# Patient Record
Sex: MALE | Race: WHITE | NOT HISPANIC OR LATINO
[De-identification: names, ages, dates, MRNs, and addresses within clinical notes are randomized per-mention and may not be internally consistent; named-entity substitution may affect disease eponyms.]

---

## 2022-12-08 PROBLEM — Z00.00 ENCOUNTER FOR PREVENTIVE HEALTH EXAMINATION: Status: ACTIVE | Noted: 2022-12-08

## 2022-12-12 ENCOUNTER — APPOINTMENT (OUTPATIENT)
Dept: CT IMAGING | Facility: CLINIC | Age: 87
End: 2022-12-12

## 2022-12-12 PROCEDURE — 70450 CT HEAD/BRAIN W/O DYE: CPT | Mod: MH

## 2023-12-04 ENCOUNTER — EMERGENCY (EMERGENCY)
Facility: HOSPITAL | Age: 88
LOS: 1 days | Discharge: HOME | End: 2023-12-04
Attending: EMERGENCY MEDICINE | Admitting: EMERGENCY MEDICINE
Payer: MEDICARE

## 2023-12-04 VITALS
SYSTOLIC BLOOD PRESSURE: 127 MMHG | TEMPERATURE: 98 F | DIASTOLIC BLOOD PRESSURE: 75 MMHG | HEART RATE: 58 BPM | RESPIRATION RATE: 16 BRPM | OXYGEN SATURATION: 95 %

## 2023-12-04 VITALS
HEIGHT: 66 IN | HEART RATE: 62 BPM | WEIGHT: 147.93 LBS | DIASTOLIC BLOOD PRESSURE: 77 MMHG | TEMPERATURE: 98 F | SYSTOLIC BLOOD PRESSURE: 132 MMHG | OXYGEN SATURATION: 92 % | RESPIRATION RATE: 18 BRPM

## 2023-12-04 DIAGNOSIS — Z20.822 CONTACT WITH AND (SUSPECTED) EXPOSURE TO COVID-19: ICD-10-CM

## 2023-12-04 DIAGNOSIS — R79.89 OTHER SPECIFIED ABNORMAL FINDINGS OF BLOOD CHEMISTRY: ICD-10-CM

## 2023-12-04 DIAGNOSIS — E11.9 TYPE 2 DIABETES MELLITUS WITHOUT COMPLICATIONS: ICD-10-CM

## 2023-12-04 DIAGNOSIS — E86.0 DEHYDRATION: ICD-10-CM

## 2023-12-04 DIAGNOSIS — R53.1 WEAKNESS: ICD-10-CM

## 2023-12-04 DIAGNOSIS — N30.90 CYSTITIS, UNSPECIFIED WITHOUT HEMATURIA: ICD-10-CM

## 2023-12-04 LAB
ALBUMIN SERPL ELPH-MCNC: 3.6 G/DL — SIGNIFICANT CHANGE UP (ref 3.3–5)
ALBUMIN SERPL ELPH-MCNC: 3.6 G/DL — SIGNIFICANT CHANGE UP (ref 3.3–5)
ALP SERPL-CCNC: 56 U/L — SIGNIFICANT CHANGE UP (ref 40–120)
ALP SERPL-CCNC: 56 U/L — SIGNIFICANT CHANGE UP (ref 40–120)
ALT FLD-CCNC: 9 U/L — LOW (ref 10–45)
ALT FLD-CCNC: 9 U/L — LOW (ref 10–45)
ANION GAP SERPL CALC-SCNC: 13 MMOL/L — SIGNIFICANT CHANGE UP (ref 5–17)
ANION GAP SERPL CALC-SCNC: 13 MMOL/L — SIGNIFICANT CHANGE UP (ref 5–17)
APPEARANCE UR: ABNORMAL
APPEARANCE UR: ABNORMAL
AST SERPL-CCNC: 15 U/L — SIGNIFICANT CHANGE UP (ref 10–40)
AST SERPL-CCNC: 15 U/L — SIGNIFICANT CHANGE UP (ref 10–40)
BACTERIA # UR AUTO: ABNORMAL /HPF
BACTERIA # UR AUTO: ABNORMAL /HPF
BASOPHILS # BLD AUTO: 0.02 K/UL — SIGNIFICANT CHANGE UP (ref 0–0.2)
BASOPHILS # BLD AUTO: 0.02 K/UL — SIGNIFICANT CHANGE UP (ref 0–0.2)
BASOPHILS NFR BLD AUTO: 0.3 % — SIGNIFICANT CHANGE UP (ref 0–2)
BASOPHILS NFR BLD AUTO: 0.3 % — SIGNIFICANT CHANGE UP (ref 0–2)
BILIRUB SERPL-MCNC: 0.5 MG/DL — SIGNIFICANT CHANGE UP (ref 0.2–1.2)
BILIRUB SERPL-MCNC: 0.5 MG/DL — SIGNIFICANT CHANGE UP (ref 0.2–1.2)
BILIRUB UR-MCNC: NEGATIVE — SIGNIFICANT CHANGE UP
BILIRUB UR-MCNC: NEGATIVE — SIGNIFICANT CHANGE UP
BUN SERPL-MCNC: 35 MG/DL — HIGH (ref 7–23)
BUN SERPL-MCNC: 35 MG/DL — HIGH (ref 7–23)
CALCIUM SERPL-MCNC: 9.5 MG/DL — SIGNIFICANT CHANGE UP (ref 8.4–10.5)
CALCIUM SERPL-MCNC: 9.5 MG/DL — SIGNIFICANT CHANGE UP (ref 8.4–10.5)
CAST: 4 /LPF — SIGNIFICANT CHANGE UP (ref 0–4)
CAST: 4 /LPF — SIGNIFICANT CHANGE UP (ref 0–4)
CHLORIDE SERPL-SCNC: 100 MMOL/L — SIGNIFICANT CHANGE UP (ref 96–108)
CHLORIDE SERPL-SCNC: 100 MMOL/L — SIGNIFICANT CHANGE UP (ref 96–108)
CO2 SERPL-SCNC: 26 MMOL/L — SIGNIFICANT CHANGE UP (ref 22–31)
CO2 SERPL-SCNC: 26 MMOL/L — SIGNIFICANT CHANGE UP (ref 22–31)
COLOR SPEC: YELLOW — SIGNIFICANT CHANGE UP
COLOR SPEC: YELLOW — SIGNIFICANT CHANGE UP
CREAT SERPL-MCNC: 1.35 MG/DL — HIGH (ref 0.5–1.3)
CREAT SERPL-MCNC: 1.35 MG/DL — HIGH (ref 0.5–1.3)
DIFF PNL FLD: ABNORMAL
DIFF PNL FLD: ABNORMAL
EGFR: 46 ML/MIN/1.73M2 — LOW
EGFR: 46 ML/MIN/1.73M2 — LOW
EOSINOPHIL # BLD AUTO: 0.06 K/UL — SIGNIFICANT CHANGE UP (ref 0–0.5)
EOSINOPHIL # BLD AUTO: 0.06 K/UL — SIGNIFICANT CHANGE UP (ref 0–0.5)
EOSINOPHIL NFR BLD AUTO: 0.9 % — SIGNIFICANT CHANGE UP (ref 0–6)
EOSINOPHIL NFR BLD AUTO: 0.9 % — SIGNIFICANT CHANGE UP (ref 0–6)
FLUAV AG NPH QL: SIGNIFICANT CHANGE UP
FLUAV AG NPH QL: SIGNIFICANT CHANGE UP
FLUBV AG NPH QL: SIGNIFICANT CHANGE UP
FLUBV AG NPH QL: SIGNIFICANT CHANGE UP
GLUCOSE SERPL-MCNC: 188 MG/DL — HIGH (ref 70–99)
GLUCOSE SERPL-MCNC: 188 MG/DL — HIGH (ref 70–99)
GLUCOSE UR QL: >=1000 MG/DL
GLUCOSE UR QL: >=1000 MG/DL
HCT VFR BLD CALC: 38.4 % — LOW (ref 39–50)
HCT VFR BLD CALC: 38.4 % — LOW (ref 39–50)
HGB BLD-MCNC: 12.9 G/DL — LOW (ref 13–17)
HGB BLD-MCNC: 12.9 G/DL — LOW (ref 13–17)
IMM GRANULOCYTES NFR BLD AUTO: 0.3 % — SIGNIFICANT CHANGE UP (ref 0–0.9)
IMM GRANULOCYTES NFR BLD AUTO: 0.3 % — SIGNIFICANT CHANGE UP (ref 0–0.9)
KETONES UR-MCNC: NEGATIVE MG/DL — SIGNIFICANT CHANGE UP
KETONES UR-MCNC: NEGATIVE MG/DL — SIGNIFICANT CHANGE UP
LEUKOCYTE ESTERASE UR-ACNC: ABNORMAL
LEUKOCYTE ESTERASE UR-ACNC: ABNORMAL
LYMPHOCYTES # BLD AUTO: 1.11 K/UL — SIGNIFICANT CHANGE UP (ref 1–3.3)
LYMPHOCYTES # BLD AUTO: 1.11 K/UL — SIGNIFICANT CHANGE UP (ref 1–3.3)
LYMPHOCYTES # BLD AUTO: 16.3 % — SIGNIFICANT CHANGE UP (ref 13–44)
LYMPHOCYTES # BLD AUTO: 16.3 % — SIGNIFICANT CHANGE UP (ref 13–44)
MCHC RBC-ENTMCNC: 30.4 PG — SIGNIFICANT CHANGE UP (ref 27–34)
MCHC RBC-ENTMCNC: 30.4 PG — SIGNIFICANT CHANGE UP (ref 27–34)
MCHC RBC-ENTMCNC: 33.6 GM/DL — SIGNIFICANT CHANGE UP (ref 32–36)
MCHC RBC-ENTMCNC: 33.6 GM/DL — SIGNIFICANT CHANGE UP (ref 32–36)
MCV RBC AUTO: 90.6 FL — SIGNIFICANT CHANGE UP (ref 80–100)
MCV RBC AUTO: 90.6 FL — SIGNIFICANT CHANGE UP (ref 80–100)
MONOCYTES # BLD AUTO: 0.67 K/UL — SIGNIFICANT CHANGE UP (ref 0–0.9)
MONOCYTES # BLD AUTO: 0.67 K/UL — SIGNIFICANT CHANGE UP (ref 0–0.9)
MONOCYTES NFR BLD AUTO: 9.8 % — SIGNIFICANT CHANGE UP (ref 2–14)
MONOCYTES NFR BLD AUTO: 9.8 % — SIGNIFICANT CHANGE UP (ref 2–14)
NEUTROPHILS # BLD AUTO: 4.94 K/UL — SIGNIFICANT CHANGE UP (ref 1.8–7.4)
NEUTROPHILS # BLD AUTO: 4.94 K/UL — SIGNIFICANT CHANGE UP (ref 1.8–7.4)
NEUTROPHILS NFR BLD AUTO: 72.4 % — SIGNIFICANT CHANGE UP (ref 43–77)
NEUTROPHILS NFR BLD AUTO: 72.4 % — SIGNIFICANT CHANGE UP (ref 43–77)
NITRITE UR-MCNC: NEGATIVE — SIGNIFICANT CHANGE UP
NITRITE UR-MCNC: NEGATIVE — SIGNIFICANT CHANGE UP
NRBC # BLD: 0 /100 WBCS — SIGNIFICANT CHANGE UP (ref 0–0)
NRBC # BLD: 0 /100 WBCS — SIGNIFICANT CHANGE UP (ref 0–0)
PH UR: 7 — SIGNIFICANT CHANGE UP (ref 5–8)
PH UR: 7 — SIGNIFICANT CHANGE UP (ref 5–8)
PLATELET # BLD AUTO: 229 K/UL — SIGNIFICANT CHANGE UP (ref 150–400)
PLATELET # BLD AUTO: 229 K/UL — SIGNIFICANT CHANGE UP (ref 150–400)
POTASSIUM SERPL-MCNC: 4.8 MMOL/L — SIGNIFICANT CHANGE UP (ref 3.5–5.3)
POTASSIUM SERPL-MCNC: 4.8 MMOL/L — SIGNIFICANT CHANGE UP (ref 3.5–5.3)
POTASSIUM SERPL-SCNC: 4.8 MMOL/L — SIGNIFICANT CHANGE UP (ref 3.5–5.3)
POTASSIUM SERPL-SCNC: 4.8 MMOL/L — SIGNIFICANT CHANGE UP (ref 3.5–5.3)
PROT SERPL-MCNC: 8.1 G/DL — SIGNIFICANT CHANGE UP (ref 6–8.3)
PROT SERPL-MCNC: 8.1 G/DL — SIGNIFICANT CHANGE UP (ref 6–8.3)
PROT UR-MCNC: 30 MG/DL
PROT UR-MCNC: 30 MG/DL
RBC # BLD: 4.24 M/UL — SIGNIFICANT CHANGE UP (ref 4.2–5.8)
RBC # BLD: 4.24 M/UL — SIGNIFICANT CHANGE UP (ref 4.2–5.8)
RBC # FLD: 14.2 % — SIGNIFICANT CHANGE UP (ref 10.3–14.5)
RBC # FLD: 14.2 % — SIGNIFICANT CHANGE UP (ref 10.3–14.5)
RBC CASTS # UR COMP ASSIST: 1 /HPF — SIGNIFICANT CHANGE UP (ref 0–4)
RBC CASTS # UR COMP ASSIST: 1 /HPF — SIGNIFICANT CHANGE UP (ref 0–4)
RSV RNA NPH QL NAA+NON-PROBE: SIGNIFICANT CHANGE UP
RSV RNA NPH QL NAA+NON-PROBE: SIGNIFICANT CHANGE UP
SARS-COV-2 RNA SPEC QL NAA+PROBE: SIGNIFICANT CHANGE UP
SARS-COV-2 RNA SPEC QL NAA+PROBE: SIGNIFICANT CHANGE UP
SODIUM SERPL-SCNC: 139 MMOL/L — SIGNIFICANT CHANGE UP (ref 135–145)
SODIUM SERPL-SCNC: 139 MMOL/L — SIGNIFICANT CHANGE UP (ref 135–145)
SP GR SPEC: 1.02 — SIGNIFICANT CHANGE UP (ref 1–1.03)
SP GR SPEC: 1.02 — SIGNIFICANT CHANGE UP (ref 1–1.03)
SQUAMOUS # UR AUTO: 1 /HPF — SIGNIFICANT CHANGE UP (ref 0–5)
SQUAMOUS # UR AUTO: 1 /HPF — SIGNIFICANT CHANGE UP (ref 0–5)
UROBILINOGEN FLD QL: 0.2 MG/DL — SIGNIFICANT CHANGE UP (ref 0.2–1)
UROBILINOGEN FLD QL: 0.2 MG/DL — SIGNIFICANT CHANGE UP (ref 0.2–1)
WBC # BLD: 6.82 K/UL — SIGNIFICANT CHANGE UP (ref 3.8–10.5)
WBC # BLD: 6.82 K/UL — SIGNIFICANT CHANGE UP (ref 3.8–10.5)
WBC # FLD AUTO: 6.82 K/UL — SIGNIFICANT CHANGE UP (ref 3.8–10.5)
WBC # FLD AUTO: 6.82 K/UL — SIGNIFICANT CHANGE UP (ref 3.8–10.5)
WBC UR QL: 605 /HPF — HIGH (ref 0–5)
WBC UR QL: 605 /HPF — HIGH (ref 0–5)
YEAST-LIKE CELLS: PRESENT
YEAST-LIKE CELLS: PRESENT

## 2023-12-04 PROCEDURE — 85025 COMPLETE CBC W/AUTO DIFF WBC: CPT

## 2023-12-04 PROCEDURE — 87086 URINE CULTURE/COLONY COUNT: CPT

## 2023-12-04 PROCEDURE — 71045 X-RAY EXAM CHEST 1 VIEW: CPT | Mod: 26

## 2023-12-04 PROCEDURE — 81001 URINALYSIS AUTO W/SCOPE: CPT

## 2023-12-04 PROCEDURE — 36415 COLL VENOUS BLD VENIPUNCTURE: CPT

## 2023-12-04 PROCEDURE — 96374 THER/PROPH/DIAG INJ IV PUSH: CPT

## 2023-12-04 PROCEDURE — 99284 EMERGENCY DEPT VISIT MOD MDM: CPT | Mod: 25

## 2023-12-04 PROCEDURE — 99284 EMERGENCY DEPT VISIT MOD MDM: CPT

## 2023-12-04 PROCEDURE — 71045 X-RAY EXAM CHEST 1 VIEW: CPT

## 2023-12-04 PROCEDURE — 80053 COMPREHEN METABOLIC PANEL: CPT

## 2023-12-04 PROCEDURE — 87637 SARSCOV2&INF A&B&RSV AMP PRB: CPT

## 2023-12-04 RX ORDER — SODIUM CHLORIDE 9 MG/ML
1000 INJECTION INTRAMUSCULAR; INTRAVENOUS; SUBCUTANEOUS ONCE
Refills: 0 | Status: COMPLETED | OUTPATIENT
Start: 2023-12-04 | End: 2023-12-04

## 2023-12-04 RX ORDER — CEFTRIAXONE 500 MG/1
1000 INJECTION, POWDER, FOR SOLUTION INTRAMUSCULAR; INTRAVENOUS ONCE
Refills: 0 | Status: COMPLETED | OUTPATIENT
Start: 2023-12-04 | End: 2023-12-04

## 2023-12-04 RX ADMIN — SODIUM CHLORIDE 1000 MILLILITER(S): 9 INJECTION INTRAMUSCULAR; INTRAVENOUS; SUBCUTANEOUS at 13:33

## 2023-12-04 RX ADMIN — CEFTRIAXONE 100 MILLIGRAM(S): 500 INJECTION, POWDER, FOR SOLUTION INTRAMUSCULAR; INTRAVENOUS at 14:12

## 2023-12-04 NOTE — ED PROVIDER NOTE - PATIENT PORTAL LINK FT
You can access the FollowMyHealth Patient Portal offered by [facility] by registering at the following website: [email or web address]. By joining [employer]’s FollowMyHealth portal, you will also be able to view your health information using other applications (apps) compatible with our system. You can access the FollowMyHealth Patient Portal offered by [facility] by registering at the following website: [email or web address]. By joining [employer]’s FollowMyHealth portal, you will also be able to view your health information using other applications (apps) compatible with our system.

## 2023-12-04 NOTE — ED ADULT NURSE NOTE - NSFALLHARMRISKINTERV_ED_ALL_ED
Assistance OOB with selected safe patient handling equipment if applicable/Assistance with ambulation/Communicate risk of Fall with Harm to all staff, patient, and family/Monitor gait and stability/Provide patient with walking aids/Provide visual cue: red socks, yellow wristband, yellow gown, etc/Reinforce activity limits and safety measures with patient and family/Bed in lowest position, wheels locked, appropriate side rails in place/Call bell, personal items and telephone in reach/Instruct patient to call for assistance before getting out of bed/chair/stretcher/Non-slip footwear applied when patient is off stretcher/[place] to call system/Physically safe environment - no spills, clutter or unnecessary equipment/Purposeful Proactive Rounding/Room/bathroom lighting operational, light cord in reach Assistance OOB with selected safe patient handling equipment if applicable/Assistance with ambulation/Communicate risk of Fall with Harm to all staff, patient, and family/Monitor gait and stability/Provide patient with walking aids/Provide visual cue: red socks, yellow wristband, yellow gown, etc/Reinforce activity limits and safety measures with patient and family/Bed in lowest position, wheels locked, appropriate side rails in place/Call bell, personal items and telephone in reach/Instruct patient to call for assistance before getting out of bed/chair/stretcher/Non-slip footwear applied when patient is off stretcher/[place] to call system/Physically safe environment - no spills, clutter or unnecessary equipment/Purposeful Proactive Rounding/Room/bathroom lighting operational, light cord in reach

## 2023-12-04 NOTE — ED PROVIDER NOTE - IV ALTEPLASE EXCL ABS HIDDEN
[place] filter in place  2009  H/O sinus surgery  2/2017  H/O Spinal surgery  2009  S/P Appendectomy  1968  S/P Cholecystectomy  1968  S/P Hysterectomy  1987  S/P TKR (total knee replacement)  Left 2009  S/P TKR (Total Knee Replacement)  2015 show [place] filter in place  2009  H/O sinus surgery  2/2017  H/O Spinal surgery  2009  S/P Appendectomy  1968  S/P Cholecystectomy  1968  S/P Hysterectomy  1987  S/P TKR (total knee replacement)  Left 2009  S/P TKR (Total Knee Replacement)  Right 2015

## 2023-12-04 NOTE — ED ADULT NURSE NOTE - OBJECTIVE STATEMENT
Patient aaox4 c/o generalized weakness x 1 day, son states referred by MD to ED for evaluation of possible uti. Speaking in full sentences without difficulty. IV initiated, labs collected and sent. Safety measures initiated, comfort measures rendered.

## 2023-12-04 NOTE — ED PROVIDER NOTE - OBJECTIVE STATEMENT
89 M ho DM, co weakness- increased fatigue with loss of bladder control x 1 week- no back pain- no f/c  tolerating po normally no n/v no f/c  mild-mod severity  px lives alone! and takes care of himself  no sig exac/allev factors

## 2023-12-04 NOTE — ED PROVIDER NOTE - CLINICAL SUMMARY MEDICAL DECISION MAKING FREE TEXT BOX
89 m w increased fatigue- px and son denies fever- ua + - bun/cr elevated for px- will give IVF bolus and ceftriaxone IV 89 m w increased fatigue- px and son denies fever- ua + - bun/cr elevated for px- will give IVF bolus and ceftriaxone IV  px looks well feels well- d/w son and nephew- Dr Huggins- wants to go home- Dr DI loomis fu daily , give abx and check on px

## 2023-12-04 NOTE — ED PROVIDER NOTE - NSFOLLOWUPINSTRUCTIONS_ED_ALL_ED_FT
Urinary Tract Infection, Adult    A urinary tract infection (UTI) is an infection of any part of the urinary tract. The urinary tract includes:  The kidneys.  The ureters.  The bladder.  The urethra.  These organs make, store, and get rid of pee (urine) in the body.    What are the causes?  This infection is caused by germs (bacteria) in your genital area. These germs grow and cause swelling (inflammation) of your urinary tract.    What increases the risk?  The following factors may make you more likely to develop this condition:  Using a small, thin tube (catheter) to drain pee.  Not being able to control when you pee or poop (incontinence).  Being female. If you are female, these things can increase the risk:  Using these methods to prevent pregnancy:  A medicine that kills sperm (spermicide).  A device that blocks sperm (diaphragm).  Having low levels of a female hormone (estrogen).  Being pregnant.  You are more likely to develop this condition if:  You have genes that add to your risk.  You are sexually active.  You take antibiotic medicines.  You have trouble peeing because of:  A prostate that is bigger than normal, if you are male.  A blockage in the part of your body that drains pee from the bladder.  A kidney stone.  A nerve condition that affects your bladder.  Not getting enough to drink.  Not peeing often enough.  You have other conditions, such as:  Diabetes.  A weak disease-fighting system (immune system).  Sickle cell disease.  Gout.  Injury of the spine.  What are the signs or symptoms?  Symptoms of this condition include:  Needing to pee right away.  Peeing small amounts often.  Pain or burning when peeing.  Blood in the pee.  Pee that smells bad or not like normal.  Trouble peeing.  Pee that is cloudy.  Fluid coming from the vagina, if you are female.  Pain in the belly or lower back.  Other symptoms include:  Vomiting.  Not feeling hungry.  Feeling mixed up (confused). This may be the first symptom in older adults.  Being tired and grouchy (irritable).  A fever.  Watery poop (diarrhea).  How is this treated?  Taking antibiotic medicine.  Taking other medicines.  Drinking enough water.  In some cases, you may need to see a specialist.    Follow these instructions at home:    Medicines    Take over-the-counter and prescription medicines only as told by your doctor.  If you were prescribed an antibiotic medicine, take it as told by your doctor. Do not stop taking it even if you start to feel better.  General instructions    Make sure you:  Pee until your bladder is empty.  Do not hold pee for a long time.  Empty your bladder after sex.  Wipe from front to back after peeing or pooping if you are a female. Use each tissue one time when you wipe.  Drink enough fluid to keep your pee pale yellow.  Keep all follow-up visits.  Contact a doctor if:  You do not get better after 1–2 days.  Your symptoms go away and then come back.  Get help right away if:  You have very bad back pain.  You have very bad pain in your lower belly.  You have a fever.  You have chills.  You feeling like you will vomit or you vomit.  Summary  A urinary tract infection (UTI) is an infection of any part of the urinary tract.  This condition is caused by germs in your genital area.  There are many risk factors for a UTI.  Treatment includes antibiotic medicines.  Drink enough fluid to keep your pee pale yellow.  This information is not intended to replace advice given to you by your health care provider. Make sure you discuss any questions you have with your health care provider.    Document Revised: 07/30/2021 Document Reviewed: 07/30/2021

## 2023-12-06 LAB
CULTURE RESULTS: SIGNIFICANT CHANGE UP
CULTURE RESULTS: SIGNIFICANT CHANGE UP
SPECIMEN SOURCE: SIGNIFICANT CHANGE UP
SPECIMEN SOURCE: SIGNIFICANT CHANGE UP

## 2024-01-08 ENCOUNTER — INPATIENT (INPATIENT)
Facility: HOSPITAL | Age: 89
LOS: 7 days | Discharge: NURSING, REHAB OR LONG-TERM CARE | DRG: 637 | End: 2024-01-16
Attending: INTERNAL MEDICINE | Admitting: INTERNAL MEDICINE
Payer: MEDICARE

## 2024-01-08 VITALS
OXYGEN SATURATION: 96 % | WEIGHT: 145.06 LBS | HEIGHT: 66 IN | SYSTOLIC BLOOD PRESSURE: 124 MMHG | DIASTOLIC BLOOD PRESSURE: 74 MMHG | RESPIRATION RATE: 18 BRPM | HEART RATE: 71 BPM | TEMPERATURE: 97 F

## 2024-01-08 LAB
ANION GAP SERPL CALC-SCNC: 15 MMOL/L — SIGNIFICANT CHANGE UP (ref 5–17)
ANION GAP SERPL CALC-SCNC: 15 MMOL/L — SIGNIFICANT CHANGE UP (ref 5–17)
BASOPHILS # BLD AUTO: 0.01 K/UL — SIGNIFICANT CHANGE UP (ref 0–0.2)
BASOPHILS # BLD AUTO: 0.01 K/UL — SIGNIFICANT CHANGE UP (ref 0–0.2)
BASOPHILS NFR BLD AUTO: 0.1 % — SIGNIFICANT CHANGE UP (ref 0–2)
BASOPHILS NFR BLD AUTO: 0.1 % — SIGNIFICANT CHANGE UP (ref 0–2)
BUN SERPL-MCNC: 48 MG/DL — HIGH (ref 7–23)
BUN SERPL-MCNC: 48 MG/DL — HIGH (ref 7–23)
CALCIUM SERPL-MCNC: 9.4 MG/DL — SIGNIFICANT CHANGE UP (ref 8.4–10.5)
CALCIUM SERPL-MCNC: 9.4 MG/DL — SIGNIFICANT CHANGE UP (ref 8.4–10.5)
CHLORIDE SERPL-SCNC: 99 MMOL/L — SIGNIFICANT CHANGE UP (ref 96–108)
CHLORIDE SERPL-SCNC: 99 MMOL/L — SIGNIFICANT CHANGE UP (ref 96–108)
CO2 SERPL-SCNC: 22 MMOL/L — SIGNIFICANT CHANGE UP (ref 22–31)
CO2 SERPL-SCNC: 22 MMOL/L — SIGNIFICANT CHANGE UP (ref 22–31)
CREAT SERPL-MCNC: 2.94 MG/DL — HIGH (ref 0.5–1.3)
CREAT SERPL-MCNC: 2.94 MG/DL — HIGH (ref 0.5–1.3)
EGFR: 18 ML/MIN/1.73M2 — LOW
EGFR: 18 ML/MIN/1.73M2 — LOW
EOSINOPHIL # BLD AUTO: 0.02 K/UL — SIGNIFICANT CHANGE UP (ref 0–0.5)
EOSINOPHIL # BLD AUTO: 0.02 K/UL — SIGNIFICANT CHANGE UP (ref 0–0.5)
EOSINOPHIL NFR BLD AUTO: 0.3 % — SIGNIFICANT CHANGE UP (ref 0–6)
EOSINOPHIL NFR BLD AUTO: 0.3 % — SIGNIFICANT CHANGE UP (ref 0–6)
GLUCOSE SERPL-MCNC: 34 MG/DL — CRITICAL LOW (ref 70–99)
GLUCOSE SERPL-MCNC: 34 MG/DL — CRITICAL LOW (ref 70–99)
HCT VFR BLD CALC: 32.5 % — LOW (ref 39–50)
HCT VFR BLD CALC: 32.5 % — LOW (ref 39–50)
HGB BLD-MCNC: 10.8 G/DL — LOW (ref 13–17)
HGB BLD-MCNC: 10.8 G/DL — LOW (ref 13–17)
IMM GRANULOCYTES NFR BLD AUTO: 0.4 % — SIGNIFICANT CHANGE UP (ref 0–0.9)
IMM GRANULOCYTES NFR BLD AUTO: 0.4 % — SIGNIFICANT CHANGE UP (ref 0–0.9)
LYMPHOCYTES # BLD AUTO: 0.93 K/UL — LOW (ref 1–3.3)
LYMPHOCYTES # BLD AUTO: 0.93 K/UL — LOW (ref 1–3.3)
LYMPHOCYTES # BLD AUTO: 13.6 % — SIGNIFICANT CHANGE UP (ref 13–44)
LYMPHOCYTES # BLD AUTO: 13.6 % — SIGNIFICANT CHANGE UP (ref 13–44)
MCHC RBC-ENTMCNC: 30.2 PG — SIGNIFICANT CHANGE UP (ref 27–34)
MCHC RBC-ENTMCNC: 30.2 PG — SIGNIFICANT CHANGE UP (ref 27–34)
MCHC RBC-ENTMCNC: 33.2 GM/DL — SIGNIFICANT CHANGE UP (ref 32–36)
MCHC RBC-ENTMCNC: 33.2 GM/DL — SIGNIFICANT CHANGE UP (ref 32–36)
MCV RBC AUTO: 90.8 FL — SIGNIFICANT CHANGE UP (ref 80–100)
MCV RBC AUTO: 90.8 FL — SIGNIFICANT CHANGE UP (ref 80–100)
MONOCYTES # BLD AUTO: 0.6 K/UL — SIGNIFICANT CHANGE UP (ref 0–0.9)
MONOCYTES # BLD AUTO: 0.6 K/UL — SIGNIFICANT CHANGE UP (ref 0–0.9)
MONOCYTES NFR BLD AUTO: 8.7 % — SIGNIFICANT CHANGE UP (ref 2–14)
MONOCYTES NFR BLD AUTO: 8.7 % — SIGNIFICANT CHANGE UP (ref 2–14)
NEUTROPHILS # BLD AUTO: 5.27 K/UL — SIGNIFICANT CHANGE UP (ref 1.8–7.4)
NEUTROPHILS # BLD AUTO: 5.27 K/UL — SIGNIFICANT CHANGE UP (ref 1.8–7.4)
NEUTROPHILS NFR BLD AUTO: 76.9 % — SIGNIFICANT CHANGE UP (ref 43–77)
NEUTROPHILS NFR BLD AUTO: 76.9 % — SIGNIFICANT CHANGE UP (ref 43–77)
NRBC # BLD: 0 /100 WBCS — SIGNIFICANT CHANGE UP (ref 0–0)
NRBC # BLD: 0 /100 WBCS — SIGNIFICANT CHANGE UP (ref 0–0)
PLATELET # BLD AUTO: 210 K/UL — SIGNIFICANT CHANGE UP (ref 150–400)
PLATELET # BLD AUTO: 210 K/UL — SIGNIFICANT CHANGE UP (ref 150–400)
POTASSIUM SERPL-MCNC: 4 MMOL/L — SIGNIFICANT CHANGE UP (ref 3.5–5.3)
POTASSIUM SERPL-MCNC: 4 MMOL/L — SIGNIFICANT CHANGE UP (ref 3.5–5.3)
POTASSIUM SERPL-SCNC: 4 MMOL/L — SIGNIFICANT CHANGE UP (ref 3.5–5.3)
POTASSIUM SERPL-SCNC: 4 MMOL/L — SIGNIFICANT CHANGE UP (ref 3.5–5.3)
RBC # BLD: 3.58 M/UL — LOW (ref 4.2–5.8)
RBC # BLD: 3.58 M/UL — LOW (ref 4.2–5.8)
RBC # FLD: 14 % — SIGNIFICANT CHANGE UP (ref 10.3–14.5)
RBC # FLD: 14 % — SIGNIFICANT CHANGE UP (ref 10.3–14.5)
SODIUM SERPL-SCNC: 136 MMOL/L — SIGNIFICANT CHANGE UP (ref 135–145)
SODIUM SERPL-SCNC: 136 MMOL/L — SIGNIFICANT CHANGE UP (ref 135–145)
WBC # BLD: 6.86 K/UL — SIGNIFICANT CHANGE UP (ref 3.8–10.5)
WBC # BLD: 6.86 K/UL — SIGNIFICANT CHANGE UP (ref 3.8–10.5)
WBC # FLD AUTO: 6.86 K/UL — SIGNIFICANT CHANGE UP (ref 3.8–10.5)
WBC # FLD AUTO: 6.86 K/UL — SIGNIFICANT CHANGE UP (ref 3.8–10.5)

## 2024-01-08 PROCEDURE — 99222 1ST HOSP IP/OBS MODERATE 55: CPT | Mod: GC

## 2024-01-08 PROCEDURE — 99285 EMERGENCY DEPT VISIT HI MDM: CPT | Mod: FS

## 2024-01-08 RX ORDER — CHLORHEXIDINE GLUCONATE 213 G/1000ML
1 SOLUTION TOPICAL
Refills: 0 | Status: DISCONTINUED | OUTPATIENT
Start: 2024-01-08 | End: 2024-01-16

## 2024-01-08 RX ORDER — DEXTROSE 10 % IN WATER 10 %
1000 INTRAVENOUS SOLUTION INTRAVENOUS
Refills: 0 | Status: DISCONTINUED | OUTPATIENT
Start: 2024-01-08 | End: 2024-01-09

## 2024-01-08 RX ORDER — DEXTROSE 10 % IN WATER 10 %
1000 INTRAVENOUS SOLUTION INTRAVENOUS
Refills: 0 | Status: DISCONTINUED | OUTPATIENT
Start: 2024-01-08 | End: 2024-01-08

## 2024-01-08 RX ORDER — DEXTROSE 50 % IN WATER 50 %
25 SYRINGE (ML) INTRAVENOUS ONCE
Refills: 0 | Status: COMPLETED | OUTPATIENT
Start: 2024-01-08 | End: 2024-01-08

## 2024-01-08 RX ORDER — SODIUM CHLORIDE 9 MG/ML
1000 INJECTION INTRAMUSCULAR; INTRAVENOUS; SUBCUTANEOUS ONCE
Refills: 0 | Status: COMPLETED | OUTPATIENT
Start: 2024-01-08 | End: 2024-01-08

## 2024-01-08 RX ADMIN — Medication 25 MILLILITER(S): at 22:00

## 2024-01-08 NOTE — H&P ADULT - NSHPLABSRESULTS_GEN_ALL_CORE
LABS:                         10.8   6.86  )-----------( 210      ( 08 Jan 2024 21:53 )             32.5     01-08    136  |  99  |  48<H>  ----------------------------<  34<LL>  4.0   |  22  |  2.94<H>    Ca    9.4      08 Jan 2024 21:53        Urinalysis Basic - ( 08 Jan 2024 21:53 )    Color: x / Appearance: x / SG: x / pH: x  Gluc: 34 mg/dL / Ketone: x  / Bili: x / Urobili: x   Blood: x / Protein: x / Nitrite: x   Leuk Esterase: x / RBC: x / WBC x   Sq Epi: x / Non Sq Epi: x / Bacteria: x            RADIOLOGY, EKG & ADDITIONAL TESTS: Reviewed.

## 2024-01-08 NOTE — ED PROVIDER NOTE - OBJECTIVE STATEMENT
89 yr old male, history of DM,   previously on oral medications for DM, most recently farxiga. has had two UTIs in last few months. PMD thought possibly triggered from farxiga so stopped that and started him on levemir insulin. 10un in morning and 5un at night. has been taking for **    also currently on levaquin for uti. 89 yr old male, history of DM, presents to the Emergency Department w hypoglycemia. previously on oral medications for DM, most recently farxiga. has had two UTIs in last few months. PMD thought possibly triggered from farxiga so stopped that and started him on levemir insulin. 10un in morning and 5un at night. tonight around 5pm, got evening insulin, around 7pm son found pt to be confused, sweaty. gave pt honey and called ems. by the time ems arrived BG was ok and mental status had improved.   pt currently on levaquin for uti. no fever, cp, sob, abd pain, n/v/d, urinary symptoms.

## 2024-01-08 NOTE — ED ADULT NURSE REASSESSMENT NOTE - NS ED NURSE REASSESS COMMENT FT1
Pt is sleeping on stretcher comfortably. Denies any discomfort. No agitation noted. Family members are on bedside.

## 2024-01-08 NOTE — ED ADULT TRIAGE NOTE - CHIEF COMPLAINT QUOTE
Pt family reports pt had AMS, slurred speech earlier tonight, pt was given honey by mouth symptoms resolved. EMS reports bgl of 92 post honey. pt denies any pain, no numbness/tingling/weakness, no slurred speech. Pt recently started on insulin 3 days ago, received 10u in the morning and 5units tonight around 5pm. Pt family reports decreased appetite and recent dx of UTI.

## 2024-01-08 NOTE — H&P ADULT - ASSESSMENT
89 yr old male, history of DM, previously on oral medications for DM, most recently farxiga. has had two UTIs in last few months. PMD thought possibly triggered from farxiga so stopped that and started him on levemir insulin. 10un in morning and 5un at night. has been taking for **also currently on levaquin for uti.      Neuro        Pulm  RENA      CVS  RENA      GI  RENA        Renal  # JULIO CESAR on CKD  pt presents with  BUN 48, Cr 2.94 ( baseline 1.35 ), eGFR 18 (baseline 46), iso hypoglycemia 34 and > 1000 glucose in urine. UA suggestive of UTI.    - Obtain urine studies  - Treat UTI  - Manage hypoglycemia  - Monitor BMP  - Strict I & Os  - Avoid nephrotoxic agents            RENA          Endo  # Hypoglycemia  pt with a Hx of DM recently switched from Farxiga by PCP ( due to recurrent UTI ), to levemir insulin. 10 units in morning and 5 units at night. Found to have a blood glucose of 34, urine Glucose > 1000.         ID  # UTI  pt with a Hx of DM recently switched from Farxiga by PCP ( due to recurrent UTI ), to levemir insulin. 10 units in morning and 5 units at night. Currently presenting with UA findings of : positive Leukocyte Esterase, few bacteria, yeast like cells and Glucose > 1000.   - Obtain urine Cx  - Start CTX 1g  - Obtain BCx  - Strict I & Os       Heme/Onc  RENA      Prophylactic measures    Fluids:  Nutrition:  GI prophylaxis:  DVT prophylaxis:  Code:  Dispo: ICU        89 yr old male, history of DM,  on oral medications for DM, most recently farxiga. has had two UTIs in last few months. PCP  thought possibly triggered from farxiga so stopped that and started him on levemir insulin. 10un in morning and 5un at night. has also currently on levaquin for uti.      Neuro  RENA      Pulm  RENA      CVS  # Lower extremity edema  pt presented with +1 unilateral edema of the left lower extremity. His RLE has no edema. Cause unknown. No known Hx of DVT  - Consider Duplex US       GI  # Poor oral intake  per son , the patient has been eating poorly in the last few days, which may have contributed to his hypoglycemia  - Dietary consult        Renal  # JULIO CESAR on CKD  pt presents with  BUN 48, Cr 2.94 ( baseline 1.35 ), eGFR 18 (baseline 46), iso hypoglycemia 34 and > 1000 glucose in urine. UA suggestive of UTI.    - Obtain urine studies  - Treat UTI  - Manage hypoglycemia  - Monitor BMP  - Strict I & Os  - Avoid nephrotoxic agents            RENA          Endo  # Hypoglycemia  pt with a Hx of DM on ( Metformin 1000 BID, Glimeperide 2mg BID )   recently switched from Farxiga by PCP ( due to recurrent UTI ), to levemir insulin. 10 units in morning and 5 units at night. Found to have a blood glucose of 34, urine Glucose > 1000. S/P D50 50ml + 25ml boluses, D10 1000ml infusion at 30ml/h, 1 L NS bolus in the ED.    - CW D10 30ml/h  - close monitoring of blood sugar  - A1c in the AM  - ISS during inpatient stay  - Holding home meds        ID  # UTI  pt with a Hx of DM recently switched from Farxiga by PCP to levemir insulin. 10 units in morning and 5 units at night.  ( due to recurrent UTI ) the last UTI was diagnosed 5 days ago for which he was prescribed a 10 day course of Levofloxacin (5 days remaining),  Currently presenting with UA findings of : positive Leukocyte Esterase, few bacteria, yeast like cells and Glucose > 1000.     - Finish the Levofloxacin course ( 500mg BID, 5 days remaining )  - Obtain urine Cx  - Obtain BCx  - Strict I & Os       Heme/Onc  RENA      Prophylactic measures    Fluids:  Nutrition:  GI prophylaxis:  DVT prophylaxis:  Code:  Dispo: ICU        89 yr old male, history of DM,  on oral medications for DM, most recently farxiga. has had two UTIs in last few months. PCP  thought possibly triggered from farxiga so stopped that and started him on levemir insulin. 10un in morning and 5un at night. has also currently on levaquin for uti.      Neuro  RENA      Pulm  RENA      CVS  # Lower extremity edema  pt presented with +1 unilateral edema of the left lower extremity. His RLE has no edema. Cause unknown. No known Hx of DVT  - Consider Duplex US     # Pt on a home dose of Atorvastatin 10mg  - C/W home meds once pt passes dysphagia screen      GI  # Poor oral intake  per son , the patient has been eating poorly in the last few days, which may have contributed to his hypoglycemia  - Dietary consult  - NPO till pt pass dysphagia screen      Renal  # JULIO CESAR on CKD  pt presents with  BUN 48, Cr 2.94 ( baseline 1.35 ), eGFR 18 (baseline 46), iso hypoglycemia 34 and > 1000 glucose in urine. UA suggestive of UTI.    - ensure adequate hydration with IV fluids  - Obtain urine studies  - Treat UTI  - Manage hypoglycemia  - Monitor BMP  - Strict I & Os  - Avoid nephrotoxic agents            RENA          Endo  # Hypoglycemia  pt with a Hx of DM on ( Metformin 1000 BID, Glimeperide 2mg BID )   recently switched from Farxiga by PCP ( due to recurrent UTI ), to levemir insulin. 10 units in morning and 5 units at night. Found to have a blood glucose of 34, urine Glucose > 1000. S/P D50 50ml + 25ml boluses, D10 1000ml infusion at 30ml/h, 1 L NS bolus in the ED.    - CW D10 30ml/h  - close monitoring of blood sugar  - A1c in the AM  - ISS during inpatient stay  - Holding home meds        ID  # UTI  pt with a Hx of DM recently switched from Farxiga by PCP to levemir insulin. 10 units in morning and 5 units at night.  ( due to recurrent UTI ) the last UTI was diagnosed 5 days ago for which he was prescribed a 10 day course of Levofloxacin (5 days remaining),  Currently presenting with UA findings of : positive Leukocyte Esterase, few bacteria, yeast like cells and Glucose > 1000.     - Finish the Levofloxacin course ( 500mg BID, 5 days remaining )  - ensure adequate hydration with IV fluids  - Obtain urine Cx  - Obtain BCx  - Strict I & Os       Heme/Onc  RENA      Prophylactic measures    Fluids: D10 30cc/h  Nutrition: NPO till passing dysphagia screen  GI prophylaxis: none  DVT prophylaxis: Heparin 5000 q8  Code: full  Dispo: ICU        89 yr old male, history of DM,  on oral medications for DM, most recently farxiga. has had two UTIs in last few months. PCP  thought possibly triggered from farxiga so stopped that and started him on levemir insulin. 10un in morning and 5un at night. has also currently on levaquin for uti.      Neuro  RENA      Pulm  RENA      CVS  # Lower extremity edema  pt presented with +1 unilateral edema of the left lower extremity. His RLE has no edema. Cause unknown. No known Hx of DVT  - Consider Duplex US     # Pt on a home dose of Atorvastatin 10mg  - C/W home meds once pt passes dysphagia screen      GI  # Poor oral intake  per son , the patient has been eating poorly in the last few days, which may have contributed to his hypoglycemia  - Dietary consult  - NPO till pt pass dysphagia screen      Renal  # JULIO CESAR on CKD  pt presents with  BUN 48, Cr 2.94 ( baseline 1.35 ), eGFR 18 (baseline 46), iso hypoglycemia 34 and > 1000 glucose in urine. UA suggestive of UTI.    - ensure adequate hydration with IV fluids  - Obtain urine studies  - Treat UTI  - Manage hypoglycemia  - Monitor BMP  - Strict I & Os  - Avoid nephrotoxic agents            RENA          Endo  # Hypoglycemia  pt with a Hx of DM on ( Metformin 1000 BID, Glimeperide 2mg BID )   recently switched from Farxiga by PCP ( due to recurrent UTI ), to levemir insulin. 10 units in morning and 5 units at night. Found to have a blood glucose of 34, urine Glucose > 1000. S/P D50 50ml + 25ml boluses, D10 1000ml infusion at 30ml/h, 1 L NS bolus in the ED.    - CW D10 80 ml/h  - C/W LR at 100 ml/h  - close monitoring of blood sugar  - A1c in the AM  - ISS during inpatient stay  - Holding home meds        ID  # UTI  pt with a Hx of DM recently switched from Farxiga by PCP to levemir insulin. 10 units in morning and 5 units at night.  ( due to recurrent UTI ) the last UTI was diagnosed 5 days ago for which he was prescribed a 10 day course of Levofloxacin (5 days remaining),  Currently presenting with UA findings of : positive Leukocyte Esterase, few bacteria, yeast like cells and Glucose > 1000.     - Finish the Levofloxacin course ( 500mg BID, 5 days remaining )  - ensure adequate hydration with IV fluids  - Obtain urine Cx (sent)  - Obtain BCx  - Strict I & Os       Heme/Onc  RENA      Prophylactic measures    Fluids: D10 80cc/h, LR at 100cc/h   Nutrition: NPO till passing dysphagia screen  GI prophylaxis: none  DVT prophylaxis: Heparin 5000 q8  Code: full  Dispo: ICU        89 yr old male, history of DM,  on oral medications for DM, most recently farxiga. has had two UTIs in last few months. PCP  thought possibly triggered from farxiga so stopped that and started him on levemir insulin. 10un in morning and 5un at night. has also currently on levaquin for uti.      Neuro  RENA      Pulm  RENA      CVS  # Lower extremity edema  pt presented with +1 unilateral edema of the left lower extremity. His RLE has no edema. Cause unknown. No known Hx of DVT  - Consider Duplex US     # Pt on a home dose of Atorvastatin 10mg  - C/W home meds once pt passes dysphagia screen      GI  # Poor oral intake  per son , the patient has been eating poorly in the last few days, which may have contributed to his hypoglycemia  - Dietary consult  - NPO till pt pass dysphagia screen      Renal  # JULIO CESAR on CKD  pt presents with  BUN 48, Cr 2.94 ( baseline 1.35 ), eGFR 18 (baseline 46), iso hypoglycemia 34 and > 1000 glucose in urine. UA suggestive of UTI.    - ensure adequate hydration with IV fluids  - Obtain urine studies  - Treat UTI  - Manage hypoglycemia  - Monitor BMP  - Strict I & Os  - Avoid nephrotoxic agents            RENA          Endo  # Hypoglycemia  pt with a Hx of DM on ( Metformin 1000 BID, Glimeperide 2mg BID )   recently switched from Farxiga by PCP ( due to recurrent UTI ), to levemir insulin. 10 units in morning and 5 units at night. Found to have a blood glucose of 34, urine Glucose > 1000. S/P D50 50ml + 25ml boluses, D10 1000ml infusion at 30ml/h, 1 L NS bolus in the ED.    - CW D10 80 ml/h  - C/W LR at 100 ml/h  - close monitoring of blood sugar  - A1c  >>>> ( 9 )  - ISS during inpatient stay  - Holding home meds        ID  # UTI  pt with a Hx of DM recently switched from Farxiga by PCP to levemir insulin. 10 units in morning and 5 units at night.  ( due to recurrent UTI ) the last UTI was diagnosed 5 days ago for which he was prescribed a 10 day course of Levofloxacin (5 days remaining),  Currently presenting with UA findings of : positive Leukocyte Esterase, few bacteria, yeast like cells and Glucose > 1000.     - Finish the Levofloxacin course ( 500mg BID, 5 days remaining )  - ensure adequate hydration with IV fluids  - Obtain urine Cx (sent)  - Obtain BCx  - Strict I & Os       Heme/Onc  RENA      Prophylactic measures    Fluids: D10 80cc/h, LR at 100cc/h   Nutrition: NPO till passing dysphagia screen  GI prophylaxis: none  DVT prophylaxis: Heparin 5000 q8  Code: full  Dispo: ICU

## 2024-01-08 NOTE — ED PROVIDER NOTE - PHYSICAL EXAMINATION
Constitutional :  non-toxic, no acute distress. awake, alert, oriented to person, place, time/situation.  Head : head normocephalic, atraumatic  EENMT : eyes clear bilaterally, PERRL, EOMI. airway patent. moist mucous membranes. neck supple.  Cardiac : Normal rate, regular rhythm. No murmur appreciated, no LE edema.  Resp : Breath sounds clear and equal bilaterally. Respirations even and unlabored.   Gastro : abdomen soft, nontender, nondistended. no rebound or guarding. no CVAT.  MSK :  range of motion is not limited, no muscle or joint tenderness  Back : No evidence of trauma. No spinal or CVA tenderness.  Vasc : Extremities warm and well perfused. 2+ radial and DP pulses. cap refill <2 seconds  Neuro : Alert and oriented, CNII-XII grossly intact, no focal deficits, no motor or sensory deficits.  Skin : Skin normal color for race, warm, dry and intact. No evidence of rash.  Psych : Alert and oriented to person, place, time/situation. normal mood and affect. no apparent risk to self or others.

## 2024-01-08 NOTE — ED ADULT NURSE NOTE - OBJECTIVE STATEMENT
Pt complaints of AMS as per son. Son report AMS w/ slurred speech earlier tonight, was given honey by mouth and symptoms resolved. EMS report BGL was 92 post honey. Pt denies any discomfort, pain, injury, fall, numbness/tingling/weakness. No slurred speech noted. Report pt recently started on insulin for DM 3days ago and received 10unit in the morning and 5units tonight around 5pm. But having decreased appetite so didn't eat well for couple of days. Also recently dx UTI, taking meds for that.

## 2024-01-08 NOTE — ED ADULT TRIAGE NOTE - SPO2 (%)
Report directly to Emergency Department for any acute worsening of symptoms.   May alternate Tylenol and Motrin as directed for elevated temp and pain.   Recommend increased intake of fluids and rest.   May take Zyrtec or Claritin OTC as directed.   Recommend OTC children's cough medication as directed.   Follow up with PCP or return to clinic in three days if no improvement.     
96

## 2024-01-08 NOTE — ED ADULT NURSE NOTE - NSFALLHARMRISKINTERV_ED_ALL_ED
Assistance OOB with selected safe patient handling equipment if applicable/Assistance with ambulation/Communicate risk of Fall with Harm to all staff, patient, and family/Monitor gait and stability/Monitor for mental status changes and reorient to person, place, and time, as needed/Provide visual cue: red socks, yellow wristband, yellow gown, etc/Reinforce activity limits and safety measures with patient and family/Toileting schedule using arm’s reach rule for commode and bathroom/Use of alarms - bed, stretcher, chair and/or video monitoring/Bed in lowest position, wheels locked, appropriate side rails in place/Call bell, personal items and telephone in reach/Instruct patient to call for assistance before getting out of bed/chair/stretcher/Non-slip footwear applied when patient is off stretcher/[place] to call system/Physically safe environment - no spills, clutter or unnecessary equipment/Purposeful Proactive Rounding/Room/bathroom lighting operational, light cord in reach Assistance OOB with selected safe patient handling equipment if applicable/Assistance with ambulation/Communicate risk of Fall with Harm to all staff, patient, and family/Monitor gait and stability/Monitor for mental status changes and reorient to person, place, and time, as needed/Provide visual cue: red socks, yellow wristband, yellow gown, etc/Reinforce activity limits and safety measures with patient and family/Toileting schedule using arm’s reach rule for commode and bathroom/Use of alarms - bed, stretcher, chair and/or video monitoring/Bed in lowest position, wheels locked, appropriate side rails in place/Call bell, personal items and telephone in reach/Instruct patient to call for assistance before getting out of bed/chair/stretcher/Non-slip footwear applied when patient is off stretcher/[place] to call system/Physically safe environment - no spills, clutter or unnecessary equipment/Purposeful Proactive Rounding/Room/bathroom lighting operational, light cord in reach

## 2024-01-08 NOTE — H&P ADULT - NSHPPHYSICALEXAM_GEN_ALL_CORE
PHYSICAL EXAM:  GENERAL: NAD, lying in bed comfortably  HEAD:  Atraumatic, Normocephalic  EYES: EOMI, PERRLA, conjunctiva and sclera clear  ENT: Moist mucous membranes  NECK: Supple, No JVD  CHEST/LUNG: Clear to auscultation bilaterally; No rales, rhonchi, wheezing, or rubs. Unlabored respirations  HEART: Regular rate and rhythm; No murmurs, rubs, or gallops  ABDOMEN: Bowel sounds present; Soft, Nontender, Nondistended. No hepatomegally  EXTREMITIES:  2+ Peripheral Pulses, brisk capillary refill. No clubbing, cyanosis, or edema  NERVOUS SYSTEM:  Alert & Oriented X3, speech clear. No deficits   MSK: FROM all 4 extremities, full and equal strength  SKIN: No rashes or lesions PHYSICAL EXAM:  GENERAL: NAD, lying in bed comfortably  HEAD:  Atraumatic, Normocephalic  EYES: EOMI, PERRLA, conjunctiva and sclera clear  ENT: Moist mucous membranes  NECK: Supple, No JVD  CHEST/LUNG: Clear to auscultation bilaterally; No rales, rhonchi, wheezing, or rubs. Unlabored respirations  HEART: Regular rate and rhythm; No murmurs, rubs, or gallops  ABDOMEN: Bowel sounds present; Soft, Nontender, Nondistended. No hepatomegally  EXTREMITIES:  2+ Peripheral Pulses, brisk capillary refill. unilateral  +1 pitting edema of the left lower extremity half way to the knee, no swelling or tenderness  NERVOUS SYSTEM:  Alert & Oriented X3, speech clear. No deficits   MSK: FROM all 4 extremities, full and equal strength  SKIN: No rashes or lesions

## 2024-01-08 NOTE — ED PROVIDER NOTE - PROGRESS NOTE DETAILS
fingerstick 117 on arrival. -> 54 and s/p juice and 1amp improved to 117.   creatinine 2.94 from 1.35 on ed visit 12/4.   seen by icu given need for close fingerstick monitoring.  recs D10 drip @30cc and admit to ICU

## 2024-01-08 NOTE — H&P ADULT - HISTORY OF PRESENT ILLNESS
89 yr old male, history of DM, previously on oral medications for DM, most recently farxiga. has had two UTIs in last few months. PMD thought possibly triggered from farxiga so stopped that and started him on levemir insulin. 10un in morning and 5un at night. has been taking for **also currently on levaquin for uti.    Initial vital signs: T: 97.2 F, HR: 71 , BP: 124/74 , R: 18, SpO2: 96 % on RA  ED course:   Labs: significant for Glucose 34, BUN 48, Cr 2.94 ( baseline 1.35 ), eGFR 18 (baseline 46),   UA: large Leukocyte Esterase, Yeast like cells, > 1000 Glucose  Imaging:  CXR:   EKG:   Medications:  89 yr old male ( AAOx3 at baseline ), history of DM for the past 20 years, on oral medications for DM ( Metformin 1000 BID, Glimeperide 2mg BID ), was recently prescribed  farxiga ( unknown dose ) to be added to his oral medications for better glucose control. But due to repeated UTIs ( one was few weeks ago and one was 5 days ago for which he started Levofloxacin 500mg BID, 10 day course ). his PCP Dr. Lai Huggins ( [phone] ) thought possibly triggered from farxiga so stopped that and started him on levemir insulin. 10 units in morning and 5 units at night. The patient has not been eating well for the past few days. Around 5 pm today, the patient got his evening insulin and by 7pm he started feeling agitated, confused and sweaty. Son called EMS and they found him to have low blood glucose. Admitted for the management of hypoglycemia.    Initial vital signs: T: 97.2 F, HR: 71 , BP: 124/74 , R: 18, SpO2: 96 % on RA  ED course:   Labs: significant for Glucose 34, BUN 48, Cr 2.94 ( baseline 1.35 ), eGFR 18 (baseline 46),   UA: large Leukocyte Esterase, Yeast like cells, > 1000 Glucose  Imaging:  CXR:   EKG: NSR  Medications: D50 50ml + 25ml boluses, D10 1000ml infusion at 30ml/h, 1 L NS bolus         89 yr old male ( AAOx3 at baseline ), history of DM for the past 20 years, on oral medications for DM ( Metformin 1000 BID, Glimeperide 2mg BID ), was recently prescribed  farxiga ( unknown dose ) to be added to his oral medications for better glucose control. But due to repeated UTIs ( one was few weeks ago and one was 5 days ago for which he started Levofloxacin 500mg BID, 10 day course ). his PCP Dr. Lai Huggins ( [phone] ) thought possibly triggered from farxiga so stopped that and started him on levemir insulin. 10 units in morning and 5 units at night. The patient has not been eating well for the past few days. Around 5 pm today, the patient got his evening insulin and by 7pm he started feeling agitated, confused and sweaty. Son called EMS and they found him to have low blood glucose. Admitted for the management of hypoglycemia.    Initial vital signs: T: 97.2 F, HR: 71 , BP: 124/74 , R: 18, SpO2: 96 % on RA  ED course:   Labs: significant for Glucose 34, BUN 48, Cr 2.94 ( baseline 1.35 ), eGFR 18 (baseline 46),   UA: large Leukocyte Esterase, Yeast like cells, > 1000 Glucose  Imaging:  CXR:   EKG: NSR  Medications: D50 50ml + 25ml boluses, D10 1000ml infusion at 30ml/h, 1 L NS bolus

## 2024-01-08 NOTE — ED PROVIDER NOTE - CLINICAL SUMMARY MEDICAL DECISION MAKING FREE TEXT BOX
history of DM, recently started on levemir insulin (10un am and 5un pm), here after found to be confused, sweaty tonight after evening insulin. resolved after honey at home. currently on levaquin for uti  pt nontoxic appearing, stable vitals, exam unremarkable - neuro intact  suspect ams 2/2 hypoglycemia. now improved after honey pta.   long acting insulin will need close monitoring  otherwise assess for dehydration, electrolyte derangement  plan - labs  frequent bg monitoring and glucose prn  admit

## 2024-01-09 DIAGNOSIS — Z51.5 ENCOUNTER FOR PALLIATIVE CARE: ICD-10-CM

## 2024-01-09 DIAGNOSIS — Z71.89 OTHER SPECIFIED COUNSELING: ICD-10-CM

## 2024-01-09 DIAGNOSIS — K59.00 CONSTIPATION, UNSPECIFIED: ICD-10-CM

## 2024-01-09 DIAGNOSIS — R53.81 OTHER MALAISE: ICD-10-CM

## 2024-01-09 LAB
A1C WITH ESTIMATED AVERAGE GLUCOSE RESULT: 9 % — HIGH (ref 4–5.6)
A1C WITH ESTIMATED AVERAGE GLUCOSE RESULT: 9 % — HIGH (ref 4–5.6)
ALBUMIN SERPL ELPH-MCNC: 3.2 G/DL — LOW (ref 3.3–5)
ALBUMIN SERPL ELPH-MCNC: 3.2 G/DL — LOW (ref 3.3–5)
ALBUMIN SERPL ELPH-MCNC: 3.7 G/DL — SIGNIFICANT CHANGE UP (ref 3.3–5)
ALBUMIN SERPL ELPH-MCNC: 3.7 G/DL — SIGNIFICANT CHANGE UP (ref 3.3–5)
ALP SERPL-CCNC: 36 U/L — LOW (ref 40–120)
ALP SERPL-CCNC: 36 U/L — LOW (ref 40–120)
ALP SERPL-CCNC: 45 U/L — SIGNIFICANT CHANGE UP (ref 40–120)
ALP SERPL-CCNC: 45 U/L — SIGNIFICANT CHANGE UP (ref 40–120)
ALT FLD-CCNC: 11 U/L — SIGNIFICANT CHANGE UP (ref 10–45)
ALT FLD-CCNC: 11 U/L — SIGNIFICANT CHANGE UP (ref 10–45)
ALT FLD-CCNC: 8 U/L — LOW (ref 10–45)
ALT FLD-CCNC: 8 U/L — LOW (ref 10–45)
ANION GAP SERPL CALC-SCNC: 10 MMOL/L — SIGNIFICANT CHANGE UP (ref 5–17)
ANION GAP SERPL CALC-SCNC: 13 MMOL/L — SIGNIFICANT CHANGE UP (ref 5–17)
ANION GAP SERPL CALC-SCNC: 13 MMOL/L — SIGNIFICANT CHANGE UP (ref 5–17)
ANION GAP SERPL CALC-SCNC: 9 MMOL/L — SIGNIFICANT CHANGE UP (ref 5–17)
ANION GAP SERPL CALC-SCNC: 9 MMOL/L — SIGNIFICANT CHANGE UP (ref 5–17)
APPEARANCE UR: ABNORMAL
APPEARANCE UR: ABNORMAL
AST SERPL-CCNC: 19 U/L — SIGNIFICANT CHANGE UP (ref 10–40)
AST SERPL-CCNC: 19 U/L — SIGNIFICANT CHANGE UP (ref 10–40)
AST SERPL-CCNC: 28 U/L — SIGNIFICANT CHANGE UP (ref 10–40)
AST SERPL-CCNC: 28 U/L — SIGNIFICANT CHANGE UP (ref 10–40)
BACTERIA # UR AUTO: NEGATIVE /HPF — SIGNIFICANT CHANGE UP
BACTERIA # UR AUTO: NEGATIVE /HPF — SIGNIFICANT CHANGE UP
BASOPHILS # BLD AUTO: 0.02 K/UL — SIGNIFICANT CHANGE UP (ref 0–0.2)
BASOPHILS # BLD AUTO: 0.02 K/UL — SIGNIFICANT CHANGE UP (ref 0–0.2)
BASOPHILS NFR BLD AUTO: 0.3 % — SIGNIFICANT CHANGE UP (ref 0–2)
BASOPHILS NFR BLD AUTO: 0.3 % — SIGNIFICANT CHANGE UP (ref 0–2)
BILIRUB DIRECT SERPL-MCNC: 0.2 MG/DL — SIGNIFICANT CHANGE UP (ref 0–0.3)
BILIRUB DIRECT SERPL-MCNC: 0.2 MG/DL — SIGNIFICANT CHANGE UP (ref 0–0.3)
BILIRUB INDIRECT FLD-MCNC: 0 MG/DL — LOW (ref 0.2–1)
BILIRUB INDIRECT FLD-MCNC: 0 MG/DL — LOW (ref 0.2–1)
BILIRUB SERPL-MCNC: 0.2 MG/DL — SIGNIFICANT CHANGE UP (ref 0.2–1.2)
BILIRUB SERPL-MCNC: 0.2 MG/DL — SIGNIFICANT CHANGE UP (ref 0.2–1.2)
BILIRUB SERPL-MCNC: 0.4 MG/DL — SIGNIFICANT CHANGE UP (ref 0.2–1.2)
BILIRUB SERPL-MCNC: 0.4 MG/DL — SIGNIFICANT CHANGE UP (ref 0.2–1.2)
BILIRUB UR-MCNC: NEGATIVE — SIGNIFICANT CHANGE UP
BILIRUB UR-MCNC: NEGATIVE — SIGNIFICANT CHANGE UP
BLD GP AB SCN SERPL QL: NEGATIVE — SIGNIFICANT CHANGE UP
BLD GP AB SCN SERPL QL: NEGATIVE — SIGNIFICANT CHANGE UP
BUN SERPL-MCNC: 43 MG/DL — HIGH (ref 7–23)
BUN SERPL-MCNC: 43 MG/DL — HIGH (ref 7–23)
BUN SERPL-MCNC: 45 MG/DL — HIGH (ref 7–23)
BUN SERPL-MCNC: 45 MG/DL — HIGH (ref 7–23)
BUN SERPL-MCNC: 48 MG/DL — HIGH (ref 7–23)
BUN SERPL-MCNC: 48 MG/DL — HIGH (ref 7–23)
BUN SERPL-MCNC: 50 MG/DL — HIGH (ref 7–23)
BUN SERPL-MCNC: 50 MG/DL — HIGH (ref 7–23)
CALCIUM SERPL-MCNC: 8.6 MG/DL — SIGNIFICANT CHANGE UP (ref 8.4–10.5)
CALCIUM SERPL-MCNC: 8.6 MG/DL — SIGNIFICANT CHANGE UP (ref 8.4–10.5)
CALCIUM SERPL-MCNC: 8.8 MG/DL — SIGNIFICANT CHANGE UP (ref 8.4–10.5)
CALCIUM SERPL-MCNC: 8.8 MG/DL — SIGNIFICANT CHANGE UP (ref 8.4–10.5)
CALCIUM SERPL-MCNC: 9.1 MG/DL — SIGNIFICANT CHANGE UP (ref 8.4–10.5)
CAST: 0 /LPF — SIGNIFICANT CHANGE UP (ref 0–4)
CAST: 0 /LPF — SIGNIFICANT CHANGE UP (ref 0–4)
CHLORIDE SERPL-SCNC: 100 MMOL/L — SIGNIFICANT CHANGE UP (ref 96–108)
CHLORIDE SERPL-SCNC: 100 MMOL/L — SIGNIFICANT CHANGE UP (ref 96–108)
CHLORIDE SERPL-SCNC: 101 MMOL/L — SIGNIFICANT CHANGE UP (ref 96–108)
CHLORIDE SERPL-SCNC: 101 MMOL/L — SIGNIFICANT CHANGE UP (ref 96–108)
CHLORIDE SERPL-SCNC: 95 MMOL/L — LOW (ref 96–108)
CHLORIDE SERPL-SCNC: 95 MMOL/L — LOW (ref 96–108)
CHLORIDE SERPL-SCNC: 98 MMOL/L — SIGNIFICANT CHANGE UP (ref 96–108)
CHLORIDE SERPL-SCNC: 98 MMOL/L — SIGNIFICANT CHANGE UP (ref 96–108)
CO2 SERPL-SCNC: 21 MMOL/L — LOW (ref 22–31)
CO2 SERPL-SCNC: 21 MMOL/L — LOW (ref 22–31)
CO2 SERPL-SCNC: 22 MMOL/L — SIGNIFICANT CHANGE UP (ref 22–31)
CO2 SERPL-SCNC: 24 MMOL/L — SIGNIFICANT CHANGE UP (ref 22–31)
CO2 SERPL-SCNC: 24 MMOL/L — SIGNIFICANT CHANGE UP (ref 22–31)
COLOR SPEC: YELLOW — SIGNIFICANT CHANGE UP
COLOR SPEC: YELLOW — SIGNIFICANT CHANGE UP
CREAT ?TM UR-MCNC: 61 MG/DL — SIGNIFICANT CHANGE UP
CREAT ?TM UR-MCNC: 61 MG/DL — SIGNIFICANT CHANGE UP
CREAT SERPL-MCNC: 2.66 MG/DL — HIGH (ref 0.5–1.3)
CREAT SERPL-MCNC: 2.66 MG/DL — HIGH (ref 0.5–1.3)
CREAT SERPL-MCNC: 2.78 MG/DL — HIGH (ref 0.5–1.3)
CREAT SERPL-MCNC: 2.78 MG/DL — HIGH (ref 0.5–1.3)
CREAT SERPL-MCNC: 3.02 MG/DL — HIGH (ref 0.5–1.3)
CREAT SERPL-MCNC: 3.02 MG/DL — HIGH (ref 0.5–1.3)
CREAT SERPL-MCNC: 3.16 MG/DL — HIGH (ref 0.5–1.3)
CREAT SERPL-MCNC: 3.16 MG/DL — HIGH (ref 0.5–1.3)
DIFF PNL FLD: NEGATIVE — SIGNIFICANT CHANGE UP
DIFF PNL FLD: NEGATIVE — SIGNIFICANT CHANGE UP
EGFR: 17 ML/MIN/1.73M2 — LOW
EGFR: 17 ML/MIN/1.73M2 — LOW
EGFR: 18 ML/MIN/1.73M2 — LOW
EGFR: 18 ML/MIN/1.73M2 — LOW
EGFR: 19 ML/MIN/1.73M2 — LOW
EGFR: 19 ML/MIN/1.73M2 — LOW
EGFR: 20 ML/MIN/1.73M2 — LOW
EGFR: 20 ML/MIN/1.73M2 — LOW
EOSINOPHIL # BLD AUTO: 0.05 K/UL — SIGNIFICANT CHANGE UP (ref 0–0.5)
EOSINOPHIL # BLD AUTO: 0.05 K/UL — SIGNIFICANT CHANGE UP (ref 0–0.5)
EOSINOPHIL NFR BLD AUTO: 0.8 % — SIGNIFICANT CHANGE UP (ref 0–6)
EOSINOPHIL NFR BLD AUTO: 0.8 % — SIGNIFICANT CHANGE UP (ref 0–6)
ESTIMATED AVERAGE GLUCOSE: 212 MG/DL — HIGH (ref 68–114)
ESTIMATED AVERAGE GLUCOSE: 212 MG/DL — HIGH (ref 68–114)
GLUCOSE BLDC GLUCOMTR-MCNC: 106 MG/DL — HIGH (ref 70–99)
GLUCOSE BLDC GLUCOMTR-MCNC: 111 MG/DL — HIGH (ref 70–99)
GLUCOSE BLDC GLUCOMTR-MCNC: 111 MG/DL — HIGH (ref 70–99)
GLUCOSE BLDC GLUCOMTR-MCNC: 113 MG/DL — HIGH (ref 70–99)
GLUCOSE BLDC GLUCOMTR-MCNC: 113 MG/DL — HIGH (ref 70–99)
GLUCOSE BLDC GLUCOMTR-MCNC: 115 MG/DL — HIGH (ref 70–99)
GLUCOSE BLDC GLUCOMTR-MCNC: 115 MG/DL — HIGH (ref 70–99)
GLUCOSE BLDC GLUCOMTR-MCNC: 119 MG/DL — HIGH (ref 70–99)
GLUCOSE BLDC GLUCOMTR-MCNC: 119 MG/DL — HIGH (ref 70–99)
GLUCOSE BLDC GLUCOMTR-MCNC: 165 MG/DL — HIGH (ref 70–99)
GLUCOSE BLDC GLUCOMTR-MCNC: 165 MG/DL — HIGH (ref 70–99)
GLUCOSE BLDC GLUCOMTR-MCNC: 181 MG/DL — HIGH (ref 70–99)
GLUCOSE BLDC GLUCOMTR-MCNC: 181 MG/DL — HIGH (ref 70–99)
GLUCOSE BLDC GLUCOMTR-MCNC: 189 MG/DL — HIGH (ref 70–99)
GLUCOSE BLDC GLUCOMTR-MCNC: 189 MG/DL — HIGH (ref 70–99)
GLUCOSE BLDC GLUCOMTR-MCNC: 206 MG/DL — HIGH (ref 70–99)
GLUCOSE BLDC GLUCOMTR-MCNC: 206 MG/DL — HIGH (ref 70–99)
GLUCOSE BLDC GLUCOMTR-MCNC: 262 MG/DL — HIGH (ref 70–99)
GLUCOSE BLDC GLUCOMTR-MCNC: 262 MG/DL — HIGH (ref 70–99)
GLUCOSE BLDC GLUCOMTR-MCNC: 64 MG/DL — LOW (ref 70–99)
GLUCOSE BLDC GLUCOMTR-MCNC: 64 MG/DL — LOW (ref 70–99)
GLUCOSE BLDC GLUCOMTR-MCNC: 83 MG/DL — SIGNIFICANT CHANGE UP (ref 70–99)
GLUCOSE BLDC GLUCOMTR-MCNC: 83 MG/DL — SIGNIFICANT CHANGE UP (ref 70–99)
GLUCOSE BLDC GLUCOMTR-MCNC: 87 MG/DL — SIGNIFICANT CHANGE UP (ref 70–99)
GLUCOSE BLDC GLUCOMTR-MCNC: 87 MG/DL — SIGNIFICANT CHANGE UP (ref 70–99)
GLUCOSE SERPL-MCNC: 123 MG/DL — HIGH (ref 70–99)
GLUCOSE SERPL-MCNC: 123 MG/DL — HIGH (ref 70–99)
GLUCOSE SERPL-MCNC: 199 MG/DL — HIGH (ref 70–99)
GLUCOSE SERPL-MCNC: 199 MG/DL — HIGH (ref 70–99)
GLUCOSE SERPL-MCNC: 270 MG/DL — HIGH (ref 70–99)
GLUCOSE SERPL-MCNC: 270 MG/DL — HIGH (ref 70–99)
GLUCOSE SERPL-MCNC: 272 MG/DL — HIGH (ref 70–99)
GLUCOSE SERPL-MCNC: 272 MG/DL — HIGH (ref 70–99)
GLUCOSE UR QL: 250 MG/DL
GLUCOSE UR QL: 250 MG/DL
HCT VFR BLD CALC: 30.6 % — LOW (ref 39–50)
HCT VFR BLD CALC: 30.6 % — LOW (ref 39–50)
HGB BLD-MCNC: 10.3 G/DL — LOW (ref 13–17)
HGB BLD-MCNC: 10.3 G/DL — LOW (ref 13–17)
IMM GRANULOCYTES NFR BLD AUTO: 0.5 % — SIGNIFICANT CHANGE UP (ref 0–0.9)
IMM GRANULOCYTES NFR BLD AUTO: 0.5 % — SIGNIFICANT CHANGE UP (ref 0–0.9)
KETONES UR-MCNC: NEGATIVE MG/DL — SIGNIFICANT CHANGE UP
KETONES UR-MCNC: NEGATIVE MG/DL — SIGNIFICANT CHANGE UP
LEUKOCYTE ESTERASE UR-ACNC: ABNORMAL
LEUKOCYTE ESTERASE UR-ACNC: ABNORMAL
LYMPHOCYTES # BLD AUTO: 1.12 K/UL — SIGNIFICANT CHANGE UP (ref 1–3.3)
LYMPHOCYTES # BLD AUTO: 1.12 K/UL — SIGNIFICANT CHANGE UP (ref 1–3.3)
LYMPHOCYTES # BLD AUTO: 16.8 % — SIGNIFICANT CHANGE UP (ref 13–44)
LYMPHOCYTES # BLD AUTO: 16.8 % — SIGNIFICANT CHANGE UP (ref 13–44)
MAGNESIUM SERPL-MCNC: 1.8 MG/DL — SIGNIFICANT CHANGE UP (ref 1.6–2.6)
MCHC RBC-ENTMCNC: 30.5 PG — SIGNIFICANT CHANGE UP (ref 27–34)
MCHC RBC-ENTMCNC: 30.5 PG — SIGNIFICANT CHANGE UP (ref 27–34)
MCHC RBC-ENTMCNC: 33.7 GM/DL — SIGNIFICANT CHANGE UP (ref 32–36)
MCHC RBC-ENTMCNC: 33.7 GM/DL — SIGNIFICANT CHANGE UP (ref 32–36)
MCV RBC AUTO: 90.5 FL — SIGNIFICANT CHANGE UP (ref 80–100)
MCV RBC AUTO: 90.5 FL — SIGNIFICANT CHANGE UP (ref 80–100)
MONOCYTES # BLD AUTO: 0.77 K/UL — SIGNIFICANT CHANGE UP (ref 0–0.9)
MONOCYTES # BLD AUTO: 0.77 K/UL — SIGNIFICANT CHANGE UP (ref 0–0.9)
MONOCYTES NFR BLD AUTO: 11.6 % — SIGNIFICANT CHANGE UP (ref 2–14)
MONOCYTES NFR BLD AUTO: 11.6 % — SIGNIFICANT CHANGE UP (ref 2–14)
NEUTROPHILS # BLD AUTO: 4.67 K/UL — SIGNIFICANT CHANGE UP (ref 1.8–7.4)
NEUTROPHILS # BLD AUTO: 4.67 K/UL — SIGNIFICANT CHANGE UP (ref 1.8–7.4)
NEUTROPHILS NFR BLD AUTO: 70 % — SIGNIFICANT CHANGE UP (ref 43–77)
NEUTROPHILS NFR BLD AUTO: 70 % — SIGNIFICANT CHANGE UP (ref 43–77)
NITRITE UR-MCNC: NEGATIVE — SIGNIFICANT CHANGE UP
NITRITE UR-MCNC: NEGATIVE — SIGNIFICANT CHANGE UP
NRBC # BLD: 0 /100 WBCS — SIGNIFICANT CHANGE UP (ref 0–0)
NRBC # BLD: 0 /100 WBCS — SIGNIFICANT CHANGE UP (ref 0–0)
PH UR: 5.5 — SIGNIFICANT CHANGE UP (ref 5–8)
PH UR: 5.5 — SIGNIFICANT CHANGE UP (ref 5–8)
PHOSPHATE SERPL-MCNC: 4 MG/DL — SIGNIFICANT CHANGE UP (ref 2.5–4.5)
PHOSPHATE SERPL-MCNC: 4 MG/DL — SIGNIFICANT CHANGE UP (ref 2.5–4.5)
PHOSPHATE SERPL-MCNC: 4.3 MG/DL — SIGNIFICANT CHANGE UP (ref 2.5–4.5)
PHOSPHATE SERPL-MCNC: 4.3 MG/DL — SIGNIFICANT CHANGE UP (ref 2.5–4.5)
PLATELET # BLD AUTO: 194 K/UL — SIGNIFICANT CHANGE UP (ref 150–400)
PLATELET # BLD AUTO: 194 K/UL — SIGNIFICANT CHANGE UP (ref 150–400)
POTASSIUM SERPL-MCNC: 3.9 MMOL/L — SIGNIFICANT CHANGE UP (ref 3.5–5.3)
POTASSIUM SERPL-MCNC: 3.9 MMOL/L — SIGNIFICANT CHANGE UP (ref 3.5–5.3)
POTASSIUM SERPL-MCNC: 4.1 MMOL/L — SIGNIFICANT CHANGE UP (ref 3.5–5.3)
POTASSIUM SERPL-MCNC: 4.1 MMOL/L — SIGNIFICANT CHANGE UP (ref 3.5–5.3)
POTASSIUM SERPL-MCNC: 4.2 MMOL/L — SIGNIFICANT CHANGE UP (ref 3.5–5.3)
POTASSIUM SERPL-SCNC: 3.9 MMOL/L — SIGNIFICANT CHANGE UP (ref 3.5–5.3)
POTASSIUM SERPL-SCNC: 3.9 MMOL/L — SIGNIFICANT CHANGE UP (ref 3.5–5.3)
POTASSIUM SERPL-SCNC: 4.1 MMOL/L — SIGNIFICANT CHANGE UP (ref 3.5–5.3)
POTASSIUM SERPL-SCNC: 4.1 MMOL/L — SIGNIFICANT CHANGE UP (ref 3.5–5.3)
POTASSIUM SERPL-SCNC: 4.2 MMOL/L — SIGNIFICANT CHANGE UP (ref 3.5–5.3)
PROT SERPL-MCNC: 6.8 G/DL — SIGNIFICANT CHANGE UP (ref 6–8.3)
PROT SERPL-MCNC: 6.8 G/DL — SIGNIFICANT CHANGE UP (ref 6–8.3)
PROT SERPL-MCNC: 7.8 G/DL — SIGNIFICANT CHANGE UP (ref 6–8.3)
PROT SERPL-MCNC: 7.8 G/DL — SIGNIFICANT CHANGE UP (ref 6–8.3)
PROT UR-MCNC: SIGNIFICANT CHANGE UP MG/DL
PROT UR-MCNC: SIGNIFICANT CHANGE UP MG/DL
RBC # BLD: 3.38 M/UL — LOW (ref 4.2–5.8)
RBC # BLD: 3.38 M/UL — LOW (ref 4.2–5.8)
RBC # FLD: 14 % — SIGNIFICANT CHANGE UP (ref 10.3–14.5)
RBC # FLD: 14 % — SIGNIFICANT CHANGE UP (ref 10.3–14.5)
RBC CASTS # UR COMP ASSIST: 0 /HPF — SIGNIFICANT CHANGE UP (ref 0–4)
RBC CASTS # UR COMP ASSIST: 0 /HPF — SIGNIFICANT CHANGE UP (ref 0–4)
RH IG SCN BLD-IMP: NEGATIVE — SIGNIFICANT CHANGE UP
RH IG SCN BLD-IMP: NEGATIVE — SIGNIFICANT CHANGE UP
SODIUM SERPL-SCNC: 129 MMOL/L — LOW (ref 135–145)
SODIUM SERPL-SCNC: 129 MMOL/L — LOW (ref 135–145)
SODIUM SERPL-SCNC: 130 MMOL/L — LOW (ref 135–145)
SODIUM SERPL-SCNC: 130 MMOL/L — LOW (ref 135–145)
SODIUM SERPL-SCNC: 132 MMOL/L — LOW (ref 135–145)
SODIUM SERPL-SCNC: 132 MMOL/L — LOW (ref 135–145)
SODIUM SERPL-SCNC: 134 MMOL/L — LOW (ref 135–145)
SODIUM SERPL-SCNC: 134 MMOL/L — LOW (ref 135–145)
SODIUM UR-SCNC: 22 MMOL/L — SIGNIFICANT CHANGE UP
SODIUM UR-SCNC: 22 MMOL/L — SIGNIFICANT CHANGE UP
SP GR SPEC: 1.01 — SIGNIFICANT CHANGE UP (ref 1–1.03)
SP GR SPEC: 1.01 — SIGNIFICANT CHANGE UP (ref 1–1.03)
SQUAMOUS # UR AUTO: 0 /HPF — SIGNIFICANT CHANGE UP (ref 0–5)
SQUAMOUS # UR AUTO: 0 /HPF — SIGNIFICANT CHANGE UP (ref 0–5)
UROBILINOGEN FLD QL: 0.2 MG/DL — SIGNIFICANT CHANGE UP (ref 0.2–1)
UROBILINOGEN FLD QL: 0.2 MG/DL — SIGNIFICANT CHANGE UP (ref 0.2–1)
UUN UR-MCNC: 428 MG/DL — SIGNIFICANT CHANGE UP
UUN UR-MCNC: 428 MG/DL — SIGNIFICANT CHANGE UP
WBC # BLD: 6.66 K/UL — SIGNIFICANT CHANGE UP (ref 3.8–10.5)
WBC # BLD: 6.66 K/UL — SIGNIFICANT CHANGE UP (ref 3.8–10.5)
WBC # FLD AUTO: 6.66 K/UL — SIGNIFICANT CHANGE UP (ref 3.8–10.5)
WBC # FLD AUTO: 6.66 K/UL — SIGNIFICANT CHANGE UP (ref 3.8–10.5)
WBC UR QL: 173 /HPF — HIGH (ref 0–5)
WBC UR QL: 173 /HPF — HIGH (ref 0–5)

## 2024-01-09 PROCEDURE — 76775 US EXAM ABDO BACK WALL LIM: CPT | Mod: 26

## 2024-01-09 PROCEDURE — 99233 SBSQ HOSP IP/OBS HIGH 50: CPT

## 2024-01-09 RX ORDER — DEXTROSE 50 % IN WATER 50 %
50 SYRINGE (ML) INTRAVENOUS ONCE
Refills: 0 | Status: COMPLETED | OUTPATIENT
Start: 2024-01-09 | End: 2024-01-09

## 2024-01-09 RX ORDER — SODIUM CHLORIDE 9 MG/ML
2000 INJECTION, SOLUTION INTRAVENOUS
Refills: 0 | Status: DISCONTINUED | OUTPATIENT
Start: 2024-01-09 | End: 2024-01-09

## 2024-01-09 RX ORDER — CEFTRIAXONE 500 MG/1
1000 INJECTION, POWDER, FOR SOLUTION INTRAMUSCULAR; INTRAVENOUS EVERY 24 HOURS
Refills: 0 | Status: COMPLETED | OUTPATIENT
Start: 2024-01-09 | End: 2024-01-11

## 2024-01-09 RX ORDER — DEXTROSE 50 % IN WATER 50 %
25 SYRINGE (ML) INTRAVENOUS ONCE
Refills: 0 | Status: COMPLETED | OUTPATIENT
Start: 2024-01-09 | End: 2024-01-09

## 2024-01-09 RX ORDER — ALFUZOSIN HYDROCHLORIDE 10 MG/1
1 TABLET, EXTENDED RELEASE ORAL
Refills: 0 | DISCHARGE

## 2024-01-09 RX ORDER — DEXTROSE 10 % IN WATER 10 %
1000 INTRAVENOUS SOLUTION INTRAVENOUS
Refills: 0 | Status: DISCONTINUED | OUTPATIENT
Start: 2024-01-09 | End: 2024-01-09

## 2024-01-09 RX ORDER — HEPARIN SODIUM 5000 [USP'U]/ML
5000 INJECTION INTRAVENOUS; SUBCUTANEOUS EVERY 8 HOURS
Refills: 0 | Status: DISCONTINUED | OUTPATIENT
Start: 2024-01-09 | End: 2024-01-16

## 2024-01-09 RX ORDER — SODIUM CHLORIDE 9 MG/ML
1000 INJECTION, SOLUTION INTRAVENOUS
Refills: 0 | Status: DISCONTINUED | OUTPATIENT
Start: 2024-01-09 | End: 2024-01-09

## 2024-01-09 RX ORDER — SODIUM CHLORIDE 9 MG/ML
250 INJECTION, SOLUTION INTRAVENOUS
Refills: 0 | Status: DISCONTINUED | OUTPATIENT
Start: 2024-01-09 | End: 2024-01-10

## 2024-01-09 RX ORDER — HEPARIN SODIUM 5000 [USP'U]/ML
5000 INJECTION INTRAVENOUS; SUBCUTANEOUS EVERY 8 HOURS
Refills: 0 | Status: DISCONTINUED | OUTPATIENT
Start: 2024-01-09 | End: 2024-01-09

## 2024-01-09 RX ORDER — ATORVASTATIN CALCIUM 80 MG/1
1 TABLET, FILM COATED ORAL
Refills: 0 | DISCHARGE

## 2024-01-09 RX ORDER — ATORVASTATIN CALCIUM 80 MG/1
10 TABLET, FILM COATED ORAL AT BEDTIME
Refills: 0 | Status: DISCONTINUED | OUTPATIENT
Start: 2024-01-09 | End: 2024-01-16

## 2024-01-09 RX ORDER — TAMSULOSIN HYDROCHLORIDE 0.4 MG/1
0.8 CAPSULE ORAL AT BEDTIME
Refills: 0 | Status: DISCONTINUED | OUTPATIENT
Start: 2024-01-09 | End: 2024-01-16

## 2024-01-09 RX ADMIN — Medication 30 MILLILITER(S): at 00:17

## 2024-01-09 RX ADMIN — Medication 80 MILLILITER(S): at 05:38

## 2024-01-09 RX ADMIN — CHLORHEXIDINE GLUCONATE 1 APPLICATION(S): 213 SOLUTION TOPICAL at 05:14

## 2024-01-09 RX ADMIN — CEFTRIAXONE 100 MILLIGRAM(S): 500 INJECTION, POWDER, FOR SOLUTION INTRAMUSCULAR; INTRAVENOUS at 14:57

## 2024-01-09 RX ADMIN — Medication 50 MILLILITER(S): at 02:15

## 2024-01-09 RX ADMIN — SODIUM CHLORIDE 100 MILLILITER(S): 9 INJECTION, SOLUTION INTRAVENOUS at 03:06

## 2024-01-09 RX ADMIN — SODIUM CHLORIDE 500 MILLILITER(S): 9 INJECTION, SOLUTION INTRAVENOUS at 18:29

## 2024-01-09 RX ADMIN — SODIUM CHLORIDE 50 MILLILITER(S): 9 INJECTION, SOLUTION INTRAVENOUS at 23:01

## 2024-01-09 RX ADMIN — HEPARIN SODIUM 5000 UNIT(S): 5000 INJECTION INTRAVENOUS; SUBCUTANEOUS at 14:57

## 2024-01-09 RX ADMIN — HEPARIN SODIUM 5000 UNIT(S): 5000 INJECTION INTRAVENOUS; SUBCUTANEOUS at 21:31

## 2024-01-09 RX ADMIN — Medication 25 MILLILITER(S): at 05:16

## 2024-01-09 RX ADMIN — SODIUM CHLORIDE 1000 MILLILITER(S): 9 INJECTION INTRAMUSCULAR; INTRAVENOUS; SUBCUTANEOUS at 00:17

## 2024-01-09 RX ADMIN — Medication 50 MILLILITER(S): at 00:17

## 2024-01-09 RX ADMIN — Medication 50 MILLILITER(S): at 02:28

## 2024-01-09 RX ADMIN — ATORVASTATIN CALCIUM 10 MILLIGRAM(S): 80 TABLET, FILM COATED ORAL at 21:31

## 2024-01-09 NOTE — PROGRESS NOTE ADULT - ASSESSMENT
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  -UTI was on day 5 of levaquin for culture (see above)  -blood glucose now controlled - he lives alone and therefore managing his blood glucose has been a challenge as he has not been compliant with diet will adjust insulin out  patient to goal hgb a1c ~8  -follow up urine culture to see if sensitivity has changed  -cont lipitor  -DVT prophylaxis  -JULIO CESAR in the setting of dehydration/poor PO  -will follow

## 2024-01-09 NOTE — CONSULT NOTE ADULT - AGENT'S NAME
Son Alec Maravilla [phone], Daughter Rizwana [phone] Son Alec Maravilla [phone], Daughter Rizwana [phone]

## 2024-01-09 NOTE — CONSULT NOTE ADULT - SUBJECTIVE AND OBJECTIVE BOX
HPI:  89 yr old male ( AAOx3 at baseline ), history of DM for the past 20 years, on oral medications for DM ( Metformin 1000 BID, Glimeperide 2mg BID ), was recently prescribed  farxiga ( unknown dose ) to be added to his oral medications for better glucose control. But due to repeated UTIs ( one was few weeks ago and one was 5 days ago for which he started Levofloxacin 500mg BID, 10 day course ). his PCP Dr. Lai Huggins ( [phone] ) thought possibly triggered from farxiga so stopped that and started him on levemir insulin. 10 units in morning and 5 units at night. The patient has not been eating well for the past few days. Around 5 pm today, the patient got his evening insulin and by 7pm he started feeling agitated, confused and sweaty. Son called EMS and they found him to have low blood glucose. Admitted for the management of hypoglycemia.   (08 Jan 2024 23:54)    Hospital course, primary team, and consultant notes reviewed. Pt seen and examined in ICU, sitting up alert and oriented x3, reading the paper, overtly comfortable. Comprehensive ROS as noted below, collateral obtained from chart/team/family. Exploration of GOC documented as below.      PAST MEDICAL & SURGICAL HISTORY:      FAMILY HISTORY:   Reviewed; no history of     in mother or father    Opiate Naive (Y/N):  Yes  iStop reviewed (Y/N): Yes.   No Rx found on iStop review (Ref#:       [id number]    Items that are not checked are not present  PSYCHOSOCIAL ASSESSMENT:  - Significant other/partner: [  ]  Children: [x  ]  - Living Situation: Home [ x ] Long term care [  ]  Rehab[  ]  Other[  ]  - Support system: strong[ x ] adequate[  ] inadequate[  ]  - Scientology/Spiritual practice: deferred   - Role of organized Scientology important [  ] some [  ] unable to assess dt pt mentation [  ]  - Coping: well[ x ]  with difficulty[  ]  poor coping[  ]  unable to assess dt pt mentation [  ]      ADVANCE DIRECTIVES:    - MOLST[  ]     - Living Will [  ]     DECISION MAKER(s):  - Health Care Proxy(s) [ x ]  Surrogate(s)[  ] Guardian[  ]           Name(s)/Phone Number(s):   - Son Alec Maravilla [phone] HCP  - Daughter Rizwana [phone] HCP      BASELINE (I)ADLs (prior to admission):  - [place]:  Total[x  ] Moderate[  ] Dependent[  ]    Allergies    No Known Allergies    Intolerances        Medications:      MEDICATIONS  (STANDING):  chlorhexidine 2% Cloths 1 Application(s) Topical <User Schedule>  dextrose 10%. 1000 milliLiter(s) (80 mL/Hr) IV Continuous <Continuous>  heparin   Injectable 5000 Unit(s) SubCutaneous every 8 hours    MEDICATIONS  (PRN):      PRESENT SYMPTOMS:   [ ]Unable to obtain due to poor mentation   Source if other than patient:  [ ]Family   [ ]Team     Pain [  ]      PAIN AD Score:  0  [email or web address] (press ctrl +  left click to view)    Analgesic Use (PRNs) for past 24 hours:  - tylenol PRN       If [  ], pt denies symptom.   Dyspnea:         [  ]  Anxiety:           [  ]  Difficulty sleeping: [  ]  Fatigue:           [  ]  Nausea:           [  ]  Loss of appetite:     [  ]  Dysphagia: [  ]  Constipation:   [  ]        LBM   Grief Present   [  ] Yes   [  ] No   Other Symptoms:    All other review of systems negative [  ]    ECOG Performance:       Current Palliative Performance Scale/Karnofsky Score:    %  Preadmit Karnofsky:   %          PEx:  General:   alert  oriented x       lethargic, distressed, cachexia,  nonverbal,  unarousable, verbal  Behavioral: Anxiety  Delirium Agitation Cooperative  HEENT: atraumatic,  No temporal wasting,  No dry mouth,  ET tube/trach  RESP: Reg rhythm, No  tachypnea/labored breathing,  No audible excessive secretions,  CTAB, diminished bilat bases  CV: RRR, S1S2,  No  tachycardia  GI: soft non distended non tender  incontinent               PEG/NG/OG tube                 constipation  last BM:   : normal  incontinent  oliguria/anuria  wade  MUSK: weakness x4,  edema, ambulatory with assistance,   mostly/fully  BB/WC bound  SKIN: No abnormal skin lesions, Poor skin turgor, Pressure ulcer stage:   NEURO:  No deficits, cognitive impairment, encephalopathic dsyphagia dysarthria paresis  Oral intake ability: unable/only mouth care, minimal moderate full capability      T(C): 36.3 (01-09-24 @ 09:54), Max: 36.5 (01-09-24 @ 01:15)  HR: 60 (01-09-24 @ 11:00) (57 - 74)  BP: 116/64 (01-09-24 @ 11:00) (91/50 - 138/65)  RR: 20 (01-09-24 @ 11:00) (15 - 20)  SpO2: 97% (01-09-24 @ 11:00) (93% - 98%)  Wt(kg): --    Labs:    CBC:                        10.3   6.66  )-----------( 194      ( 09 Jan 2024 05:30 )             30.6     CMP:    01-09    134<L>  |  100  |  50<H>  ----------------------------<  123<H>  3.9   |  24  |  3.02<H>    Ca    8.6      09 Jan 2024 05:30  Phos  4.0     01-09  Mg     1.8     01-09    TPro  6.8  /  Alb  3.2<L>  /  TBili  0.4  /  DBili  x   /  AST  19  /  ALT  8<L>  /  AlkPhos  36<L>  01-09       Urinalysis Basic - ( 09 Jan 2024 05:30 )    Color: x / Appearance: x / SG: x / pH: x  Gluc: 123 mg/dL / Ketone: x  / Bili: x / Urobili: x   Blood: x / Protein: x / Nitrite: x   Leuk Esterase: x / RBC: x / WBC x   Sq Epi: x / Non Sq Epi: x / Bacteria: x        RADIOLOGY & ADDITIONAL STUDIES:  Imaging:  Reviewed      GO discussion:  HPI:  89 yr old male ( AAOx3 at baseline ), history of DM for the past 20 years, on oral medications for DM ( Metformin 1000 BID, Glimeperide 2mg BID ), was recently prescribed  farxiga ( unknown dose ) to be added to his oral medications for better glucose control. But due to repeated UTIs ( one was few weeks ago and one was 5 days ago for which he started Levofloxacin 500mg BID, 10 day course ). his PCP Dr. Lai Huggins ( [phone] ) thought possibly triggered from farxiga so stopped that and started him on levemir insulin. 10 units in morning and 5 units at night. The patient has not been eating well for the past few days. Around 5 pm today, the patient got his evening insulin and by 7pm he started feeling agitated, confused and sweaty. Son called EMS and they found him to have low blood glucose. Admitted for the management of hypoglycemia.   (08 Jan 2024 23:54)    Hospital course, primary team, and consultant notes reviewed. Pt seen and examined in ICU, sitting up alert and oriented x3, reading the paper, overtly comfortable. Comprehensive ROS as noted below, collateral obtained from chart/team/family. Exploration of GOC documented as below.      PAST MEDICAL & SURGICAL HISTORY:      FAMILY HISTORY:   Reviewed; no history of     in mother or father    Opiate Naive (Y/N):  Yes  iStop reviewed (Y/N): Yes.   No Rx found on iStop review (Ref#:       [id number]    Items that are not checked are not present  PSYCHOSOCIAL ASSESSMENT:  - Significant other/partner: [  ]  Children: [x  ]  - Living Situation: Home [ x ] Long term care [  ]  Rehab[  ]  Other[  ]  - Support system: strong[ x ] adequate[  ] inadequate[  ]  - Orthodoxy/Spiritual practice: deferred   - Role of organized Mosque important [  ] some [  ] unable to assess dt pt mentation [  ]  - Coping: well[ x ]  with difficulty[  ]  poor coping[  ]  unable to assess dt pt mentation [  ]      ADVANCE DIRECTIVES:    - MOLST[  ]     - Living Will [  ]     DECISION MAKER(s):  - Health Care Proxy(s) [ x ]  Surrogate(s)[  ] Guardian[  ]           Name(s)/Phone Number(s):   - Son Alec Maravilla [phone] HCP  - Daughter Rizwana [phone] HCP      BASELINE (I)ADLs (prior to admission):  - [place]:  Total[x  ] Moderate[  ] Dependent[  ]    Allergies    No Known Allergies    Intolerances        Medications:      MEDICATIONS  (STANDING):  chlorhexidine 2% Cloths 1 Application(s) Topical <User Schedule>  dextrose 10%. 1000 milliLiter(s) (80 mL/Hr) IV Continuous <Continuous>  heparin   Injectable 5000 Unit(s) SubCutaneous every 8 hours    MEDICATIONS  (PRN):      PRESENT SYMPTOMS:   [ ]Unable to obtain due to poor mentation   Source if other than patient:  [ ]Family   [ ]Team     Pain [  ]      PAIN AD Score:  0  [email or web address] (press ctrl +  left click to view)    Analgesic Use (PRNs) for past 24 hours:  - tylenol PRN       If [  ], pt denies symptom.   Dyspnea:         [  ]  Anxiety:           [  ]  Difficulty sleeping: [  ]  Fatigue:           [  ]  Nausea:           [  ]  Loss of appetite:     [  ]  Dysphagia: [  ]  Constipation:   [  ]        LBM   Grief Present   [  ] Yes   [  ] No   Other Symptoms:    All other review of systems negative [  ]    ECOG Performance:       Current Palliative Performance Scale/Karnofsky Score:    %  Preadmit Karnofsky:   %          PEx:  General:   alert  oriented x       lethargic, distressed, cachexia,  nonverbal,  unarousable, verbal  Behavioral: Anxiety  Delirium Agitation Cooperative  HEENT: atraumatic,  No temporal wasting,  No dry mouth,  ET tube/trach  RESP: Reg rhythm, No  tachypnea/labored breathing,  No audible excessive secretions,  CTAB, diminished bilat bases  CV: RRR, S1S2,  No  tachycardia  GI: soft non distended non tender  incontinent               PEG/NG/OG tube                 constipation  last BM:   : normal  incontinent  oliguria/anuria  wade  MUSK: weakness x4,  edema, ambulatory with assistance,   mostly/fully  BB/WC bound  SKIN: No abnormal skin lesions, Poor skin turgor, Pressure ulcer stage:   NEURO:  No deficits, cognitive impairment, encephalopathic dsyphagia dysarthria paresis  Oral intake ability: unable/only mouth care, minimal moderate full capability      T(C): 36.3 (01-09-24 @ 09:54), Max: 36.5 (01-09-24 @ 01:15)  HR: 60 (01-09-24 @ 11:00) (57 - 74)  BP: 116/64 (01-09-24 @ 11:00) (91/50 - 138/65)  RR: 20 (01-09-24 @ 11:00) (15 - 20)  SpO2: 97% (01-09-24 @ 11:00) (93% - 98%)  Wt(kg): --    Labs:    CBC:                        10.3   6.66  )-----------( 194      ( 09 Jan 2024 05:30 )             30.6     CMP:    01-09    134<L>  |  100  |  50<H>  ----------------------------<  123<H>  3.9   |  24  |  3.02<H>    Ca    8.6      09 Jan 2024 05:30  Phos  4.0     01-09  Mg     1.8     01-09    TPro  6.8  /  Alb  3.2<L>  /  TBili  0.4  /  DBili  x   /  AST  19  /  ALT  8<L>  /  AlkPhos  36<L>  01-09       Urinalysis Basic - ( 09 Jan 2024 05:30 )    Color: x / Appearance: x / SG: x / pH: x  Gluc: 123 mg/dL / Ketone: x  / Bili: x / Urobili: x   Blood: x / Protein: x / Nitrite: x   Leuk Esterase: x / RBC: x / WBC x   Sq Epi: x / Non Sq Epi: x / Bacteria: x        RADIOLOGY & ADDITIONAL STUDIES:  Imaging:  Reviewed      GO discussion:  HPI:  89 yr old male ( AAOx3 at baseline ), history of DM for the past 20 years, on oral medications for DM ( Metformin 1000 BID, Glimeperide 2mg BID ), was recently prescribed  farxiga ( unknown dose ) to be added to his oral medications for better glucose control. But due to repeated UTIs ( one was few weeks ago and one was 5 days ago for which he started Levofloxacin 500mg BID, 10 day course ). his PCP Dr. Lai Huggins ( [phone] ) thought possibly triggered from farxiga so stopped that and started him on levemir insulin. 10 units in morning and 5 units at night. The patient has not been eating well for the past few days. Around 5 pm today, the patient got his evening insulin and by 7pm he started feeling agitated, confused and sweaty. Son called EMS and they found him to have low blood glucose. Admitted for the management of hypoglycemia.   (08 Jan 2024 23:54)    Hospital course, primary team, and consultant notes reviewed. Pt seen and examined in ICU, sitting up alert and oriented x3, reading the paper, overtly comfortable. Comprehensive ROS as noted below, collateral obtained from chart/team/family. Exploration of GOC documented as below.      PAST MEDICAL & SURGICAL HISTORY:      FAMILY HISTORY:   Reviewed; no history of     in mother or father    Opiate Naive (Y/N):  Yes  iStop reviewed (Y/N): Yes.   No Rx found on iStop review (Ref#:       [id number]    Items that are not checked are not present  PSYCHOSOCIAL ASSESSMENT:  - Significant other/partner: [  ]  Children: [x  ]  - Living Situation: Home [ x ] Long term care [  ]  Rehab[  ]  Other[  ]  - Support system: strong[ x ] adequate[  ] inadequate[  ]  - Baptist/Spiritual practice: deferred   - Role of organized Anabaptism important [  ] some [  ] unable to assess dt pt mentation [  ]  - Coping: well[ x ]  with difficulty[  ]  poor coping[  ]  unable to assess dt pt mentation [  ]      ADVANCE DIRECTIVES:    - MOLST[  ]     - Living Will [  ]     DECISION MAKER(s):  - Health Care Proxy(s) [ x ]  Surrogate(s)[  ] Guardian[  ]           Name(s)/Phone Number(s):   - Son Alec Maravilla [phone] HCP  - Daughter Rizwana [phone] HCP      BASELINE (I)ADLs (prior to admission):  - [place]:  Total[x  ] Moderate[  ] Dependent[  ]    Allergies    No Known Allergies    Intolerances        Medications:      MEDICATIONS  (STANDING):  chlorhexidine 2% Cloths 1 Application(s) Topical <User Schedule>  dextrose 10%. 1000 milliLiter(s) (80 mL/Hr) IV Continuous <Continuous>  heparin   Injectable 5000 Unit(s) SubCutaneous every 8 hours    MEDICATIONS  (PRN):      PRESENT SYMPTOMS:   [ ]Unable to obtain due to poor mentation   Source if other than patient:  [ ]Family   [ ]Team     Pain [  ]      PAIN AD Score:  0  [email or web address] (press ctrl +  left click to view)    Analgesic Use (PRNs) for past 24 hours:  - tylenol PRN       If [  ], pt denies symptom.   Dyspnea:         [  ]  Anxiety:           [  ]  Difficulty sleeping: [  ]  Fatigue:           [  ]  Nausea:           [  ]  Loss of appetite:     [  ]  Dysphagia: [  ]  Constipation:   [  ]        Grief Present   [  ] Yes   [  ] No   Other Symptoms:    All other review of systems negative [  x]    ECOG Performance:     1/2  Current Palliative Performance Scale/Karnofsky Score:  40-50  %  Preadmit Karnofsky:  70-80 %          PEx:  General: elderly man sitting up in bed reading paper, NAD  alert  oriented x  3     verbal  Behavioral: Calm pleasant sense of humor   HEENT: atraumatic,  No temporal wasting,  No dry mouth  RESP: Reg rhythm, No  tachypnea/labored breathing,  No audible excessive secretions,  CTAB, diminished bilat bases  CV: RRR, S1S2,  No  tachycardia  GI: soft non distended non tender  MUSK: weakness x4,  No edema, ambulatory   SKIN: No abnormal skin lesions, Poor skin turgor  NEURO:  No deficits, cognitive impairment, encephalopathic dsyphagia dysarthria paresis  Oral intake ability:  full capability      T(C): 36.3 (01-09-24 @ 09:54), Max: 36.5 (01-09-24 @ 01:15)  HR: 60 (01-09-24 @ 11:00) (57 - 74)  BP: 116/64 (01-09-24 @ 11:00) (91/50 - 138/65)  RR: 20 (01-09-24 @ 11:00) (15 - 20)  SpO2: 97% (01-09-24 @ 11:00) (93% - 98%)  Wt(kg): --    Labs:    CBC:                        10.3   6.66  )-----------( 194      ( 09 Jan 2024 05:30 )             30.6     CMP:    01-09    134<L>  |  100  |  50<H>  ----------------------------<  123<H>  3.9   |  24  |  3.02<H>    Ca    8.6      09 Jan 2024 05:30  Phos  4.0     01-09  Mg     1.8     01-09    TPro  6.8  /  Alb  3.2<L>  /  TBili  0.4  /  DBili  x   /  AST  19  /  ALT  8<L>  /  AlkPhos  36<L>  01-09       Urinalysis Basic - ( 09 Jan 2024 05:30 )    Color: x / Appearance: x / SG: x / pH: x  Gluc: 123 mg/dL / Ketone: x  / Bili: x / Urobili: x   Blood: x / Protein: x / Nitrite: x   Leuk Esterase: x / RBC: x / WBC x   Sq Epi: x / Non Sq Epi: x / Bacteria: x        RADIOLOGY & ADDITIONAL STUDIES:  Imaging:  Reviewed      GO discussion:  HPI:  89 yr old male ( AAOx3 at baseline ), history of DM for the past 20 years, on oral medications for DM ( Metformin 1000 BID, Glimeperide 2mg BID ), was recently prescribed  farxiga ( unknown dose ) to be added to his oral medications for better glucose control. But due to repeated UTIs ( one was few weeks ago and one was 5 days ago for which he started Levofloxacin 500mg BID, 10 day course ). his PCP Dr. Lai Huggins ( [phone] ) thought possibly triggered from farxiga so stopped that and started him on levemir insulin. 10 units in morning and 5 units at night. The patient has not been eating well for the past few days. Around 5 pm today, the patient got his evening insulin and by 7pm he started feeling agitated, confused and sweaty. Son called EMS and they found him to have low blood glucose. Admitted for the management of hypoglycemia.   (08 Jan 2024 23:54)    Hospital course, primary team, and consultant notes reviewed. Pt seen and examined in ICU, sitting up alert and oriented x3, reading the paper, overtly comfortable. Comprehensive ROS as noted below, collateral obtained from chart/team/family. Exploration of GOC documented as below.      PAST MEDICAL & SURGICAL HISTORY:      FAMILY HISTORY:   Reviewed; no history of     in mother or father    Opiate Naive (Y/N):  Yes  iStop reviewed (Y/N): Yes.   No Rx found on iStop review (Ref#:       [id number]    Items that are not checked are not present  PSYCHOSOCIAL ASSESSMENT:  - Significant other/partner: [  ]  Children: [x  ]  - Living Situation: Home [ x ] Long term care [  ]  Rehab[  ]  Other[  ]  - Support system: strong[ x ] adequate[  ] inadequate[  ]  - Jehovah's witness/Spiritual practice: deferred   - Role of organized [employer] important [  ] some [  ] unable to assess dt pt mentation [  ]  - Coping: well[ x ]  with difficulty[  ]  poor coping[  ]  unable to assess dt pt mentation [  ]      ADVANCE DIRECTIVES:    - MOLST[  ]     - Living Will [  ]     DECISION MAKER(s):  - Health Care Proxy(s) [ x ]  Surrogate(s)[  ] Guardian[  ]           Name(s)/Phone Number(s):   - Son Alec Maravilla [phone] HCP  - Daughter Rizwana [phone] HCP      BASELINE (I)ADLs (prior to admission):  - [place]:  Total[x  ] Moderate[  ] Dependent[  ]    Allergies    No Known Allergies    Intolerances        Medications:      MEDICATIONS  (STANDING):  chlorhexidine 2% Cloths 1 Application(s) Topical <User Schedule>  dextrose 10%. 1000 milliLiter(s) (80 mL/Hr) IV Continuous <Continuous>  heparin   Injectable 5000 Unit(s) SubCutaneous every 8 hours    MEDICATIONS  (PRN):      PRESENT SYMPTOMS:   [ ]Unable to obtain due to poor mentation   Source if other than patient:  [ ]Family   [ ]Team     Pain [  ]      PAIN AD Score:  0  [email or web address] (press ctrl +  left click to view)    Analgesic Use (PRNs) for past 24 hours:  - tylenol PRN       If [  ], pt denies symptom.   Dyspnea:         [  ]  Anxiety:           [  ]  Difficulty sleeping: [  ]  Fatigue:           [  ]  Nausea:           [  ]  Loss of appetite:     [  ]  Dysphagia: [  ]  Constipation:   [  ]        Grief Present   [  ] Yes   [  ] No   Other Symptoms:    All other review of systems negative [  x]    ECOG Performance:     1/2  Current Palliative Performance Scale/Karnofsky Score:  40-50  %  Preadmit Karnofsky:  70-80 %          PEx:  General: elderly man sitting up in bed reading paper, NAD  alert  oriented x  3     verbal  Behavioral: Calm pleasant sense of humor   HEENT: atraumatic,  No temporal wasting,  No dry mouth  RESP: Reg rhythm, No  tachypnea/labored breathing,  No audible excessive secretions,  CTAB, diminished bilat bases  CV: RRR, S1S2,  No  tachycardia  GI: soft non distended non tender  MUSK: weakness x4,  No edema, ambulatory   SKIN: No abnormal skin lesions, Poor skin turgor  NEURO:  No deficits, cognitive impairment, encephalopathic dsyphagia dysarthria paresis  Oral intake ability:  full capability      T(C): 36.3 (01-09-24 @ 09:54), Max: 36.5 (01-09-24 @ 01:15)  HR: 60 (01-09-24 @ 11:00) (57 - 74)  BP: 116/64 (01-09-24 @ 11:00) (91/50 - 138/65)  RR: 20 (01-09-24 @ 11:00) (15 - 20)  SpO2: 97% (01-09-24 @ 11:00) (93% - 98%)  Wt(kg): --    Labs:    CBC:                        10.3   6.66  )-----------( 194      ( 09 Jan 2024 05:30 )             30.6     CMP:    01-09    134<L>  |  100  |  50<H>  ----------------------------<  123<H>  3.9   |  24  |  3.02<H>    Ca    8.6      09 Jan 2024 05:30  Phos  4.0     01-09  Mg     1.8     01-09    TPro  6.8  /  Alb  3.2<L>  /  TBili  0.4  /  DBili  x   /  AST  19  /  ALT  8<L>  /  AlkPhos  36<L>  01-09       Urinalysis Basic - ( 09 Jan 2024 05:30 )    Color: x / Appearance: x / SG: x / pH: x  Gluc: 123 mg/dL / Ketone: x  / Bili: x / Urobili: x   Blood: x / Protein: x / Nitrite: x   Leuk Esterase: x / RBC: x / WBC x   Sq Epi: x / Non Sq Epi: x / Bacteria: x        RADIOLOGY & ADDITIONAL STUDIES:  Imaging:  Reviewed      GO discussion:

## 2024-01-09 NOTE — CONSULT NOTE ADULT - ASSESSMENT
89M PMH DM2, HLD, BPH presents for metabolic encephalopathy 2/2 new insulin use. Admitted to MICU for q1 fingersticks. Palliative consulted for complex medical decision making / ACP in the setting of advanced age and critical illness.

## 2024-01-09 NOTE — CONSULT NOTE ADULT - ASSESSMENT
89M PMH DM2, BPH, HLD presents for AMS. ICU consulted for metabolic encephalopathy.    #Encephalopathy  Likely metabolic 2/2 hypoglycemia in the setting of new insulin use. EKG non-ischemic.   - admit to ICU for q1 fingersticks  - start D10 gtt goal -180    - CTH  - NPO for now until more awake  - hold all anti-diabetic medications  - f/u a1c    #JULIO CESAR  BUN 48/Cr 2.94 on admission, more elevated than previous. Unknown baseline.   Likely pre-renal as pt with poor PO intake over the last 2 days. Also has BPH for which he takes alfuzosin.   - will give LR @ 100cc/hr  - f/u UA with lytes  - bladder scan    Dispo: MICU    Pt seen and case d/w intensivist Dr. Carrizales. 89M PMH DM2, BPH, HLD presents for AMS. ICU consulted for metabolic encephalopathy.    #Encephalopathy  Likely metabolic 2/2 hypoglycemia in the setting of new insulin use with ongoing sulfonylurea use. EKG non-ischemic. He is lethargic but arousable and following commands.   - admit to ICU for q1 fingersticks  - start D10 gtt goal -180    - CTH  - NPO for now until more awake  - hold all anti-diabetic medications  - f/u a1c    #JULIO CESAR  BUN 48/Cr 2.94 on admission, more elevated than previous. Unknown baseline.   Likely pre-renal as pt with poor PO intake over the last 2 days. Also has BPH for which he takes alfuzosin.   - will give LR @ 100cc/hr  - f/u UA with lytes  - bladder scan    Dispo: MICU    Pt seen and case d/w intensivist Dr. Carrizales.

## 2024-01-09 NOTE — CONSULT NOTE ADULT - PROBLEM SELECTOR RECOMMENDATION 9
.  - DNR DNI MOLST completed 1/9  - [facility] son, Alec, and daughter, Rizwana  - Son Alec Maravilla [phone]   - Daughter Rizwana [phone] .  - DNR DNI MOLST completed 1/9  - [facility] son, Alec, and daughter, Rizwana  - Son Alec Maravilla [phone]   - Daughter Rizwana [phone] .  at baseline pt ambulates with walker, has good safety awareness, and is independent with ADLs, son does shopping.  admitted to ICU for q1 FS. pps 40%  - cw supportive care  - PT eval when appropriate  - Family interested in learning more about HHA resources

## 2024-01-09 NOTE — PROGRESS NOTE ADULT - SUBJECTIVE AND OBJECTIVE BOX
HPI: 89 yr old male ( AAOx3 at baseline ), history of DM for the past 20 years, on oral medications for DM ( Metformin 1000 BID, Glimeperide 2mg BID ), was recently prescribed  farxiga ( unknown dose ) to be added to his oral medications for better glucose control. But due to repeated UTIs ( one was few weeks ago and one was 5 days ago for which he started Levofloxacin 500mg BID, 10 day course ). his PCP Dr. Lai Huggins ( [phone] ) thought possibly triggered from farxiga so stopped that and started him on levemir insulin. 10 units in morning and 5 units at night. The patient has not been eating well for the past few days. Around 5 pm today, the patient got his evening insulin and by 7pm he started feeling agitated, confused and sweaty. Son called EMS and they found him to have low blood glucose. Admitted for the management of hypoglycemia.    Hgb a1c was 7 on GLP-1 but family felt it made him fatigued, he has been on SGLT-2 for close to a year but then recently developed recurrent UTI (see below) and his hgb a1c  has been runnning around 9 so insulin was started but in the setting of UTI his po intake decreased likely preciptitating the hypoglycemia.    PAST MEDICAL & SURGICAL HISTORY:  AODM  HLD    MEDICATIONS  (STANDING):  chlorhexidine 2% Cloths 1 Application(s) Topical <User Schedule>  dextrose 10%. 1000 milliLiter(s) (80 mL/Hr) IV Continuous <Continuous>  lactated ringers. 1000 milliLiter(s) (100 mL/Hr) IV Continuous <Continuous>    MEDICATIONS  (PRN):    ICU Vital Signs Last 24 Hrs  T(C): 36.3 (09 Jan 2024 05:46), Max: 36.5 (09 Jan 2024 01:15)  T(F): 97.4 (09 Jan 2024 05:46), Max: 97.7 (09 Jan 2024 01:15)  HR: 58 (09 Jan 2024 07:00) (57 - 74)  BP: 102/53 (09 Jan 2024 07:00) (91/50 - 138/65)  BP(mean): 73 (09 Jan 2024 07:00) (67 - 93)  ABP: --  ABP(mean): --  RR: 16 (09 Jan 2024 07:00) (16 - 18)  SpO2: 96% (09 Jan 2024 07:00) (95% - 98%)    O2 Parameters below as of 09 Jan 2024 07:00  Patient On (Oxygen Delivery Method): room air      NAD  No JVD  Chest clear  CV RRR s1s2  Abd soft   Ext trace edema                 10.3   6.66  )-----------( 194      ( 09 Jan 2024 05:30 )             30.6   01-09    134<L>  |  100  |  50<H>  ----------------------------<  123<H>  3.9   |  24  |  3.02<H>    Ca    8.6      09 Jan 2024 05:30  Phos  4.0     01-09  Mg     1.8     01-09    TPro  6.8  /  Alb  3.2<L>  /  TBili  0.4  /  DBili  x   /  AST  19  /  ALT  8<L>  /  AlkPhos  36<L>  01-09      (08 Jan 2024 23:54)    CULTURE/SENS,URINE   URINE CULTURE      CULTURE/SENS,URINE    REPORT STATUS: FINAL   SOURCE: NG              CULTURE: >= 100,000 CFU/ML STAPHYLOCOCCUS EPIDERMIDIS      STAEPI   ------------------   AMOX/CLAVULAN ACID SENSITIVE   CIPROFLOXACIN SENSITIVE <=0.5   CLINDAMYCIN SENSITIVE <=0.25   ERYTHROMYCIN SENSITIVE 0.5   GENTAMICIN SENSITIVE <=0.5   LEVOFLOXACIN SENSITIVE 0.25   LINEZOLID SENSITIVE 2   MOXIFLOXACIN SENSITIVE <=0.25   NITROFURANTOIN SENSITIVE <=16   OXACILLIN SENSITIVE <=0.25   PENICILLIN RESISTANT >=0.5   QUINUP/DALFOPRIST SENSITIVE <=0.25   RIFAMPICIN SENSITIVE <=0.5   TETRACYCLINE SENSITIVE <=1   TRIMETH/SULFAMETH SENSITIVE <=10 HPI: 89 yr old male ( AAOx3 at baseline ), history of DM for the past 20 years, on oral medications for DM ( Metformin 1000 BID, Glimeperide 2mg BID ), was recently prescribed  farxiga ( unknown dose ) to be added to his oral medications for better glucose control. But due to repeated UTIs ( one was few weeks ago and one was 5 days ago for which he started Levofloxacin 500mg BID, 10 day course ). his PCP Dr. Lai Huggins ( [phone] ) thought possibly triggered from farxiga so stopped that and started him on levemir insulin. 10 units in morning and 5 units at night. The patient has not been eating well for the past few days. Around 5 pm today, the patient got his evening insulin and by 7pm he started feeling agitated, confused and sweaty. Son called EMS and they found him to have low blood glucose. Admitted for the management of hypoglycemia.    Hgb a1c was 7 on GLP-1 but family felt it made him fatigued, he has been on SGLT-2 for close to a year but then recently developed recurrent UTI (see below) and his hgb a1c  has been runnning around 9 so insulin was started but in the setting of UTI his po intake decreased likely preciptitating the hypoglycemia.    PAST MEDICAL & SURGICAL HISTORY:  AODM  HLD    MEDICATIONS  (STANDING):  chlorhexidine 2% Cloths 1 Application(s) Topical <User Schedule>  dextrose 10%. 1000 milliLiter(s) (80 mL/Hr) IV Continuous <Continuous>  lactated ringers. 1000 milliLiter(s) (100 mL/Hr) IV Continuous <Continuous>    MEDICATIONS  (PRN):    ICU Vital Signs Last 24 Hrs  T(C): 36.3 (09 Jan 2024 05:46), Max: 36.5 (09 Jan 2024 01:15)  T(F): 97.4 (09 Jan 2024 05:46), Max: 97.7 (09 Jan 2024 01:15)  HR: 58 (09 Jan 2024 07:00) (57 - 74)  BP: 102/53 (09 Jan 2024 07:00) (91/50 - 138/65)  BP(mean): 73 (09 Jan 2024 07:00) (67 - 93)  ABP: --  ABP(mean): --  RR: 16 (09 Jan 2024 07:00) (16 - 18)  SpO2: 96% (09 Jan 2024 07:00) (95% - 98%)    O2 Parameters below as of 09 Jan 2024 07:00  Patient On (Oxygen Delivery Method): room air      NAD  No JVD  Chest clear  CV RRR s1s2  Abd soft   Ext trace edema                 10.3   6.66  )-----------( 194      ( 09 Jan 2024 05:30 )             30.6   01-09    134<L>  |  100  |  50<H>  ----------------------------<  123<H>  3.9   |  24  |  3.02<H>    Ca    8.6      09 Jan 2024 05:30  Phos  4.0     01-09  Mg     1.8     01-09    TPro  6.8  /  Alb  3.2<L>  /  TBili  0.4  /  DBili  x   /  AST  19  /  ALT  8<L>  /  AlkPhos  36<L>  01-09      (08 Jan 2024 23:54)    CULTURE/SENS,URINE   URINE CULTURE      CULTURE/SENS,URINE    REPORT STATUS: FINAL   SOURCE: NG              CULTURE: >= 100,000 CFU/ML STAPHYLOCOCCUS EPIDERMIDIS      STAEPI   ------------------   AMOX/CLAVULAN ACID SENSITIVE   CIPROFLOXACIN SENSITIVE <=0.5   CLINDAMYCIN SENSITIVE <=0.25   ERYTHROMYCIN SENSITIVE 0.5   GENTAMICIN SENSITIVE <=0.5   LEVOFLOXACIN SENSITIVE 0.25   LINEZOLID SENSITIVE 2   MOXIFLOXACIN SENSITIVE <=0.25   NITROFURANTOIN SENSITIVE <=16   OXACILLIN SENSITIVE <=0.25   PENICILLIN RESISTANT >=0.5   QUINUP/DALFOPRIST SENSITIVE <=0.25   RIFAMPICIN SENSITIVE <=0.5   TETRACYCLINE SENSITIVE <=1   TRIMETH/SULFAMETH SENSITIVE <=10

## 2024-01-09 NOTE — CONSULT NOTE ADULT - SUBJECTIVE AND OBJECTIVE BOX
[facility] [place] [facility] SERVICE CONSULTATION NOTE    Consult Reason:  hypoglycemia  CC: AMS    HPI:  89M PMH DM2, BPH, HLD presents for AMS. He has been on oral medications for his diabetes (metformin, glimeperide, farxiga). Majority of history obtained from son at the bedside. He developed 2 UTIs in the past month and his PMD (Dr. Huggins) discontinued farxiga and started him on levemir (10U in AM and 5u qhs). He was also given levaquin for a UTI on 1/3/23. His reports reports increased lethargy since insulin was prescribed along with poor PO intake. After receiving evening insulin (which the son has been administering), the patient became diaphoretic and confused. They placed honey in his mouth and called EMS. He was found have a BG of 92 in the field. The patient says he feels well and does not complain of chest pain, headache, abdominal pain, dysuria.      (2024 23:54)    VITAL SIGNS:  ICU Vital Signs Last 24 Hrs  T(C): 36.5 (2024 01:15), Max: 36.5 (2024 01:15)  T(F): 97.7 (2024 01:15), Max: 97.7 (2024 01:15)  HR: 57 (2024 04:00) (57 - 74)  BP: 91/50 (2024 04:00) (91/50 - 138/65)  BP(mean): 67 (2024 04:00) (67 - 93)  ABP: --  ABP(mean): --  RR: 16 (2024 04:00) (16 - 18)  SpO2: 98% (2024 04:00) (95% - 98%)    O2 Parameters below as of 2024 04:00  Patient On (Oxygen Delivery Method): room air          CAPILLARY BLOOD GLUCOSE      POCT Blood Glucose.: 106 mg/dL (2024 03:32)      PHYSICAL EXAM:  Constitutional: lethargic but arousable and follows commands, NAD  HEENT: NC/AT; PERRL; R eyelid difficult to open; dry mm  Neck: supple  Respiratory: CTA B/L, no W/R/R  Cardiovascular: +S1/S2, RRR  Gastrointestinal: abdomen soft, NT/ND  Extremities: WWP; no edema  Vascular: 2+ radial, DP/PT pulses B/L  Neurological: AAOx2; no FND    LABS:                        10.8   6.86  )-----------( 210      ( 2024 21:53 )             32.5         136  |  99  |  48<H>  ----------------------------<  34<LL>  4.0   |  22  |  2.94<H>    Ca    9.4      2024 21:53  Phos  4.3       Mg     1.8         TPro  7.8  /  Alb  3.7  /  TBili  0.2  /  DBili  0.2  /  AST  28  /  ALT  11  /  AlkPhos  45              Urinalysis Basic - ( 2024 00:51 )    Color: Yellow / Appearance: Cloudy / S.014 / pH: x  Gluc: x / Ketone: Negative mg/dL  / Bili: Negative / Urobili: 0.2 mg/dL   Blood: x / Protein: Trace mg/dL / Nitrite: Negative   Leuk Esterase: Large / RBC: 0 /HPF /  /HPF   Sq Epi: x / Non Sq Epi: 0 /HPF / Bacteria: Negative /HPF          EKG: Reviewed.    RADIOLOGY & ADDITIONAL TESTS: Reviewed. [facility] SERVICE CONSULTATION NOTE    Consult Reason:  hypoglycemia  CC: AMS    HPI:  89M PMH DM2, BPH, HLD presents for AMS. He has been on oral medications for his diabetes (metformin, glimeperide, farxiga). Majority of history obtained from son at the bedside. He developed 2 UTIs in the past month and his PMD (Dr. Huggins) discontinued farxiga and started him on levemir (10U in AM and 5u qhs). He was also given levaquin for a UTI on 1/3/23. His reports reports increased lethargy since insulin was prescribed along with poor PO intake. After receiving evening insulin (which the son has been administering), the patient became diaphoretic and confused. They placed honey in his mouth and called EMS. He was found have a BG of 92 in the field. The patient says he feels well and does not complain of chest pain, headache, abdominal pain, dysuria.      (2024 23:54)    VITAL SIGNS:  ICU Vital Signs Last 24 Hrs  T(C): 36.5 (2024 01:15), Max: 36.5 (2024 01:15)  T(F): 97.7 (2024 01:15), Max: 97.7 (2024 01:15)  HR: 57 (2024 04:00) (57 - 74)  BP: 91/50 (2024 04:00) (91/50 - 138/65)  BP(mean): 67 (2024 04:00) (67 - 93)  ABP: --  ABP(mean): --  RR: 16 (2024 04:00) (16 - 18)  SpO2: 98% (2024 04:00) (95% - 98%)    O2 Parameters below as of 2024 04:00  Patient On (Oxygen Delivery Method): room air          CAPILLARY BLOOD GLUCOSE      POCT Blood Glucose.: 106 mg/dL (2024 03:32)      PHYSICAL EXAM:  Constitutional: lethargic but arousable and follows commands, NAD  HEENT: NC/AT; PERRL; R eyelid difficult to open; dry mm  Neck: supple  Respiratory: CTA B/L, no W/R/R  Cardiovascular: +S1/S2, RRR  Gastrointestinal: abdomen soft, NT/ND  Extremities: WWP; no edema  Vascular: 2+ radial, DP/PT pulses B/L  Neurological: AAOx2; no FND    LABS:                        10.8   6.86  )-----------( 210      ( 2024 21:53 )             32.5         136  |  99  |  48<H>  ----------------------------<  34<LL>  4.0   |  22  |  2.94<H>    Ca    9.4      2024 21:53  Phos  4.3       Mg     1.8         TPro  7.8  /  Alb  3.7  /  TBili  0.2  /  DBili  0.2  /  AST  28  /  ALT  11  /  AlkPhos  45              Urinalysis Basic - ( 2024 00:51 )    Color: Yellow / Appearance: Cloudy / S.014 / pH: x  Gluc: x / Ketone: Negative mg/dL  / Bili: Negative / Urobili: 0.2 mg/dL   Blood: x / Protein: Trace mg/dL / Nitrite: Negative   Leuk Esterase: Large / RBC: 0 /HPF /  /HPF   Sq Epi: x / Non Sq Epi: 0 /HPF / Bacteria: Negative /HPF          EKG: Reviewed.    RADIOLOGY & ADDITIONAL TESTS: Reviewed.

## 2024-01-09 NOTE — CONSULT NOTE ADULT - PROBLEM SELECTOR RECOMMENDATION 4
.  Complex medical decision making / symptom management in the setting of advanced age.     Symptoms well managed  ACP and GOC as above  Hospice referral inappropriate given treatment preferences and lack of hospice dx     Coping:  well[x  ] with difficulty[  ] poor coping[  ] unable to assess[  ]   Support system: strong[  x] adequate[  ] inadequate[  ]    Active Psychosocial Referrals:  SW/CM[  x] PT/OT[ x ] Hospice[  ]  Ethics[  ]  S[facility] Patient/Family Support[  ] Chaplaincy[   ]  Massage Therapy[ x ] Music Therapy [x  ] Holistic RN[  ]    - Palliative medicine will sign off.  Please contact Palliative Medicine 24/7 at [phone] if the patient's symptoms and/or goals of care change, need to be readdressed, or if patient is readmitted in the future.

## 2024-01-09 NOTE — CONSULT NOTE ADULT - PROBLEM SELECTOR RECOMMENDATION 3
.  - DNR DNI [facility] completed 1/9  - HCP son, Alec, and daughter, Rizwana  - Son Alec Maravilla [phone]   - Daughter Rizwana [phone]      In addition to the E/M visit, an advance care planning meeting was performed. Start time:1015 ; End time: 1045; Total time: 30 min. A in-person meeting to discuss advance care planning was held today regarding:  Lewis Maravilla   Primary decision maker:  Patient is able to participate in decision making;  Alternate/surrogate: Rizwana and Alec Maravilla   . Discussed advance directives including, but not limited to, healthcare proxy and code status, as well as disease trajectory, patient's values/goals, and health care options that are available for end of life care. Decision regarding code status: DNR/DNI   Documentation completed today: [facility]  [facility] note .  - DNR DNI [facility] completed 1/9  - HCP son, Alec, and daughter, Rizwana  - Son Alec Maravilla [phone]   - Daughter Rizwana [phone]      In addition to the E/M visit, an advance care planning meeting was performed. Start time:1015 ; End time: 1045; Total time: 30 min. A in-person meeting to discuss advance care planning was held today regarding:  Lewis Maravilla   Primary decision maker:  Patient is able to participate in decision making;  Alternate/surrogate: Rizwana and Alec Maravilla   . Discussed advance directives including, but not limited to, healthcare proxy and code status, as well as disease trajectory, patient's values/goals, and health care options that are available for end of life care. Decision regarding code status: DNR/DNI   Documentation completed today: [facility]OL[facility]  [facility] note

## 2024-01-09 NOTE — PROGRESS NOTE ADULT - ASSESSMENT
89 yr old male, history of DM,  on oral medications for DM, most recently farxiga. has had two UTIs in last few months. PCP  thought possibly triggered from farxiga so stopped that and started him on levemir insulin. 10un in morning and 5un at night. has also currently on levaquin for uti.      Neuro  RENA      Pulm  RENA      CVS  # Lower extremity edema  pt presented with +1 unilateral edema of the left lower extremity. His RLE has no edema. Cause unknown. No known Hx of DVT  - Consider Duplex US     # Pt on a home dose of Atorvastatin 10mg  - C/W home meds once pt passes dysphagia screen      GI  # Poor oral intake  per son , the patient has been eating poorly in the last few days, which may have contributed to his hypoglycemia  - Dietary consult  - NPO till pt pass dysphagia screen      Renal  # JULIO CESAR on CKD  pt presents with  BUN 48, Cr 2.94 ( baseline 1.35 ), eGFR 18 (baseline 46), iso hypoglycemia 34 and > 1000 glucose in urine. UA suggestive of UTI.    - ensure adequate hydration with IV fluids  - Obtain urine studies  - Treat UTI  - Manage hypoglycemia  - Monitor BMP  - Strict I & Os  - Avoid nephrotoxic agents            RENA          Endo  # Hypoglycemia  pt with a Hx of DM on ( Metformin 1000 BID, Glimeperide 2mg BID )   recently switched from Farxiga by PCP ( due to recurrent UTI ), to levemir insulin. 10 units in morning and 5 units at night. Found to have a blood glucose of 34, urine Glucose > 1000. S/P D50 50ml + 25ml boluses, D10 1000ml infusion at 30ml/h, 1 L NS bolus in the ED.    - CW D10 30ml/h  - close monitoring of blood sugar  - A1c in the AM  - ISS during inpatient stay  - Holding home meds        ID  # UTI  pt with a Hx of DM recently switched from Farxiga by PCP to levemir insulin. 10 units in morning and 5 units at night.  ( due to recurrent UTI ) the last UTI was diagnosed 5 days ago for which he was prescribed a 10 day course of Levofloxacin (5 days remaining),  Currently presenting with UA findings of : positive Leukocyte Esterase, few bacteria, yeast like cells and Glucose > 1000.     - Finish the Levofloxacin course ( 500mg BID, 5 days remaining )  - ensure adequate hydration with IV fluids  - Obtain urine Cx  - Obtain BCx  - Strict I & Os       Heme/Onc  RENA      Prophylactic measures    Fluids: D10 30cc/h  Nutrition: NPO till passing dysphagia screen  GI prophylaxis: none  DVT prophylaxis: Heparin 5000 q8  Code: full  Dispo: ICU        89-year-old Male with PMH of DM2, HLD, BPH presented for metabolic encephalopathy 2/2 new insulin use. Admitted to MICU for q1 fingersticks.      Neuro  #AMS, resolved  Pt presented with AMS, likely iso UTI and hypoglycemia. Pt is back at baseline on 1/9.  - Continue to monitor     Pulm  RENA    CVS  #LLE edema  Pt presented with +1 unilateral edema of the left lower extremity. His RLE has no edema. No known Hx of DVT  - Bedside POCUS of LE on 1/10 to r/o DVT    #HLD  PT on home Atorvastatin 10mg PO qd.  - C/w home med     GI  Started soft bite sized diet after passing dysphagia screen on 1/9    Renal  #JULIO CESAR on CKD  On admission, Cr of 2.94 (baseline of 1.35 on 12/4/23); Cr continued to trend up to 3.16 on 1/9. Specific gravity of 1.023, concentrated. JULIO CESAR likely prerenal due to hypovolemia due to dehydration iso AMS (less likely, did not improve with 1L of LR) vs. obstructive etiology iso BPH, urinary retention and urethral stricture (more likely).   - Ensure adequate hydration with IV fluids  - Monitor BMP  - Strict I & Os  - Avoid nephrotoxic agents  - Dan placed by urology on 1/9       #BPH  #Urinary retention  Pt with history of BPH, on home tamsulosin 0.8mg PO qhs. Renal US with moderate bilateral hydro and mild increased renal parenchyma Found to be retaining on bladder scan on 1/9 with 800cc; unable to straight cath due to blood clot with resistance.   - Dan placed by urology on 1/9  - C/w tamsulosin 0.8mg PO qhs  - Monitor I&O's    Endo  #Hypoglycemia, resolved  Pt with DM, was on home Metformin 1000 BID, Glimepiride 2mg BID; recently switched from Farxiga by PCP (due to recurrent UTI), to Levemir insulin 10 units in morning and 5 units at night. Found to have a blood glucose of 34 on admission, urine Glucose > 1000, A1c 9. S/p D50 50ml + 25ml boluses, D10 1000ml infusion at 30ml/h, 1 L NS bolus in the ED.   - Discontinued D10 drip and started diet   - FSG q2h     ID  #UTI  Pt with BPH and DM recently switched from Farxiga by PCP to levemir insulin due to recurrent UTI. Had 2 episodes of UTI in the past month, last diagnosed on 1/4; ucx grew staph epi, cx was pansensitive, placed on 10-day course of Levaquin (completed 5 days outpatient prior to presenting to [facility]). UA (+) LE, WBC, bacteria. Ucx contaminated.   - Started CTX 1g IV qd x3 days (1/9-1/11)    Heme/Onc  RENA    Prophylactic measures  Fluids: s/p D10 drip   Nutrition: Soft bite sized diet  GI prophylaxis: none  DVT prophylaxis: Heparin 5000 q8h  Code: full  Dispo: MICU         89-year-old Male with PMH of DM2, HLD, BPH presented for metabolic encephalopathy 2/2 new insulin use. Admitted to MICU for q1 fingersticks.      Neuro  #AMS, resolved  Pt presented with AMS, likely iso UTI and hypoglycemia. Pt is back at baseline on 1/9.  - Continue to monitor     Pulm  RENA    CVS  #LLE edema  Pt presented with +1 unilateral edema of the left lower extremity. His RLE has no edema. No known Hx of DVT  - Bedside POCUS of LE on 1/10 to r/o DVT    #HLD  PT on home Atorvastatin 10mg PO qd.  - C/w home med     GI  Started soft bite sized diet after passing dysphagia screen on 1/9    Renal  #JULIO CESAR on CKD  On admission, Cr of 2.94 (baseline of 1.35 on 12/4/23); Cr continued to trend up to 3.16 on 1/9. Specific gravity of 1.023, concentrated. JULIO CESAR likely prerenal due to hypovolemia due to dehydration iso AMS (less likely, did not improve with 1L of LR) vs. obstructive etiology iso BPH, urinary retention and urethral stricture (more likely).   - Ensure adequate hydration with IV fluids  - Monitor BMP  - Strict I & Os  - Avoid nephrotoxic agents  - Dan placed by urology on 1/9       #BPH  #Urinary retention  Pt with history of BPH, on home tamsulosin 0.8mg PO qhs. Renal US with moderate bilateral hydro and mild increased renal parenchyma Found to be retaining on bladder scan on 1/9 with 800cc; unable to straight cath due to blood clot with resistance.   - Dan placed by urology on 1/9  - C/w tamsulosin 0.8mg PO qhs  - Monitor I&O's    Endo  #Hypoglycemia, resolved  Pt with DM, was on home Metformin 1000 BID, Glimepiride 2mg BID; recently switched from Farxiga by PCP (due to recurrent UTI), to Levemir insulin 10 units in morning and 5 units at night. Found to have a blood glucose of 34 on admission, urine Glucose > 1000, A1c 9. S/p D50 50ml + 25ml boluses, D10 1000ml infusion at 30ml/h, 1 L NS bolus in the ED.   - Discontinued D10 drip and started diet   - FSG q2h     ID  #UTI  Pt with BPH and DM recently switched from Farxiga by PCP to levemir insulin due to recurrent UTI. Had 2 episodes of UTI in the past month, last diagnosed on 1/4; ucx grew staph epi, cx was pansensitive, placed on 10-day course of Levaquin (completed 5 days outpatient prior to presenting to [facility]). UA (+) LE, WBC, bacteria. Ucx contaminated.   - Started CTX 1g IV qd x3 days (1/9-1/11)    Heme/Onc  RENA    Prophylactic measures  Fluids: s/p D10 drip   Nutrition: Soft bite sized diet  GI prophylaxis: none  DVT prophylaxis: Heparin 5000 q8h  Code: full  Dispo: MICU

## 2024-01-09 NOTE — DIETITIAN INITIAL EVALUATION ADULT - BODY MASS INDEX
at goal, continue current medications, continue current treatment plan, medication adherence emphasized
well controlled, continue current medications, continue current treatment plan, medication adherence emphasized, lifestyle modifications recommended
well controlled, continue current medications, continue current treatment plan, medication adherence emphasized, lifestyle modifications recommended
23.4

## 2024-01-09 NOTE — DIETITIAN INITIAL EVALUATION ADULT - PERTINENT LABORATORY DATA
01-09    134<L>  |  100  |  50<H>  ----------------------------<  123<H>  3.9   |  24  |  3.02<H>    Ca    8.6      09 Jan 2024 05:30  Phos  4.0     01-09  Mg     1.8     01-09    TPro  6.8  /  Alb  3.2<L>  /  TBili  0.4  /  DBili  x   /  AST  19  /  ALT  8<L>  /  AlkPhos  36<L>  01-09  POCT Blood Glucose.: 206 mg/dL (01-09-24 @ 13:19)  A1C with Estimated Average Glucose Result: 9.0 % (01-09-24 @ 05:30)

## 2024-01-09 NOTE — DIETITIAN INITIAL EVALUATION ADULT - OTHER CALCULATIONS
Estimated nutritional needs determined using [facility] Standards of Nutrition Care for elderly repletion, wound care using current body weight 65.8 kg (102%IBW).  Estimated nutritional needs determined using [facility] Standards of Nutrition Care for elderly repletion, wound care using current body weight 65.8 kg (102%IBW).

## 2024-01-09 NOTE — DIETITIAN INITIAL EVALUATION ADULT - OTHER INFO
89 yr old male, history of DM,  on oral medications for DM, most recently farxiga. has had two UTIs in last few months. PCP  thought possibly triggered from farxiga so stopped that and started him on levemir insulin. 10un in morning and 5un at night. has also currently on levaquin for uti.    Pt care discussed in interdisciplinary care team rounds. Rx and labs reviewed. Pt presents with no overt signs of nutritional wasting. Pt received resting in bed reading newspaper with family at bedside. Meal tray at bedside >75 consumed; family notes pt with very limited appetite or PO intake several days PTA. Good appetite today. Pt with usual body weight of 145 pounds; current body weight of 145 pounds reflects stable wt trend. Discussed current diet therapy and rationale; family agreeable and verbalized understanding. Discussed diet and DM management with insulin. Diet recall and frequently consumed foods gathered; food preferences updated and relayed to kitchen. Pt takes Glucerna at home; recommend Glucerna BID while inpatient to optimize nutrition and support wound healing. No complaints of nausea/vomiting/constipation/diarrhea or difficult chew/swallow. NKA to food. RDN will continue to reassess, intervene, and monitor as appropriate.     Pain: 0 per chart review   GI: Abdomen non-distended/non-tender, +BS x4, last bowel movement PTA   Skin: pressure injury unspecified mid-lower back, pressure injury unspecified R shoulder, no edema assessed.

## 2024-01-09 NOTE — DIETITIAN INITIAL EVALUATION ADULT - PERTINENT MEDS FT
MEDICATIONS  (STANDING):  atorvastatin 10 milliGRAM(s) Oral at bedtime  cefTRIAXone   IVPB 1000 milliGRAM(s) IV Intermittent every 24 hours  chlorhexidine 2% Cloths 1 Application(s) Topical <User Schedule>  dextrose 10%. 1000 milliLiter(s) (80 mL/Hr) IV Continuous <Continuous>  heparin   Injectable 5000 Unit(s) SubCutaneous every 8 hours  tamsulosin 0.8 milliGRAM(s) Oral at bedtime    MEDICATIONS  (PRN):

## 2024-01-09 NOTE — PATIENT PROFILE ADULT - FALL HARM RISK - HARM RISK INTERVENTIONS
Assistance with ambulation/Assistance OOB with selected safe patient handling equipment/Communicate Risk of Fall with Harm to all staff/Discuss with provider need for PT consult/Monitor gait and stability/Provide patient with walking aids - walker, cane, crutches/Reinforce activity limits and safety measures with patient and family/Tailored Fall Risk Interventions/Visual Cue: Yellow wristband and red socks/Bed in lowest position, wheels locked, appropriate side rails in place/Call bell, personal items and telephone in reach/Instruct patient to call for assistance before getting out of bed or chair/Non-slip footwear when patient is out of bed/[place] to call system/Physically safe environment - no spills, clutter or unnecessary equipment/Purposeful Proactive Rounding/Room/bathroom lighting operational, light cord in reach Assistance with ambulation/Assistance OOB with selected safe patient handling equipment/Communicate Risk of Fall with Harm to all staff/Discuss with provider need for PT consult/Monitor gait and stability/Provide patient with walking aids - walker, cane, crutches/Reinforce activity limits and safety measures with patient and family/Tailored Fall Risk Interventions/Visual Cue: Yellow wristband and red socks/Bed in lowest position, wheels locked, appropriate side rails in place/Call bell, personal items and telephone in reach/Instruct patient to call for assistance before getting out of bed or chair/Non-slip footwear when patient is out of bed/[place] to call system/Physically safe environment - no spills, clutter or unnecessary equipment/Purposeful Proactive Rounding/Room/bathroom lighting operational, light cord in reach

## 2024-01-09 NOTE — CONSULT NOTE ADULT - CONSULT REASON
Palliative consulted for complex medical decision making / ACP in the setting of advanced illness.
encephalopathy

## 2024-01-09 NOTE — CONSULT NOTE ADULT - CONVERSATION DETAILS
Met with patient and his adult children at bedside. Introduced the role of palliative medicine for ACP, GOC, and support.  Reviewed hospital course after he presented with AMS 2/2 new insulin regimen for his DM2. Pt feeling well today and is pending step down.     Prior to admission, pt had been living alone, independent with ADLs, and ambulatory with walker; his son does shopping for patient. Pt has had two UTIs in recent weeks and following treatment has returned to his functional baseline.  He lives with an "excellent" quality of life and remains active and OOB as much as he is able. Goals are for patient to maintain functional independence for as long as possible, to avoid falls, and explore hiring HHA or  when patient returns home.     Patient's HCP are his adult children, Alec and Rizwana. We discussed risks/benefits/potential complications for cpr/intubation in light of advanced age. Alec understands that this has been discussed and addressed in the past. He affirms that pt/family "do not want heroic measures." They would like reversible conditions to be treated, but in the event of cardiac arrest/respiratory failure, they would elect to trial NIV, but otherwise wish to allow natural death. Verbal consent obtained to complete Molst reflecting these wishes.

## 2024-01-09 NOTE — DIETITIAN INITIAL EVALUATION ADULT - ADD RECOMMEND
-Continue current diet   -Add Glucerna BID  -Encourage good PO intake  -Honor food preferences as able  -Weekly wts  -Monitor chemistry, GI function, and skin integrity

## 2024-01-09 NOTE — PROGRESS NOTE ADULT - SUBJECTIVE AND OBJECTIVE BOX
OVERNIGHT EVENTS:    SUBJECTIVE/INTERVAL HPI: Patient was seen and examined at bedside, resting comfortably.     VITAL SIGNS:  T(F): 97.4 (01-09-24 @ 05:46)  HR: 59 (01-09-24 @ 06:00)  BP: 116/60 (01-09-24 @ 06:00)  RR: 17 (01-09-24 @ 06:00)  SpO2: 96% (01-09-24 @ 06:00)  Wt(kg): --      01-08-24 @ 07:01  -  01-09-24 @ 06:49  --------------------------------------------------------  IN: 770 mL / OUT: 50 mL / NET: 720 mL        PHYSICAL EXAM:    Constitutional: lethargic but arousable and follows commands, NAD  HEENT: NC/AT; PERRL; R eyelid difficult to open; dry mm  Neck: supple  Respiratory: CTA B/L, no W/R/R  Cardiovascular: +S1/S2, RRR  Gastrointestinal: abdomen soft, NT/ND  Extremities: WWP; no edema  Vascular: 2+ radial, DP/PT pulses B/L  Neurological: AAOx2; no FND      MEDICATIONS  (STANDING):  chlorhexidine 2% Cloths 1 Application(s) Topical <User Schedule>  dextrose 10%. 1000 milliLiter(s) (80 mL/Hr) IV Continuous <Continuous>  lactated ringers. 1000 milliLiter(s) (100 mL/Hr) IV Continuous <Continuous>    MEDICATIONS  (PRN):      Allergies    No Known Allergies    Intolerances        LABS:                        10.3   6.66  )-----------( 194      ( 09 Jan 2024 05:30 )             30.6     01-09    134<L>  |  100  |  50<H>  ----------------------------<  123<H>  3.9   |  24  |  3.02<H>    Ca    8.6      09 Jan 2024 05:30  Phos  4.0     01-09  Mg     1.8     01-09    TPro  6.8  /  Alb  3.2<L>  /  TBili  0.4  /  DBili  x   /  AST  19  /  ALT  8<L>  /  AlkPhos  36<L>  01-09      Urinalysis Basic - ( 09 Jan 2024 05:30 )    Color: x / Appearance: x / SG: x / pH: x  Gluc: 123 mg/dL / Ketone: x  / Bili: x / Urobili: x   Blood: x / Protein: x / Nitrite: x   Leuk Esterase: x / RBC: x / WBC x   Sq Epi: x / Non Sq Epi: x / Bacteria: x        RADIOLOGY & ADDITIONAL TESTS:  Reviewed OVERNIGHT EVENTS: ROBIN    SUBJECTIVE/INTERVAL HPI: Patient was seen and examined at bedside. Reports fatigue but denies any chest pain, SOB, headache, dizziness, abdominal pain, n/v/d.     VITAL SIGNS:  T(F): 97.4 (01-09-24 @ 05:46)  HR: 59 (01-09-24 @ 06:00)  BP: 116/60 (01-09-24 @ 06:00)  RR: 17 (01-09-24 @ 06:00)  SpO2: 96% (01-09-24 @ 06:00)  Wt(kg): --      01-08-24 @ 07:01  -  01-09-24 @ 06:49  --------------------------------------------------------  IN: 770 mL / OUT: 50 mL / NET: 720 mL        PHYSICAL EXAM:    Constitutional: NAD, resting comfortably in bed  HEENT: PERRL; dry MM  Respiratory: CTA B/L, no wheezing  Cardiovascular: +S1/S2, RRR, no murmurs  Gastrointestinal: abdomen soft, NT/ND  Extremities: WWP; edema to LLE   Vascular: 2+ radial pulses B/L  Neurological: AAOx3; follows commands; answers questions       MEDICATIONS  (STANDING):  chlorhexidine 2% Cloths 1 Application(s) Topical <User Schedule>  dextrose 10%. 1000 milliLiter(s) (80 mL/Hr) IV Continuous <Continuous>  lactated ringers. 1000 milliLiter(s) (100 mL/Hr) IV Continuous <Continuous>    MEDICATIONS  (PRN):      Allergies    No Known Allergies    Intolerances        LABS:                        10.3   6.66  )-----------( 194      ( 09 Jan 2024 05:30 )             30.6     01-09    134<L>  |  100  |  50<H>  ----------------------------<  123<H>  3.9   |  24  |  3.02<H>    Ca    8.6      09 Jan 2024 05:30  Phos  4.0     01-09  Mg     1.8     01-09    TPro  6.8  /  Alb  3.2<L>  /  TBili  0.4  /  DBili  x   /  AST  19  /  ALT  8<L>  /  AlkPhos  36<L>  01-09      Urinalysis Basic - ( 09 Jan 2024 05:30 )    Color: x / Appearance: x / SG: x / pH: x  Gluc: 123 mg/dL / Ketone: x  / Bili: x / Urobili: x   Blood: x / Protein: x / Nitrite: x   Leuk Esterase: x / RBC: x / WBC x   Sq Epi: x / Non Sq Epi: x / Bacteria: x        RADIOLOGY & ADDITIONAL TESTS:  Reviewed

## 2024-01-09 NOTE — CONSULT NOTE ADULT - TIME BILLING
Please see the above for the details, multiple visits to his room for adjustment of D10 and frequent evaluations.

## 2024-01-10 DIAGNOSIS — N17.9 ACUTE KIDNEY FAILURE, UNSPECIFIED: ICD-10-CM

## 2024-01-10 DIAGNOSIS — G93.41 METABOLIC ENCEPHALOPATHY: ICD-10-CM

## 2024-01-10 DIAGNOSIS — N39.0 URINARY TRACT INFECTION, SITE NOT SPECIFIED: ICD-10-CM

## 2024-01-10 DIAGNOSIS — R33.9 RETENTION OF URINE, UNSPECIFIED: ICD-10-CM

## 2024-01-10 DIAGNOSIS — Z29.9 ENCOUNTER FOR PROPHYLACTIC MEASURES, UNSPECIFIED: ICD-10-CM

## 2024-01-10 DIAGNOSIS — N40.0 BENIGN PROSTATIC HYPERPLASIA WITHOUT LOWER URINARY TRACT SYMPTOMS: ICD-10-CM

## 2024-01-10 DIAGNOSIS — E16.2 HYPOGLYCEMIA, UNSPECIFIED: ICD-10-CM

## 2024-01-10 DIAGNOSIS — E78.5 HYPERLIPIDEMIA, UNSPECIFIED: ICD-10-CM

## 2024-01-10 LAB
ALBUMIN SERPL ELPH-MCNC: 3.1 G/DL — LOW (ref 3.3–5)
ALBUMIN SERPL ELPH-MCNC: 3.1 G/DL — LOW (ref 3.3–5)
ALP SERPL-CCNC: 41 U/L — SIGNIFICANT CHANGE UP (ref 40–120)
ALP SERPL-CCNC: 41 U/L — SIGNIFICANT CHANGE UP (ref 40–120)
ALT FLD-CCNC: 8 U/L — LOW (ref 10–45)
ALT FLD-CCNC: 8 U/L — LOW (ref 10–45)
ANION GAP SERPL CALC-SCNC: 10 MMOL/L — SIGNIFICANT CHANGE UP (ref 5–17)
ANION GAP SERPL CALC-SCNC: 10 MMOL/L — SIGNIFICANT CHANGE UP (ref 5–17)
AST SERPL-CCNC: 16 U/L — SIGNIFICANT CHANGE UP (ref 10–40)
AST SERPL-CCNC: 16 U/L — SIGNIFICANT CHANGE UP (ref 10–40)
BASOPHILS # BLD AUTO: 0.02 K/UL — SIGNIFICANT CHANGE UP (ref 0–0.2)
BASOPHILS # BLD AUTO: 0.02 K/UL — SIGNIFICANT CHANGE UP (ref 0–0.2)
BASOPHILS NFR BLD AUTO: 0.3 % — SIGNIFICANT CHANGE UP (ref 0–2)
BASOPHILS NFR BLD AUTO: 0.3 % — SIGNIFICANT CHANGE UP (ref 0–2)
BILIRUB SERPL-MCNC: 0.4 MG/DL — SIGNIFICANT CHANGE UP (ref 0.2–1.2)
BILIRUB SERPL-MCNC: 0.4 MG/DL — SIGNIFICANT CHANGE UP (ref 0.2–1.2)
BUN SERPL-MCNC: 39 MG/DL — HIGH (ref 7–23)
BUN SERPL-MCNC: 39 MG/DL — HIGH (ref 7–23)
CALCIUM SERPL-MCNC: 9.1 MG/DL — SIGNIFICANT CHANGE UP (ref 8.4–10.5)
CALCIUM SERPL-MCNC: 9.1 MG/DL — SIGNIFICANT CHANGE UP (ref 8.4–10.5)
CHLORIDE SERPL-SCNC: 104 MMOL/L — SIGNIFICANT CHANGE UP (ref 96–108)
CHLORIDE SERPL-SCNC: 104 MMOL/L — SIGNIFICANT CHANGE UP (ref 96–108)
CO2 SERPL-SCNC: 22 MMOL/L — SIGNIFICANT CHANGE UP (ref 22–31)
CO2 SERPL-SCNC: 22 MMOL/L — SIGNIFICANT CHANGE UP (ref 22–31)
CREAT SERPL-MCNC: 2.48 MG/DL — HIGH (ref 0.5–1.3)
CREAT SERPL-MCNC: 2.48 MG/DL — HIGH (ref 0.5–1.3)
CULTURE RESULTS: SIGNIFICANT CHANGE UP
CULTURE RESULTS: SIGNIFICANT CHANGE UP
EGFR: 22 ML/MIN/1.73M2 — LOW
EGFR: 22 ML/MIN/1.73M2 — LOW
EOSINOPHIL # BLD AUTO: 0.06 K/UL — SIGNIFICANT CHANGE UP (ref 0–0.5)
EOSINOPHIL # BLD AUTO: 0.06 K/UL — SIGNIFICANT CHANGE UP (ref 0–0.5)
EOSINOPHIL NFR BLD AUTO: 0.8 % — SIGNIFICANT CHANGE UP (ref 0–6)
EOSINOPHIL NFR BLD AUTO: 0.8 % — SIGNIFICANT CHANGE UP (ref 0–6)
GLUCOSE BLDC GLUCOMTR-MCNC: 150 MG/DL — HIGH (ref 70–99)
GLUCOSE BLDC GLUCOMTR-MCNC: 150 MG/DL — HIGH (ref 70–99)
GLUCOSE BLDC GLUCOMTR-MCNC: 196 MG/DL — HIGH (ref 70–99)
GLUCOSE BLDC GLUCOMTR-MCNC: 196 MG/DL — HIGH (ref 70–99)
GLUCOSE BLDC GLUCOMTR-MCNC: 239 MG/DL — HIGH (ref 70–99)
GLUCOSE BLDC GLUCOMTR-MCNC: 239 MG/DL — HIGH (ref 70–99)
GLUCOSE BLDC GLUCOMTR-MCNC: 252 MG/DL — HIGH (ref 70–99)
GLUCOSE BLDC GLUCOMTR-MCNC: 252 MG/DL — HIGH (ref 70–99)
GLUCOSE BLDC GLUCOMTR-MCNC: 263 MG/DL — HIGH (ref 70–99)
GLUCOSE BLDC GLUCOMTR-MCNC: 263 MG/DL — HIGH (ref 70–99)
GLUCOSE BLDC GLUCOMTR-MCNC: 265 MG/DL — HIGH (ref 70–99)
GLUCOSE BLDC GLUCOMTR-MCNC: 265 MG/DL — HIGH (ref 70–99)
GLUCOSE BLDC GLUCOMTR-MCNC: 52 MG/DL — CRITICAL LOW (ref 70–99)
GLUCOSE BLDC GLUCOMTR-MCNC: 52 MG/DL — CRITICAL LOW (ref 70–99)
GLUCOSE SERPL-MCNC: 198 MG/DL — HIGH (ref 70–99)
GLUCOSE SERPL-MCNC: 198 MG/DL — HIGH (ref 70–99)
HCT VFR BLD CALC: 32.7 % — LOW (ref 39–50)
HCT VFR BLD CALC: 32.7 % — LOW (ref 39–50)
HGB BLD-MCNC: 11.1 G/DL — LOW (ref 13–17)
HGB BLD-MCNC: 11.1 G/DL — LOW (ref 13–17)
IMM GRANULOCYTES NFR BLD AUTO: 0.4 % — SIGNIFICANT CHANGE UP (ref 0–0.9)
IMM GRANULOCYTES NFR BLD AUTO: 0.4 % — SIGNIFICANT CHANGE UP (ref 0–0.9)
LYMPHOCYTES # BLD AUTO: 1.08 K/UL — SIGNIFICANT CHANGE UP (ref 1–3.3)
LYMPHOCYTES # BLD AUTO: 1.08 K/UL — SIGNIFICANT CHANGE UP (ref 1–3.3)
LYMPHOCYTES # BLD AUTO: 14.5 % — SIGNIFICANT CHANGE UP (ref 13–44)
LYMPHOCYTES # BLD AUTO: 14.5 % — SIGNIFICANT CHANGE UP (ref 13–44)
MAGNESIUM SERPL-MCNC: 1.8 MG/DL — SIGNIFICANT CHANGE UP (ref 1.6–2.6)
MAGNESIUM SERPL-MCNC: 1.8 MG/DL — SIGNIFICANT CHANGE UP (ref 1.6–2.6)
MCHC RBC-ENTMCNC: 29.8 PG — SIGNIFICANT CHANGE UP (ref 27–34)
MCHC RBC-ENTMCNC: 29.8 PG — SIGNIFICANT CHANGE UP (ref 27–34)
MCHC RBC-ENTMCNC: 33.9 GM/DL — SIGNIFICANT CHANGE UP (ref 32–36)
MCHC RBC-ENTMCNC: 33.9 GM/DL — SIGNIFICANT CHANGE UP (ref 32–36)
MCV RBC AUTO: 87.9 FL — SIGNIFICANT CHANGE UP (ref 80–100)
MCV RBC AUTO: 87.9 FL — SIGNIFICANT CHANGE UP (ref 80–100)
MONOCYTES # BLD AUTO: 0.65 K/UL — SIGNIFICANT CHANGE UP (ref 0–0.9)
MONOCYTES # BLD AUTO: 0.65 K/UL — SIGNIFICANT CHANGE UP (ref 0–0.9)
MONOCYTES NFR BLD AUTO: 8.7 % — SIGNIFICANT CHANGE UP (ref 2–14)
MONOCYTES NFR BLD AUTO: 8.7 % — SIGNIFICANT CHANGE UP (ref 2–14)
NEUTROPHILS # BLD AUTO: 5.61 K/UL — SIGNIFICANT CHANGE UP (ref 1.8–7.4)
NEUTROPHILS # BLD AUTO: 5.61 K/UL — SIGNIFICANT CHANGE UP (ref 1.8–7.4)
NEUTROPHILS NFR BLD AUTO: 75.3 % — SIGNIFICANT CHANGE UP (ref 43–77)
NEUTROPHILS NFR BLD AUTO: 75.3 % — SIGNIFICANT CHANGE UP (ref 43–77)
NRBC # BLD: 0 /100 WBCS — SIGNIFICANT CHANGE UP (ref 0–0)
NRBC # BLD: 0 /100 WBCS — SIGNIFICANT CHANGE UP (ref 0–0)
PHOSPHATE SERPL-MCNC: 3.8 MG/DL — SIGNIFICANT CHANGE UP (ref 2.5–4.5)
PHOSPHATE SERPL-MCNC: 3.8 MG/DL — SIGNIFICANT CHANGE UP (ref 2.5–4.5)
PLATELET # BLD AUTO: 210 K/UL — SIGNIFICANT CHANGE UP (ref 150–400)
PLATELET # BLD AUTO: 210 K/UL — SIGNIFICANT CHANGE UP (ref 150–400)
POTASSIUM SERPL-MCNC: 3.7 MMOL/L — SIGNIFICANT CHANGE UP (ref 3.5–5.3)
POTASSIUM SERPL-MCNC: 3.7 MMOL/L — SIGNIFICANT CHANGE UP (ref 3.5–5.3)
POTASSIUM SERPL-SCNC: 3.7 MMOL/L — SIGNIFICANT CHANGE UP (ref 3.5–5.3)
POTASSIUM SERPL-SCNC: 3.7 MMOL/L — SIGNIFICANT CHANGE UP (ref 3.5–5.3)
PROT SERPL-MCNC: 6.7 G/DL — SIGNIFICANT CHANGE UP (ref 6–8.3)
PROT SERPL-MCNC: 6.7 G/DL — SIGNIFICANT CHANGE UP (ref 6–8.3)
RBC # BLD: 3.72 M/UL — LOW (ref 4.2–5.8)
RBC # BLD: 3.72 M/UL — LOW (ref 4.2–5.8)
RBC # FLD: 13.8 % — SIGNIFICANT CHANGE UP (ref 10.3–14.5)
RBC # FLD: 13.8 % — SIGNIFICANT CHANGE UP (ref 10.3–14.5)
SODIUM SERPL-SCNC: 136 MMOL/L — SIGNIFICANT CHANGE UP (ref 135–145)
SODIUM SERPL-SCNC: 136 MMOL/L — SIGNIFICANT CHANGE UP (ref 135–145)
SPECIMEN SOURCE: SIGNIFICANT CHANGE UP
SPECIMEN SOURCE: SIGNIFICANT CHANGE UP
WBC # BLD: 7.45 K/UL — SIGNIFICANT CHANGE UP (ref 3.8–10.5)
WBC # BLD: 7.45 K/UL — SIGNIFICANT CHANGE UP (ref 3.8–10.5)
WBC # FLD AUTO: 7.45 K/UL — SIGNIFICANT CHANGE UP (ref 3.8–10.5)
WBC # FLD AUTO: 7.45 K/UL — SIGNIFICANT CHANGE UP (ref 3.8–10.5)

## 2024-01-10 PROCEDURE — 99233 SBSQ HOSP IP/OBS HIGH 50: CPT

## 2024-01-10 RX ORDER — SODIUM CHLORIDE 9 MG/ML
1000 INJECTION, SOLUTION INTRAVENOUS
Refills: 0 | Status: DISCONTINUED | OUTPATIENT
Start: 2024-01-10 | End: 2024-01-16

## 2024-01-10 RX ORDER — INSULIN LISPRO 100/ML
VIAL (ML) SUBCUTANEOUS AT BEDTIME
Refills: 0 | Status: DISCONTINUED | OUTPATIENT
Start: 2024-01-10 | End: 2024-01-16

## 2024-01-10 RX ORDER — GLUCAGON INJECTION, SOLUTION 0.5 MG/.1ML
1 INJECTION, SOLUTION SUBCUTANEOUS ONCE
Refills: 0 | Status: DISCONTINUED | OUTPATIENT
Start: 2024-01-10 | End: 2024-01-16

## 2024-01-10 RX ORDER — SODIUM CHLORIDE 9 MG/ML
250 INJECTION, SOLUTION INTRAVENOUS
Refills: 0 | Status: DISCONTINUED | OUTPATIENT
Start: 2024-01-10 | End: 2024-01-10

## 2024-01-10 RX ORDER — DEXTROSE 50 % IN WATER 50 %
25 SYRINGE (ML) INTRAVENOUS ONCE
Refills: 0 | Status: DISCONTINUED | OUTPATIENT
Start: 2024-01-10 | End: 2024-01-16

## 2024-01-10 RX ORDER — POTASSIUM CHLORIDE 20 MEQ
40 PACKET (EA) ORAL ONCE
Refills: 0 | Status: COMPLETED | OUTPATIENT
Start: 2024-01-10 | End: 2024-01-10

## 2024-01-10 RX ORDER — ACETAMINOPHEN 500 MG
650 TABLET ORAL EVERY 6 HOURS
Refills: 0 | Status: DISCONTINUED | OUTPATIENT
Start: 2024-01-10 | End: 2024-01-16

## 2024-01-10 RX ORDER — INSULIN LISPRO 100/ML
3 VIAL (ML) SUBCUTANEOUS ONCE
Refills: 0 | Status: COMPLETED | OUTPATIENT
Start: 2024-01-10 | End: 2024-01-10

## 2024-01-10 RX ORDER — DEXTROSE 50 % IN WATER 50 %
50 SYRINGE (ML) INTRAVENOUS ONCE
Refills: 0 | Status: COMPLETED | OUTPATIENT
Start: 2024-01-10 | End: 2024-01-10

## 2024-01-10 RX ORDER — SODIUM CHLORIDE 9 MG/ML
1000 INJECTION, SOLUTION INTRAVENOUS
Refills: 0 | Status: DISCONTINUED | OUTPATIENT
Start: 2024-01-10 | End: 2024-01-11

## 2024-01-10 RX ORDER — DEXTROSE 50 % IN WATER 50 %
12.5 SYRINGE (ML) INTRAVENOUS ONCE
Refills: 0 | Status: DISCONTINUED | OUTPATIENT
Start: 2024-01-10 | End: 2024-01-16

## 2024-01-10 RX ORDER — POTASSIUM CHLORIDE 20 MEQ
40 PACKET (EA) ORAL ONCE
Refills: 0 | Status: DISCONTINUED | OUTPATIENT
Start: 2024-01-10 | End: 2024-01-10

## 2024-01-10 RX ORDER — LIDOCAINE HCL 20 MG/ML
5 VIAL (ML) INJECTION ONCE
Refills: 0 | Status: COMPLETED | OUTPATIENT
Start: 2024-01-10 | End: 2024-01-10

## 2024-01-10 RX ORDER — MAGNESIUM SULFATE 500 MG/ML
2 VIAL (ML) INJECTION ONCE
Refills: 0 | Status: COMPLETED | OUTPATIENT
Start: 2024-01-10 | End: 2024-01-10

## 2024-01-10 RX ORDER — INSULIN LISPRO 100/ML
VIAL (ML) SUBCUTANEOUS
Refills: 0 | Status: DISCONTINUED | OUTPATIENT
Start: 2024-01-10 | End: 2024-01-16

## 2024-01-10 RX ORDER — DEXTROSE 50 % IN WATER 50 %
15 SYRINGE (ML) INTRAVENOUS ONCE
Refills: 0 | Status: DISCONTINUED | OUTPATIENT
Start: 2024-01-10 | End: 2024-01-16

## 2024-01-10 RX ADMIN — Medication 40 MILLIEQUIVALENT(S): at 08:07

## 2024-01-10 RX ADMIN — SODIUM CHLORIDE 125 MILLILITER(S): 9 INJECTION, SOLUTION INTRAVENOUS at 01:00

## 2024-01-10 RX ADMIN — HEPARIN SODIUM 5000 UNIT(S): 5000 INJECTION INTRAVENOUS; SUBCUTANEOUS at 05:10

## 2024-01-10 RX ADMIN — TAMSULOSIN HYDROCHLORIDE 0.8 MILLIGRAM(S): 0.4 CAPSULE ORAL at 03:19

## 2024-01-10 RX ADMIN — SODIUM CHLORIDE 100 MILLILITER(S): 9 INJECTION, SOLUTION INTRAVENOUS at 05:30

## 2024-01-10 RX ADMIN — Medication 25 GRAM(S): at 06:32

## 2024-01-10 RX ADMIN — SODIUM CHLORIDE 100 MILLILITER(S): 9 INJECTION, SOLUTION INTRAVENOUS at 09:39

## 2024-01-10 RX ADMIN — CEFTRIAXONE 100 MILLIGRAM(S): 500 INJECTION, POWDER, FOR SOLUTION INTRAMUSCULAR; INTRAVENOUS at 13:54

## 2024-01-10 RX ADMIN — Medication 3: at 15:49

## 2024-01-10 RX ADMIN — ATORVASTATIN CALCIUM 10 MILLIGRAM(S): 80 TABLET, FILM COATED ORAL at 22:14

## 2024-01-10 RX ADMIN — Medication 3 UNIT(S): at 16:51

## 2024-01-10 RX ADMIN — TAMSULOSIN HYDROCHLORIDE 0.8 MILLIGRAM(S): 0.4 CAPSULE ORAL at 22:14

## 2024-01-10 RX ADMIN — SODIUM CHLORIDE 75 MILLILITER(S): 9 INJECTION, SOLUTION INTRAVENOUS at 16:32

## 2024-01-10 RX ADMIN — SODIUM CHLORIDE 125 MILLILITER(S): 9 INJECTION, SOLUTION INTRAVENOUS at 05:10

## 2024-01-10 RX ADMIN — HEPARIN SODIUM 5000 UNIT(S): 5000 INJECTION INTRAVENOUS; SUBCUTANEOUS at 13:54

## 2024-01-10 RX ADMIN — HEPARIN SODIUM 5000 UNIT(S): 5000 INJECTION INTRAVENOUS; SUBCUTANEOUS at 22:15

## 2024-01-10 RX ADMIN — Medication 50 MILLILITER(S): at 22:22

## 2024-01-10 NOTE — PROGRESS NOTE ADULT - PROBLEM SELECTOR PLAN 8
Fluids: s/p D10 drip, NS while still autodiuresis  Nutrition: Soft bite sized diet  GI prophylaxis: none  DVT prophylaxis: Heparin 5000 q8h  Code: full  Dispo: MICU

## 2024-01-10 NOTE — PROGRESS NOTE ADULT - ASSESSMENT
Urinary retention - BPH now with wade  JULIO CESAR - now tredning down post wade  UTI - on abx though it appears the out pateint treatment with levofolxacin resolved the infection but the BPH and obstruiction precipitated the JULIO CESAR  Hypoglycemia in the setting of poor po with UTI - off insulin now euglycmic  -will need adjustment to outpateint meds but they need to be manageable in thiis 89 yo man whose son is the primary care giver and can only see him most days once  -DVT prophyalxis

## 2024-01-10 NOTE — PROGRESS NOTE ADULT - ASSESSMENT
89-year-old Male with PMH of DM2, HLD, BPH presented for metabolic encephalopathy 2/2 new insulin use. Admitted to MICU for q1 fingersticks. Pt found to have obstructive uropathy 2/2 BPH/retention, now resolving.

## 2024-01-10 NOTE — ADVANCED PRACTICE NURSE CONSULT - RECOMMEDATIONS
Continue pressure injury prevention strategies.    Please consult if new wound care concerns arise.

## 2024-01-10 NOTE — PROGRESS NOTE ADULT - PROBLEM SELECTOR PROBLEM 3
0730  Pt arrived for elective induction.Admission and POC.    0900  Dr. Hunter at bedside to SVE and AROM    1200 SVE byRN and pitocin started with verbal consent from pt.       Urinary retention

## 2024-01-10 NOTE — PROGRESS NOTE ADULT - ASSESSMENT
89-year-old Male with PMH of DM2, HLD, BPH presented for metabolic encephalopathy 2/2 new insulin use. Admitted to MICU for q1 fingersticks.      Neuro  #AMS, resolved  Pt presented with AMS, likely iso UTI and hypoglycemia. Pt is back at baseline on 1/9.  - Continue to monitor     Pulm  RENA    CVS  #LLE edema  Pt presented with +1 unilateral edema of the left lower extremity. His RLE has no edema. No known Hx of DVT  - Bedside POCUS of LE on 1/10 to r/o DVT    #HLD  PT on home Atorvastatin 10mg PO qd.  - C/w home med     GI  Started soft bite sized diet after passing dysphagia screen on 1/9    Renal  #JULIO CESAR on CKD  On admission, Cr of 2.94 (baseline of 1.35 on 12/4/23); Cr continued to trend up to 3.16 on 1/9. Specific gravity of 1.023, concentrated. JULIO CESAR likely prerenal due to hypovolemia due to dehydration iso AMS (less likely, did not improve with 1L of LR) vs. obstructive etiology iso BPH, urinary retention and urethral stricture (more likely).   - Ensure adequate hydration with IV fluids  - Monitor BMP  - Strict I & Os  - Avoid nephrotoxic agents  - Dan placed by urology on 1/9       #BPH  #Urinary retention  Pt with history of BPH, on home tamsulosin 0.8mg PO qhs. Renal US with moderate bilateral hydro and mild increased renal parenchyma Found to be retaining on bladder scan on 1/9 with 800cc; unable to straight cath due to blood clot with resistance.   - Dan placed by urology on 1/9  - C/w tamsulosin 0.8mg PO qhs  - Monitor I&O's    Endo  #Hypoglycemia, resolved  Pt with DM, was on home Metformin 1000 BID, Glimepiride 2mg BID; recently switched from Farxiga by PCP (due to recurrent UTI), to Levemir insulin 10 units in morning and 5 units at night. Found to have a blood glucose of 34 on admission, urine Glucose > 1000, A1c 9. S/p D50 50ml + 25ml boluses, D10 1000ml infusion at 30ml/h, 1 L NS bolus in the ED.   - Discontinued D10 drip and started diet   - FSG q2h     ID  #UTI  Pt with BPH and DM recently switched from Farxiga by PCP to levemir insulin due to recurrent UTI. Had 2 episodes of UTI in the past month, last diagnosed on 1/4; ucx grew staph epi, cx was pansensitive, placed on 10-day course of Levaquin (completed 5 days outpatient prior to presenting to [facility]). UA (+) LE, WBC, bacteria. Ucx contaminated.   - Started CTX 1g IV qd x3 days (1/9-1/11)    Heme/Onc  RENA    Prophylactic measures  Fluids: s/p D10 drip   Nutrition: Soft bite sized diet  GI prophylaxis: none  DVT prophylaxis: Heparin 5000 q8h  Code: full  Dispo: MICU         89-year-old Male with PMH of DM2, HLD, BPH presented for metabolic encephalopathy 2/2 new insulin use. Admitted to MICU for q1 fingersticks.      Neuro  #AMS, resolved  Pt presented with AMS, likely iso UTI and hypoglycemia. Pt is back at baseline on 1/9.  - Continue to monitor     Pulm  RENA    CVS  #LLE edema  Pt presented with +1 unilateral edema of the left lower extremity. His RLE has no edema. No known Hx of DVT  - Bedside POCUS of LE on 1/10 to r/o DVT    #HLD  PT on home Atorvastatin 10mg PO qd.  - C/w home med     GI  Started soft bite sized diet after passing dysphagia screen on 1/9    Renal  #JULIO CESAR on CKD  On admission, Cr of 2.94 (baseline of 1.35 on 12/4/23); Cr continued to trend up to 3.16 on 1/9. Specific gravity of 1.023, concentrated. JULIO CESAR likely prerenal due to hypovolemia due to dehydration iso AMS (less likely, did not improve with 1L of LR) vs. obstructive etiology iso BPH, urinary retention and urethral stricture (more likely).   - Ensure adequate hydration with IV fluids  - Monitor BMP  - Strict I & Os  - Avoid nephrotoxic agents  - Dan placed by urology on 1/9       #BPH  #Urinary retention  Pt with history of BPH, on home tamsulosin 0.8mg PO qhs. Renal US with moderate bilateral hydro and mild increased renal parenchyma Found to be retaining on bladder scan on 1/9 with 800cc; unable to straight cath due to blood clot with resistance.   - Dan placed by urology on 1/9  - C/w tamsulosin 0.8mg PO qhs  - Monitor I&O's    Endo  #Hypoglycemia, resolved  Pt with DM, was on home Metformin 1000 BID, Glimepiride 2mg BID; recently switched from Farxiga by PCP (due to recurrent UTI), to Levemir insulin 10 units in morning and 5 units at night. Found to have a blood glucose of 34 on admission, urine Glucose > 1000, A1c 9. S/p D50 50ml + 25ml boluses, D10 1000ml infusion at 30ml/h, 1 L NS bolus in the ED.   - Discontinued D10 drip and started diet   - FSG q2h     ID  #UTI  Pt with BPH and DM recently switched from Farxiga by PCP to levemir insulin due to recurrent UTI. Had 2 episodes of UTI in the past month, last diagnosed on 1/4; ucx grew staph epi, cx was pansensitive, placed on 10-day course of Levaquin (completed 5 days outpatient prior to presenting to [facility]). UA (+) LE, WBC, bacteria. Ucx contaminated.   - Started CTX 1g IV qd x3 days (1/9-1/11)    Heme/Onc  RENA    Prophylactic measures  Fluids: s/p D10 drip   Nutrition: Soft bite sized diet  GI prophylaxis: none  DVT prophylaxis: Heparin 5000 q8h  Code: full  Dispo: MICU         89-year-old Male with PMH of DM2, HLD, BPH presented for metabolic encephalopathy 2/2 new insulin use. Admitted to MICU for q1 fingersticks. Pt found to have obstructive uropathy 2/2 BPH/retention, now resolving.      Neuro  #Metabolic encephalopathy, resolved  Pt presented with AMS, likely iso UTI and hypoglycemia. Pt is back at baseline on 1/9.    Pulm  RENA    CVS  #HLD  PT on home Atorvastatin 10mg PO qd  - C/w home med     GI  Started soft bite sized diet after passing dysphagia screen on 1/9    Renal  #obstructive JULIO CESAR on CKD iso BPH  #b/l hydronephrosis  On admission, Cr of 2.94 (baseline of 1.35 on 12/4/23); Cr continued to trend up to 3.16 on 1/9. Specific gravity of 1.023, concentrated  - Ensure adequate hydration with IV fluids  - Monitor BMP  - Strict I & Os  - Avoid nephrotoxic agents  - Dan placed by urology on 1/9       #BPH  #Urinary retention  Pt with history of BPH, on home tamsulosin 0.8mg PO qhs. Renal US with moderate bilateral hydro and mild increased renal parenchyma Found to be retaining on bladder scan on 1/9 with 800cc; unable to straight cath due to blood clot with resistance.   - Dan placed by urology on 1/9  - C/w tamsulosin 0.8mg PO qhs  - Monitor I&O's    Endo  #Hypoglycemia, resolved  Pt with DM, was on home Metformin 1000 BID, Glimepiride 2mg BID; recently switched from Farxiga by PCP (due to recurrent UTI), to Levemir insulin 10 units in morning and 5 units at night. Found to have a blood glucose of 34 on admission, urine Glucose > 1000, A1c 9. S/p D50 50ml + 25ml boluses, D10 1000ml infusion at 30ml/h, 1 L NS bolus in the ED. Discontinued D10 drip and started diet   - FSG q2h   - start on Maame     ID  #UTI  Pt with BPH and DM recently switched from Farxiga by PCP to levemir insulin due to recurrent UTI. Had 2 episodes of UTI in the past month, last diagnosed on 1/4; ucx grew staph epi, cx was pansensitive, placed on 10-day course of Levaquin (completed 5 days outpatient prior to presenting to [facility]). UA (+) LE, WBC, bacteria. Ucx contaminated.   - Started CTX 1g IV qd x3 days (1/9-1/11)    Heme/Onc  RENA    Prophylactic measures  Fluids: s/p D10 drip, NS while still autodiuresis  Nutrition: Soft bite sized diet  GI prophylaxis: none  DVT prophylaxis: Heparin 5000 q8h  Code: full  Dispo: MICU         89-year-old Male with PMH of DM2, HLD, BPH presented for metabolic encephalopathy 2/2 new insulin use. Admitted to MICU for q1 fingersticks. Pt found to have obstructive uropathy 2/2 BPH/retention, now resolving.      Neuro  #Metabolic encephalopathy, resolved  Pt presented with AMS, likely iso UTI and hypoglycemia. Pt is back at baseline on 1/9.    Pulm  RENA    CVS  #HLD  PT on home Atorvastatin 10mg PO qd  - C/w home med     GI  Started soft bite sized diet after passing dysphagia screen on 1/9    Renal  #obstructive JULIO CESAR on CKD iso BPH  #b/l hydronephrosis  On admission, Cr of 2.94 (baseline of 1.35 on 12/4/23); Cr continued to trend up to 3.16 on 1/9. Specific gravity of 1.023, concentrated  - Ensure adequate hydration with IV fluids  - Monitor BMP  - Strict I & Os  - Avoid nephrotoxic agents  - Dan placed by urology on 1/9       #BPH  #Urinary retention  Pt with history of BPH, on home tamsulosin 0.8mg PO qhs. Renal US with moderate bilateral hydro and mild increased renal parenchyma Found to be retaining on bladder scan on 1/9 with 800cc; unable to straight cath due to blood clot with resistance.   - Dan placed by urology on 1/9  - C/w tamsulosin 0.8mg PO qhs  - Monitor I&O's    Endo  #Hypoglycemia, resolved  Pt with DM, was on home Metformin 1000 BID, Glimepiride 2mg BID; recently switched from Farxiga by PCP (due to recurrent UTI), to Levemir insulin 10 units in morning and 5 units at night. Found to have a blood glucose of 34 on admission, urine Glucose > 1000, A1c 9. S/p D50 50ml + 25ml boluses, D10 1000ml infusion at 30ml/h, 1 L NS bolus in the ED. Discontinued D10 drip and started diet   - FSG q2h   - start on Maame     ID  #UTI  Pt with BPH and DM recently switched from Farxiga by PCP to levemir insulin due to recurrent UTI. Had 2 episodes of UTI in the past month, last diagnosed on 1/4; ucx grew staph epi, cx was pansensitive, placed on 10-day course of Levaquin (completed 5 days outpatient prior to presenting to [facility]). UA (+) LE, WBC, bacteria. Ucx contaminated.   - Started CTX 1g IV qd x3 days (1/9-1/11)    Heme/Onc  RENA    Prophylactic measures  Fluids: s/p D10 drip, NS while still autodiuresis  Nutrition: Soft bite sized diet  GI prophylaxis: none  DVT prophylaxis: Heparin 5000 q8h  Code: full  Dispo: MICU

## 2024-01-10 NOTE — PROGRESS NOTE ADULT - SUBJECTIVE AND OBJECTIVE BOX
*******TRANSFER ACCEPTED FROM MICU TO [facility]********  89 yr old male w/ PMH of DM for the past 20 years, on oral medications for DM ( Metformin 1000 BID, Glimeperide 2mg BID ), HLD, admitted for metabolic encephalopathy 2/2 symptomatic hypoglycemia after pt was recently started on levemir insulin. Of note, pt had frequent UTIs on farxiga and was started on insulin. Home dose 10 units in morning and 5 units at night. S/p D50 50ml + 25ml boluses and D10 drip. hypoglycemia corrected and started on Maame. On retroperitoneal US, found to have b/l moderate hydronephrosis. Urology consulted and placed wade. Pt now in post-obstructive diuresis and receiving fluids. CTX for three days (1/9-1/11). Pt back to baseline mental status and medically stable to be stepped down.    SUBJECTIVE / INTERVAL HPI: Patient seen and examined at bedside. He feels well and is in a cheerful mood. Denies SOB, CP. ROS negative.     VITAL SIGNS:  Vital Signs Last 24 Hrs  T(C): 36.3 (10 Braxton 2024 13:50), Max: 36.6 (09 Jan 2024 17:43)  T(F): 97.3 (10 Braxton 2024 13:50), Max: 97.9 (09 Jan 2024 17:43)  HR: 79 (10 Braxton 2024 15:00) (62 - 91)  BP: 148/77 (10 Braxton 2024 15:00) (106/64 - 154/63)  BP(mean): 107 (10 Braxton 2024 15:00) (68 - 107)  RR: 18 (10 Braxton 2024 15:00) (10 - 36)  SpO2: 94% (10 Braxton 2024 15:00) (92% - 98%)    Parameters below as of 10 Braxton 2024 15:00  Patient On (Oxygen Delivery Method): room air        PHYSICAL EXAM:    General: NAD, sitting up in bed comfortably  Head: NC/AT; MMM; PERRL; EOMI;  Neck: Supple; no JVD  Respiratory: CTAB; no wheezes/rales/rhonchi  Cardiovascular: Regular rhythm/rate; S1/S2+, no murmurs, rubs gallops   Gastrointestinal: Soft; NTND; bowel sounds normal and present  Extremities: WWP; no edema/cyanosis  Neurological: A&Ox3, conversing and answering questions appropriately    MEDICATIONS:  MEDICATIONS  (STANDING):  atorvastatin 10 milliGRAM(s) Oral at bedtime  cefTRIAXone   IVPB 1000 milliGRAM(s) IV Intermittent every 24 hours  chlorhexidine 2% Cloths 1 Application(s) Topical <User Schedule>  dextrose 5%. 1000 milliLiter(s) (50 mL/Hr) IV Continuous <Continuous>  dextrose 5%. 1000 milliLiter(s) (100 mL/Hr) IV Continuous <Continuous>  dextrose 50% Injectable 25 Gram(s) IV Push once  dextrose 50% Injectable 12.5 Gram(s) IV Push once  dextrose 50% Injectable 25 Gram(s) IV Push once  glucagon  Injectable 1 milliGRAM(s) IntraMuscular once  heparin   Injectable 5000 Unit(s) SubCutaneous every 8 hours  insulin lispro (ADMELOG) corrective regimen sliding scale   SubCutaneous at bedtime  insulin lispro (ADMELOG) corrective regimen sliding scale   SubCutaneous three times a day before meals  sodium chloride 0.45%. 250 milliLiter(s) (100 mL/Hr) IV Continuous <Continuous>  tamsulosin 0.8 milliGRAM(s) Oral at bedtime    MEDICATIONS  (PRN):  dextrose Oral Gel 15 Gram(s) Oral once PRN Blood Glucose LESS THAN 70 milliGRAM(s)/deciliter      ALLERGIES:  Allergies    No Known Allergies    Intolerances        LABS:                        11.1   7.45  )-----------( 210      ( 10 Braxton 2024 04:53 )             32.7     01-10    136  |  104  |  39<H>  ----------------------------<  198<H>  3.7   |  22  |  2.48<H>    Ca    9.1      10 Braxton 2024 04:53  Phos  3.8     01-10  Mg     1.8     01-10    TPro  6.7  /  Alb  3.1<L>  /  TBili  0.4  /  DBili  x   /  AST  16  /  ALT  8<L>  /  AlkPhos  41  01-10      Urinalysis Basic - ( 10 Braxton 2024 04:53 )    Color: x / Appearance: x / SG: x / pH: x  Gluc: 198 mg/dL / Ketone: x  / Bili: x / Urobili: x   Blood: x / Protein: x / Nitrite: x   Leuk Esterase: x / RBC: x / WBC x   Sq Epi: x / Non Sq Epi: x / Bacteria: x      CAPILLARY BLOOD GLUCOSE      POCT Blood Glucose.: 239 mg/dL (10 Braxton 2024 11:10)      RADIOLOGY & ADDITIONAL TESTS: Reviewed.   *******TRANSFER ACCEPTED FROM MICU TO [facility] [place]********  89 yr old male w/ PMH of DM for the past 20 years, on oral medications for DM ( Metformin 1000 BID, Glimeperide 2mg BID ), HLD, admitted for metabolic encephalopathy 2/2 symptomatic hypoglycemia after pt was recently started on levemir insulin. Of note, pt had frequent UTIs on farxiga and was started on insulin. Home dose 10 units in morning and 5 units at night. S/p D50 50ml + 25ml boluses and D10 drip. hypoglycemia corrected and started on Maame. On retroperitoneal US, found to have b/l moderate hydronephrosis. Urology consulted and placed wade. Pt now in post-obstructive diuresis and receiving fluids. CTX for three days (1/9-1/11). Pt back to baseline mental status and medically stable to be stepped down.    SUBJECTIVE / INTERVAL HPI: Patient seen and examined at bedside. He feels well and is in a cheerful mood. Denies SOB, CP. ROS negative.     VITAL SIGNS:  Vital Signs Last 24 Hrs  T(C): 36.3 (10 Braxton 2024 13:50), Max: 36.6 (09 Jan 2024 17:43)  T(F): 97.3 (10 Braxton 2024 13:50), Max: 97.9 (09 Jan 2024 17:43)  HR: 79 (10 Braxton 2024 15:00) (62 - 91)  BP: 148/77 (10 Braxton 2024 15:00) (106/64 - 154/63)  BP(mean): 107 (10 Braxton 2024 15:00) (68 - 107)  RR: 18 (10 Braxton 2024 15:00) (10 - 36)  SpO2: 94% (10 Braxton 2024 15:00) (92% - 98%)    Parameters below as of 10 Braxton 2024 15:00  Patient On (Oxygen Delivery Method): room air        PHYSICAL EXAM:    General: NAD, sitting up in bed comfortably  Head: NC/AT; MMM; PERRL; EOMI;  Neck: Supple; no JVD  Respiratory: CTAB; no wheezes/rales/rhonchi  Cardiovascular: Regular rhythm/rate; S1/S2+, no murmurs, rubs gallops   Gastrointestinal: Soft; NTND; bowel sounds normal and present  Extremities: WWP; no edema/cyanosis  Neurological: A&Ox3, conversing and answering questions appropriately    MEDICATIONS:  MEDICATIONS  (STANDING):  atorvastatin 10 milliGRAM(s) Oral at bedtime  cefTRIAXone   IVPB 1000 milliGRAM(s) IV Intermittent every 24 hours  chlorhexidine 2% Cloths 1 Application(s) Topical <User Schedule>  dextrose 5%. 1000 milliLiter(s) (50 mL/Hr) IV Continuous <Continuous>  dextrose 5%. 1000 milliLiter(s) (100 mL/Hr) IV Continuous <Continuous>  dextrose 50% Injectable 25 Gram(s) IV Push once  dextrose 50% Injectable 12.5 Gram(s) IV Push once  dextrose 50% Injectable 25 Gram(s) IV Push once  glucagon  Injectable 1 milliGRAM(s) IntraMuscular once  heparin   Injectable 5000 Unit(s) SubCutaneous every 8 hours  insulin lispro (ADMELOG) corrective regimen sliding scale   SubCutaneous at bedtime  insulin lispro (ADMELOG) corrective regimen sliding scale   SubCutaneous three times a day before meals  sodium chloride 0.45%. 250 milliLiter(s) (100 mL/Hr) IV Continuous <Continuous>  tamsulosin 0.8 milliGRAM(s) Oral at bedtime    MEDICATIONS  (PRN):  dextrose Oral Gel 15 Gram(s) Oral once PRN Blood Glucose LESS THAN 70 milliGRAM(s)/deciliter      ALLERGIES:  Allergies    No Known Allergies    Intolerances        LABS:                        11.1   7.45  )-----------( 210      ( 10 Braxton 2024 04:53 )             32.7     01-10    136  |  104  |  39<H>  ----------------------------<  198<H>  3.7   |  22  |  2.48<H>    Ca    9.1      10 Braxton 2024 04:53  Phos  3.8     01-10  Mg     1.8     01-10    TPro  6.7  /  Alb  3.1<L>  /  TBili  0.4  /  DBili  x   /  AST  16  /  ALT  8<L>  /  AlkPhos  41  01-10      Urinalysis Basic - ( 10 Braxton 2024 04:53 )    Color: x / Appearance: x / SG: x / pH: x  Gluc: 198 mg/dL / Ketone: x  / Bili: x / Urobili: x   Blood: x / Protein: x / Nitrite: x   Leuk Esterase: x / RBC: x / WBC x   Sq Epi: x / Non Sq Epi: x / Bacteria: x      CAPILLARY BLOOD GLUCOSE      POCT Blood Glucose.: 239 mg/dL (10 Braxton 2024 11:10)      RADIOLOGY & ADDITIONAL TESTS: Reviewed.

## 2024-01-10 NOTE — PROGRESS NOTE ADULT - PROBLEM SELECTOR PLAN 4
Pt with BPH and DM recently switched from Farxiga by PCP to levemir insulin due to recurrent UTI. Had 2 episodes of UTI in the past month, last diagnosed on 1/4; ucx grew staph epi, cx was pansensitive, placed on 10-day course of Levaquin (completed 5 days outpatient prior to presenting to [facility]). UA (+) LE, WBC, bacteria. Ucx contaminated.   - Started CTX 1g IV qd x3 days (1/9-1/11) Pt with BPH and DM recently switched from Farxiga by PCP to levemir insulin due to recurrent UTI. Had 2 episodes of UTI in the past month, last diagnosed on 1/4; ucx grew staph epi, cx was pansensitive, placed on 10-day course of Levaquin (completed 5 days outpatient prior to presenting to [facility]). UA (+) LE, WBC, bacteria. Ucx contaminated.   - Started CTX 1g IV qd x3 days (1/9-1/11)

## 2024-01-10 NOTE — PROGRESS NOTE ADULT - ATTENDING COMMENTS
off D10, cr improving, good UOP  stable for transfer to [facility] off D10, cr improving, good UOP  stable for transfer to [facility] off D10, cr improving, good UOP  stable for transfer to [facility]  decision making high complexity

## 2024-01-10 NOTE — PROGRESS NOTE ADULT - SUBJECTIVE AND OBJECTIVE BOX
O/N Events:    Subjective/ROS: Patient seen and examined at bedside.     Denies Fever/Chills, HA, CP, SOB, n/v, changes in bowel/urinary habits.  12pt ROS otherwise negative.    VITALS  Vital Signs Last 24 Hrs  T(C): 36.6 (10 Braxton 2024 01:03), Max: 36.6 (09 Jan 2024 17:43)  T(F): 97.8 (10 Braxton 2024 01:03), Max: 97.9 (09 Jan 2024 17:43)  HR: 75 (10 Braxton 2024 05:00) (57 - 91)  BP: 131/60 (10 Braxton 2024 05:00) (102/53 - 154/63)  BP(mean): 87 (10 Braxton 2024 05:00) (68 - 105)  RR: 10 (10 Braxton 2024 05:00) (10 - 22)  SpO2: 92% (10 Braxton 2024 05:00) (92% - 98%)    Parameters below as of 10 Braxton 2024 06:00  Patient On (Oxygen Delivery Method): room air        CAPILLARY BLOOD GLUCOSE      POCT Blood Glucose.: 196 mg/dL (10 Braxton 2024 05:40)  POCT Blood Glucose.: 263 mg/dL (10 Braxton 2024 00:53)  POCT Blood Glucose.: 262 mg/dL (09 Jan 2024 20:56)  POCT Blood Glucose.: 181 mg/dL (09 Jan 2024 16:30)  POCT Blood Glucose.: 206 mg/dL (09 Jan 2024 13:19)  POCT Blood Glucose.: 119 mg/dL (09 Jan 2024 10:45)  POCT Blood Glucose.: 115 mg/dL (09 Jan 2024 08:47)  POCT Blood Glucose.: 106 mg/dL (09 Jan 2024 07:32)  POCT Blood Glucose.: 113 mg/dL (09 Jan 2024 06:32)      PHYSICAL EXAM  General: NAD  Head: NC/AT; MMM; PERRL; EOMI;  Neck: Supple; no JVD  Respiratory: CTAB; no wheezes/rales/rhonchi  Cardiovascular: Regular rhythm/rate; S1/S2+, no murmurs, rubs gallops   Gastrointestinal: Soft; NTND; bowel sounds normal and present  Extremities: WWP; no edema/cyanosis  Neurological: A&Ox3, CNII-XII grossly intact; no obvious focal deficits    MEDICATIONS  (STANDING):  atorvastatin 10 milliGRAM(s) Oral at bedtime  cefTRIAXone   IVPB 1000 milliGRAM(s) IV Intermittent every 24 hours  chlorhexidine 2% Cloths 1 Application(s) Topical <User Schedule>  heparin   Injectable 5000 Unit(s) SubCutaneous every 8 hours  sodium chloride 0.45%. 250 milliLiter(s) (100 mL/Hr) IV Continuous <Continuous>  tamsulosin 0.8 milliGRAM(s) Oral at bedtime    MEDICATIONS  (PRN):      No Known Allergies      LABS                        11.1   7.45  )-----------( 210      ( 10 Braxton 2024 04:53 )             32.7     01-10    136  |  104  |  39<H>  ----------------------------<  198<H>  3.7   |  22  |  2.48<H>    Ca    9.1      10 Braxton 2024 04:53  Phos  3.8     01-10  Mg     1.8     01-10    TPro  6.7  /  Alb  3.1<L>  /  TBili  0.4  /  DBili  x   /  AST  16  /  ALT  8<L>  /  AlkPhos  41  01-10      Urinalysis Basic - ( 10 Braxton 2024 04:53 )    Color: x / Appearance: x / SG: x / pH: x  Gluc: 198 mg/dL / Ketone: x  / Bili: x / Urobili: x   Blood: x / Protein: x / Nitrite: x   Leuk Esterase: x / RBC: x / WBC x   Sq Epi: x / Non Sq Epi: x / Bacteria: x              IMAGING/EKG/ETC   *********************************Transfer from ICU to [facility]*******************************  89 yr old male w/ PMH of DM for the past 20 years, on oral medications for DM ( Metformin 1000 BID, Glimeperide 2mg BID ), HLD, admitted for metabolic encephalopathy 2/2 symptomatic hypoglycemia after pt was recently started on levemir insulin. Of note, pt had frequent UTIs on farxiga and was started on insulin. Home dose 10 units in morning and 5 units at night. On retroperitoneal US, found to have b/l moderate hydronephrosis. Pt now in post-obstructive diuresis and receiving fluids. Pt back to baseline mental status and medically stable to be stepped down.      Subjective/ROS: Patient seen and examined at bedside. Pt slept poorly but otherwise denies any pain, SOB, CP, dysuria.    VITALS  Vital Signs Last 24 Hrs  T(C): 36.6 (10 Braxton 2024 01:03), Max: 36.6 (09 Jan 2024 17:43)  T(F): 97.8 (10 Braxton 2024 01:03), Max: 97.9 (09 Jan 2024 17:43)  HR: 75 (10 Braxton 2024 05:00) (57 - 91)  BP: 131/60 (10 Braxton 2024 05:00) (102/53 - 154/63)  BP(mean): 87 (10 Braxton 2024 05:00) (68 - 105)  RR: 10 (10 Braxton 2024 05:00) (10 - 22)  SpO2: 92% (10 Braxton 2024 05:00) (92% - 98%)    Parameters below as of 10 Braxton 2024 06:00  Patient On (Oxygen Delivery Method): room air        CAPILLARY BLOOD GLUCOSE      POCT Blood Glucose.: 196 mg/dL (10 Braxton 2024 05:40)  POCT Blood Glucose.: 263 mg/dL (10 Braxton 2024 00:53)  POCT Blood Glucose.: 262 mg/dL (09 Jan 2024 20:56)  POCT Blood Glucose.: 181 mg/dL (09 Jan 2024 16:30)  POCT Blood Glucose.: 206 mg/dL (09 Jan 2024 13:19)  POCT Blood Glucose.: 119 mg/dL (09 Jan 2024 10:45)  POCT Blood Glucose.: 115 mg/dL (09 Jan 2024 08:47)  POCT Blood Glucose.: 106 mg/dL (09 Jan 2024 07:32)  POCT Blood Glucose.: 113 mg/dL (09 Jan 2024 06:32)      PHYSICAL EXAM  General: NAD, sitting up in bed comfortably  Head: NC/AT; MMM; PERRL; EOMI;  Neck: Supple; no JVD  Respiratory: CTAB; no wheezes/rales/rhonchi  Cardiovascular: Regular rhythm/rate; S1/S2+, no murmurs, rubs gallops   Gastrointestinal: Soft; NTND; bowel sounds normal and present  Extremities: WWP; no edema/cyanosis  Neurological: A&Ox3, conversing and answering questions appropriately    MEDICATIONS  (STANDING):  atorvastatin 10 milliGRAM(s) Oral at bedtime  cefTRIAXone   IVPB 1000 milliGRAM(s) IV Intermittent every 24 hours  chlorhexidine 2% Cloths 1 Application(s) Topical <User Schedule>  heparin   Injectable 5000 Unit(s) SubCutaneous every 8 hours  sodium chloride 0.45%. 250 milliLiter(s) (100 mL/Hr) IV Continuous <Continuous>  tamsulosin 0.8 milliGRAM(s) Oral at bedtime    MEDICATIONS  (PRN):      No Known Allergies      LABS                        11.1   7.45  )-----------( 210      ( 10 Braxton 2024 04:53 )             32.7     01-10    136  |  104  |  39<H>  ----------------------------<  198<H>  3.7   |  22  |  2.48<H>    Ca    9.1      10 Braxton 2024 04:53  Phos  3.8     01-10  Mg     1.8     01-10    TPro  6.7  /  Alb  3.1<L>  /  TBili  0.4  /  DBili  x   /  AST  16  /  ALT  8<L>  /  AlkPhos  41  01-10      Urinalysis Basic - ( 10 Braxton 2024 04:53 )    Color: x / Appearance: x / SG: x / pH: x  Gluc: 198 mg/dL / Ketone: x  / Bili: x / Urobili: x   Blood: x / Protein: x / Nitrite: x   Leuk Esterase: x / RBC: x / WBC x   Sq Epi: x / Non Sq Epi: x / Bacteria: x              IMAGING/EKG/ETC   *********************************Transfer from ICU to [facility]*******************************  89 yr old male w/ PMH of DM for the past 20 years, on oral medications for DM ( Metformin 1000 BID, Glimeperide 2mg BID ), HLD, admitted for metabolic encephalopathy 2/2 symptomatic hypoglycemia after pt was recently started on levemir insulin. Of note, pt had frequent UTIs on farxiga and was started on insulin. Home dose 10 units in morning and 5 units at night. On retroperitoneal US, found to have b/l moderate hydronephrosis. Pt now in post-obstructive diuresis and receiving fluids. Pt back to baseline mental status and medically stable to be stepped down.      Subjective/ROS: Patient seen and examined at bedside. Pt slept poorly but otherwise denies any pain, SOB, CP, dysuria.    VITALS  Vital Signs Last 24 Hrs  T(C): 36.6 (10 Braxton 2024 01:03), Max: 36.6 (09 Jan 2024 17:43)  T(F): 97.8 (10 Braxton 2024 01:03), Max: 97.9 (09 Jan 2024 17:43)  HR: 75 (10 Braxton 2024 05:00) (57 - 91)  BP: 131/60 (10 Braxton 2024 05:00) (102/53 - 154/63)  BP(mean): 87 (10 Braxton 2024 05:00) (68 - 105)  RR: 10 (10 Braxton 2024 05:00) (10 - 22)  SpO2: 92% (10 Braxton 2024 05:00) (92% - 98%)    Parameters below as of 10 Braxton 2024 06:00  Patient On (Oxygen Delivery Method): room air        CAPILLARY BLOOD GLUCOSE      POCT Blood Glucose.: 196 mg/dL (10 Braxton 2024 05:40)  POCT Blood Glucose.: 263 mg/dL (10 Braxton 2024 00:53)  POCT Blood Glucose.: 262 mg/dL (09 Jan 2024 20:56)  POCT Blood Glucose.: 181 mg/dL (09 Jan 2024 16:30)  POCT Blood Glucose.: 206 mg/dL (09 Jan 2024 13:19)  POCT Blood Glucose.: 119 mg/dL (09 Jan 2024 10:45)  POCT Blood Glucose.: 115 mg/dL (09 Jan 2024 08:47)  POCT Blood Glucose.: 106 mg/dL (09 Jan 2024 07:32)  POCT Blood Glucose.: 113 mg/dL (09 Jan 2024 06:32)      PHYSICAL EXAM  General: NAD, sitting up in bed comfortably  Head: NC/AT; MMM; PERRL; EOMI;  Neck: Supple; no JVD  Respiratory: CTAB; no wheezes/rales/rhonchi  Cardiovascular: Regular rhythm/rate; S1/S2+, no murmurs, rubs gallops   Gastrointestinal: Soft; NTND; bowel sounds normal and present  Extremities: WWP; no edema/cyanosis  Neurological: A&Ox3, conversing and answering questions appropriately    MEDICATIONS  (STANDING):  atorvastatin 10 milliGRAM(s) Oral at bedtime  cefTRIAXone   IVPB 1000 milliGRAM(s) IV Intermittent every 24 hours  chlorhexidine 2% Cloths 1 Application(s) Topical <User Schedule>  heparin   Injectable 5000 Unit(s) SubCutaneous every 8 hours  sodium chloride 0.45%. 250 milliLiter(s) (100 mL/Hr) IV Continuous <Continuous>  tamsulosin 0.8 milliGRAM(s) Oral at bedtime    MEDICATIONS  (PRN):      No Known Allergies      LABS                        11.1   7.45  )-----------( 210      ( 10 Braxton 2024 04:53 )             32.7     01-10    136  |  104  |  39<H>  ----------------------------<  198<H>  3.7   |  22  |  2.48<H>    Ca    9.1      10 Braxton 2024 04:53  Phos  3.8     01-10  Mg     1.8     01-10    TPro  6.7  /  Alb  3.1<L>  /  TBili  0.4  /  DBili  x   /  AST  16  /  ALT  8<L>  /  AlkPhos  41  01-10      Urinalysis Basic - ( 10 Braxton 2024 04:53 )    Color: x / Appearance: x / SG: x / pH: x  Gluc: 198 mg/dL / Ketone: x  / Bili: x / Urobili: x   Blood: x / Protein: x / Nitrite: x   Leuk Esterase: x / RBC: x / WBC x   Sq Epi: x / Non Sq Epi: x / Bacteria: x              IMAGING/EKG/ETC   *********************************Transfer from ICU to [facility]*******************************  89 yr old male w/ PMH of DM for the past 20 years, on oral medications for DM ( Metformin 1000 BID, Glimeperide 2mg BID ), HLD, admitted for metabolic encephalopathy 2/2 symptomatic hypoglycemia after pt was recently started on levemir insulin. Of note, pt had frequent UTIs on farxiga and was started on insulin. Home dose 10 units in morning and 5 units at night. S/p D50 50ml + 25ml boluses and D10 drip. hypoglycemia corrected and started on Maame. On retroperitoneal US, found to have b/l moderate hydronephrosis. Urology consulted and placed wade. Pt now in post-obstructive diuresis and receiving fluids. CTX for three days (1/9-1/11). Pt back to baseline mental status and medically stable to be stepped down.      Subjective/ROS: Patient seen and examined at bedside. Pt slept poorly but otherwise denies any pain, SOB, CP, dysuria.    VITALS  Vital Signs Last 24 Hrs  T(C): 36.6 (10 Braxton 2024 01:03), Max: 36.6 (09 Jan 2024 17:43)  T(F): 97.8 (10 Braxton 2024 01:03), Max: 97.9 (09 Jan 2024 17:43)  HR: 75 (10 Braxton 2024 05:00) (57 - 91)  BP: 131/60 (10 Braxton 2024 05:00) (102/53 - 154/63)  BP(mean): 87 (10 Braxton 2024 05:00) (68 - 105)  RR: 10 (10 Braxton 2024 05:00) (10 - 22)  SpO2: 92% (10 Braxton 2024 05:00) (92% - 98%)    Parameters below as of 10 Braxton 2024 06:00  Patient On (Oxygen Delivery Method): room air        CAPILLARY BLOOD GLUCOSE      POCT Blood Glucose.: 196 mg/dL (10 Braxton 2024 05:40)  POCT Blood Glucose.: 263 mg/dL (10 Braxton 2024 00:53)  POCT Blood Glucose.: 262 mg/dL (09 Jan 2024 20:56)  POCT Blood Glucose.: 181 mg/dL (09 Jan 2024 16:30)  POCT Blood Glucose.: 206 mg/dL (09 Jan 2024 13:19)  POCT Blood Glucose.: 119 mg/dL (09 Jan 2024 10:45)  POCT Blood Glucose.: 115 mg/dL (09 Jan 2024 08:47)  POCT Blood Glucose.: 106 mg/dL (09 Jan 2024 07:32)  POCT Blood Glucose.: 113 mg/dL (09 Jan 2024 06:32)      PHYSICAL EXAM  General: NAD, sitting up in bed comfortably  Head: NC/AT; MMM; PERRL; EOMI;  Neck: Supple; no JVD  Respiratory: CTAB; no wheezes/rales/rhonchi  Cardiovascular: Regular rhythm/rate; S1/S2+, no murmurs, rubs gallops   Gastrointestinal: Soft; NTND; bowel sounds normal and present  Extremities: WWP; no edema/cyanosis  Neurological: A&Ox3, conversing and answering questions appropriately    MEDICATIONS  (STANDING):  atorvastatin 10 milliGRAM(s) Oral at bedtime  cefTRIAXone   IVPB 1000 milliGRAM(s) IV Intermittent every 24 hours  chlorhexidine 2% Cloths 1 Application(s) Topical <User Schedule>  heparin   Injectable 5000 Unit(s) SubCutaneous every 8 hours  sodium chloride 0.45%. 250 milliLiter(s) (100 mL/Hr) IV Continuous <Continuous>  tamsulosin 0.8 milliGRAM(s) Oral at bedtime    MEDICATIONS  (PRN):      No Known Allergies      LABS                        11.1   7.45  )-----------( 210      ( 10 Braxton 2024 04:53 )             32.7     01-10    136  |  104  |  39<H>  ----------------------------<  198<H>  3.7   |  22  |  2.48<H>    Ca    9.1      10 Braxton 2024 04:53  Phos  3.8     01-10  Mg     1.8     01-10    TPro  6.7  /  Alb  3.1<L>  /  TBili  0.4  /  DBili  x   /  AST  16  /  ALT  8<L>  /  AlkPhos  41  01-10      Urinalysis Basic - ( 10 Braxton 2024 04:53 )    Color: x / Appearance: x / SG: x / pH: x  Gluc: 198 mg/dL / Ketone: x  / Bili: x / Urobili: x   Blood: x / Protein: x / Nitrite: x   Leuk Esterase: x / RBC: x / WBC x   Sq Epi: x / Non Sq Epi: x / Bacteria: x              IMAGING/EKG/ETC   *********************************Transfer from ICU to [facility]*******************************  89 yr old male w/ PMH of DM for the past 20 years, on oral medications for DM ( Metformin 1000 BID, Glimeperide 2mg BID ), HLD, admitted for metabolic encephalopathy 2/2 symptomatic hypoglycemia after pt was recently started on levemir insulin. Of note, pt had frequent UTIs on farxiga and was started on insulin. Home dose 10 units in morning and 5 units at night. S/p D50 50ml + 25ml boluses and D10 drip. hypoglycemia corrected and started on Maame. On retroperitoneal US, found to have b/l moderate hydronephrosis. Urology consulted and placed wade. Pt now in post-obstructive diuresis and receiving fluids. CTX for three days (1/9-1/11). Pt back to baseline mental status and medically stable to be stepped down.      Subjective/ROS: Patient seen and examined at bedside. Pt slept poorly but otherwise denies any pain, SOB, CP, dysuria.    VITALS  Vital Signs Last 24 Hrs  T(C): 36.6 (10 Braxton 2024 01:03), Max: 36.6 (09 Jan 2024 17:43)  T(F): 97.8 (10 Braxton 2024 01:03), Max: 97.9 (09 Jan 2024 17:43)  HR: 75 (10 Braxton 2024 05:00) (57 - 91)  BP: 131/60 (10 Braxton 2024 05:00) (102/53 - 154/63)  BP(mean): 87 (10 Braxton 2024 05:00) (68 - 105)  RR: 10 (10 Braxton 2024 05:00) (10 - 22)  SpO2: 92% (10 Braxton 2024 05:00) (92% - 98%)    Parameters below as of 10 Braxton 2024 06:00  Patient On (Oxygen Delivery Method): room air        CAPILLARY BLOOD GLUCOSE      POCT Blood Glucose.: 196 mg/dL (10 Braxton 2024 05:40)  POCT Blood Glucose.: 263 mg/dL (10 Braxton 2024 00:53)  POCT Blood Glucose.: 262 mg/dL (09 Jan 2024 20:56)  POCT Blood Glucose.: 181 mg/dL (09 Jan 2024 16:30)  POCT Blood Glucose.: 206 mg/dL (09 Jan 2024 13:19)  POCT Blood Glucose.: 119 mg/dL (09 Jan 2024 10:45)  POCT Blood Glucose.: 115 mg/dL (09 Jan 2024 08:47)  POCT Blood Glucose.: 106 mg/dL (09 Jan 2024 07:32)  POCT Blood Glucose.: 113 mg/dL (09 Jan 2024 06:32)      PHYSICAL EXAM  General: NAD, sitting up in bed comfortably  Head: NC/AT; MMM; PERRL; EOMI;  Neck: Supple; no JVD  Respiratory: CTAB; no wheezes/rales/rhonchi  Cardiovascular: Regular rhythm/rate; S1/S2+, no murmurs, rubs gallops   Gastrointestinal: Soft; NTND; bowel sounds normal and present  Extremities: WWP; no edema/cyanosis  Neurological: A&Ox3, conversing and answering questions appropriately    MEDICATIONS  (STANDING):  atorvastatin 10 milliGRAM(s) Oral at bedtime  cefTRIAXone   IVPB 1000 milliGRAM(s) IV Intermittent every 24 hours  chlorhexidine 2% Cloths 1 Application(s) Topical <User Schedule>  heparin   Injectable 5000 Unit(s) SubCutaneous every 8 hours  sodium chloride 0.45%. 250 milliLiter(s) (100 mL/Hr) IV Continuous <Continuous>  tamsulosin 0.8 milliGRAM(s) Oral at bedtime    MEDICATIONS  (PRN):      No Known Allergies      LABS                        11.1   7.45  )-----------( 210      ( 10 Braxton 2024 04:53 )             32.7     01-10    136  |  104  |  39<H>  ----------------------------<  198<H>  3.7   |  22  |  2.48<H>    Ca    9.1      10 Braxton 2024 04:53  Phos  3.8     01-10  Mg     1.8     01-10    TPro  6.7  /  Alb  3.1<L>  /  TBili  0.4  /  DBili  x   /  AST  16  /  ALT  8<L>  /  AlkPhos  41  01-10      Urinalysis Basic - ( 10 Braxton 2024 04:53 )    Color: x / Appearance: x / SG: x / pH: x  Gluc: 198 mg/dL / Ketone: x  / Bili: x / Urobili: x   Blood: x / Protein: x / Nitrite: x   Leuk Esterase: x / RBC: x / WBC x   Sq Epi: x / Non Sq Epi: x / Bacteria: x              IMAGING/EKG/ETC

## 2024-01-10 NOTE — PROGRESS NOTE ADULT - PROBLEM SELECTOR PLAN 1
Pt with DM, was on home Metformin 1000 BID, Glimepiride 2mg BID; recently switched from Farxiga by PCP (due to recurrent UTI), to Levemir insulin 10 units in morning and 5 units at night. Found to have a blood glucose of 34 on admission, urine Glucose > 1000, A1c 9. S/p D50 50ml + 25ml boluses, D10 1000ml infusion at 30ml/h, 1 L NS bolus in the ED. Discontinued D10 drip and started diet   - FSG q2h   - start on Maame

## 2024-01-10 NOTE — PROGRESS NOTE ADULT - SUBJECTIVE AND OBJECTIVE BOX
Urinary retention  JULIO CESAR  UTI  Hypoglycemia in the setting of poor po with UTI    MEDICATIONS  (STANDING):  atorvastatin 10 milliGRAM(s) Oral at bedtime  cefTRIAXone   IVPB 1000 milliGRAM(s) IV Intermittent every 24 hours  chlorhexidine 2% Cloths 1 Application(s) Topical <User Schedule>  dextrose 5%. 1000 milliLiter(s) (100 mL/Hr) IV Continuous <Continuous>  dextrose 5%. 1000 milliLiter(s) (50 mL/Hr) IV Continuous <Continuous>  dextrose 50% Injectable 25 Gram(s) IV Push once  dextrose 50% Injectable 25 Gram(s) IV Push once  dextrose 50% Injectable 12.5 Gram(s) IV Push once  glucagon  Injectable 1 milliGRAM(s) IntraMuscular once  heparin   Injectable 5000 Unit(s) SubCutaneous every 8 hours  insulin lispro (ADMELOG) corrective regimen sliding scale   SubCutaneous at bedtime  insulin lispro (ADMELOG) corrective regimen sliding scale   SubCutaneous three times a day before meals  lidocaine 2% Jelly 5 milliLiter(s) IntraUrethral once  sodium chloride 0.45%. 1000 milliLiter(s) (75 mL/Hr) IV Continuous <Continuous>  tamsulosin 0.8 milliGRAM(s) Oral at bedtime    MEDICATIONS  (PRN):  acetaminophen     Tablet .. 650 milliGRAM(s) Oral every 6 hours PRN Temp greater or equal to 38C (100.4F), Mild Pain (1 - 3)  dextrose Oral Gel 15 Gram(s) Oral once PRN Blood Glucose LESS THAN 70 milliGRAM(s)/deciliter    Vital Signs Last 24 Hrs  T(C): 36.2 (10 Braxton 2024 16:10), Max: 36.6 (10 Braxton 2024 01:03)  T(F): 97.1 (10 Braxton 2024 16:10), Max: 97.8 (10 Braxton 2024 01:03)  HR: 82 (10 Braxton 2024 16:10) (62 - 91)  BP: 130/75 (10 Braxton 2024 16:10) (116/62 - 154/63)  BP(mean): 90 (10 Braxton 2024 15:47) (81 - 107)  RR: 18 (10 Braxton 2024 16:10) (10 - 36)  SpO2: 95% (10 Braxton 2024 16:10) (92% - 95%)    Parameters below as of 10 Braxton 2024 16:10  Patient On (Oxygen Delivery Method): room air    NAD  No JVD  Chest clear  CV RRR s1s2  Abd soft  Ext no edema                          11.1   7.45  )-----------( 210      ( 10 Braxton 2024 04:53 )             32.7   01-10    136  |  104  |  39<H>  ----------------------------<  198<H>  3.7   |  22  |  2.48<H>    Ca    9.1      10 Braxton 2024 04:53  Phos  3.8     01-10  Mg     1.8     01-10    TPro  6.7  /  Alb  3.1<L>  /  TBili  0.4  /  DBili  x   /  AST  16  /  ALT  8<L>  /  AlkPhos  41  01-10    Urinalysis with Rflx Culture (collected 09 Jan 2024 00:51)    Culture - Urine (collected 09 Jan 2024 00:51)  Source: Clean Catch None  Final Report (10 Braxton 2024 11:15):    Normal Urogenital francisco j present

## 2024-01-10 NOTE — PROGRESS NOTE ADULT - PROBLEM SELECTOR PLAN 3
Pt with history of BPH, on home tamsulosin 0.8mg PO qhs. Renal US with moderate bilateral hydro and mild increased renal parenchyma Found to be retaining on bladder scan on 1/9 with 800cc; unable to straight cath due to blood clot with resistance.   - Dan placed by urology on 1/9  - C/w tamsulosin 0.8mg PO qhs  - Monitor I&O's

## 2024-01-10 NOTE — PROGRESS NOTE ADULT - PROBLEM SELECTOR PLAN 2
#obstructive JULIO CESAR on CKD iso BPH  #b/l hydronephrosis  On admission, Cr of 2.94 (baseline of 1.35 on 12/4/23); Cr continued to trend up to 3.16 on 1/9. Specific gravity of 1.023, concentrated  - Ensure adequate hydration with IV fluids  - Monitor BMP  - Strict I & Os  - Avoid nephrotoxic agents  - Dan placed by urology on 1/9

## 2024-01-11 ENCOUNTER — TRANSCRIPTION ENCOUNTER (OUTPATIENT)
Age: 89
End: 2024-01-11

## 2024-01-11 LAB
ALBUMIN SERPL ELPH-MCNC: 2.9 G/DL — LOW (ref 3.3–5)
ALBUMIN SERPL ELPH-MCNC: 2.9 G/DL — LOW (ref 3.3–5)
ALP SERPL-CCNC: 40 U/L — SIGNIFICANT CHANGE UP (ref 40–120)
ALP SERPL-CCNC: 40 U/L — SIGNIFICANT CHANGE UP (ref 40–120)
ALT FLD-CCNC: 7 U/L — LOW (ref 10–45)
ALT FLD-CCNC: 7 U/L — LOW (ref 10–45)
ANION GAP SERPL CALC-SCNC: 10 MMOL/L — SIGNIFICANT CHANGE UP (ref 5–17)
ANION GAP SERPL CALC-SCNC: 10 MMOL/L — SIGNIFICANT CHANGE UP (ref 5–17)
AST SERPL-CCNC: 15 U/L — SIGNIFICANT CHANGE UP (ref 10–40)
AST SERPL-CCNC: 15 U/L — SIGNIFICANT CHANGE UP (ref 10–40)
BASOPHILS # BLD AUTO: 0.03 K/UL — SIGNIFICANT CHANGE UP (ref 0–0.2)
BASOPHILS # BLD AUTO: 0.03 K/UL — SIGNIFICANT CHANGE UP (ref 0–0.2)
BASOPHILS NFR BLD AUTO: 0.4 % — SIGNIFICANT CHANGE UP (ref 0–2)
BASOPHILS NFR BLD AUTO: 0.4 % — SIGNIFICANT CHANGE UP (ref 0–2)
BILIRUB SERPL-MCNC: 0.3 MG/DL — SIGNIFICANT CHANGE UP (ref 0.2–1.2)
BILIRUB SERPL-MCNC: 0.3 MG/DL — SIGNIFICANT CHANGE UP (ref 0.2–1.2)
BUN SERPL-MCNC: 26 MG/DL — HIGH (ref 7–23)
BUN SERPL-MCNC: 26 MG/DL — HIGH (ref 7–23)
CALCIUM SERPL-MCNC: 8.5 MG/DL — SIGNIFICANT CHANGE UP (ref 8.4–10.5)
CALCIUM SERPL-MCNC: 8.5 MG/DL — SIGNIFICANT CHANGE UP (ref 8.4–10.5)
CHLORIDE SERPL-SCNC: 104 MMOL/L — SIGNIFICANT CHANGE UP (ref 96–108)
CHLORIDE SERPL-SCNC: 104 MMOL/L — SIGNIFICANT CHANGE UP (ref 96–108)
CO2 SERPL-SCNC: 25 MMOL/L — SIGNIFICANT CHANGE UP (ref 22–31)
CO2 SERPL-SCNC: 25 MMOL/L — SIGNIFICANT CHANGE UP (ref 22–31)
CREAT SERPL-MCNC: 1.78 MG/DL — HIGH (ref 0.5–1.3)
CREAT SERPL-MCNC: 1.78 MG/DL — HIGH (ref 0.5–1.3)
EGFR: 33 ML/MIN/1.73M2 — LOW
EGFR: 33 ML/MIN/1.73M2 — LOW
EOSINOPHIL # BLD AUTO: 0.11 K/UL — SIGNIFICANT CHANGE UP (ref 0–0.5)
EOSINOPHIL # BLD AUTO: 0.11 K/UL — SIGNIFICANT CHANGE UP (ref 0–0.5)
EOSINOPHIL NFR BLD AUTO: 1.4 % — SIGNIFICANT CHANGE UP (ref 0–6)
EOSINOPHIL NFR BLD AUTO: 1.4 % — SIGNIFICANT CHANGE UP (ref 0–6)
GLUCOSE BLDC GLUCOMTR-MCNC: 138 MG/DL — HIGH (ref 70–99)
GLUCOSE BLDC GLUCOMTR-MCNC: 138 MG/DL — HIGH (ref 70–99)
GLUCOSE BLDC GLUCOMTR-MCNC: 160 MG/DL — HIGH (ref 70–99)
GLUCOSE BLDC GLUCOMTR-MCNC: 160 MG/DL — HIGH (ref 70–99)
GLUCOSE BLDC GLUCOMTR-MCNC: 215 MG/DL — HIGH (ref 70–99)
GLUCOSE BLDC GLUCOMTR-MCNC: 215 MG/DL — HIGH (ref 70–99)
GLUCOSE BLDC GLUCOMTR-MCNC: 323 MG/DL — HIGH (ref 70–99)
GLUCOSE BLDC GLUCOMTR-MCNC: 323 MG/DL — HIGH (ref 70–99)
GLUCOSE SERPL-MCNC: 138 MG/DL — HIGH (ref 70–99)
GLUCOSE SERPL-MCNC: 138 MG/DL — HIGH (ref 70–99)
HCT VFR BLD CALC: 30.4 % — LOW (ref 39–50)
HCT VFR BLD CALC: 30.4 % — LOW (ref 39–50)
HGB BLD-MCNC: 10.3 G/DL — LOW (ref 13–17)
HGB BLD-MCNC: 10.3 G/DL — LOW (ref 13–17)
IMM GRANULOCYTES NFR BLD AUTO: 0.5 % — SIGNIFICANT CHANGE UP (ref 0–0.9)
IMM GRANULOCYTES NFR BLD AUTO: 0.5 % — SIGNIFICANT CHANGE UP (ref 0–0.9)
LYMPHOCYTES # BLD AUTO: 1.31 K/UL — SIGNIFICANT CHANGE UP (ref 1–3.3)
LYMPHOCYTES # BLD AUTO: 1.31 K/UL — SIGNIFICANT CHANGE UP (ref 1–3.3)
LYMPHOCYTES # BLD AUTO: 17.2 % — SIGNIFICANT CHANGE UP (ref 13–44)
LYMPHOCYTES # BLD AUTO: 17.2 % — SIGNIFICANT CHANGE UP (ref 13–44)
MAGNESIUM SERPL-MCNC: 1.6 MG/DL — SIGNIFICANT CHANGE UP (ref 1.6–2.6)
MAGNESIUM SERPL-MCNC: 1.6 MG/DL — SIGNIFICANT CHANGE UP (ref 1.6–2.6)
MCHC RBC-ENTMCNC: 30.3 PG — SIGNIFICANT CHANGE UP (ref 27–34)
MCHC RBC-ENTMCNC: 30.3 PG — SIGNIFICANT CHANGE UP (ref 27–34)
MCHC RBC-ENTMCNC: 33.9 GM/DL — SIGNIFICANT CHANGE UP (ref 32–36)
MCHC RBC-ENTMCNC: 33.9 GM/DL — SIGNIFICANT CHANGE UP (ref 32–36)
MCV RBC AUTO: 89.4 FL — SIGNIFICANT CHANGE UP (ref 80–100)
MCV RBC AUTO: 89.4 FL — SIGNIFICANT CHANGE UP (ref 80–100)
MONOCYTES # BLD AUTO: 0.66 K/UL — SIGNIFICANT CHANGE UP (ref 0–0.9)
MONOCYTES # BLD AUTO: 0.66 K/UL — SIGNIFICANT CHANGE UP (ref 0–0.9)
MONOCYTES NFR BLD AUTO: 8.7 % — SIGNIFICANT CHANGE UP (ref 2–14)
MONOCYTES NFR BLD AUTO: 8.7 % — SIGNIFICANT CHANGE UP (ref 2–14)
NEUTROPHILS # BLD AUTO: 5.48 K/UL — SIGNIFICANT CHANGE UP (ref 1.8–7.4)
NEUTROPHILS # BLD AUTO: 5.48 K/UL — SIGNIFICANT CHANGE UP (ref 1.8–7.4)
NEUTROPHILS NFR BLD AUTO: 71.8 % — SIGNIFICANT CHANGE UP (ref 43–77)
NEUTROPHILS NFR BLD AUTO: 71.8 % — SIGNIFICANT CHANGE UP (ref 43–77)
NRBC # BLD: 0 /100 WBCS — SIGNIFICANT CHANGE UP (ref 0–0)
NRBC # BLD: 0 /100 WBCS — SIGNIFICANT CHANGE UP (ref 0–0)
PHOSPHATE SERPL-MCNC: 2.9 MG/DL — SIGNIFICANT CHANGE UP (ref 2.5–4.5)
PHOSPHATE SERPL-MCNC: 2.9 MG/DL — SIGNIFICANT CHANGE UP (ref 2.5–4.5)
PLATELET # BLD AUTO: 197 K/UL — SIGNIFICANT CHANGE UP (ref 150–400)
PLATELET # BLD AUTO: 197 K/UL — SIGNIFICANT CHANGE UP (ref 150–400)
POTASSIUM SERPL-MCNC: 3.8 MMOL/L — SIGNIFICANT CHANGE UP (ref 3.5–5.3)
POTASSIUM SERPL-MCNC: 3.8 MMOL/L — SIGNIFICANT CHANGE UP (ref 3.5–5.3)
POTASSIUM SERPL-SCNC: 3.8 MMOL/L — SIGNIFICANT CHANGE UP (ref 3.5–5.3)
POTASSIUM SERPL-SCNC: 3.8 MMOL/L — SIGNIFICANT CHANGE UP (ref 3.5–5.3)
PROT SERPL-MCNC: 6.5 G/DL — SIGNIFICANT CHANGE UP (ref 6–8.3)
PROT SERPL-MCNC: 6.5 G/DL — SIGNIFICANT CHANGE UP (ref 6–8.3)
RBC # BLD: 3.4 M/UL — LOW (ref 4.2–5.8)
RBC # BLD: 3.4 M/UL — LOW (ref 4.2–5.8)
RBC # FLD: 13.9 % — SIGNIFICANT CHANGE UP (ref 10.3–14.5)
RBC # FLD: 13.9 % — SIGNIFICANT CHANGE UP (ref 10.3–14.5)
SODIUM SERPL-SCNC: 139 MMOL/L — SIGNIFICANT CHANGE UP (ref 135–145)
SODIUM SERPL-SCNC: 139 MMOL/L — SIGNIFICANT CHANGE UP (ref 135–145)
WBC # BLD: 7.63 K/UL — SIGNIFICANT CHANGE UP (ref 3.8–10.5)
WBC # BLD: 7.63 K/UL — SIGNIFICANT CHANGE UP (ref 3.8–10.5)
WBC # FLD AUTO: 7.63 K/UL — SIGNIFICANT CHANGE UP (ref 3.8–10.5)
WBC # FLD AUTO: 7.63 K/UL — SIGNIFICANT CHANGE UP (ref 3.8–10.5)

## 2024-01-11 RX ORDER — SODIUM CHLORIDE 9 MG/ML
1000 INJECTION, SOLUTION INTRAVENOUS
Refills: 0 | Status: DISCONTINUED | OUTPATIENT
Start: 2024-01-11 | End: 2024-01-11

## 2024-01-11 RX ADMIN — ATORVASTATIN CALCIUM 10 MILLIGRAM(S): 80 TABLET, FILM COATED ORAL at 21:56

## 2024-01-11 RX ADMIN — CHLORHEXIDINE GLUCONATE 1 APPLICATION(S): 213 SOLUTION TOPICAL at 07:51

## 2024-01-11 RX ADMIN — Medication 2: at 22:02

## 2024-01-11 RX ADMIN — HEPARIN SODIUM 5000 UNIT(S): 5000 INJECTION INTRAVENOUS; SUBCUTANEOUS at 21:56

## 2024-01-11 RX ADMIN — HEPARIN SODIUM 5000 UNIT(S): 5000 INJECTION INTRAVENOUS; SUBCUTANEOUS at 13:27

## 2024-01-11 RX ADMIN — CEFTRIAXONE 100 MILLIGRAM(S): 500 INJECTION, POWDER, FOR SOLUTION INTRAMUSCULAR; INTRAVENOUS at 13:27

## 2024-01-11 RX ADMIN — TAMSULOSIN HYDROCHLORIDE 0.8 MILLIGRAM(S): 0.4 CAPSULE ORAL at 21:56

## 2024-01-11 RX ADMIN — Medication 1: at 17:56

## 2024-01-11 NOTE — DISCHARGE NOTE PROVIDER - NSDCMRMEDTOKEN_GEN_ALL_CORE_FT
alfuzosin 10 mg oral tablet, extended release: 1 tab(s) orally once a day  atorvastatin 10 mg oral tablet: 1 tab(s) orally once a day  glimepiride 2 mg oral tablet: 1 tab(s) orally 2 times a day  Levaquin 500 mg oral tablet: 1 tab(s) orally 2 times a day  Levemir 100 units/mL subcutaneous solution: subcutaneous 2 times a day 10 units in AM and 5 units in pm  metFORMIN 1000 mg oral tablet: 1 tab(s) orally 2 times a day   alfuzosin 10 mg oral tablet, extended release: 1 tab(s) orally once a day  atorvastatin 10 mg oral tablet: 1 tab(s) orally once a day  metFORMIN 1000 mg oral tablet: 1 tab(s) orally 2 times a day  tamsulosin 0.4 mg oral capsule: 2 cap(s) orally once a day (at bedtime)   alfuzosin 10 mg oral tablet, extended release: 1 tab(s) orally once a day  atorvastatin 10 mg oral tablet: 1 tab(s) orally once a day  finasteride 5 mg oral tablet: 1 tab(s) orally once a day  heparin: 1 subcutaneous once a day  Januvia 100 mg oral tablet: 1 tab(s) orally once a day  metFORMIN 1000 mg oral tablet: 1 tab(s) orally 2 times a day  tadalafil 5 mg oral tablet: 1 tab(s) orally once a day as needed for difficulty urinating  tamsulosin 0.4 mg oral capsule: 2 cap(s) orally once a day (at bedtime)

## 2024-01-11 NOTE — PROGRESS NOTE ADULT - PROBLEM SELECTOR PLAN 4
Pt with BPH and DM recently switched from Farxiga by PCP to levemir insulin due to recurrent UTI. Had 2 episodes of UTI in the past month, last diagnosed on 1/4; ucx grew staph epi, cx was pansensitive, placed on 10-day course of Levaquin (completed 5 days outpatient prior to presenting to [facility]). UA (+) LE, WBC, bacteria. Ucx contaminated.   - Started CTX 1g IV qd x3 days (1/9-1/11) Pt with BPH and DM recently switched from Farxiga by PCP to levemir insulin due to recurrent UTI. Had 2 episodes of UTI in the past month, last diagnosed on 1/4; ucx grew staph epi, cx was pansensitive, placed on 10-day course of Levaquin (completed 5 days outpatient prior to presenting to [facility]). UA (+) LE, WBC, bacteria. Ucx contaminated.   - Started CTX 1g IV qd x3 days (1/9-1/11) Pt with BPH and DM recently switched from Farxiga by PCP to levemir insulin due to recurrent UTI. Had 2 episodes of UTI in the past month, last diagnosed on 1/4; ucx grew staph epi, cx was pansensitive, placed on 10-day course of Levaquin (completed 5 days outpatient prior to presenting to [facility]). UA (+) LE, WBC, bacteria. Ucx contaminated.   - Started CTX 1g IV qd x3 days (1/9-1/11)  RESOLVED Pt with BPH and DM recently switched from Farxiga by PCP to levemir insulin due to recurrent UTI. Had 2 episodes of UTI in the past month, last diagnosed on 1/4; ucx grew staph epi, cx was pansensitive, placed on 10-day course of Levaquin (completed 5 days outpatient prior to presenting to [facility]). UA (+) LE, WBC, bacteria. Ucx contaminated.   - Started CTX 1g IV qd x3 days (1/9-1/11)  RESOLVED

## 2024-01-11 NOTE — PROGRESS NOTE ADULT - SUBJECTIVE AND OBJECTIVE BOX
Overnight episode of hypoglycemia post insulin dosing  Slept well  Appettite improved  Significant diuresis (1.5 liters) post obstructive - continues to require IV fluid replacement    MEDICATIONS  (STANDING):  atorvastatin 10 milliGRAM(s) Oral at bedtime  cefTRIAXone   IVPB 1000 milliGRAM(s) IV Intermittent every 24 hours  chlorhexidine 2% Cloths 1 Application(s) Topical <User Schedule>  dextrose 5%. 1000 milliLiter(s) (100 mL/Hr) IV Continuous <Continuous>  dextrose 5%. 1000 milliLiter(s) (50 mL/Hr) IV Continuous <Continuous>  dextrose 50% Injectable 25 Gram(s) IV Push once  dextrose 50% Injectable 25 Gram(s) IV Push once  dextrose 50% Injectable 12.5 Gram(s) IV Push once  glucagon  Injectable 1 milliGRAM(s) IntraMuscular once  heparin   Injectable 5000 Unit(s) SubCutaneous every 8 hours  insulin lispro (ADMELOG) corrective regimen sliding scale   SubCutaneous at bedtime  insulin lispro (ADMELOG) corrective regimen sliding scale   SubCutaneous three times a day before meals  sodium chloride 0.45%. 1000 milliLiter(s) (75 mL/Hr) IV Continuous <Continuous>  tamsulosin 0.8 milliGRAM(s) Oral at bedtime    MEDICATIONS  (PRN):  acetaminophen     Tablet .. 650 milliGRAM(s) Oral every 6 hours PRN Temp greater or equal to 38C (100.4F), Mild Pain (1 - 3)  dextrose Oral Gel 15 Gram(s) Oral once PRN Blood Glucose LESS THAN 70 milliGRAM(s)/deciliter      ICU Vital Signs Last 24 Hrs  T(C): 37.7 (11 Jan 2024 05:44), Max: 37.7 (11 Jan 2024 05:44)  T(F): 99.8 (11 Jan 2024 05:44), Max: 99.8 (11 Jan 2024 05:44)  HR: 85 (11 Jan 2024 05:44) (62 - 85)  BP: 134/72 (11 Jan 2024 05:44) (103/57 - 151/71)  BP(mean): 90 (10 Braxton 2024 15:47) (85 - 107)  ABP: --  ABP(mean): --  RR: 18 (11 Jan 2024 05:44) (11 - 36)  SpO2: 93% (11 Jan 2024 05:44) (93% - 95%)    O2 Parameters below as of 11 Jan 2024 05:44  Patient On (Oxygen Delivery Method): room air    Sleeping but easily arousable  No JVD  Chest clear  CV RRR s1s2   Abd soft  Ext no edema                          11.1   7.45  )-----------( 210      ( 10 Braxton 2024 04:53 )             32.7   01-10    136  |  104  |  39<H>  ----------------------------<  198<H>  3.7   |  22  |  2.48<H>    Ca    9.1      10 Braxton 2024 04:53  Phos  3.8     01-10  Mg     1.8     01-10    TPro  6.7  /  Alb  3.1<L>  /  TBili  0.4  /  DBili  x   /  AST  16  /  ALT  8<L>  /  AlkPhos  41  01-10      Urinalysis with Rflx Culture (collected 09 Jan 2024 00:51)    Culture - Urine (collected 09 Jan 2024 00:51)  Source: Clean Catch None  Final Report (10 Braxton 2024 11:15):    Normal Urogenital francisco j present    CAPILLARY BLOOD GLUCOSE      POCT Blood Glucose.: 138 mg/dL (11 Jan 2024 07:01)  POCT Blood Glucose.: 150 mg/dL (10 Braxton 2024 23:14)  POCT Blood Glucose.: 52 mg/dL (10 Braxton 2024 21:55)  POCT Blood Glucose.: 252 mg/dL (10 Braxton 2024 16:42)  POCT Blood Glucose.: 265 mg/dL (10 Braxton 2024 15:41)  POCT Blood Glucose.: 239 mg/dL (10 Braxton 2024 11:10)

## 2024-01-11 NOTE — DISCHARGE NOTE PROVIDER - CARE PROVIDERS DIRECT ADDRESSES
[email or web address]

## 2024-01-11 NOTE — PROGRESS NOTE ADULT - ASSESSMENT
Hemodynamically stable with persistent hypoglycemic response to insulin  -post obstriuctive diuresis with wade still requires IV fluid replacement  Blood pressure controlled  Monitor renal function  Will reveiw out patient oral diabetic regimen  Verify alpha blocker (either tamsulosin or alfulosin) in consideration of voiding trial prior to discharge  DVT prophyalxis  D/W crystal

## 2024-01-11 NOTE — DISCHARGE NOTE PROVIDER - NSDCCPCAREPLAN_GEN_ALL_CORE_FT
PRINCIPAL DISCHARGE DIAGNOSIS  Diagnosis: Hypoglycemia  Assessment and Plan of Treatment: You came to the hospita for feeling unwell, confused and sweaty after recently starting insulin for your diabetes. In the ED your blood glucose was checked and was found to be dangerously low. You were given fluids rich with glucose to help bring up your blood glucose and you were admitted to the ICU for closer monitoring, as hypoglycemia is very dangerous and can be fatal. In the ICU you continued to received fluids with glucose. You sugars became stable and you were determined stable to moved from the ICU to the regular medical floor. At this point, you have been deemed medically optimzied for discharge and will be going home with a change to your medical regimen. ***COMPLETE WITH DISCHARGE MED PLAN****      SECONDARY DISCHARGE DIAGNOSES  Diagnosis: Postobstructive diuresis  Assessment and Plan of Treatment:      PRINCIPAL DISCHARGE DIAGNOSIS  Diagnosis: Hypoglycemia  Assessment and Plan of Treatment: You came to the hospital for feeling unwell, confused and sweaty after recently starting insulin for your diabetes. In the ED your blood glucose was checked and was found to be dangerously low. You were given fluids rich with glucose to help bring up your blood glucose and you were admitted to the ICU for closer monitoring, as hypoglycemia is very dangerous and can be fatal. In the ICU you continued to received fluids with glucose. You sugars became stable and you were determined stable to moved from the ICU to the regular medical floor. At this point, you have been deemed medically optimzied for discharge and will be going home with a change to your medical regimen. ***COMPLETE WITH DISCHARGE MED PLAN****      SECONDARY DISCHARGE DIAGNOSES  Diagnosis: Postobstructive diuresis  Assessment and Plan of Treatment: You came to the hospital after feeling unwell with recent urinary tract infections (UTIs), which is why your diabetes medication was changed, as Farxiga puts you at risk for developing UTIs. You also have a history of urinary retention. We completed an ultrasound of your kidneys which showed that they were full of fluid, called hydronephrosis. This is likely secondary to an obstruction blocking the flow of urine from the kidneys out of the body. In older men, a common cause of this is BPH or benign prostatic hyperplasia. We placed a wade catheter which relieved the obstruction, however your body started producing excessive urine. This is a common consequence of relieving a urinary obstruction and we call this post-obstructive diuresis. We treated you by ensuring that the fluid lost to your urine was replaced with IV fluid. Moving forward, it is very important that you follow up with your primary care provider as soon as possible after discharge. Please seek immediate medical attention if you notice increased difficulty with urination or excessive urination.  ****COMPLETE WITH DISCHARGE PLAN****     PRINCIPAL DISCHARGE DIAGNOSIS  Diagnosis: Hypoglycemia  Assessment and Plan of Treatment: You came to the hospital for feeling unwell, confused and sweaty after recently starting insulin for your diabetes. In the ED your blood glucose was checked and was found to be dangerously low. You were given fluids rich with glucose to help bring up your blood glucose and you were admitted to the ICU for closer monitoring, as hypoglycemia is very dangerous and can be fatal. In the ICU you continued to received fluids with glucose. You sugars became stable and you were determined stable to moved from the ICU to the regular medical floor. At this point, you have been deemed medically optimzied for discharge and will be going home with a change to your medical regimen.   Moving forward, you will continue taking Metformin 1000mg once per day and will start taking a medication called Januvia 100mg once a day.   Please seek immediate medical attention if you experience worsening fatigue, exhaustion, dizziness,      SECONDARY DISCHARGE DIAGNOSES  Diagnosis: Postobstructive diuresis  Assessment and Plan of Treatment: You came to the hospital after feeling unwell with recent urinary tract infections (UTIs), which is why your diabetes medication was changed, as Farxiga puts you at risk for developing UTIs. You also have a history of urinary retention. We completed an ultrasound of your kidneys which showed that they were full of fluid, called hydronephrosis. This is likely secondary to an obstruction blocking the flow of urine from the kidneys out of the body. In older men, a common cause of this is BPH or benign prostatic hyperplasia. We placed a wade catheter which relieved the obstruction, however your body started producing excessive urine. This is a common consequence of relieving a urinary obstruction and we call this post-obstructive diuresis. We treated you by ensuring that the fluid lost to your urine was replaced with IV fluid. Moving forward, it is very important that you follow up with your primary care provider as soon as possible after discharge. Please seek immediate medical attention if you notice increased difficulty with urination or excessive urination.  ****COMPLETE WITH DISCHARGE PLAN****     PRINCIPAL DISCHARGE DIAGNOSIS  Diagnosis: Hypoglycemia  Assessment and Plan of Treatment: You came to the hospital for feeling unwell, confused and sweaty after recently starting insulin for your diabetes. In the ED your blood glucose was checked and was found to be dangerously low. You were given fluids rich with glucose to help bring up your blood glucose and you were admitted to the ICU for closer monitoring, as hypoglycemia is very dangerous and can be fatal. In the ICU you continued to received fluids with glucose. You sugars became stable and you were determined stable to moved from the ICU to the regular medical floor. At this point, you have been deemed medically optimzied for discharge and will be going home with a change to your medical regimen.   Moving forward, you will continue taking Metformin 1000mg once per day and will start taking a medication called Januvia 100mg once a day.   Please seek immediate medical attention if you experience worsening fatigue/exhaustion, dizziness, nausea/vomiting, or increased thirst or urination      SECONDARY DISCHARGE DIAGNOSES  Diagnosis: Postobstructive diuresis  Assessment and Plan of Treatment: You came to the hospital after feeling unwell with recent urinary tract infections (UTIs), which is why your diabetes medication was changed, as Farxiga puts you at risk for developing UTIs. You also have a history of urinary retention. We completed an ultrasound of your kidneys which showed that they were full of fluid, called hydronephrosis. This is likely secondary to an obstruction blocking the flow of urine from the kidneys out of the body. In older men, a common cause of this is BPH or benign prostatic hyperplasia. We placed a wade catheter which relieved the obstruction, however your body started producing excessive urine. This is a common consequence of relieving a urinary obstruction and we call this post-obstructive diuresis. We treated you by ensuring that the fluid lost to your urine was replaced with IV fluid. At this time you are now stable without the need for IV fluids. You will be discharged with a Wade catheter in place which you will have managed for you at the rehabiliation facility. it is very important that you follow up with your primary care provider as soon as possible after discharge. Please seek immediate medical attention if you notice increased difficulty with urination or excessive urination.

## 2024-01-11 NOTE — PROGRESS NOTE ADULT - PROBLEM SELECTOR PLAN 3
Pt with history of BPH, on home tamsulosin 0.8mg PO qhs. Renal US with moderate bilateral hydro and mild increased renal parenchyma Found to be retaining on bladder scan on 1/9 with 800cc; unable to straight cath due to blood clot with resistance.   - Dan placed by urology on 1/9  - C/w tamsulosin 0.8mg PO qhs  - Monitor I&O's Pt with history of BPH, on home tamsulosin 0.8mg PO qhs. Renal US with moderate bilateral hydro and mild increased renal parenchyma Found to be retaining on bladder scan on 1/9 with 800cc; unable to straight cath due to blood clot with resistance.   - Dan placed by urology on 1/9  - C/w tamsulosin 0.8mg PO qhs  - Monitor I&O's  - TOV 1/12 #Post-obstructive diuresis   Pt with history of BPH, on home tamsulosin 0.8mg PO qhs. Renal US with moderate bilateral hydro and mild increased renal parenchyma Found to be retaining on bladder scan on 1/9 with 800cc; unable to straight cath due to blood clot with resistance.   Pt urinating high volume following obstruction, consistent with post-obstructive diuresis  - Dan placed by urology on 1/9  - C/w tamsulosin 0.8mg PO qhs  - Monitor I&O's  - C/w IV fluid replacement   - TOV 1/12

## 2024-01-11 NOTE — DISCHARGE NOTE PROVIDER - CARE PROVIDER_API CALL
Lai Huggins  Cardiovascular Disease  [address] [place] [id number]  Phone: [phone]  Fax: [phone]  Established Patient  Follow Up Time: 1 week   Lai Huggins  Cardiovascular Disease  [address] [place] [id number]  Phone: [phone]  Fax: [phone]  Established Patient  Follow Up Time: 1 week   Lai Huggins  Cardiovascular Disease  [address] [place] [id number]  Phone: [phone]  Fax: [phone]  Established Patient  Follow Up Time: 1 week   Lai Huggins  Cardiovascular Disease  [address] [place] [id number]  Phone: [phone]  Fax: [phone]  Established Patient  Follow Up Time: 1 week

## 2024-01-11 NOTE — PROGRESS NOTE ADULT - SUBJECTIVE AND OBJECTIVE BOX
OVERNIGHT EVENTS:    SUBJECTIVE / INTERVAL HPI: Patient seen and examined at bedside.     VITAL SIGNS:  Vital Signs Last 24 Hrs  T(C): 37.7 (11 Jan 2024 05:44), Max: 37.7 (11 Jan 2024 05:44)  T(F): 99.8 (11 Jan 2024 05:44), Max: 99.8 (11 Jan 2024 05:44)  HR: 85 (11 Jan 2024 05:44) (62 - 85)  BP: 134/72 (11 Jan 2024 05:44) (103/57 - 151/71)  BP(mean): 90 (10 Braxton 2024 15:47) (84 - 107)  RR: 18 (11 Jan 2024 05:44) (11 - 36)  SpO2: 93% (11 Jan 2024 05:44) (92% - 95%)    Parameters below as of 11 Jan 2024 05:44  Patient On (Oxygen Delivery Method): room air        PHYSICAL EXAM:    General: NAD  HEENT: NC/AT; PERRL, anicteric sclera; MMM  Neck: supple w/o palpable nodularity  Cardiovascular: +S1/S2; RRR  Respiratory: CTA B/L; no W/R/R  Gastrointestinal: soft, NT/ND; +BSx4  Extremities: WWP; no edema, clubbing or cyanosis  Vascular: 2+ radial, DP/PT pulses B/L  Neurological: AAOx3; no focal deficits    MEDICATIONS:  MEDICATIONS  (STANDING):  atorvastatin 10 milliGRAM(s) Oral at bedtime  cefTRIAXone   IVPB 1000 milliGRAM(s) IV Intermittent every 24 hours  chlorhexidine 2% Cloths 1 Application(s) Topical <User Schedule>  dextrose 5%. 1000 milliLiter(s) (100 mL/Hr) IV Continuous <Continuous>  dextrose 5%. 1000 milliLiter(s) (50 mL/Hr) IV Continuous <Continuous>  dextrose 50% Injectable 25 Gram(s) IV Push once  dextrose 50% Injectable 25 Gram(s) IV Push once  dextrose 50% Injectable 12.5 Gram(s) IV Push once  glucagon  Injectable 1 milliGRAM(s) IntraMuscular once  heparin   Injectable 5000 Unit(s) SubCutaneous every 8 hours  insulin lispro (ADMELOG) corrective regimen sliding scale   SubCutaneous at bedtime  insulin lispro (ADMELOG) corrective regimen sliding scale   SubCutaneous three times a day before meals  sodium chloride 0.45%. 1000 milliLiter(s) (75 mL/Hr) IV Continuous <Continuous>  tamsulosin 0.8 milliGRAM(s) Oral at bedtime    MEDICATIONS  (PRN):  acetaminophen     Tablet .. 650 milliGRAM(s) Oral every 6 hours PRN Temp greater or equal to 38C (100.4F), Mild Pain (1 - 3)  dextrose Oral Gel 15 Gram(s) Oral once PRN Blood Glucose LESS THAN 70 milliGRAM(s)/deciliter      ALLERGIES:  Allergies    No Known Allergies    Intolerances        LABS:                        11.1   7.45  )-----------( 210      ( 10 Braxton 2024 04:53 )             32.7     01-10    136  |  104  |  39<H>  ----------------------------<  198<H>  3.7   |  22  |  2.48<H>    Ca    9.1      10 Braxton 2024 04:53  Phos  3.8     01-10  Mg     1.8     01-10    TPro  6.7  /  Alb  3.1<L>  /  TBili  0.4  /  DBili  x   /  AST  16  /  ALT  8<L>  /  AlkPhos  41  01-10      Urinalysis Basic - ( 10 Braxton 2024 04:53 )    Color: x / Appearance: x / SG: x / pH: x  Gluc: 198 mg/dL / Ketone: x  / Bili: x / Urobili: x   Blood: x / Protein: x / Nitrite: x   Leuk Esterase: x / RBC: x / WBC x   Sq Epi: x / Non Sq Epi: x / Bacteria: x      CAPILLARY BLOOD GLUCOSE      POCT Blood Glucose.: 138 mg/dL (11 Jan 2024 07:01)      RADIOLOGY & ADDITIONAL TESTS: Reviewed.   OVERNIGHT EVENTS: FSG 50, given 1 amp    SUBJECTIVE / INTERVAL HPI: Patient seen and examined at bedside found sleeping but woke to my whispered conversation with son. He feels well and has no acute complaints. The son is concerned by his father's hypoglycemia. Explained to him that it was likely 2/2 insulin he received yesterday.     VITAL SIGNS:  Vital Signs Last 24 Hrs  T(C): 37.7 (11 Jan 2024 05:44), Max: 37.7 (11 Jan 2024 05:44)  T(F): 99.8 (11 Jan 2024 05:44), Max: 99.8 (11 Jan 2024 05:44)  HR: 85 (11 Jan 2024 05:44) (62 - 85)  BP: 134/72 (11 Jan 2024 05:44) (103/57 - 151/71)  BP(mean): 90 (10 Braxton 2024 15:47) (84 - 107)  RR: 18 (11 Jan 2024 05:44) (11 - 36)  SpO2: 93% (11 Jan 2024 05:44) (92% - 95%)    Parameters below as of 11 Jan 2024 05:44  Patient On (Oxygen Delivery Method): room air        PHYSICAL EXAM:    General: NAD, laying in bed comfortably  Head: NC/AT; MMM; PERRL; EOMI;  Neck: Supple; no JVD  Respiratory: CTAB; no wheezes/rales/rhonchi  Cardiovascular: Regular rhythm/rate; S1/S2+, no murmurs, rubs gallops   Gastrointestinal: Soft; NTND; bowel sounds normal and present  Extremities: WWP; no edema/cyanosis  Neurological: A&Ox3, conversing and answering questions appropriately    MEDICATIONS:  MEDICATIONS  (STANDING):  atorvastatin 10 milliGRAM(s) Oral at bedtime  cefTRIAXone   IVPB 1000 milliGRAM(s) IV Intermittent every 24 hours  chlorhexidine 2% Cloths 1 Application(s) Topical <User Schedule>  dextrose 5%. 1000 milliLiter(s) (100 mL/Hr) IV Continuous <Continuous>  dextrose 5%. 1000 milliLiter(s) (50 mL/Hr) IV Continuous <Continuous>  dextrose 50% Injectable 25 Gram(s) IV Push once  dextrose 50% Injectable 25 Gram(s) IV Push once  dextrose 50% Injectable 12.5 Gram(s) IV Push once  glucagon  Injectable 1 milliGRAM(s) IntraMuscular once  heparin   Injectable 5000 Unit(s) SubCutaneous every 8 hours  insulin lispro (ADMELOG) corrective regimen sliding scale   SubCutaneous at bedtime  insulin lispro (ADMELOG) corrective regimen sliding scale   SubCutaneous three times a day before meals  sodium chloride 0.45%. 1000 milliLiter(s) (75 mL/Hr) IV Continuous <Continuous>  tamsulosin 0.8 milliGRAM(s) Oral at bedtime    MEDICATIONS  (PRN):  acetaminophen     Tablet .. 650 milliGRAM(s) Oral every 6 hours PRN Temp greater or equal to 38C (100.4F), Mild Pain (1 - 3)  dextrose Oral Gel 15 Gram(s) Oral once PRN Blood Glucose LESS THAN 70 milliGRAM(s)/deciliter      ALLERGIES:  Allergies    No Known Allergies    Intolerances        LABS:                        11.1   7.45  )-----------( 210      ( 10 Braxton 2024 04:53 )             32.7     01-10    136  |  104  |  39<H>  ----------------------------<  198<H>  3.7   |  22  |  2.48<H>    Ca    9.1      10 Braxton 2024 04:53  Phos  3.8     01-10  Mg     1.8     01-10    TPro  6.7  /  Alb  3.1<L>  /  TBili  0.4  /  DBili  x   /  AST  16  /  ALT  8<L>  /  AlkPhos  41  01-10      Urinalysis Basic - ( 10 Braxton 2024 04:53 )    Color: x / Appearance: x / SG: x / pH: x  Gluc: 198 mg/dL / Ketone: x  / Bili: x / Urobili: x   Blood: x / Protein: x / Nitrite: x   Leuk Esterase: x / RBC: x / WBC x   Sq Epi: x / Non Sq Epi: x / Bacteria: x      CAPILLARY BLOOD GLUCOSE      POCT Blood Glucose.: 138 mg/dL (11 Jan 2024 07:01)      RADIOLOGY & ADDITIONAL TESTS: Reviewed.

## 2024-01-11 NOTE — DISCHARGE NOTE PROVIDER - HOSPITAL COURSE
#Discharge: do not delete    SARATH VILLASEÑOR is a 89 yr old male w/ PMH of DM for the past 20 years, on oral medications for DM ( Metformin 1000 BID, Glimeperide 2mg BID ), HLD, admitted for metabolic encephalopathy 2/2 symptomatic hypoglycemia after pt was recently started on levemir insulin.    Problem List/Main Diagnoses (system-based):   # Hypoglycemia   Pt with DM, was on home Metformin 1000 BID, Glimepiride 2mg BID; recently switched from Farxiga by PCP (due to recurrent UTI), to Levemir insulin 10 units in morning and 5 units at night. Found to have a blood glucose of 34 on admission, urine Glucose > 1000, A1c 9. S/p D50 50ml + 25ml boluses, D10 1000ml infusion at 30ml/h, 1 L NS bolus in the ED. Discontinued D10 drip and started diet.   - DC'd insulin  - C/w ______    # Obstructive JULIO CESAR on CKD  On admission, Cr of 2.94 (baseline of 1.35 on 12/4/23); Cr continued to trend up to 3.16 on 1/9. Specific gravity of 1.023, concentrated. Cr downtrended throughout stay.       #    Inpatient treatment course:     Patient was discharged to: (home/[facility]/acute rehab/hospice, etc. and w/ home health/home PT/RN/home O2)    New medications:   Changes to old medications:  Medications that were stopped:    Items to follow up as outpatient:    Physical exam at the time of discharge:       LABS & STUDIES:   #Discharge: do not delete    SARATH VILLASEÑOR is a 89 yr old male w/ PMH of DM for the past 20 years, on oral medications for DM ( Metformin 1000 BID, Glimeperide 2mg BID ), HLD, admitted for metabolic encephalopathy 2/2 symptomatic hypoglycemia after pt was recently started on levemir insulin. Pt had recently been started on insulin after lizzie frequent UTIs on farxiga, which precipitated his hypoglycemia. Was admitted to MICU and given D50 50ml + 25ml boluses and D10 gtt. His hypoglycemia corrected and started on low dose ISS. Found to have b/l moderate hydronephrosis on RP US. Urology consulted and placed wade. Pt then had post-obstructive diuresis, urinating high volumed, requiring IV fluid replacement. Stepped down to [facility] for further management.     Problem List/Main Diagnoses (system-based):   # Hypoglycemia   Pt with DM, was on home Metformin 1000 BID, Glimepiride 2mg BID; recently switched from Farxiga by PCP (due to recurrent UTI), to Levemir insulin 10 units in morning and 5 units at night. Found to have a blood glucose of 34 on admission, urine Glucose > 1000, A1c 9. S/p D50 50ml + 25ml boluses, D10 1000ml infusion at 30ml/h, 1 L NS bolus in the ED. Discontinued D10 drip and started diet.   - DC'd insulin  - C/w ______    # Obstructive JULIO CESAR on CKD  #Post-obstructive diuresis  On admission, Cr of 2.94 (baseline of 1.35 on 12/4/23); Cr continued to trend up to 3.16 on 1/9. Specific gravity of 1.023, concentrated. Cr downtrended throughout stay. Retroperitoneal US found to have b/l moderate hydronephrosis. Urology consulted and placed wade -> post-obstructive diuresis requiring aggressive IV volume replacement. Decided to keep wade until UOP returns to baseline. Unclear as to why continues to have diuresis. Attempt to hold fluids 1/12 ...     # UTI  Pt with 2 episodes of UTIs in the past month, last on 1/4. UCX growing staph epi, started on 10-dayu course levaquin (completed 5 days outpatient prior to current presentation). Completed CTX 1g IV x3 days. RESOLVED    Patient was discharged to: Home    New medications:   Changes to old medications:  Medications that were stopped:    Items to follow up as outpatient:    Physical exam at the time of discharge:       LABS & STUDIES:   #Discharge: do not delete    SARATH VILLASEÑOR is a 89 yr old male w/ PMH of DM for the past 20 years, on oral medications for DM ( Metformin 1000 BID, Glimeperide 2mg BID ), HLD, admitted for metabolic encephalopathy 2/2 symptomatic hypoglycemia after pt was recently started on levemir insulin. Pt had recently been started on insulin after lizzie frequent UTIs on farxiga, which precipitated his hypoglycemia. Was admitted to MICU and given D50 50ml + 25ml boluses and D10 gtt. His hypoglycemia corrected and started on low dose ISS. Found to have b/l moderate hydronephrosis on RP US. Urology consulted and placed wade. Pt then had post-obstructive diuresis, urinating high volumed, requiring IV fluid replacement. Stepped down to [facility] for further management.     Problem List/Main Diagnoses (system-based):   # Hypoglycemia   Pt with DM, was on home Metformin 1000 BID, Glimepiride 2mg BID; recently switched from Farxiga by PCP (due to recurrent UTI), to Levemir insulin 10 units in morning and 5 units at night. Found to have a blood glucose of 34 on admission, urine Glucose > 1000, A1c 9. S/p D50 50ml + 25ml boluses, D10 1000ml infusion at 30ml/h, 1 L NS bolus in the ED. Discontinued D10 drip and started diet.   - DC'd insulin  - C/w metformin 1000mg QD  - Start januvia 100mg QD    # Obstructive JULIO CESAR on CKD  #Post-obstructive diuresis  On admission, Cr of 2.94 (baseline of 1.35 on 12/4/23); Cr continued to trend up to 3.16 on 1/9. Specific gravity of 1.023, concentrated. Cr downtrended throughout stay. Retroperitoneal US found to have b/l moderate hydronephrosis. Urology consulted and placed wade -> post-obstructive diuresis requiring aggressive IV volume replacement. Decided to keep wade until UOP returns to baseline. Unclear as to why continues to have diuresis. Failed TOV. Wade placed.  - Discharge with wade to [facility]    # UTI  Pt with 2 episodes of UTIs in the past month, last on 1/4. UCX growing staph epi, started on 10-dayu course levaquin (completed 5 days outpatient prior to current presentation). Completed CTX 1g IV x3 days. RESOLVED    Patient was discharged to: [facility]    New medications: Januvia 100mg QD, finasteride 5mg QD, Tadalifil PRN  Changes to old medications: None  Medications that were stopped: Insulin     Items to follow up as outpatient: DM, Wade cath    Physical exam at the time of discharge:   General: NAD, laying in bed comfortably  Head: NC/AT; MMM; PERRL; EOMI;  Neck: Supple; no JVD  Respiratory: CTAB; no wheezes/rales/rhonchi  Cardiovascular: Regular rhythm/rate; S1/S2+, no murmurs, rubs gallops   Gastrointestinal: Soft; NTND; bowel sounds normal and present  Extremities: WWP; no edema/cyanosis  Neurological: A&Ox3, conversing and answering questions appropriately      LABS & STUDIES:  LABS:                         11.2   9.09  )-----------( 233      ( 14 Jan 2024 05:30 )             33.8     01-14    137  |  102  |  19  ----------------------------<  182<H>  3.8   |  23  |  1.27    Ca    8.8      14 Jan 2024 05:30  Phos  2.2     01-14  Mg     2.0     01-14    TPro  7.4  /  Alb  3.2<L>  /  TBili  0.4  /  DBili  x   /  AST  16  /  ALT  12  /  AlkPhos  49  01-14      Urinalysis Basic - ( 14 Jan 2024 05:30 )    Color: x / Appearance: x / SG: x / pH: x  Gluc: 182 mg/dL / Ketone: x  / Bili: x / Urobili: x   Blood: x / Protein: x / Nitrite: x   Leuk Esterase: x / RBC: x / WBC x   Sq Epi: x / Non Sq Epi: x / Bacteria: x                RADIOLOGY, EKG & ADDITIONAL TESTS: #Discharge: do not delete    SARATH VILLASEÑOR is a 89 yr old male w/ PMH of DM for the past 20 years, on oral medications for DM ( Metformin 1000 BID, Glimeperide 2mg BID ), HLD, admitted for metabolic encephalopathy 2/2 symptomatic hypoglycemia after pt was recently started on levemir insulin. Pt had recently been started on insulin after lizzie frequent UTIs on farxiga, which precipitated his hypoglycemia. Was admitted to MICU and given D50 50ml + 25ml boluses and D10 gtt. His hypoglycemia corrected and started on low dose ISS. Found to have b/l moderate hydronephrosis on RP US. Urology consulted and placed wade. Pt then had post-obstructive diuresis, urinating high volumed, requiring IV fluid replacement. Stepped down to [facility] for further management.     Problem List/Main Diagnoses (system-based):   # Hypoglycemia   Pt with DM, was on home Metformin 1000 BID, Glimepiride 2mg BID; recently switched from Farxiga by PCP (due to recurrent UTI), to Levemir insulin 10 units in morning and 5 units at night. Found to have a blood glucose of 34 on admission, urine Glucose > 1000, A1c 9. S/p D50 50ml + 25ml boluses, D10 1000ml infusion at 30ml/h, 1 L NS bolus in the ED. Discontinued D10 drip and started diet.   - DC'd insulin  - C/w metformin 1000mg QD  - Start januvia 100mg QD    # Obstructive JULIO CESAR on CKD  #Post-obstructive diuresis  On admission, Cr of 2.94 (baseline of 1.35 on 12/4/23); Cr continued to trend up to 3.16 on 1/9. Specific gravity of 1.023, concentrated. Cr downtrended throughout stay. Retroperitoneal US found to have b/l moderate hydronephrosis. Urology consulted and placed wade -> post-obstructive diuresis requiring aggressive IV volume replacement. Decided to keep wade until UOP returns to baseline. Unclear as to why continues to have diuresis. Failed TOV. Wade placed.  - Discharge with wade to [facility]    # UTI  Pt with 2 episodes of UTIs in the past month, last on 1/4. UCX growing staph epi, started on 10-dayu course levaquin (completed 5 days outpatient prior to current presentation). Completed CTX 1g IV x3 days. RESOLVED    Patient was discharged to: [facility]    New medications: Januvia 100mg QD, finasteride 5mg QD, Tadalifil PRN  Changes to old medications: None  Medications that were stopped: Insulin     Items to follow up as outpatient: DM, Wade cath    Physical exam at the time of discharge:   General: NAD, laying in bed comfortably  Head: NC/AT; MMM; PERRL; EOMI;  Neck: Supple; no JVD  Respiratory: CTAB; no wheezes/rales/rhonchi  Cardiovascular: Regular rhythm/rate; S1/S2+, no murmurs, rubs gallops   Gastrointestinal: Soft; NTND; bowel sounds normal and present  Extremities: WWP; no edema/cyanosis  Neurological: A&Ox3, conversing and answering questions appropriately      LABS & STUDIES:  LABS:                         11.2   9.09  )-----------( 233      ( 14 Jan 2024 05:30 )             33.8     01-14    137  |  102  |  19  ----------------------------<  182<H>  3.8   |  23  |  1.27    Ca    8.8      14 Jan 2024 05:30  Phos  2.2     01-14  Mg     2.0     01-14    TPro  7.4  /  Alb  3.2<L>  /  TBili  0.4  /  DBili  x   /  AST  16  /  ALT  12  /  AlkPhos  49  01-14      Urinalysis Basic - ( 14 Jan 2024 05:30 )    Color: x / Appearance: x / SG: x / pH: x  Gluc: 182 mg/dL / Ketone: x  / Bili: x / Urobili: x   Blood: x / Protein: x / Nitrite: x   Leuk Esterase: x / RBC: x / WBC x   Sq Epi: x / Non Sq Epi: x / Bacteria: x                RADIOLOGY, EKG & ADDITIONAL TESTS: #Discharge: do not delete    SARATH VILLASEÑOR is a 89 yr old male w/ PMH of DM for the past 20 years, on oral medications for DM ( Metformin 1000 BID, Glimeperide 2mg BID ), HLD, admitted for metabolic encephalopathy 2/2 symptomatic hypoglycemia after pt was recently started on levemir insulin. Pt had recently been started on insulin after lizzie frequent UTIs on farxiga, which precipitated his hypoglycemia. Was admitted to MICU and given D50 50ml + 25ml boluses and D10 gtt. His hypoglycemia corrected and started on low dose ISS. Found to have b/l moderate hydronephrosis on RP US. Urology consulted and placed wade. Pt then had post-obstructive diuresis, urinating high volumed, requiring IV fluid replacement. Stepped down to [facility] for further management.     Problem List/Main Diagnoses (system-based):   # Hypoglycemia   Pt with DM, was on home Metformin 1000 BID, Glimepiride 2mg BID; recently switched from Farxiga by PCP (due to recurrent UTI), to Levemir insulin 10 units in morning and 5 units at night. Found to have a blood glucose of 34 on admission, urine Glucose > 1000, A1c 9. S/p D50 50ml + 25ml boluses, D10 1000ml infusion at 30ml/h, 1 L NS bolus in the ED. Discontinued D10 drip and started diet.   - DC'd insulin  - C/w metformin 1000mg QD  - Start januvia 100mg QD    # Obstructive JULIO CESAR on CKD  #Post-obstructive diuresis  On admission, Cr of 2.94 (baseline of 1.35 on 12/4/23); Cr continued to trend up to 3.16 on 1/9. Specific gravity of 1.023, concentrated. Cr downtrended throughout stay. Retroperitoneal US found to have b/l moderate hydronephrosis. Urology consulted and placed wade -> post-obstructive diuresis requiring aggressive IV volume replacement. Decided to keep wade until UOP returns to baseline. Unclear as to why continues to have diuresis. Failed TOV. Wade placed.  - Discharge with wade to [facility]    # UTI  Pt with 2 episodes of UTIs in the past month, last on 1/4. UCX growing staph epi, started on 10-dayu course levaquin (completed 5 days outpatient prior to current presentation). Completed CTX 1g IV x3 days. RESOLVED    Patient was discharged to: [facility]    New medications: Januvia 100mg QD, finasteride 5mg QD, Tadalifil PRN  Changes to old medications: None  Medications that were stopped: Insulin     Items to follow up as outpatient: DM, Wade cath    Physical exam at the time of discharge:   General: NAD, laying in bed comfortably  Head: NC/AT; MMM; PERRL; EOMI;  Neck: Supple; no JVD  Respiratory: CTAB; no wheezes/rales/rhonchi  Cardiovascular: Regular rhythm/rate; S1/S2+, no murmurs, rubs gallops   Gastrointestinal: Soft; NTND; bowel sounds normal and present  Extremities: WWP; no edema/cyanosis  Neurological: A&Ox3, conversing and answering questions appropriately      LABS & STUDIES:  LABS:                         11.2   9.09  )-----------( 233      ( 14 Jan 2024 05:30 )             33.8     01-14    137  |  102  |  19  ----------------------------<  182<H>  3.8   |  23  |  1.27    Ca    8.8      14 Jan 2024 05:30  Phos  2.2     01-14  Mg     2.0     01-14    TPro  7.4  /  Alb  3.2<L>  /  TBili  0.4  /  DBili  x   /  AST  16  /  ALT  12  /  AlkPhos  49  01-14      Urinalysis Basic - ( 14 Jan 2024 05:30 )    Color: x / Appearance: x / SG: x / pH: x  Gluc: 182 mg/dL / Ketone: x  / Bili: x / Urobili: x   Blood: x / Protein: x / Nitrite: x   Leuk Esterase: x / RBC: x / WBC x   Sq Epi: x / Non Sq Epi: x / Bacteria: x                RADIOLOGY, EKG & ADDITIONAL TESTS: #Discharge: do not delete    SARATH VILLASEÑOR is a 89 yr old male w/ PMH of DM for the past 20 years, on oral medications for DM ( Metformin 1000 BID, Glimeperide 2mg BID ), HLD, admitted for metabolic encephalopathy 2/2 symptomatic hypoglycemia after pt was recently started on levemir insulin. Pt had recently been started on insulin after lizzie frequent UTIs on farxiga, which precipitated his hypoglycemia. Was admitted to MICU and given D50 50ml + 25ml boluses and D10 gtt. His hypoglycemia corrected and started on low dose ISS. Found to have b/l moderate hydronephrosis on RP US. Urology consulted and placed wade. Pt then had post-obstructive diuresis, urinating high volumed, requiring IV fluid replacement. Stepped down to [facility] for further management.     Problem List/Main Diagnoses (system-based):   # Hypoglycemia   Pt with DM, was on home Metformin 1000 BID, Glimepiride 2mg BID; recently switched from Farxiga by PCP (due to recurrent UTI), to Levemir insulin 10 units in morning and 5 units at night. Found to have a blood glucose of 34 on admission, urine Glucose > 1000, A1c 9. S/p D50 50ml + 25ml boluses, D10 1000ml infusion at 30ml/h, 1 L NS bolus in the ED. Discontinued D10 drip and started diet.   - DC'd insulin  - C/w metformin 1000mg QD  - Start januvia 100mg QD    # Obstructive JULIO CESAR on CKD  #Post-obstructive diuresis  On admission, Cr of 2.94 (baseline of 1.35 on 12/4/23); Cr continued to trend up to 3.16 on 1/9. Specific gravity of 1.023, concentrated. Cr downtrended throughout stay. Retroperitoneal US found to have b/l moderate hydronephrosis. Urology consulted and placed wade -> post-obstructive diuresis requiring aggressive IV volume replacement. Decided to keep wade until UOP returns to baseline. Unclear as to why continues to have diuresis. Failed TOV. Wade placed.  - Discharge with wade to [facility]    # UTI  Pt with 2 episodes of UTIs in the past month, last on 1/4. UCX growing staph epi, started on 10-dayu course levaquin (completed 5 days outpatient prior to current presentation). Completed CTX 1g IV x3 days. RESOLVED    Patient was discharged to: [facility]    New medications: Januvia 100mg QD, finasteride 5mg QD, Tadalifil PRN  Changes to old medications: None  Medications that were stopped: Insulin     Items to follow up as outpatient: DM, Wade cath    Physical exam at the time of discharge:   General: NAD, laying in bed comfortably  Head: NC/AT; MMM; PERRL; EOMI;  Neck: Supple; no JVD  Respiratory: CTAB; no wheezes/rales/rhonchi  Cardiovascular: Regular rhythm/rate; S1/S2+, no murmurs, rubs gallops   Gastrointestinal: Soft; NTND; bowel sounds normal and present  Extremities: WWP; no edema/cyanosis  Neurological: A&Ox3, conversing and answering questions appropriately      LABS & STUDIES:  LABS:                         11.2   9.09  )-----------( 233      ( 14 Jan 2024 05:30 )             33.8     01-14    137  |  102  |  19  ----------------------------<  182<H>  3.8   |  23  |  1.27    Ca    8.8      14 Jan 2024 05:30  Phos  2.2     01-14  Mg     2.0     01-14    TPro  7.4  /  Alb  3.2<L>  /  TBili  0.4  /  DBili  x   /  AST  16  /  ALT  12  /  AlkPhos  49  01-14      Urinalysis Basic - ( 14 Jan 2024 05:30 )    Color: x / Appearance: x / SG: x / pH: x  Gluc: 182 mg/dL / Ketone: x  / Bili: x / Urobili: x   Blood: x / Protein: x / Nitrite: x   Leuk Esterase: x / RBC: x / WBC x   Sq Epi: x / Non Sq Epi: x / Bacteria: x                RADIOLOGY, EKG & ADDITIONAL TESTS: #Discharge: do not delete    SARATH VILLASEÑOR is a 89 yr old male w/ PMH of DM for the past 20 years, on oral medications for DM ( Metformin 1000 BID, Glimeperide 2mg BID ), HLD, admitted for metabolic encephalopathy 2/2 symptomatic hypoglycemia after pt was recently started on levemir insulin. Pt had recently been started on insulin after lizzie frequent UTIs on farxiga, which precipitated his hypoglycemia. Was admitted to MICU and given D50 50ml + 25ml boluses and D10 gtt. His hypoglycemia corrected and started on low dose ISS. Found to have b/l moderate hydronephrosis on RP US. Urology consulted and placed wade. Pt then had post-obstructive diuresis, urinating high volumed, requiring IV fluid replacement. Stepped down to [facility] for further management.     Problem List/Main Diagnoses (system-based):   # Hypoglycemia   Pt with DM, was on home Metformin 1000 BID, Glimepiride 2mg BID; recently switched from Farxiga by PCP (due to recurrent UTI), to Levemir insulin 10 units in morning and 5 units at night. Found to have a blood glucose of 34 on admission, urine Glucose > 1000, A1c 9. S/p D50 50ml + 25ml boluses, D10 1000ml infusion at 30ml/h, 1 L NS bolus in the ED. Discontinued D10 drip and started diet.   - DC'd insulin  - C/w metformin 1000mg QD  - Start januvia 100mg QD    # Obstructive JULIO CESAR on CKD  #Post-obstructive diuresis  On admission, Cr of 2.94 (baseline of 1.35 on 12/4/23); Cr continued to trend up to 3.16 on 1/9. Specific gravity of 1.023, concentrated. Cr downtrended throughout stay. Retroperitoneal US found to have b/l moderate hydronephrosis. Urology consulted and placed wade -> post-obstructive diuresis requiring aggressive IV volume replacement. Decided to keep wade until UOP returns to baseline. Unclear as to why continues to have diuresis. Failed TOV. Wade placed.  - Discharge with wade to [facility]    # UTI  Pt with 2 episodes of UTIs in the past month, last on 1/4. UCX growing staph epi, started on 10-dayu course levaquin (completed 5 days outpatient prior to current presentation). Completed CTX 1g IV x3 days. RESOLVED    Patient was discharged to: [facility]    New medications: Januvia 100mg QD, finasteride 5mg QD, Tadalifil PRN  Changes to old medications: None  Medications that were stopped: Insulin     Items to follow up as outpatient: DM, Wade cath    Physical exam at the time of discharge:   General: NAD, laying in bed comfortably  Head: NC/AT; MMM; PERRL; EOMI;  Neck: Supple; no JVD  Respiratory: CTAB; no wheezes/rales/rhonchi  Cardiovascular: Regular rhythm/rate; S1/S2+, no murmurs, rubs gallops   Gastrointestinal: Soft; NTND; bowel sounds normal and present  Extremities: WWP; no edema/cyanosis  Neurological: A&Ox3, conversing and answering questions appropriately      LABS & STUDIES:  .  LABS:                         10.3   8.01  )-----------( 242      ( 16 Jan 2024 08:46 )             31.0     01-16    138  |  103  |  17  ----------------------------<  164<H>  4.2   |  26  |  1.24    Ca    8.8      16 Jan 2024 08:46  Phos  2.3     01-16  Mg     1.6     01-16        Urinalysis Basic - ( 16 Jan 2024 08:46 )    Color: x / Appearance: x / SG: x / pH: x  Gluc: 164 mg/dL / Ketone: x  / Bili: x / Urobili: x   Blood: x / Protein: x / Nitrite: x   Leuk Esterase: x / RBC: x / WBC x   Sq Epi: x / Non Sq Epi: x / Bacteria: x                RADIOLOGY, EKG & ADDITIONAL TESTS: Reviewed. #Discharge: do not delete    SARATH VILLASEÑOR is a 89 yr old male w/ PMH of DM for the past 20 years, on oral medications for DM ( Metformin 1000 BID, Glimeperide 2mg BID ), HLD, admitted for metabolic encephalopathy 2/2 symptomatic hypoglycemia after pt was recently started on levemir insulin. Pt had recently been started on insulin after lizzie frequent UTIs on farxiga, which precipitated his hypoglycemia. Was admitted to MICU and given D50 50ml + 25ml boluses and D10 gtt. His hypoglycemia corrected and started on low dose ISS. Found to have b/l moderate hydronephrosis on RP US. Urology consulted and placed wade. Pt then had post-obstructive diuresis, urinating high volumed, requiring IV fluid replacement. Stepped down to [facility] for further management.     Problem List/Main Diagnoses (system-based):   # Hypoglycemia   Pt with DM, was on home Metformin 1000 BID, Glimepiride 2mg BID; recently switched from Farxiga by PCP (due to recurrent UTI), to Levemir insulin 10 units in morning and 5 units at night. Found to have a blood glucose of 34 on admission, urine Glucose > 1000, A1c 9. S/p D50 50ml + 25ml boluses, D10 1000ml infusion at 30ml/h, 1 L NS bolus in the ED. Discontinued D10 drip and started diet.   - DC'd insulin  - C/w metformin 1000mg QD  - Start januvia 100mg QD    # Obstructive JULIO CESAR on CKD  #Post-obstructive diuresis  On admission, Cr of 2.94 (baseline of 1.35 on 12/4/23); Cr continued to trend up to 3.16 on 1/9. Specific gravity of 1.023, concentrated. Cr downtrended throughout stay. Retroperitoneal US found to have b/l moderate hydronephrosis. Urology consulted and placed wade -> post-obstructive diuresis requiring aggressive IV volume replacement. Decided to keep wade until UOP returns to baseline. Unclear as to why continues to have diuresis. Failed TOV. Wade placed.  - Discharge with wade to [facility]    # UTI  Pt with 2 episodes of UTIs in the past month, last on 1/4. UCX growing staph epi, started on 10-dayu course levaquin (completed 5 days outpatient prior to current presentation). Completed CTX 1g IV x3 days. RESOLVED    Patient was discharged to: [facility]    New medications: Januvia 100mg QD, finasteride 5mg QD, Tadalifil PRN  Changes to old medications: None  Medications that were stopped: Insulin     Items to follow up as outpatient: DM, Wade cath    Physical exam at the time of discharge:   General: NAD, laying in bed comfortably  Head: NC/AT; MMM; PERRL; EOMI;  Neck: Supple; no JVD  Respiratory: CTAB; no wheezes/rales/rhonchi  Cardiovascular: Regular rhythm/rate; S1/S2+, no murmurs, rubs gallops   Gastrointestinal: Soft; NTND; bowel sounds normal and present  Extremities: WWP; no edema/cyanosis  Neurological: A&Ox3, conversing and answering questions appropriately      LABS & STUDIES:  .  LABS:                         10.3   8.01  )-----------( 242      ( 16 Jan 2024 08:46 )             31.0     01-16    138  |  103  |  17  ----------------------------<  164<H>  4.2   |  26  |  1.24    Ca    8.8      16 Jan 2024 08:46  Phos  2.3     01-16  Mg     1.6     01-16        Urinalysis Basic - ( 16 Jan 2024 08:46 )    Color: x / Appearance: x / SG: x / pH: x  Gluc: 164 mg/dL / Ketone: x  / Bili: x / Urobili: x   Blood: x / Protein: x / Nitrite: x   Leuk Esterase: x / RBC: x / WBC x   Sq Epi: x / Non Sq Epi: x / Bacteria: x                RADIOLOGY, EKG & ADDITIONAL TESTS: Reviewed. #Discharge: do not delete    SARATH VILLASEÑOR is a 89 yr old male w/ PMH of DM for the past 20 years, on oral medications for DM ( Metformin 1000 BID, Glimeperide 2mg BID ), HLD, admitted for metabolic encephalopathy 2/2 symptomatic hypoglycemia after pt was recently started on levemir insulin. Pt had recently been started on insulin after lizzie frequent UTIs on farxiga, which precipitated his hypoglycemia. Was admitted to MICU and given D50 50ml + 25ml boluses and D10 gtt. His hypoglycemia corrected and started on low dose ISS. Found to have b/l moderate hydronephrosis on RP US. Urology consulted and placed wdae. Pt then had post-obstructive diuresis, urinating high volumed, requiring IV fluid replacement. Stepped down to [facility] for further management.     Problem List/Main Diagnoses (system-based):   # Hypoglycemia   Pt with DM, was on home Metformin 1000 BID, Glimepiride 2mg BID; recently switched from Farxiga by PCP (due to recurrent UTI), to Levemir insulin 10 units in morning and 5 units at night. Found to have a blood glucose of 34 on admission, urine Glucose > 1000, A1c 9. S/p D50 50ml + 25ml boluses, D10 1000ml infusion at 30ml/h, 1 L NS bolus in the ED. Discontinued D10 drip and started diet.   - DC'd insulin  - C/w metformin 1000mg QD  - Start januvia 100mg QD    # Obstructive JULIO CESAR on CKD  #Post-obstructive diuresis  On admission, Cr of 2.94 (baseline of 1.35 on 12/4/23); Cr continued to trend up to 3.16 on 1/9. Specific gravity of 1.023, concentrated. Cr downtrended throughout stay. Retroperitoneal US found to have b/l moderate hydronephrosis. Urology consulted and placed wade -> post-obstructive diuresis requiring aggressive IV volume replacement. Decided to keep wade until UOP returns to baseline. Unclear as to why continues to have diuresis. Failed TOV. Wade placed.  - Discharge with wade to [facility]    # UTI  Pt with 2 episodes of UTIs in the past month, last on 1/4. UCX growing staph epi, started on 10-dayu course levaquin (completed 5 days outpatient prior to current presentation). Completed CTX 1g IV x3 days. RESOLVED    Patient was discharged to: [facility]    New medications: Januvia 100mg QD, finasteride 5mg QD, Tadalifil PRN  Changes to old medications: None  Medications that were stopped: Insulin     Items to follow up as outpatient: DM, Wade cath    Physical exam at the time of discharge:   General: NAD, laying in bed comfortably  Head: NC/AT; MMM; PERRL; EOMI;  Neck: Supple; no JVD  Respiratory: CTAB; no wheezes/rales/rhonchi  Cardiovascular: Regular rhythm/rate; S1/S2+, no murmurs, rubs gallops   Gastrointestinal: Soft; NTND; bowel sounds normal and present  Extremities: WWP; no edema/cyanosis  Neurological: A&Ox3, conversing and answering questions appropriately      LABS & STUDIES:  .  LABS:                         10.3   8.01  )-----------( 242      ( 16 Jan 2024 08:46 )             31.0     01-16    138  |  103  |  17  ----------------------------<  164<H>  4.2   |  26  |  1.24    Ca    8.8      16 Jan 2024 08:46  Phos  2.3     01-16  Mg     1.6     01-16        Urinalysis Basic - ( 16 Jan 2024 08:46 )    Color: x / Appearance: x / SG: x / pH: x  Gluc: 164 mg/dL / Ketone: x  / Bili: x / Urobili: x   Blood: x / Protein: x / Nitrite: x   Leuk Esterase: x / RBC: x / WBC x   Sq Epi: x / Non Sq Epi: x / Bacteria: x                RADIOLOGY, EKG & ADDITIONAL TESTS: Reviewed. #Discharge: do not delete    SARATH VILLASEÑOR is a 89 yr old male w/ PMH of DM for the past 20 years, on oral medications for DM ( Metformin 1000 BID, Glimeperide 2mg BID ), HLD, admitted for metabolic encephalopathy 2/2 symptomatic hypoglycemia after pt was recently started on levemir insulin. Pt had recently been started on insulin after lizzie frequent UTIs on farxiga, which precipitated his hypoglycemia. Was admitted to MICU and given D50 50ml + 25ml boluses and D10 gtt. His hypoglycemia corrected and started on low dose ISS. Found to have b/l moderate hydronephrosis on RP US. Urology consulted and placed wade. Pt then had post-obstructive diuresis, urinating high volumed, requiring IV fluid replacement. Stepped down to [facility] for further management.     Problem List/Main Diagnoses (system-based):   # Hypoglycemia   Pt with DM, was on home Metformin 1000 BID, Glimepiride 2mg BID; recently switched from Farxiga by PCP (due to recurrent UTI), to Levemir insulin 10 units in morning and 5 units at night. Found to have a blood glucose of 34 on admission, urine Glucose > 1000, A1c 9. S/p D50 50ml + 25ml boluses, D10 1000ml infusion at 30ml/h, 1 L NS bolus in the ED. Discontinued D10 drip and started diet.   - DC'd insulin  - C/w metformin 1000mg QD  - Start januvia 100mg QD    # Obstructive JULIO CESAR on CKD  #Post-obstructive diuresis  On admission, Cr of 2.94 (baseline of 1.35 on 12/4/23); Cr continued to trend up to 3.16 on 1/9. Specific gravity of 1.023, concentrated. Cr downtrended throughout stay. Retroperitoneal US found to have b/l moderate hydronephrosis. Urology consulted and placed wade -> post-obstructive diuresis requiring aggressive IV volume replacement. Decided to keep wade until UOP returns to baseline. Unclear as to why continues to have diuresis. Failed TOV. Wade placed.  - Discharge with wade to [facility]    # UTI  Pt with 2 episodes of UTIs in the past month, last on 1/4. UCX growing staph epi, started on 10-dayu course levaquin (completed 5 days outpatient prior to current presentation). Completed CTX 1g IV x3 days. RESOLVED    Patient was discharged to: [facility]    New medications: Januvia 100mg QD, finasteride 5mg QD, Tadalifil PRN  Changes to old medications: None  Medications that were stopped: Insulin     Items to follow up as outpatient: DM, Wade cath    Physical exam at the time of discharge:   General: NAD, laying in bed comfortably  Head: NC/AT; MMM; PERRL; EOMI;  Neck: Supple; no JVD  Respiratory: CTAB; no wheezes/rales/rhonchi  Cardiovascular: Regular rhythm/rate; S1/S2+, no murmurs, rubs gallops   Gastrointestinal: Soft; NTND; bowel sounds normal and present  Extremities: WWP; no edema/cyanosis  Neurological: A&Ox3, conversing and answering questions appropriately      LABS & STUDIES:  .  LABS:                         10.3   8.01  )-----------( 242      ( 16 Jan 2024 08:46 )             31.0     01-16    138  |  103  |  17  ----------------------------<  164<H>  4.2   |  26  |  1.24    Ca    8.8      16 Jan 2024 08:46  Phos  2.3     01-16  Mg     1.6     01-16        Urinalysis Basic - ( 16 Jan 2024 08:46 )    Color: x / Appearance: x / SG: x / pH: x  Gluc: 164 mg/dL / Ketone: x  / Bili: x / Urobili: x   Blood: x / Protein: x / Nitrite: x   Leuk Esterase: x / RBC: x / WBC x   Sq Epi: x / Non Sq Epi: x / Bacteria: x                RADIOLOGY, EKG & ADDITIONAL TESTS: Reviewed.

## 2024-01-11 NOTE — PROVIDER CONTACT NOTE (HYPOGLYCEMIA EVENT) - NS PROVIDER CONTACT BACKGROUND-HYPO
Age: 89y    Gender: Male    POCT Blood Glucose:  150 mg/dL (01-10-24 @ 23:14)  52 mg/dL (01-10-24 @ 21:55)  252 mg/dL (01-10-24 @ 16:42)  265 mg/dL (01-10-24 @ 15:41)  239 mg/dL (01-10-24 @ 11:10)  196 mg/dL (01-10-24 @ 05:40)      eMAR:atorvastatin   10 milliGRAM(s) Oral (01-10-24 @ 22:14)    dextrose 50% Injectable   50 milliLiter(s) IV Push (01-10-24 @ 22:22)    insulin lispro (ADMELOG) corrective regimen sliding scale   3 Unit(s) SubCutaneous (01-10-24 @ 15:49)    insulin lispro Injectable (ADMELOG)   3 Unit(s) SubCutaneous (01-10-24 @ 16:51)

## 2024-01-11 NOTE — DISCHARGE NOTE PROVIDER - NSDCCPTREATMENT_GEN_ALL_CORE_FT
PRINCIPAL PROCEDURE  Procedure: US retroperitoneum limited  Findings and Treatment: IMPRESSION:  Moderate bilateral hydronephrosis.  Normal size kidneys.  Mildly increased renal parenchymal images

## 2024-01-12 LAB
ANION GAP SERPL CALC-SCNC: 8 MMOL/L — SIGNIFICANT CHANGE UP (ref 5–17)
ANION GAP SERPL CALC-SCNC: 8 MMOL/L — SIGNIFICANT CHANGE UP (ref 5–17)
BASOPHILS # BLD AUTO: 0.04 K/UL — SIGNIFICANT CHANGE UP (ref 0–0.2)
BASOPHILS # BLD AUTO: 0.04 K/UL — SIGNIFICANT CHANGE UP (ref 0–0.2)
BASOPHILS NFR BLD AUTO: 0.5 % — SIGNIFICANT CHANGE UP (ref 0–2)
BASOPHILS NFR BLD AUTO: 0.5 % — SIGNIFICANT CHANGE UP (ref 0–2)
BUN SERPL-MCNC: 22 MG/DL — SIGNIFICANT CHANGE UP (ref 7–23)
BUN SERPL-MCNC: 22 MG/DL — SIGNIFICANT CHANGE UP (ref 7–23)
CALCIUM SERPL-MCNC: 8.6 MG/DL — SIGNIFICANT CHANGE UP (ref 8.4–10.5)
CALCIUM SERPL-MCNC: 8.6 MG/DL — SIGNIFICANT CHANGE UP (ref 8.4–10.5)
CHLORIDE SERPL-SCNC: 105 MMOL/L — SIGNIFICANT CHANGE UP (ref 96–108)
CHLORIDE SERPL-SCNC: 105 MMOL/L — SIGNIFICANT CHANGE UP (ref 96–108)
CO2 SERPL-SCNC: 24 MMOL/L — SIGNIFICANT CHANGE UP (ref 22–31)
CO2 SERPL-SCNC: 24 MMOL/L — SIGNIFICANT CHANGE UP (ref 22–31)
CREAT SERPL-MCNC: 1.53 MG/DL — HIGH (ref 0.5–1.3)
CREAT SERPL-MCNC: 1.53 MG/DL — HIGH (ref 0.5–1.3)
EGFR: 40 ML/MIN/1.73M2 — LOW
EGFR: 40 ML/MIN/1.73M2 — LOW
EOSINOPHIL # BLD AUTO: 0.25 K/UL — SIGNIFICANT CHANGE UP (ref 0–0.5)
EOSINOPHIL # BLD AUTO: 0.25 K/UL — SIGNIFICANT CHANGE UP (ref 0–0.5)
EOSINOPHIL NFR BLD AUTO: 3.1 % — SIGNIFICANT CHANGE UP (ref 0–6)
EOSINOPHIL NFR BLD AUTO: 3.1 % — SIGNIFICANT CHANGE UP (ref 0–6)
GLUCOSE BLDC GLUCOMTR-MCNC: 154 MG/DL — HIGH (ref 70–99)
GLUCOSE BLDC GLUCOMTR-MCNC: 154 MG/DL — HIGH (ref 70–99)
GLUCOSE BLDC GLUCOMTR-MCNC: 195 MG/DL — HIGH (ref 70–99)
GLUCOSE BLDC GLUCOMTR-MCNC: 195 MG/DL — HIGH (ref 70–99)
GLUCOSE BLDC GLUCOMTR-MCNC: 198 MG/DL — HIGH (ref 70–99)
GLUCOSE BLDC GLUCOMTR-MCNC: 198 MG/DL — HIGH (ref 70–99)
GLUCOSE BLDC GLUCOMTR-MCNC: 213 MG/DL — HIGH (ref 70–99)
GLUCOSE BLDC GLUCOMTR-MCNC: 213 MG/DL — HIGH (ref 70–99)
GLUCOSE BLDC GLUCOMTR-MCNC: 238 MG/DL — HIGH (ref 70–99)
GLUCOSE BLDC GLUCOMTR-MCNC: 238 MG/DL — HIGH (ref 70–99)
GLUCOSE BLDC GLUCOMTR-MCNC: 247 MG/DL — HIGH (ref 70–99)
GLUCOSE BLDC GLUCOMTR-MCNC: 247 MG/DL — HIGH (ref 70–99)
GLUCOSE SERPL-MCNC: 161 MG/DL — HIGH (ref 70–99)
GLUCOSE SERPL-MCNC: 161 MG/DL — HIGH (ref 70–99)
HCT VFR BLD CALC: 31.3 % — LOW (ref 39–50)
HCT VFR BLD CALC: 31.3 % — LOW (ref 39–50)
HGB BLD-MCNC: 10.1 G/DL — LOW (ref 13–17)
HGB BLD-MCNC: 10.1 G/DL — LOW (ref 13–17)
IMM GRANULOCYTES NFR BLD AUTO: 0.5 % — SIGNIFICANT CHANGE UP (ref 0–0.9)
IMM GRANULOCYTES NFR BLD AUTO: 0.5 % — SIGNIFICANT CHANGE UP (ref 0–0.9)
LYMPHOCYTES # BLD AUTO: 1.6 K/UL — SIGNIFICANT CHANGE UP (ref 1–3.3)
LYMPHOCYTES # BLD AUTO: 1.6 K/UL — SIGNIFICANT CHANGE UP (ref 1–3.3)
LYMPHOCYTES # BLD AUTO: 19.8 % — SIGNIFICANT CHANGE UP (ref 13–44)
LYMPHOCYTES # BLD AUTO: 19.8 % — SIGNIFICANT CHANGE UP (ref 13–44)
MAGNESIUM SERPL-MCNC: 1.5 MG/DL — LOW (ref 1.6–2.6)
MAGNESIUM SERPL-MCNC: 1.5 MG/DL — LOW (ref 1.6–2.6)
MCHC RBC-ENTMCNC: 30.1 PG — SIGNIFICANT CHANGE UP (ref 27–34)
MCHC RBC-ENTMCNC: 30.1 PG — SIGNIFICANT CHANGE UP (ref 27–34)
MCHC RBC-ENTMCNC: 32.3 GM/DL — SIGNIFICANT CHANGE UP (ref 32–36)
MCHC RBC-ENTMCNC: 32.3 GM/DL — SIGNIFICANT CHANGE UP (ref 32–36)
MCV RBC AUTO: 93.4 FL — SIGNIFICANT CHANGE UP (ref 80–100)
MCV RBC AUTO: 93.4 FL — SIGNIFICANT CHANGE UP (ref 80–100)
MONOCYTES # BLD AUTO: 0.69 K/UL — SIGNIFICANT CHANGE UP (ref 0–0.9)
MONOCYTES # BLD AUTO: 0.69 K/UL — SIGNIFICANT CHANGE UP (ref 0–0.9)
MONOCYTES NFR BLD AUTO: 8.5 % — SIGNIFICANT CHANGE UP (ref 2–14)
MONOCYTES NFR BLD AUTO: 8.5 % — SIGNIFICANT CHANGE UP (ref 2–14)
NEUTROPHILS # BLD AUTO: 5.47 K/UL — SIGNIFICANT CHANGE UP (ref 1.8–7.4)
NEUTROPHILS # BLD AUTO: 5.47 K/UL — SIGNIFICANT CHANGE UP (ref 1.8–7.4)
NEUTROPHILS NFR BLD AUTO: 67.6 % — SIGNIFICANT CHANGE UP (ref 43–77)
NEUTROPHILS NFR BLD AUTO: 67.6 % — SIGNIFICANT CHANGE UP (ref 43–77)
NRBC # BLD: 0 /100 WBCS — SIGNIFICANT CHANGE UP (ref 0–0)
NRBC # BLD: 0 /100 WBCS — SIGNIFICANT CHANGE UP (ref 0–0)
PHOSPHATE SERPL-MCNC: 2.7 MG/DL — SIGNIFICANT CHANGE UP (ref 2.5–4.5)
PHOSPHATE SERPL-MCNC: 2.7 MG/DL — SIGNIFICANT CHANGE UP (ref 2.5–4.5)
PLATELET # BLD AUTO: 205 K/UL — SIGNIFICANT CHANGE UP (ref 150–400)
PLATELET # BLD AUTO: 205 K/UL — SIGNIFICANT CHANGE UP (ref 150–400)
POTASSIUM SERPL-MCNC: 3.8 MMOL/L — SIGNIFICANT CHANGE UP (ref 3.5–5.3)
POTASSIUM SERPL-MCNC: 3.8 MMOL/L — SIGNIFICANT CHANGE UP (ref 3.5–5.3)
POTASSIUM SERPL-SCNC: 3.8 MMOL/L — SIGNIFICANT CHANGE UP (ref 3.5–5.3)
POTASSIUM SERPL-SCNC: 3.8 MMOL/L — SIGNIFICANT CHANGE UP (ref 3.5–5.3)
RBC # BLD: 3.35 M/UL — LOW (ref 4.2–5.8)
RBC # BLD: 3.35 M/UL — LOW (ref 4.2–5.8)
RBC # FLD: 14.3 % — SIGNIFICANT CHANGE UP (ref 10.3–14.5)
RBC # FLD: 14.3 % — SIGNIFICANT CHANGE UP (ref 10.3–14.5)
SODIUM SERPL-SCNC: 137 MMOL/L — SIGNIFICANT CHANGE UP (ref 135–145)
SODIUM SERPL-SCNC: 137 MMOL/L — SIGNIFICANT CHANGE UP (ref 135–145)
WBC # BLD: 8.09 K/UL — SIGNIFICANT CHANGE UP (ref 3.8–10.5)
WBC # BLD: 8.09 K/UL — SIGNIFICANT CHANGE UP (ref 3.8–10.5)
WBC # FLD AUTO: 8.09 K/UL — SIGNIFICANT CHANGE UP (ref 3.8–10.5)
WBC # FLD AUTO: 8.09 K/UL — SIGNIFICANT CHANGE UP (ref 3.8–10.5)

## 2024-01-12 RX ORDER — MAGNESIUM SULFATE 500 MG/ML
2 VIAL (ML) INJECTION ONCE
Refills: 0 | Status: COMPLETED | OUTPATIENT
Start: 2024-01-12 | End: 2024-01-12

## 2024-01-12 RX ORDER — SODIUM CHLORIDE 9 MG/ML
1000 INJECTION, SOLUTION INTRAVENOUS
Refills: 0 | Status: DISCONTINUED | OUTPATIENT
Start: 2024-01-12 | End: 2024-01-12

## 2024-01-12 RX ADMIN — Medication 25 GRAM(S): at 18:08

## 2024-01-12 RX ADMIN — SODIUM CHLORIDE 80 MILLILITER(S): 9 INJECTION, SOLUTION INTRAVENOUS at 03:32

## 2024-01-12 RX ADMIN — Medication 2: at 18:08

## 2024-01-12 RX ADMIN — CHLORHEXIDINE GLUCONATE 1 APPLICATION(S): 213 SOLUTION TOPICAL at 05:12

## 2024-01-12 RX ADMIN — Medication 1: at 13:19

## 2024-01-12 RX ADMIN — TAMSULOSIN HYDROCHLORIDE 0.8 MILLIGRAM(S): 0.4 CAPSULE ORAL at 21:44

## 2024-01-12 RX ADMIN — HEPARIN SODIUM 5000 UNIT(S): 5000 INJECTION INTRAVENOUS; SUBCUTANEOUS at 05:12

## 2024-01-12 RX ADMIN — HEPARIN SODIUM 5000 UNIT(S): 5000 INJECTION INTRAVENOUS; SUBCUTANEOUS at 13:49

## 2024-01-12 RX ADMIN — HEPARIN SODIUM 5000 UNIT(S): 5000 INJECTION INTRAVENOUS; SUBCUTANEOUS at 21:44

## 2024-01-12 RX ADMIN — ATORVASTATIN CALCIUM 10 MILLIGRAM(S): 80 TABLET, FILM COATED ORAL at 21:44

## 2024-01-12 NOTE — PROGRESS NOTE ADULT - PROBLEM SELECTOR PLAN 4
Pt with BPH and DM recently switched from Farxiga by PCP to levemir insulin due to recurrent UTI. Had 2 episodes of UTI in the past month, last diagnosed on 1/4; ucx grew staph epi, cx was pansensitive, placed on 10-day course of Levaquin (completed 5 days outpatient prior to presenting to [facility]). UA (+) LE, WBC, bacteria. Ucx contaminated.   - Started CTX 1g IV qd x3 days (1/9-1/11)  RESOLVED Pt with BPH and DM recently switched from Farxiga by PCP to levemir insulin due to recurrent UTI. Had 2 episodes of UTI in the past month, last diagnosed on 1/4; ucx grew staph epi, cx was pansensitive, placed on 10-day course of Levaquin (completed 5 days outpatient prior to presenting to [facility]). UA (+) LE, WBC, bacteria. Ucx contaminated.   - Started CTX 1g IV qd x3 days (1/9-1/11)  RESOLVED

## 2024-01-12 NOTE — PROGRESS NOTE ADULT - SUBJECTIVE AND OBJECTIVE BOX
Hemodynamically stable today  FSS up to 300 last pm treated with insulin with good response  Excellent appetite this am  Signifcant ongoing post obstructive diuresis with 4 liters out over the past 24 hours    MEDICATIONS  (STANDING):  atorvastatin 10 milliGRAM(s) Oral at bedtime  chlorhexidine 2% Cloths 1 Application(s) Topical <User Schedule>  dextrose 5%. 1000 milliLiter(s) (100 mL/Hr) IV Continuous <Continuous>  dextrose 5%. 1000 milliLiter(s) (50 mL/Hr) IV Continuous <Continuous>  dextrose 50% Injectable 25 Gram(s) IV Push once  dextrose 50% Injectable 25 Gram(s) IV Push once  dextrose 50% Injectable 12.5 Gram(s) IV Push once  glucagon  Injectable 1 milliGRAM(s) IntraMuscular once  heparin   Injectable 5000 Unit(s) SubCutaneous every 8 hours  insulin lispro (ADMELOG) corrective regimen sliding scale   SubCutaneous at bedtime  insulin lispro (ADMELOG) corrective regimen sliding scale   SubCutaneous three times a day before meals  sodium chloride 0.45%. 1000 milliLiter(s) (80 mL/Hr) IV Continuous <Continuous>  tamsulosin 0.8 milliGRAM(s) Oral at bedtime    MEDICATIONS  (PRN):  acetaminophen     Tablet .. 650 milliGRAM(s) Oral every 6 hours PRN Temp greater or equal to 38C (100.4F), Mild Pain (1 - 3)  dextrose Oral Gel 15 Gram(s) Oral once PRN Blood Glucose LESS THAN 70 milliGRAM(s)/deciliter    ICU Vital Signs Last 24 Hrs  T(C): 36.7 (12 Jan 2024 11:31), Max: 37.4 (12 Jan 2024 05:33)  T(F): 98 (12 Jan 2024 11:31), Max: 99.4 (12 Jan 2024 05:33)  HR: 69 (12 Jan 2024 11:31) (69 - 90)  BP: 120/69 (12 Jan 2024 11:31) (104/60 - 133/86)  BP(mean): --  ABP: --  ABP(mean): --  RR: 20 (12 Jan 2024 11:31) (18 - 20)  SpO2: 94% (12 Jan 2024 11:31) (93% - 95%)    O2 Parameters below as of 12 Jan 2024 11:31  Patient On (Oxygen Delivery Method): room air        NAD  No JVD  Chest clear  CV RRR s1s2 no murmur  Abd soft   Ext no edema                            10.1   8.09  )-----------( 205      ( 12 Jan 2024 07:24 )             31.3   01-12    137  |  105  |  22  ----------------------------<  161<H>  3.8   |  24  |  1.53<H>    Ca    8.6      12 Jan 2024 07:24  Phos  2.7     01-12  Mg     1.5     01-12    TPro  6.5  /  Alb  2.9<L>  /  TBili  0.3  /  DBili  x   /  AST  15  /  ALT  7<L>  /  AlkPhos  40  01-11

## 2024-01-12 NOTE — PROGRESS NOTE ADULT - ASSESSMENT
Hemodynamically stable  AODM controlled on SSRI  Post obstructive diuretisis with persistent high IV volume requirements - concerning that he could not keep up with the electrolyte and fluid balance at this time  Continue wade until urine output returns to baseline  DVT prophyalxis  Encouarge ambulation  OOB to chair  PT evaluation  D/W housetaff adn family

## 2024-01-12 NOTE — PHYSICAL THERAPY INITIAL EVALUATION ADULT - MODALITIES TREATMENT COMMENTS
Pt. currently presenting with significantly limited endurance and mobility compared to recent reported baseline and therefore not safe for d/c home.

## 2024-01-12 NOTE — PROGRESS NOTE ADULT - PROBLEM SELECTOR PLAN 3
#Post-obstructive diuresis   Pt with history of BPH, on home tamsulosin 0.8mg PO qhs. Renal US with moderate bilateral hydro and mild increased renal parenchyma Found to be retaining on bladder scan on 1/9 with 800cc; unable to straight cath due to blood clot with resistance.   Pt urinating high volume following obstruction, consistent with post-obstructive diuresis  - Dan placed by urology on 1/9  - C/w tamsulosin 0.8mg PO qhs  - Monitor I&O's  - C/w IV fluid replacement   - TOV 1/12

## 2024-01-12 NOTE — CHART NOTE - NSCHARTNOTEFT_GEN_A_CORE
Admitting Diagnosis:   Patient is a 89y old  Male who presents with a chief complaint of Hypoglycemia (2024 12:09)    Current Nutrition Order:  Diet, Soft and Bite Sized:   Consistent Carbohydrate {Evening Snack}  Glucerna Shake x2/day     PO Intake: Good (%) [ x ]  Fair (50-75%) [   ] Poor (<25%) [   ]    GI Issues:   Pt denies nausea/vomiting/diarrhea  Endorses constipation, no BM since admission    Pt denies abdominal discomfort/distension    Pain:  No pain reported     Skin Integrity:  No pressure injuries or edema documented, Tate score 20    Labs:       137  |  105  |  22  ----------------------------<  161<H>  3.8   |  24  |  1.53<H>    Ca    8.6      2024 07:24  Phos  2.7       Mg     1.5         TPro  6.5  /  Alb  2.9<L>  /  TBili  0.3  /  DBili  x   /  AST  15  /  ALT  7<L>  /  AlkPhos  40      CAPILLARY BLOOD GLUCOSE  POCT Blood Glucose.: 198 mg/dL (2024 12:20)  POCT Blood Glucose.: 154 mg/dL (2024 09:18)  POCT Blood Glucose.: 195 mg/dL (2024 00:30)  POCT Blood Glucose.: 323 mg/dL (2024 21:57)  POCT Blood Glucose.: 160 mg/dL (2024 17:20)    Medications:  MEDICATIONS  (STANDING):  atorvastatin 10 milliGRAM(s) Oral at bedtime  chlorhexidine 2% Cloths 1 Application(s) Topical <User Schedule>  dextrose 5%. 1000 milliLiter(s) (100 mL/Hr) IV Continuous <Continuous>  dextrose 5%. 1000 milliLiter(s) (50 mL/Hr) IV Continuous <Continuous>  dextrose 50% Injectable 12.5 Gram(s) IV Push once  dextrose 50% Injectable 25 Gram(s) IV Push once  dextrose 50% Injectable 25 Gram(s) IV Push once  glucagon  Injectable 1 milliGRAM(s) IntraMuscular once  heparin   Injectable 5000 Unit(s) SubCutaneous every 8 hours  insulin lispro (ADMELOG) corrective regimen sliding scale   SubCutaneous at bedtime  insulin lispro (ADMELOG) corrective regimen sliding scale   SubCutaneous three times a day before meals  sodium chloride 0.45%. 1000 milliLiter(s) (80 mL/Hr) IV Continuous <Continuous>  tamsulosin 0.8 milliGRAM(s) Oral at bedtime    MEDICATIONS  (PRN):  acetaminophen     Tablet .. 650 milliGRAM(s) Oral every 6 hours PRN Temp greater or equal to 38C (100.4F), Mild Pain (1 - 3)  dextrose Oral Gel 15 Gram(s) Oral once PRN Blood Glucose LESS THAN 70 milliGRAM(s)/deciliter    Anthropometrics:  Height: 5'6"  Weight: 145lb/65.8kg  BMI: 23.4  IBW: 142lb/64.5kg    102% IBW    Weight Change:   No new weights obtained since admission. Recommend nursing to trend weights weekly. RD to continue monitoring weights as able.     Estimated energy needs:   Calories: 25-30kcal/k-1974kcal/d  Protein: 1.0-1.2g/k-79g/d  Fluid: 25-30mL/k-1974mL/d  Estimated needs based on dosing wt as within % IBW. Needs adjusted for advanced age and clinical status.     Subjective:   89-year-old Male with PMH of DM2, HLD, BPH presented for metabolic encephalopathy 2/2 new insulin use. Admitted to MICU for q1 fingersticks. Pt found to have obstructive uropathy 2/2 BPH/retention, now resolving.    Pt seen on 7WO for follow-up. Labs and medication orders reviewed. Pt resting in bed with son at bedside. Pt and son report pt with good appetite and intake, RD observed pt completed breakfast this AM 12. Pt denies difficulty chewing/swallowing, pt and son request regular texture diet so pt may order preferred foods - communicated to team. Son reports pt with no BM since admission, no bowel regimen ordered - communicated with RN, RN to request regimen from MD. Discussed nutrition therapy to promote blood glucose control. See nutrition recommendations. RD to remain available.     Previous Nutrition Diagnosis:  Increased nutrient needs related to pressure injuries present as evidenced by increased metabolic demands for wound healing.    Active [   ]  Resolved [ x ]  *No pressure injuries per Wound Care 1/10.    If resolved, new PES:   Altered nutrition related lab values related to diabetes as evidenced by elevated POC blood glucose/HgbA1c 9.0%    Goal:  Pt diet to align with nutrition recommendations while consistently meeting >75% nutrient needs.     Recommendations:  1. Continue Consistent Carbohydrate diet with Glucerna Shake (220kcal, 10g protein) x2/day.   >>Recommend advance to regular texture as pt with no difficulty chewing/swallowing and per pt/family preference.   >>Encourage & monitor PO intake. Elliott dietary preferences as able.   2. Monitor GI tolerance, weight trends, labs, & skin integrity.  3. Defer bowel and pain regimens to team.   4. RD to remain available for diet education/intervention prn.     Education:   Provided written and verbal education on nutrition therapy for DM per son request. Discussed sources of carbohydrates, choosing high fiber options, pairing with proteins, and portion sizing. Provided outpatient nutrition program contact information. Pt and son aware RD remains available for additional questions/concerns.     Risk Level: High [   ] Moderate [ x ] Low [   ] Admitting Diagnosis:   Patient is a 89y old  Male who presents with a chief complaint of Hypoglycemia (2024 12:09)    Current Nutrition Order:  Diet, Soft and Bite Sized:   Consistent Carbohydrate {Evening Snack}  Glucerna Shake x2/day     PO Intake: Good (%) [ x ]  Fair (50-75%) [   ] Poor (<25%) [   ]    GI Issues:   Pt denies nausea/vomiting/diarrhea  Endorses constipation, no BM since admission    Pt denies abdominal discomfort/distension    Pain:  No pain reported     Skin Integrity:  No pressure injuries or edema documented, Tate score 20    Labs:       137  |  105  |  22  ----------------------------<  161<H>  3.8   |  24  |  1.53<H>    Ca    8.6      2024 07:24  Phos  2.7       Mg     1.5         TPro  6.5  /  Alb  2.9<L>  /  TBili  0.3  /  DBili  x   /  AST  15  /  ALT  7<L>  /  AlkPhos  40      CAPILLARY BLOOD GLUCOSE  POCT Blood Glucose.: 198 mg/dL (2024 12:20)  POCT Blood Glucose.: 154 mg/dL (2024 09:18)  POCT Blood Glucose.: 195 mg/dL (2024 00:30)  POCT Blood Glucose.: 323 mg/dL (2024 21:57)  POCT Blood Glucose.: 160 mg/dL (2024 17:20)    Medications:  MEDICATIONS  (STANDING):  atorvastatin 10 milliGRAM(s) Oral at bedtime  chlorhexidine 2% Cloths 1 Application(s) Topical <User Schedule>  dextrose 5%. 1000 milliLiter(s) (100 mL/Hr) IV Continuous <Continuous>  dextrose 5%. 1000 milliLiter(s) (50 mL/Hr) IV Continuous <Continuous>  dextrose 50% Injectable 12.5 Gram(s) IV Push once  dextrose 50% Injectable 25 Gram(s) IV Push once  dextrose 50% Injectable 25 Gram(s) IV Push once  glucagon  Injectable 1 milliGRAM(s) IntraMuscular once  heparin   Injectable 5000 Unit(s) SubCutaneous every 8 hours  insulin lispro (ADMELOG) corrective regimen sliding scale   SubCutaneous at bedtime  insulin lispro (ADMELOG) corrective regimen sliding scale   SubCutaneous three times a day before meals  sodium chloride 0.45%. 1000 milliLiter(s) (80 mL/Hr) IV Continuous <Continuous>  tamsulosin 0.8 milliGRAM(s) Oral at bedtime    MEDICATIONS  (PRN):  acetaminophen     Tablet .. 650 milliGRAM(s) Oral every 6 hours PRN Temp greater or equal to 38C (100.4F), Mild Pain (1 - 3)  dextrose Oral Gel 15 Gram(s) Oral once PRN Blood Glucose LESS THAN 70 milliGRAM(s)/deciliter    Anthropometrics:  Height: 5'6"  Weight: 145lb/65.8kg  BMI: 23.4  IBW: 142lb/64.5kg    102% IBW    Weight Change:   No new weights obtained since admission. Recommend nursing to trend weights weekly. RD to continue monitoring weights as able.     Estimated energy needs:   Calories: 25-30kcal/k-1974kcal/d  Protein: 1.0-1.2g/k-79g/d  Fluid: 25-30mL/k-1974mL/d  Estimated needs based on dosing wt as within % IBW. Needs adjusted for advanced age and clinical status.     Subjective:   89-year-old Male with PMH of DM2, HLD, BPH presented for metabolic encephalopathy 2/2 new insulin use. Admitted to MICU for q1 fingersticks. Pt found to have obstructive uropathy 2/2 BPH/retention, now resolving.    Pt seen on 7WO for follow-up. Labs and medication orders reviewed. Pt resting in bed with son at bedside. Pt and son report pt with good appetite and intake, RD observed pt completed breakfast this AM 12. Pt denies difficulty chewing/swallowing, pt and son request regular texture diet so pt may order preferred foods - communicated to team. Son reports pt with no BM since admission, no bowel regimen ordered - communicated with RN, RN to request regimen from MD. Discussed nutrition therapy to promote blood glucose control. See nutrition recommendations. RD to remain available.     Previous Nutrition Diagnosis:  Increased nutrient needs related to pressure injuries present as evidenced by increased metabolic demands for wound healing.    Active [   ]  Resolved [ x ]  *No pressure injuries per Wound Care 1/10.    If resolved, new PES:   Altered nutrition related lab values related to diabetes as evidenced by elevated POC blood glucose/HgbA1c 9.0%    Goal:  Pt diet to align with nutrition recommendations while consistently meeting >75% nutrient needs.     Recommendations:  1. Continue Consistent Carbohydrate diet with Glucerna Shake (220kcal, 10g protein) x2/day.   >>Recommend advance to regular texture as pt with no difficulty chewing/swallowing and per pt/family preference.   >>Encourage & monitor PO intake. [place] dietary preferences as able.   2. Monitor GI tolerance, weight trends, labs, & skin integrity.  3. Defer bowel and pain regimens to team.   4. RD to remain available for diet education/intervention prn.     Education:   Provided written and verbal education on nutrition therapy for DM per son request. Discussed sources of carbohydrates, choosing high fiber options, pairing with proteins, and portion sizing. Provided outpatient nutrition program contact information. Pt and son aware RD remains available for additional questions/concerns.     Risk Level: High [   ] Moderate [ x ] Low [   ]

## 2024-01-12 NOTE — PHYSICAL THERAPY INITIAL EVALUATION ADULT - PERTINENT HX OF CURRENT PROBLEM, REHAB EVAL
Pt. is a 89 y.o male initially presenting with hypoglycemia with BG=34mmol/dl; admitted to MICU for management of hypoglycemia with course c/b JULIO CESAR on CKD and obstructive uropathy., now resolving.

## 2024-01-12 NOTE — PROGRESS NOTE ADULT - SUBJECTIVE AND OBJECTIVE BOX
OVERNIGHT EVENTS:    SUBJECTIVE / INTERVAL HPI: Patient seen and examined at bedside.     VITAL SIGNS:  Vital Signs Last 24 Hrs  T(C): 37.4 (12 Jan 2024 05:33), Max: 37.4 (12 Jan 2024 05:33)  T(F): 99.4 (12 Jan 2024 05:33), Max: 99.4 (12 Jan 2024 05:33)  HR: 80 (12 Jan 2024 05:33) (77 - 90)  BP: 132/75 (12 Jan 2024 05:33) (104/60 - 133/86)  BP(mean): --  RR: 18 (12 Jan 2024 05:33) (18 - 20)  SpO2: 93% (12 Jan 2024 05:33) (93% - 95%)    Parameters below as of 11 Jan 2024 20:36  Patient On (Oxygen Delivery Method): room air        PHYSICAL EXAM:    General: NAD  HEENT: NC/AT; PERRL, anicteric sclera; MMM  Neck: supple w/o palpable nodularity  Cardiovascular: +S1/S2; RRR  Respiratory: CTA B/L; no W/R/R  Gastrointestinal: soft, NT/ND; +BSx4  Extremities: WWP; no edema, clubbing or cyanosis  Vascular: 2+ radial, DP/PT pulses B/L  Neurological: AAOx3; no focal deficits    MEDICATIONS:  MEDICATIONS  (STANDING):  atorvastatin 10 milliGRAM(s) Oral at bedtime  chlorhexidine 2% Cloths 1 Application(s) Topical <User Schedule>  dextrose 5%. 1000 milliLiter(s) (100 mL/Hr) IV Continuous <Continuous>  dextrose 5%. 1000 milliLiter(s) (50 mL/Hr) IV Continuous <Continuous>  dextrose 50% Injectable 12.5 Gram(s) IV Push once  dextrose 50% Injectable 25 Gram(s) IV Push once  dextrose 50% Injectable 25 Gram(s) IV Push once  glucagon  Injectable 1 milliGRAM(s) IntraMuscular once  heparin   Injectable 5000 Unit(s) SubCutaneous every 8 hours  insulin lispro (ADMELOG) corrective regimen sliding scale   SubCutaneous at bedtime  insulin lispro (ADMELOG) corrective regimen sliding scale   SubCutaneous three times a day before meals  sodium chloride 0.45%. 1000 milliLiter(s) (80 mL/Hr) IV Continuous <Continuous>  tamsulosin 0.8 milliGRAM(s) Oral at bedtime    MEDICATIONS  (PRN):  acetaminophen     Tablet .. 650 milliGRAM(s) Oral every 6 hours PRN Temp greater or equal to 38C (100.4F), Mild Pain (1 - 3)  dextrose Oral Gel 15 Gram(s) Oral once PRN Blood Glucose LESS THAN 70 milliGRAM(s)/deciliter      ALLERGIES:  Allergies    No Known Allergies    Intolerances        LABS:                        10.3   7.63  )-----------( 197      ( 11 Jan 2024 09:01 )             30.4     01-11    139  |  104  |  26<H>  ----------------------------<  138<H>  3.8   |  25  |  1.78<H>    Ca    8.5      11 Jan 2024 09:01  Phos  2.9     01-11  Mg     1.6     01-11    TPro  6.5  /  Alb  2.9<L>  /  TBili  0.3  /  DBili  x   /  AST  15  /  ALT  7<L>  /  AlkPhos  40  01-11      Urinalysis Basic - ( 11 Jan 2024 09:01 )    Color: x / Appearance: x / SG: x / pH: x  Gluc: 138 mg/dL / Ketone: x  / Bili: x / Urobili: x   Blood: x / Protein: x / Nitrite: x   Leuk Esterase: x / RBC: x / WBC x   Sq Epi: x / Non Sq Epi: x / Bacteria: x      CAPILLARY BLOOD GLUCOSE      POCT Blood Glucose.: 195 mg/dL (12 Jan 2024 00:30)      RADIOLOGY & ADDITIONAL TESTS: Reviewed.   OVERNIGHT EVENTS: 2am UOP 2L over 12 hours. Started 1L 1/2 NS @80 cc/hr    SUBJECTIVE / INTERVAL HPI: Patient seen and examined at bedside. Patient found sitting in chair reading newspaper. He says he feels well and has no acute complaints. ROS negative.     VITAL SIGNS:  Vital Signs Last 24 Hrs  T(C): 37.4 (12 Jan 2024 05:33), Max: 37.4 (12 Jan 2024 05:33)  T(F): 99.4 (12 Jan 2024 05:33), Max: 99.4 (12 Jan 2024 05:33)  HR: 80 (12 Jan 2024 05:33) (77 - 90)  BP: 132/75 (12 Jan 2024 05:33) (104/60 - 133/86)  BP(mean): --  RR: 18 (12 Jan 2024 05:33) (18 - 20)  SpO2: 93% (12 Jan 2024 05:33) (93% - 95%)    Parameters below as of 11 Jan 2024 20:36  Patient On (Oxygen Delivery Method): room air        PHYSICAL EXAM:    General: NAD, laying in bed comfortably  Head: NC/AT; MMM; PERRL; EOMI;  Neck: Supple; no JVD  Respiratory: CTAB; no wheezes/rales/rhonchi  Cardiovascular: Regular rhythm/rate; S1/S2+, no murmurs, rubs gallops   Gastrointestinal: Soft; NTND; bowel sounds normal and present  Extremities: WWP; no edema/cyanosis  Neurological: A&Ox3, conversing and answering questions appropriately    MEDICATIONS:  MEDICATIONS  (STANDING):  atorvastatin 10 milliGRAM(s) Oral at bedtime  chlorhexidine 2% Cloths 1 Application(s) Topical <User Schedule>  dextrose 5%. 1000 milliLiter(s) (100 mL/Hr) IV Continuous <Continuous>  dextrose 5%. 1000 milliLiter(s) (50 mL/Hr) IV Continuous <Continuous>  dextrose 50% Injectable 12.5 Gram(s) IV Push once  dextrose 50% Injectable 25 Gram(s) IV Push once  dextrose 50% Injectable 25 Gram(s) IV Push once  glucagon  Injectable 1 milliGRAM(s) IntraMuscular once  heparin   Injectable 5000 Unit(s) SubCutaneous every 8 hours  insulin lispro (ADMELOG) corrective regimen sliding scale   SubCutaneous at bedtime  insulin lispro (ADMELOG) corrective regimen sliding scale   SubCutaneous three times a day before meals  sodium chloride 0.45%. 1000 milliLiter(s) (80 mL/Hr) IV Continuous <Continuous>  tamsulosin 0.8 milliGRAM(s) Oral at bedtime    MEDICATIONS  (PRN):  acetaminophen     Tablet .. 650 milliGRAM(s) Oral every 6 hours PRN Temp greater or equal to 38C (100.4F), Mild Pain (1 - 3)  dextrose Oral Gel 15 Gram(s) Oral once PRN Blood Glucose LESS THAN 70 milliGRAM(s)/deciliter      ALLERGIES:  Allergies    No Known Allergies    Intolerances        LABS:                        10.3   7.63  )-----------( 197      ( 11 Jan 2024 09:01 )             30.4     01-11    139  |  104  |  26<H>  ----------------------------<  138<H>  3.8   |  25  |  1.78<H>    Ca    8.5      11 Jan 2024 09:01  Phos  2.9     01-11  Mg     1.6     01-11    TPro  6.5  /  Alb  2.9<L>  /  TBili  0.3  /  DBili  x   /  AST  15  /  ALT  7<L>  /  AlkPhos  40  01-11      Urinalysis Basic - ( 11 Jan 2024 09:01 )    Color: x / Appearance: x / SG: x / pH: x  Gluc: 138 mg/dL / Ketone: x  / Bili: x / Urobili: x   Blood: x / Protein: x / Nitrite: x   Leuk Esterase: x / RBC: x / WBC x   Sq Epi: x / Non Sq Epi: x / Bacteria: x      CAPILLARY BLOOD GLUCOSE      POCT Blood Glucose.: 195 mg/dL (12 Jan 2024 00:30)      RADIOLOGY & ADDITIONAL TESTS: Reviewed.

## 2024-01-12 NOTE — PROGRESS NOTE ADULT - PROBLEM SELECTOR PLAN 2
#obstructive JULIO CESAR on CKD iso BPH  #b/l hydronephrosis  On admission, Cr of 2.94 (baseline of 1.35 on 12/4/23); Cr continued to trend up to 3.16 on 1/9. Specific gravity of 1.023, concentrated  - Ensure adequate hydration with IV fluids  - Monitor BMP  - Strict I & Os  - Avoid nephrotoxic agents  - Dan placed by urology on 1/9 #obstructive JULIO CESAR on CKD iso BPH  #b/l hydronephrosis  On admission, Cr of 2.94 (baseline of 1.35 on 12/4/23); Cr continued to trend up to 3.16 on 1/9. Specific gravity of 1.023, concentrated  - Ensure adequate hydration with IV fluids  - Monitor BMP  - Strict I & Os  - Avoid nephrotoxic agents  - Dan placed by urology on 1/9  - Holding fluids iso continued diuresis

## 2024-01-12 NOTE — PROGRESS NOTE ADULT - PROBLEM SELECTOR PLAN 8
Fluids: s/p D10 drip, NS while still autodiuresis  Nutrition: Soft bite sized diet  GI prophylaxis: none  DVT prophylaxis: Heparin 5000 q8h  Code: full  Dispo: MICU Fluids: s/p D10 drip, NS while still autodiuresis  Nutrition: Soft bite sized diet  GI prophylaxis: none  DVT prophylaxis: Heparin 5000 q8h  Code: full  Dispo: RMF

## 2024-01-12 NOTE — PHYSICAL THERAPY INITIAL EVALUATION ADULT - PREDICTED DURATION OF THERAPY (DAYS/WKS), PT EVAL
Pt. would benefit from continued PT f/u to improve endurance and functional mobility; prevent further deconditioning.

## 2024-01-13 LAB
ANION GAP SERPL CALC-SCNC: 9 MMOL/L — SIGNIFICANT CHANGE UP (ref 5–17)
ANION GAP SERPL CALC-SCNC: 9 MMOL/L — SIGNIFICANT CHANGE UP (ref 5–17)
BASOPHILS # BLD AUTO: 0.02 K/UL — SIGNIFICANT CHANGE UP (ref 0–0.2)
BASOPHILS # BLD AUTO: 0.02 K/UL — SIGNIFICANT CHANGE UP (ref 0–0.2)
BASOPHILS NFR BLD AUTO: 0.3 % — SIGNIFICANT CHANGE UP (ref 0–2)
BASOPHILS NFR BLD AUTO: 0.3 % — SIGNIFICANT CHANGE UP (ref 0–2)
BUN SERPL-MCNC: 20 MG/DL — SIGNIFICANT CHANGE UP (ref 7–23)
BUN SERPL-MCNC: 20 MG/DL — SIGNIFICANT CHANGE UP (ref 7–23)
CALCIUM SERPL-MCNC: 8.7 MG/DL — SIGNIFICANT CHANGE UP (ref 8.4–10.5)
CALCIUM SERPL-MCNC: 8.7 MG/DL — SIGNIFICANT CHANGE UP (ref 8.4–10.5)
CHLORIDE SERPL-SCNC: 100 MMOL/L — SIGNIFICANT CHANGE UP (ref 96–108)
CHLORIDE SERPL-SCNC: 100 MMOL/L — SIGNIFICANT CHANGE UP (ref 96–108)
CO2 SERPL-SCNC: 25 MMOL/L — SIGNIFICANT CHANGE UP (ref 22–31)
CO2 SERPL-SCNC: 25 MMOL/L — SIGNIFICANT CHANGE UP (ref 22–31)
CREAT SERPL-MCNC: 1.35 MG/DL — HIGH (ref 0.5–1.3)
CREAT SERPL-MCNC: 1.35 MG/DL — HIGH (ref 0.5–1.3)
EGFR: 46 ML/MIN/1.73M2 — LOW
EGFR: 46 ML/MIN/1.73M2 — LOW
EOSINOPHIL # BLD AUTO: 0.21 K/UL — SIGNIFICANT CHANGE UP (ref 0–0.5)
EOSINOPHIL # BLD AUTO: 0.21 K/UL — SIGNIFICANT CHANGE UP (ref 0–0.5)
EOSINOPHIL NFR BLD AUTO: 3 % — SIGNIFICANT CHANGE UP (ref 0–6)
EOSINOPHIL NFR BLD AUTO: 3 % — SIGNIFICANT CHANGE UP (ref 0–6)
GLUCOSE BLDC GLUCOMTR-MCNC: 161 MG/DL — HIGH (ref 70–99)
GLUCOSE BLDC GLUCOMTR-MCNC: 161 MG/DL — HIGH (ref 70–99)
GLUCOSE BLDC GLUCOMTR-MCNC: 208 MG/DL — HIGH (ref 70–99)
GLUCOSE BLDC GLUCOMTR-MCNC: 208 MG/DL — HIGH (ref 70–99)
GLUCOSE BLDC GLUCOMTR-MCNC: 247 MG/DL — HIGH (ref 70–99)
GLUCOSE BLDC GLUCOMTR-MCNC: 247 MG/DL — HIGH (ref 70–99)
GLUCOSE BLDC GLUCOMTR-MCNC: 266 MG/DL — HIGH (ref 70–99)
GLUCOSE BLDC GLUCOMTR-MCNC: 266 MG/DL — HIGH (ref 70–99)
GLUCOSE SERPL-MCNC: 268 MG/DL — HIGH (ref 70–99)
GLUCOSE SERPL-MCNC: 268 MG/DL — HIGH (ref 70–99)
HCT VFR BLD CALC: 31.7 % — LOW (ref 39–50)
HCT VFR BLD CALC: 31.7 % — LOW (ref 39–50)
HGB BLD-MCNC: 10.6 G/DL — LOW (ref 13–17)
HGB BLD-MCNC: 10.6 G/DL — LOW (ref 13–17)
IMM GRANULOCYTES NFR BLD AUTO: 0.4 % — SIGNIFICANT CHANGE UP (ref 0–0.9)
IMM GRANULOCYTES NFR BLD AUTO: 0.4 % — SIGNIFICANT CHANGE UP (ref 0–0.9)
LYMPHOCYTES # BLD AUTO: 1.17 K/UL — SIGNIFICANT CHANGE UP (ref 1–3.3)
LYMPHOCYTES # BLD AUTO: 1.17 K/UL — SIGNIFICANT CHANGE UP (ref 1–3.3)
LYMPHOCYTES # BLD AUTO: 16.6 % — SIGNIFICANT CHANGE UP (ref 13–44)
LYMPHOCYTES # BLD AUTO: 16.6 % — SIGNIFICANT CHANGE UP (ref 13–44)
MAGNESIUM SERPL-MCNC: 1.9 MG/DL — SIGNIFICANT CHANGE UP (ref 1.6–2.6)
MAGNESIUM SERPL-MCNC: 1.9 MG/DL — SIGNIFICANT CHANGE UP (ref 1.6–2.6)
MCHC RBC-ENTMCNC: 30.2 PG — SIGNIFICANT CHANGE UP (ref 27–34)
MCHC RBC-ENTMCNC: 30.2 PG — SIGNIFICANT CHANGE UP (ref 27–34)
MCHC RBC-ENTMCNC: 33.4 GM/DL — SIGNIFICANT CHANGE UP (ref 32–36)
MCHC RBC-ENTMCNC: 33.4 GM/DL — SIGNIFICANT CHANGE UP (ref 32–36)
MCV RBC AUTO: 90.3 FL — SIGNIFICANT CHANGE UP (ref 80–100)
MCV RBC AUTO: 90.3 FL — SIGNIFICANT CHANGE UP (ref 80–100)
MONOCYTES # BLD AUTO: 0.66 K/UL — SIGNIFICANT CHANGE UP (ref 0–0.9)
MONOCYTES # BLD AUTO: 0.66 K/UL — SIGNIFICANT CHANGE UP (ref 0–0.9)
MONOCYTES NFR BLD AUTO: 9.3 % — SIGNIFICANT CHANGE UP (ref 2–14)
MONOCYTES NFR BLD AUTO: 9.3 % — SIGNIFICANT CHANGE UP (ref 2–14)
NEUTROPHILS # BLD AUTO: 4.97 K/UL — SIGNIFICANT CHANGE UP (ref 1.8–7.4)
NEUTROPHILS # BLD AUTO: 4.97 K/UL — SIGNIFICANT CHANGE UP (ref 1.8–7.4)
NEUTROPHILS NFR BLD AUTO: 70.4 % — SIGNIFICANT CHANGE UP (ref 43–77)
NEUTROPHILS NFR BLD AUTO: 70.4 % — SIGNIFICANT CHANGE UP (ref 43–77)
NRBC # BLD: 0 /100 WBCS — SIGNIFICANT CHANGE UP (ref 0–0)
NRBC # BLD: 0 /100 WBCS — SIGNIFICANT CHANGE UP (ref 0–0)
PHOSPHATE SERPL-MCNC: 2.1 MG/DL — LOW (ref 2.5–4.5)
PHOSPHATE SERPL-MCNC: 2.1 MG/DL — LOW (ref 2.5–4.5)
PLATELET # BLD AUTO: 214 K/UL — SIGNIFICANT CHANGE UP (ref 150–400)
PLATELET # BLD AUTO: 214 K/UL — SIGNIFICANT CHANGE UP (ref 150–400)
POTASSIUM SERPL-MCNC: 4.2 MMOL/L — SIGNIFICANT CHANGE UP (ref 3.5–5.3)
POTASSIUM SERPL-MCNC: 4.2 MMOL/L — SIGNIFICANT CHANGE UP (ref 3.5–5.3)
POTASSIUM SERPL-SCNC: 4.2 MMOL/L — SIGNIFICANT CHANGE UP (ref 3.5–5.3)
POTASSIUM SERPL-SCNC: 4.2 MMOL/L — SIGNIFICANT CHANGE UP (ref 3.5–5.3)
RBC # BLD: 3.51 M/UL — LOW (ref 4.2–5.8)
RBC # BLD: 3.51 M/UL — LOW (ref 4.2–5.8)
RBC # FLD: 14 % — SIGNIFICANT CHANGE UP (ref 10.3–14.5)
RBC # FLD: 14 % — SIGNIFICANT CHANGE UP (ref 10.3–14.5)
SODIUM SERPL-SCNC: 134 MMOL/L — LOW (ref 135–145)
SODIUM SERPL-SCNC: 134 MMOL/L — LOW (ref 135–145)
WBC # BLD: 7.06 K/UL — SIGNIFICANT CHANGE UP (ref 3.8–10.5)
WBC # BLD: 7.06 K/UL — SIGNIFICANT CHANGE UP (ref 3.8–10.5)
WBC # FLD AUTO: 7.06 K/UL — SIGNIFICANT CHANGE UP (ref 3.8–10.5)
WBC # FLD AUTO: 7.06 K/UL — SIGNIFICANT CHANGE UP (ref 3.8–10.5)

## 2024-01-13 RX ADMIN — Medication 2: at 18:23

## 2024-01-13 RX ADMIN — HEPARIN SODIUM 5000 UNIT(S): 5000 INJECTION INTRAVENOUS; SUBCUTANEOUS at 07:13

## 2024-01-13 RX ADMIN — CHLORHEXIDINE GLUCONATE 1 APPLICATION(S): 213 SOLUTION TOPICAL at 07:10

## 2024-01-13 RX ADMIN — HEPARIN SODIUM 5000 UNIT(S): 5000 INJECTION INTRAVENOUS; SUBCUTANEOUS at 15:36

## 2024-01-13 RX ADMIN — HEPARIN SODIUM 5000 UNIT(S): 5000 INJECTION INTRAVENOUS; SUBCUTANEOUS at 22:12

## 2024-01-13 RX ADMIN — ATORVASTATIN CALCIUM 10 MILLIGRAM(S): 80 TABLET, FILM COATED ORAL at 22:12

## 2024-01-13 RX ADMIN — TAMSULOSIN HYDROCHLORIDE 0.8 MILLIGRAM(S): 0.4 CAPSULE ORAL at 22:12

## 2024-01-13 RX ADMIN — Medication 1: at 08:53

## 2024-01-13 RX ADMIN — Medication 3: at 13:00

## 2024-01-13 NOTE — PROGRESS NOTE ADULT - SUBJECTIVE AND OBJECTIVE BOX
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 1000 milliLiter(s) (100 mL/Hr) IV Continuous <Continuous>  dextrose 5%. 1000 milliLiter(s) (50 mL/Hr) IV Continuous <Continuous>  dextrose 50% Injectable 25 Gram(s) IV Push once  dextrose 50% Injectable 12.5 Gram(s) IV Push once  dextrose 50% Injectable 25 Gram(s) IV Push once  glucagon  Injectable 1 milliGRAM(s) IntraMuscular once  heparin   Injectable 5000 Unit(s) SubCutaneous every 8 hours  insulin lispro (ADMELOG) corrective regimen sliding scale   SubCutaneous at bedtime  insulin lispro (ADMELOG) corrective regimen sliding scale   SubCutaneous three times a day before meals  tamsulosin 0.8 milliGRAM(s) Oral at bedtime    MEDICATIONS  (PRN):  acetaminophen     Tablet .. 650 milliGRAM(s) Oral every 6 hours PRN Temp greater or equal to 38C (100.4F), Mild Pain (1 - 3)  dextrose Oral Gel 15 Gram(s) Oral once PRN Blood Glucose LESS THAN 70 milliGRAM(s)/deciliter    ICU Vital Signs Last 24 Hrs  T(C): 36.7 (13 Jan 2024 07:21), Max: 37.6 (12 Jan 2024 16:49)  T(F): 98.1 (13 Jan 2024 07:21), Max: 99.7 (12 Jan 2024 16:49)  HR: 77 (13 Jan 2024 07:21) (77 - 82)  BP: 129/70 (13 Jan 2024 07:21) (110/67 - 137/71)  BP(mean): --  ABP: --  ABP(mean): --  RR: 16 (13 Jan 2024 07:21) (16 - 20)  SpO2: 92% (13 Jan 2024 07:21) (92% - 99%)    O2 Parameters below as of 13 Jan 2024 07:21  Patient On (Oxygen Delivery Method): room air        Seated in chair eating  No JVD  Chest clear  CV RRR s1s2 no murmur  Abd soft   Ext no edema                          10.6   7.06  )-----------( 214      ( 13 Jan 2024 12:00 )             31.7   01-13    134<L>  |  100  |  20  ----------------------------<  268<H>  4.2   |  25  |  1.35<H>    Ca    8.7      13 Jan 2024 12:00  Phos  2.1     01-13  Mg     1.9     01-13

## 2024-01-13 NOTE — PROGRESS NOTE ADULT - PROBLEM SELECTOR PLAN 5
Pt presented with AMS, likely iso UTI and hypoglycemia. Pt is back at baseline on 1/9.  RESOLVED RESOLVED. Pt with DM, was on home Metformin 1000 BID, Glimepiride 2mg BID; recently switched from Farxiga by PCP (due to recurrent UTI), to Levemir insulin 10 units in morning and 5 units at night. Found to have a blood glucose of 34 on admission, urine Glucose > 1000, A1c 9. S/p D50 50ml + 25ml boluses, D10 1000ml infusion at 30ml/h, 1 L NS bolus in the ED. Discontinued D10 drip and started diet   - FSG q2h   - start on Maame

## 2024-01-13 NOTE — PROGRESS NOTE ADULT - PROBLEM SELECTOR PLAN 2
#obstructive JULIO CESAR on CKD iso BPH  #b/l hydronephrosis  On admission, Cr of 2.94 (baseline of 1.35 on 12/4/23); Cr continued to trend up to 3.16 on 1/9. Specific gravity of 1.023, concentrated  - Ensure adequate hydration with IV fluids  - Monitor BMP  - Strict I & Os  - Avoid nephrotoxic agents  - Dan placed by urology on 1/9  - Holding fluids iso continued diuresis #Post-obstructive diuresis   Pt with history of BPH, on home tamsulosin 0.8mg PO qhs. Renal US with moderate bilateral hydro and mild increased renal parenchyma Found to be retaining on bladder scan on 1/9 with 800cc; unable to straight cath due to blood clot with resistance.   Pt urinating high volume following obstruction, consistent with post-obstructive diuresis  - Dan placed by urology on 1/9, TOV 1/13 while ambulate as tolerated  - C/w tamsulosin 0.8mg PO qhs  - Monitor I&O's  - will defer further IVF as pt is tolerating PO>

## 2024-01-13 NOTE — PROGRESS NOTE ADULT - PROBLEM SELECTOR PLAN 8
Fluids: s/p D10 drip, NS while still autodiuresis  Nutrition: Soft bite sized diet  GI prophylaxis: none  DVT prophylaxis: Heparin 5000 q8h  Code: full  Dispo: RMF

## 2024-01-13 NOTE — PROGRESS NOTE ADULT - PROBLEM SELECTOR PLAN 3
#Post-obstructive diuresis   Pt with history of BPH, on home tamsulosin 0.8mg PO qhs. Renal US with moderate bilateral hydro and mild increased renal parenchyma Found to be retaining on bladder scan on 1/9 with 800cc; unable to straight cath due to blood clot with resistance.   Pt urinating high volume following obstruction, consistent with post-obstructive diuresis  - Dan placed by urology on 1/9  - C/w tamsulosin 0.8mg PO qhs  - Monitor I&O's  - C/w IV fluid replacement   - TOV 1/12 Pt has hx of BPH  - C/w tamsulosin 0.8mg PO qhs  - Dan placed by urology on 1/9

## 2024-01-13 NOTE — PROGRESS NOTE ADULT - PROBLEM SELECTOR PLAN 6
PT on home Atorvastatin 10mg PO qd  - C/w home med RESOLVED. Pt presented with AMS, likely iso UTI and hypoglycemia. Pt is back at baseline on 1/9.

## 2024-01-13 NOTE — PROGRESS NOTE ADULT - PROBLEM SELECTOR PLAN 1
Pt with DM, was on home Metformin 1000 BID, Glimepiride 2mg BID; recently switched from Farxiga by PCP (due to recurrent UTI), to Levemir insulin 10 units in morning and 5 units at night. Found to have a blood glucose of 34 on admission, urine Glucose > 1000, A1c 9. S/p D50 50ml + 25ml boluses, D10 1000ml infusion at 30ml/h, 1 L NS bolus in the ED. Discontinued D10 drip and started diet   - FSG q2h   - start on Maame #obstructive JULIO CESAR on CKD iso BPH  #b/l hydronephrosis  IMPROVING.   On admission, Cr of 2.94 (baseline of 1.35 on 12/4/23); Cr continued to trend up to 3.16 on 1/9. Specific gravity of 1.023, concentrated  - Ensure adequate hydration with IV fluids  - Monitor BMP  - Strict I & Os  - Avoid nephrotoxic agents  - Dan placed by urology on 1/9  - Holding fluids iso continued diuresis

## 2024-01-13 NOTE — PROGRESS NOTE ADULT - PROBLEM SELECTOR PLAN 4
Pt with BPH and DM recently switched from Farxiga by PCP to levemir insulin due to recurrent UTI. Had 2 episodes of UTI in the past month, last diagnosed on 1/4; ucx grew staph epi, cx was pansensitive, placed on 10-day course of Levaquin (completed 5 days outpatient prior to presenting to [facility]). UA (+) LE, WBC, bacteria. Ucx contaminated.   - Started CTX 1g IV qd x3 days (1/9-1/11)  RESOLVED Pt with BPH and DM recently switched from Farxiga by PCP to levemir insulin due to recurrent UTI. Had 2 episodes of UTI in the past month, last diagnosed on 1/4; ucx grew staph epi, cx was pansensitive, placed on 10-day course of Levaquin (completed 5 days outpatient prior to presenting to [facility]). UA (+) LE, WBC, bacteria. Ucx contaminated.   - Started CTX 1g IV qd x3 days (1/9-1/11)  RESOLVED PT on home Atorvastatin 10mg PO qd  - C/w home med

## 2024-01-13 NOTE — PROGRESS NOTE ADULT - SUBJECTIVE AND OBJECTIVE BOX
**INCOMPLETE NOTE*** OVERNIGHT EVENTS: NAEO      SUBJECTIVE  Pt was seen and examined at bedside with family. Pt appears to be in good spirit and reports subjective symptomatic improvement.     Family also reports improving functional status as pt was able to make small steps bedside overnight.     Patient denies any fevers/ chills, n/v, headache, acute SOB, chest pain, abdominal pain, genitourinary sx.       Vital Signs Last 24 Hrs  T(C): 36.7 (13 Jan 2024 07:21), Max: 37.6 (12 Jan 2024 16:49)  T(F): 98.1 (13 Jan 2024 07:21), Max: 99.7 (12 Jan 2024 16:49)  HR: 77 (13 Jan 2024 07:21) (77 - 82)  BP: 129/70 (13 Jan 2024 07:21) (110/67 - 137/71)  BP(mean): --  RR: 16 (13 Jan 2024 07:21) (16 - 20)  SpO2: 92% (13 Jan 2024 07:21) (92% - 99%)    Parameters below as of 13 Jan 2024 07:21  Patient On (Oxygen Delivery Method): room air          PHYSICAL EXAM   General: NAD, laying in bed comfortably  Head: NC/AT; MMM; PERRL; EOMI;  Neck: Supple; no JVD  Respiratory: CTAB; no wheezes/rales/rhonchi  Cardiovascular: Regular rhythm/rate; S1/S2+, no murmurs, rubs gallops   Gastrointestinal: Soft; NTND; bowel sounds normal and present  Extremities: WWP; no edema/cyanosis  Neurological: A&Ox3, conversing and answering questions appropriately      .  LABS:                         10.6   7.06  )-----------( 214      ( 13 Jan 2024 12:00 )             31.7     01-13    134<L>  |  100  |  20  ----------------------------<  268<H>  4.2   |  25  |  1.35<H>    Ca    8.7      13 Jan 2024 12:00  Phos  2.1     01-13  Mg     1.9     01-13        Urinalysis Basic - ( 13 Jan 2024 12:00 )    Color: x / Appearance: x / SG: x / pH: x  Gluc: 268 mg/dL / Ketone: x  / Bili: x / Urobili: x   Blood: x / Protein: x / Nitrite: x   Leuk Esterase: x / RBC: x / WBC x   Sq Epi: x / Non Sq Epi: x / Bacteria: x                RADIOLOGY, EKG & ADDITIONAL TESTS: Reviewed.

## 2024-01-13 NOTE — PROGRESS NOTE ADULT - ASSESSMENT
89 yo man with AODM  -BPH with obstruction and JULIO CESAR resolving with wade  -post obstructive diuresis now resolved  -off IVF   -will try to remove wade and monitor urine output on tamsulosin 0.8mg daily  -FSS Sliding scale insulin - will resume out pateint metformin and DPP4 therapy  -blood pressure controlled  -would be an excellent candidate for BRIAN  -cont atorvastatin  -DVT prophylaxis  -Case d/w resident and family

## 2024-01-13 NOTE — PROGRESS NOTE ADULT - PROBLEM SELECTOR PLAN 7
Pt has hx of BPH  - C/w tamsulosin 0.8mg PO qhs  - Dan placed by urology on 1/9 RESOLVED. Pt with BPH and DM recently switched from Farxiga by PCP to levemir insulin due to recurrent UTI. Had 2 episodes of UTI in the past month, last diagnosed on 1/4; ucx grew staph epi, cx was pansensitive, placed on 10-day course of Levaquin (completed 5 days outpatient prior to presenting to [facility]). UA (+) LE, WBC, bacteria. Ucx contaminated.   - Started CTX 1g IV qd x3 days (1/9-1/11) RESOLVED. Pt with BPH and DM recently switched from Farxiga by PCP to levemir insulin due to recurrent UTI. Had 2 episodes of UTI in the past month, last diagnosed on 1/4; ucx grew staph epi, cx was pansensitive, placed on 10-day course of Levaquin (completed 5 days outpatient prior to presenting to [facility]). UA (+) LE, WBC, bacteria. Ucx contaminated.   - Started CTX 1g IV qd x3 days (1/9-1/11)

## 2024-01-14 LAB
ALBUMIN SERPL ELPH-MCNC: 3.2 G/DL — LOW (ref 3.3–5)
ALBUMIN SERPL ELPH-MCNC: 3.2 G/DL — LOW (ref 3.3–5)
ALP SERPL-CCNC: 49 U/L — SIGNIFICANT CHANGE UP (ref 40–120)
ALP SERPL-CCNC: 49 U/L — SIGNIFICANT CHANGE UP (ref 40–120)
ALT FLD-CCNC: 12 U/L — SIGNIFICANT CHANGE UP (ref 10–45)
ALT FLD-CCNC: 12 U/L — SIGNIFICANT CHANGE UP (ref 10–45)
ANION GAP SERPL CALC-SCNC: 12 MMOL/L — SIGNIFICANT CHANGE UP (ref 5–17)
ANION GAP SERPL CALC-SCNC: 12 MMOL/L — SIGNIFICANT CHANGE UP (ref 5–17)
AST SERPL-CCNC: 16 U/L — SIGNIFICANT CHANGE UP (ref 10–40)
AST SERPL-CCNC: 16 U/L — SIGNIFICANT CHANGE UP (ref 10–40)
BASOPHILS # BLD AUTO: 0.02 K/UL — SIGNIFICANT CHANGE UP (ref 0–0.2)
BASOPHILS # BLD AUTO: 0.02 K/UL — SIGNIFICANT CHANGE UP (ref 0–0.2)
BASOPHILS NFR BLD AUTO: 0.2 % — SIGNIFICANT CHANGE UP (ref 0–2)
BASOPHILS NFR BLD AUTO: 0.2 % — SIGNIFICANT CHANGE UP (ref 0–2)
BILIRUB SERPL-MCNC: 0.4 MG/DL — SIGNIFICANT CHANGE UP (ref 0.2–1.2)
BILIRUB SERPL-MCNC: 0.4 MG/DL — SIGNIFICANT CHANGE UP (ref 0.2–1.2)
BUN SERPL-MCNC: 19 MG/DL — SIGNIFICANT CHANGE UP (ref 7–23)
BUN SERPL-MCNC: 19 MG/DL — SIGNIFICANT CHANGE UP (ref 7–23)
CALCIUM SERPL-MCNC: 8.8 MG/DL — SIGNIFICANT CHANGE UP (ref 8.4–10.5)
CALCIUM SERPL-MCNC: 8.8 MG/DL — SIGNIFICANT CHANGE UP (ref 8.4–10.5)
CHLORIDE SERPL-SCNC: 102 MMOL/L — SIGNIFICANT CHANGE UP (ref 96–108)
CHLORIDE SERPL-SCNC: 102 MMOL/L — SIGNIFICANT CHANGE UP (ref 96–108)
CO2 SERPL-SCNC: 23 MMOL/L — SIGNIFICANT CHANGE UP (ref 22–31)
CO2 SERPL-SCNC: 23 MMOL/L — SIGNIFICANT CHANGE UP (ref 22–31)
CREAT SERPL-MCNC: 1.27 MG/DL — SIGNIFICANT CHANGE UP (ref 0.5–1.3)
CREAT SERPL-MCNC: 1.27 MG/DL — SIGNIFICANT CHANGE UP (ref 0.5–1.3)
EGFR: 50 ML/MIN/1.73M2 — LOW
EGFR: 50 ML/MIN/1.73M2 — LOW
EOSINOPHIL # BLD AUTO: 0.16 K/UL — SIGNIFICANT CHANGE UP (ref 0–0.5)
EOSINOPHIL # BLD AUTO: 0.16 K/UL — SIGNIFICANT CHANGE UP (ref 0–0.5)
EOSINOPHIL NFR BLD AUTO: 1.8 % — SIGNIFICANT CHANGE UP (ref 0–6)
EOSINOPHIL NFR BLD AUTO: 1.8 % — SIGNIFICANT CHANGE UP (ref 0–6)
GLUCOSE BLDC GLUCOMTR-MCNC: 130 MG/DL — HIGH (ref 70–99)
GLUCOSE BLDC GLUCOMTR-MCNC: 130 MG/DL — HIGH (ref 70–99)
GLUCOSE BLDC GLUCOMTR-MCNC: 192 MG/DL — HIGH (ref 70–99)
GLUCOSE BLDC GLUCOMTR-MCNC: 192 MG/DL — HIGH (ref 70–99)
GLUCOSE BLDC GLUCOMTR-MCNC: 239 MG/DL — HIGH (ref 70–99)
GLUCOSE BLDC GLUCOMTR-MCNC: 239 MG/DL — HIGH (ref 70–99)
GLUCOSE BLDC GLUCOMTR-MCNC: 287 MG/DL — HIGH (ref 70–99)
GLUCOSE BLDC GLUCOMTR-MCNC: 287 MG/DL — HIGH (ref 70–99)
GLUCOSE SERPL-MCNC: 182 MG/DL — HIGH (ref 70–99)
GLUCOSE SERPL-MCNC: 182 MG/DL — HIGH (ref 70–99)
HCT VFR BLD CALC: 33.8 % — LOW (ref 39–50)
HCT VFR BLD CALC: 33.8 % — LOW (ref 39–50)
HGB BLD-MCNC: 11.2 G/DL — LOW (ref 13–17)
HGB BLD-MCNC: 11.2 G/DL — LOW (ref 13–17)
IMM GRANULOCYTES NFR BLD AUTO: 0.4 % — SIGNIFICANT CHANGE UP (ref 0–0.9)
IMM GRANULOCYTES NFR BLD AUTO: 0.4 % — SIGNIFICANT CHANGE UP (ref 0–0.9)
LYMPHOCYTES # BLD AUTO: 1.52 K/UL — SIGNIFICANT CHANGE UP (ref 1–3.3)
LYMPHOCYTES # BLD AUTO: 1.52 K/UL — SIGNIFICANT CHANGE UP (ref 1–3.3)
LYMPHOCYTES # BLD AUTO: 16.7 % — SIGNIFICANT CHANGE UP (ref 13–44)
LYMPHOCYTES # BLD AUTO: 16.7 % — SIGNIFICANT CHANGE UP (ref 13–44)
MAGNESIUM SERPL-MCNC: 2 MG/DL — SIGNIFICANT CHANGE UP (ref 1.6–2.6)
MAGNESIUM SERPL-MCNC: 2 MG/DL — SIGNIFICANT CHANGE UP (ref 1.6–2.6)
MCHC RBC-ENTMCNC: 30.2 PG — SIGNIFICANT CHANGE UP (ref 27–34)
MCHC RBC-ENTMCNC: 30.2 PG — SIGNIFICANT CHANGE UP (ref 27–34)
MCHC RBC-ENTMCNC: 33.1 GM/DL — SIGNIFICANT CHANGE UP (ref 32–36)
MCHC RBC-ENTMCNC: 33.1 GM/DL — SIGNIFICANT CHANGE UP (ref 32–36)
MCV RBC AUTO: 91.1 FL — SIGNIFICANT CHANGE UP (ref 80–100)
MCV RBC AUTO: 91.1 FL — SIGNIFICANT CHANGE UP (ref 80–100)
MONOCYTES # BLD AUTO: 0.69 K/UL — SIGNIFICANT CHANGE UP (ref 0–0.9)
MONOCYTES # BLD AUTO: 0.69 K/UL — SIGNIFICANT CHANGE UP (ref 0–0.9)
MONOCYTES NFR BLD AUTO: 7.6 % — SIGNIFICANT CHANGE UP (ref 2–14)
MONOCYTES NFR BLD AUTO: 7.6 % — SIGNIFICANT CHANGE UP (ref 2–14)
NEUTROPHILS # BLD AUTO: 6.66 K/UL — SIGNIFICANT CHANGE UP (ref 1.8–7.4)
NEUTROPHILS # BLD AUTO: 6.66 K/UL — SIGNIFICANT CHANGE UP (ref 1.8–7.4)
NEUTROPHILS NFR BLD AUTO: 73.3 % — SIGNIFICANT CHANGE UP (ref 43–77)
NEUTROPHILS NFR BLD AUTO: 73.3 % — SIGNIFICANT CHANGE UP (ref 43–77)
NRBC # BLD: 0 /100 WBCS — SIGNIFICANT CHANGE UP (ref 0–0)
NRBC # BLD: 0 /100 WBCS — SIGNIFICANT CHANGE UP (ref 0–0)
PHOSPHATE SERPL-MCNC: 2.2 MG/DL — LOW (ref 2.5–4.5)
PHOSPHATE SERPL-MCNC: 2.2 MG/DL — LOW (ref 2.5–4.5)
PLATELET # BLD AUTO: 233 K/UL — SIGNIFICANT CHANGE UP (ref 150–400)
PLATELET # BLD AUTO: 233 K/UL — SIGNIFICANT CHANGE UP (ref 150–400)
POTASSIUM SERPL-MCNC: 3.8 MMOL/L — SIGNIFICANT CHANGE UP (ref 3.5–5.3)
POTASSIUM SERPL-MCNC: 3.8 MMOL/L — SIGNIFICANT CHANGE UP (ref 3.5–5.3)
POTASSIUM SERPL-SCNC: 3.8 MMOL/L — SIGNIFICANT CHANGE UP (ref 3.5–5.3)
POTASSIUM SERPL-SCNC: 3.8 MMOL/L — SIGNIFICANT CHANGE UP (ref 3.5–5.3)
PROT SERPL-MCNC: 7.4 G/DL — SIGNIFICANT CHANGE UP (ref 6–8.3)
PROT SERPL-MCNC: 7.4 G/DL — SIGNIFICANT CHANGE UP (ref 6–8.3)
RBC # BLD: 3.71 M/UL — LOW (ref 4.2–5.8)
RBC # BLD: 3.71 M/UL — LOW (ref 4.2–5.8)
RBC # FLD: 14.4 % — SIGNIFICANT CHANGE UP (ref 10.3–14.5)
RBC # FLD: 14.4 % — SIGNIFICANT CHANGE UP (ref 10.3–14.5)
SODIUM SERPL-SCNC: 137 MMOL/L — SIGNIFICANT CHANGE UP (ref 135–145)
SODIUM SERPL-SCNC: 137 MMOL/L — SIGNIFICANT CHANGE UP (ref 135–145)
WBC # BLD: 9.09 K/UL — SIGNIFICANT CHANGE UP (ref 3.8–10.5)
WBC # BLD: 9.09 K/UL — SIGNIFICANT CHANGE UP (ref 3.8–10.5)
WBC # FLD AUTO: 9.09 K/UL — SIGNIFICANT CHANGE UP (ref 3.8–10.5)
WBC # FLD AUTO: 9.09 K/UL — SIGNIFICANT CHANGE UP (ref 3.8–10.5)

## 2024-01-14 PROCEDURE — 99232 SBSQ HOSP IP/OBS MODERATE 35: CPT | Mod: GC

## 2024-01-14 RX ORDER — SODIUM CHLORIDE 9 MG/ML
1000 INJECTION INTRAMUSCULAR; INTRAVENOUS; SUBCUTANEOUS ONCE
Refills: 0 | Status: COMPLETED | OUTPATIENT
Start: 2024-01-14 | End: 2024-01-14

## 2024-01-14 RX ORDER — SODIUM,POTASSIUM PHOSPHATES 278-250MG
1 POWDER IN PACKET (EA) ORAL ONCE
Refills: 0 | Status: COMPLETED | OUTPATIENT
Start: 2024-01-14 | End: 2024-01-14

## 2024-01-14 RX ORDER — POTASSIUM PHOSPHATE, MONOBASIC POTASSIUM PHOSPHATE, DIBASIC 236; 224 MG/ML; MG/ML
30 INJECTION, SOLUTION INTRAVENOUS ONCE
Refills: 0 | Status: DISCONTINUED | OUTPATIENT
Start: 2024-01-14 | End: 2024-01-14

## 2024-01-14 RX ADMIN — HEPARIN SODIUM 5000 UNIT(S): 5000 INJECTION INTRAVENOUS; SUBCUTANEOUS at 14:55

## 2024-01-14 RX ADMIN — ATORVASTATIN CALCIUM 10 MILLIGRAM(S): 80 TABLET, FILM COATED ORAL at 22:21

## 2024-01-14 RX ADMIN — Medication 1: at 09:34

## 2024-01-14 RX ADMIN — SODIUM CHLORIDE 1000 MILLILITER(S): 9 INJECTION INTRAMUSCULAR; INTRAVENOUS; SUBCUTANEOUS at 12:42

## 2024-01-14 RX ADMIN — Medication 1 PACKET(S): at 12:03

## 2024-01-14 RX ADMIN — HEPARIN SODIUM 5000 UNIT(S): 5000 INJECTION INTRAVENOUS; SUBCUTANEOUS at 22:22

## 2024-01-14 RX ADMIN — Medication 3: at 12:41

## 2024-01-14 RX ADMIN — CHLORHEXIDINE GLUCONATE 1 APPLICATION(S): 213 SOLUTION TOPICAL at 05:13

## 2024-01-14 RX ADMIN — HEPARIN SODIUM 5000 UNIT(S): 5000 INJECTION INTRAVENOUS; SUBCUTANEOUS at 05:13

## 2024-01-14 RX ADMIN — TAMSULOSIN HYDROCHLORIDE 0.8 MILLIGRAM(S): 0.4 CAPSULE ORAL at 22:21

## 2024-01-14 NOTE — PROGRESS NOTE ADULT - SUBJECTIVE AND OBJECTIVE BOX
89 yo diabetic acmittied with hypoglycemia in setting of UTI with BPH and obstruction resolved with wade  -he has completed abx  -failled trial of voiding wade catheter replaced  -FSS controlled on sliding scale insulin  -will need BRIAN as he cannot manage wade at home   -d/w family and patient and team

## 2024-01-14 NOTE — PROGRESS NOTE ADULT - ASSESSMENT
89-year-old Male with PMH of DM2, HLD, BPH presented for metabolic encephalopathy 2/2 new insulin use. Admitted to MICU for q1 fingersticks. Pt found to have obstructive uropathy 2/2 BPH/retention, was previously treated with 14 fr catheter for bladder scan of 700 on 1/9. The patient had TOV today and failed with bladder scan showing 700-800cc in the bladder with resulting bladder distention, suprapubic pain and tendnerness to palpation.     - 14 fr catheter placed with return of clear yellow urine sans hematuria (____ cc return) , stat locked to right leg with wade bag placed below level of the bladder  - monitor urine output and color, specifically urine outputs of greater than 200cc / hour would raise suspicion for post obstructive diuresis  - replete outputs as per primary team and per patients hydration status  - Q8h or q12h BMP as long as urine output is above 200c/ per hour. May decrease frequency of BMP monitoring once urine output decreases or if electrolyte values are within normal ranges. Replete electrolytes as needed  - maintain wade catheter for to promote maximal decompression of urinary system in the setting of obstructive uropathy and multiple failed TOVs  - do not attempt trial of void prior to optimization which includes: bladder rest and recovery with wade catheter decompression (minimum 3 days), ambulation at baseline for patient, regular bowel function, and discontinuation of any anticholinergic or bladder relaxing medications  - Patient may follow up for active TOV in the urology clinic within 1-2 weeks of discharge.  Call [phone] to schedule your appointment.  The office is located at [address] [place] [address], [place] [address].  89-year-old Male with PMH of DM2, HLD, BPH presented for metabolic encephalopathy 2/2 new insulin use. Admitted to MICU for q1 fingersticks. Pt found to have obstructive uropathy 2/2 BPH/retention, was previously treated with 14 fr catheter for bladder scan of 700 on 1/9. The patient had TOV today and failed with bladder scan showing 700-800cc in the bladder with resulting bladder distention, suprapubic pain and tendnerness to palpation.     - 14 fr catheter placed with return of clear yellow urine sans hematuria (____ cc return) , stat locked to right leg with wade bag placed below level of the bladder  - monitor urine output and color, specifically urine outputs of greater than 200cc / hour would raise suspicion for post obstructive diuresis  - replete outputs as per primary team and per patients hydration status  - Q8h or q12h BMP as long as urine output is above 200c/ per hour. May decrease frequency of BMP monitoring once urine output decreases or if electrolyte values are within normal ranges. Replete electrolytes as needed  - maintain wade catheter for to promote maximal decompression of urinary system in the setting of obstructive uropathy and multiple failed TOVs  - do not attempt trial of void prior to optimization which includes: bladder rest and recovery with wade catheter decompression (minimum 3 days), ambulation at baseline for patient, regular bowel function, and discontinuation of any anticholinergic or bladder relaxing medications  - Patient may follow up for active TOV in the urology clinic within 1-2 weeks of discharge.  Call [phone] to schedule your appointment.  The office is located at [address] [place] [address], [place] [address].  89-year-old Male with PMH of DM2, HLD, BPH presented for metabolic encephalopathy 2/2 new insulin use. Admitted to MICU for q1 fingersticks. Pt found to have obstructive uropathy 2/2 BPH/retention, was previously treated with 14 fr catheter for bladder scan of 700 on 1/9. The patient had TOV today and failed with bladder scan showing 700-800cc in the bladder with resulting bladder distention, suprapubic pain and tendnerness to palpation.     - 14 fr catheter placed with return of clear yellow urine sans hematuria (____ cc return) , stat locked to right leg with wade bag placed below level of the bladder  - monitor urine output and color, specifically urine outputs of greater than 200cc / hour would raise suspicion for post obstructive diuresis  - replete outputs as per primary team and per patients hydration status  - Q8h or q12h BMP as long as urine output is above 200c/ per hour. May decrease frequency of BMP monitoring once urine output decreases or if electrolyte values are within normal ranges. Replete electrolytes as needed  - maintain wade catheter for to promote maximal decompression of urinary system in the setting of obstructive uropathy and multiple failed TOVs  - do not attempt trial of void prior to optimization which includes: bladder rest and recovery with wade catheter decompression (minimum 3 days), ambulation at baseline for patient, regular bowel function, and discontinuation of any anticholinergic or bladder relaxing medications  - Patient may follow up for active TOV in the urology clinic within 1-2 weeks of discharge.  Call [phone] to schedule your appointment.  The office is located at [address] [place] [address], [place] [address].  89-year-old Male with PMH of DM2, HLD, BPH presented for metabolic encephalopathy 2/2 new insulin use. Admitted to MICU for q1 fingersticks. Pt found to have obstructive uropathy 2/2 BPH/retention, was previously treated with 14 fr catheter for bladder scan of 700 on 1/9. The patient had TOV today and failed with bladder scan showing 700-800cc in the bladder with resulting bladder distention, suprapubic pain and tendnerness to palpation.     - 14 fr catheter placed with return of clear yellow urine sans hematuria (____ cc return) , stat locked to right leg with wade bag placed below level of the bladder  - monitor urine output and color, specifically urine outputs of greater than 200cc / hour would raise suspicion for post obstructive diuresis  - replete outputs as per primary team and per patients hydration status  - Q8h or q12h BMP as long as urine output is above 200c/ per hour. May decrease frequency of BMP monitoring once urine output decreases or if electrolyte values are within normal ranges. Replete electrolytes as needed  - maintain wade catheter for to promote maximal decompression of urinary system in the setting of obstructive uropathy and multiple failed TOVs  - do not attempt trial of void prior to optimization which includes: bladder rest and recovery with wade catheter decompression (minimum 3 days), ambulation at baseline for patient, regular bowel function, and discontinuation of any anticholinergic or bladder relaxing medications  - Patient may follow up for active TOV in the urology clinic within 1-2 weeks of discharge.  Call [phone] to schedule your appointment.  The office is located at [address] [place] [address], [place] [address].  89-year-old Male with PMH of DM2, HLD, BPH presented for metabolic encephalopathy 2/2 new insulin use. Admitted to MICU for q1 fingersticks. Pt found to have obstructive uropathy 2/2 BPH/retention, was previously treated with 14 fr catheter for bladder scan of 700 on 1/9. The patient had TOV today and failed with bladder scan showing 700-800cc in the bladder with resulting bladder distention, suprapubic pain and tendnerness to palpation.     - 14 fr catheter placed with return of clear yellow urine sans hematuria (1000 cc return) , stat locked to right leg with wade bag placed below level of the bladder  - monitor urine output and color, specifically urine outputs of greater than 200cc / hour would raise suspicion for post obstructive diuresis  - replete outputs as per primary team and per patients hydration status  - Q8h or q12h BMP as long as urine output is above 200c/ per hour. May decrease frequency of BMP monitoring once urine output decreases or if electrolyte values are within normal ranges. Replete electrolytes as needed  - maintain wade catheter for to promote maximal decompression of urinary system in the setting of obstructive uropathy and multiple failed TOVs  - do not attempt trial of void prior to optimization which includes: bladder rest and recovery with wade catheter decompression (minimum 3 days), ambulation at baseline for patient, regular bowel function, and discontinuation of any anticholinergic or bladder relaxing medications  - Patient may follow up for active TOV in the urology clinic within 1-2 weeks of discharge.  Call [phone] to schedule your appointment.  The office is located at [address] [place] [address], [address] [place] [address].  89-year-old Male with PMH of DM2, HLD, BPH presented for metabolic encephalopathy 2/2 new insulin use. Admitted to MICU for q1 fingersticks. Pt found to have obstructive uropathy 2/2 BPH/retention, was previously treated with 14 fr catheter for bladder scan of 700 on 1/9. The patient had TOV today and failed with bladder scan showing 700-800cc in the bladder with resulting bladder distention, suprapubic pain and tendnerness to palpation.     - 14 fr catheter placed with return of clear yellow urine sans hematuria (1000 cc return) , stat locked to right leg with wade bag placed below level of the bladder  - monitor urine output and color, specifically urine outputs of greater than 200cc / hour would raise suspicion for post obstructive diuresis  - replete outputs as per primary team and per patients hydration status  - Q8h or q12h BMP as long as urine output is above 200c/ per hour. May decrease frequency of BMP monitoring once urine output decreases or if electrolyte values are within normal ranges. Replete electrolytes as needed  - maintain wade catheter for to promote maximal decompression of urinary system in the setting of obstructive uropathy and multiple failed TOVs  - do not attempt trial of void prior to optimization which includes: bladder rest and recovery with wade catheter decompression (minimum 3 days), ambulation at baseline for patient, regular bowel function, and discontinuation of any anticholinergic or bladder relaxing medications  - Patient may follow up for active TOV in the urology clinic within 1-2 weeks of discharge.  Call [phone] to schedule your appointment.  The office is located at [address] [place] [address], [address] [place] [address].  89-year-old Male with PMH of DM2, HLD, BPH presented for metabolic encephalopathy 2/2 new insulin use. Admitted to MICU for q1 fingersticks. Pt found to have obstructive uropathy 2/2 BPH/retention, was previously treated with 14 fr catheter for bladder scan of 700 on 1/9. The patient had TOV today and failed with bladder scan showing 700-800cc in the bladder with resulting bladder distention, suprapubic pain and tendnerness to palpation.     - 14 fr catheter placed with return of clear yellow urine sans hematuria (1000 cc return) , stat locked to right leg with wade bag placed below level of the bladder  - monitor urine output and color, specifically urine outputs of greater than 200cc / hour would raise suspicion for post obstructive diuresis  - replete outputs as per primary team and per patients hydration status  - Q8h or q12h BMP as long as urine output is above 200c/ per hour. May decrease frequency of BMP monitoring once urine output decreases or if electrolyte values are within normal ranges. Replete electrolytes as needed  - maintain wade catheter for to promote maximal decompression of urinary system in the setting of obstructive uropathy and multiple failed TOVs  - do not attempt trial of void prior to optimization which includes: bladder rest and recovery with wade catheter decompression (minimum 3 days), ambulation at baseline for patient, regular bowel function, and discontinuation of any anticholinergic or bladder relaxing medications  - Patient may follow up for active TOV in the urology clinic within 1-2 weeks of discharge.  Call [phone] to schedule your appointment.  The office is located at [address] [place] [address], [address] [place] [address].  89-year-old Male with PMH of DM2, HLD, BPH presented for metabolic encephalopathy 2/2 new insulin use. Admitted to MICU for q1 fingersticks. Pt found to have obstructive uropathy 2/2 BPH/retention, was previously treated with 14 fr catheter for bladder scan of 700 on 1/9. The patient had TOV today and failed with bladder scan showing 700-800cc in the bladder with resulting bladder distention, suprapubic pain and tendnerness to palpation.     - 14 fr catheter placed with return of clear yellow urine sans hematuria (1000 cc return) , stat locked to right leg with wade bag placed below level of the bladder  - monitor urine output and color, specifically urine outputs of greater than 200cc / hour would raise suspicion for post obstructive diuresis  - replete outputs as per primary team and per patients hydration status  - Q8h or q12h BMP as long as urine output is above 200c/ per hour. May decrease frequency of BMP monitoring once urine output decreases or if electrolyte values are within normal ranges. Replete electrolytes as needed  - maintain wade catheter for to promote maximal decompression of urinary system in the setting of obstructive uropathy and multiple failed TOVs  - do not attempt trial of void prior to optimization which includes: bladder rest and recovery with wade catheter decompression (minimum 3 days), ambulation at baseline for patient, regular bowel function, and discontinuation of any anticholinergic or bladder relaxing medications  - Patient may follow up for active TOV in the urology clinic within 1-2 weeks of discharge.  Call [phone] to schedule your appointment.  The office is located at [address] [place] [address], [address] [place] [address].

## 2024-01-14 NOTE — PROGRESS NOTE ADULT - SUBJECTIVE AND OBJECTIVE BOX
UROLOGY PROGRESS NOTE    SUBJECTIVE: Patient seen and examined bedside. Patient denies fevers/chills, HA/dizziness, nausea/vomiting, CP/SOB, and ab/flank pain. Ambulating to bathroom and back x 3 today with rolling walker, urinating via wade catheter this AM, 14 fr wade was removed in the afternoon and Urology was consulted for failed TOV with bladder scan showing 700cc. of note, the patient had his first bowel movement today.    heparin   Injectable 5000 Unit(s) SubCutaneous every 8 hours      Vital Signs Last 24 Hrs  T(C): 36.4 (13 Jan 2024 20:30), Max: 36.7 (13 Jan 2024 07:21)  T(F): 97.5 (13 Jan 2024 20:30), Max: 98.1 (13 Jan 2024 07:21)  HR: 72 (13 Jan 2024 20:30) (72 - 80)  BP: 118/69 (13 Jan 2024 20:30) (118/69 - 129/70)  BP(mean): --  RR: 19 (13 Jan 2024 20:30) (16 - 19)  SpO2: 95% (13 Jan 2024 20:30) (92% - 95%)    Parameters below as of 13 Jan 2024 20:30  Patient On (Oxygen Delivery Method): room air      I&O's Detail    12 Jan 2024 07:01  -  13 Jan 2024 07:00  --------------------------------------------------------  IN:  Total IN: 0 mL    OUT:    Indwelling Catheter - Urethral (mL): 2100 mL    Voided (mL): 1200 mL  Total OUT: 3300 mL    Total NET: -3300 mL      13 Jan 2024 07:01  -  14 Jan 2024 00:10  --------------------------------------------------------  IN:  Total IN: 0 mL    OUT:    Indwelling Catheter - Urethral (mL): 1000 mL    Voided (mL): 50 mL  Total OUT: 1050 mL    Total NET: -1050 mL          PHYSICAL EXAM    General: NAD, resting comfortably in bed  C/V: NSR  Pulm: Nonlabored breathing, no respiratory distress on room air  Abd: soft, NT, no rebound tenderness, no guarding, no flank TTP. Notable for suprapubic abdominal distention and tenderness.   : circumcised penis without erythema, lesions or swelling. nonedematous, nontender, testicles. No masses appreciated. No CVA tenderness. No suprapubic tenderness or distention   Extrem: WWP, no edema, SCDs in place        LABS:                        10.6   7.06  )-----------( 214      ( 13 Jan 2024 12:00 )             31.7     01-13    134<L>  |  100  |  20  ----------------------------<  268<H>  4.2   |  25  |  1.35<H>    Ca    8.7      13 Jan 2024 12:00  Phos  2.1     01-13  Mg     1.9     01-13        Urinalysis Basic - ( 13 Jan 2024 12:00 )    Color: x / Appearance: x / SG: x / pH: x  Gluc: 268 mg/dL / Ketone: x  / Bili: x / Urobili: x   Blood: x / Protein: x / Nitrite: x   Leuk Esterase: x / RBC: x / WBC x   Sq Epi: x / Non Sq Epi: x / Bacteria: x        CULTURES:        Culture - Urine (collected 01-09-24 @ 00:51)  Source: Clean Catch None  Final Report (01-10-24 @ 11:15):    Normal Urogenital francisco j present        RADIOLOGY & ADDITIONAL STUDIES:

## 2024-01-15 LAB
GLUCOSE BLDC GLUCOMTR-MCNC: 144 MG/DL — HIGH (ref 70–99)
GLUCOSE BLDC GLUCOMTR-MCNC: 167 MG/DL — HIGH (ref 70–99)
GLUCOSE BLDC GLUCOMTR-MCNC: 167 MG/DL — HIGH (ref 70–99)
GLUCOSE BLDC GLUCOMTR-MCNC: 211 MG/DL — HIGH (ref 70–99)
GLUCOSE BLDC GLUCOMTR-MCNC: 211 MG/DL — HIGH (ref 70–99)
GLUCOSE BLDC GLUCOMTR-MCNC: 291 MG/DL — HIGH (ref 70–99)

## 2024-01-15 RX ORDER — TADALAFIL 10 MG/1
1 TABLET, FILM COATED ORAL
Qty: 30 | Refills: 0
Start: 2024-01-15 | End: 2024-02-13

## 2024-01-15 RX ORDER — LEVOFLOXACIN 5 MG/ML
1 INJECTION, SOLUTION INTRAVENOUS
Refills: 0 | DISCHARGE

## 2024-01-15 RX ORDER — INSULIN DETEMIR 100/ML (3)
0 INSULIN PEN (ML) SUBCUTANEOUS
Refills: 0 | DISCHARGE

## 2024-01-15 RX ORDER — HEPARIN SODIUM 5000 [USP'U]/ML
1 INJECTION INTRAVENOUS; SUBCUTANEOUS
Qty: 0 | Refills: 0 | DISCHARGE
Start: 2024-01-15

## 2024-01-15 RX ORDER — FINASTERIDE 5 MG/1
1 TABLET, FILM COATED ORAL
Qty: 60 | Refills: 0
Start: 2024-01-15 | End: 2024-03-14

## 2024-01-15 RX ORDER — GLIMEPIRIDE 1 MG
1 TABLET ORAL
Refills: 0 | DISCHARGE

## 2024-01-15 RX ORDER — TAMSULOSIN HYDROCHLORIDE 0.4 MG/1
2 CAPSULE ORAL
Qty: 0 | Refills: 0 | DISCHARGE
Start: 2024-01-15

## 2024-01-15 RX ORDER — LATANOPROST 0.05 MG/ML
1 SOLUTION/ DROPS OPHTHALMIC; TOPICAL AT BEDTIME
Refills: 0 | Status: DISCONTINUED | OUTPATIENT
Start: 2024-01-15 | End: 2024-01-15

## 2024-01-15 RX ORDER — SITAGLIPTIN 50 MG/1
1 TABLET, FILM COATED ORAL
Qty: 60 | Refills: 0
Start: 2024-01-15 | End: 2024-03-14

## 2024-01-15 RX ORDER — LIDOCAINE HCL 20 MG/ML
1 VIAL (ML) INJECTION DAILY
Refills: 0 | Status: DISCONTINUED | OUTPATIENT
Start: 2024-01-15 | End: 2024-01-16

## 2024-01-15 RX ADMIN — Medication 2: at 13:20

## 2024-01-15 RX ADMIN — HEPARIN SODIUM 5000 UNIT(S): 5000 INJECTION INTRAVENOUS; SUBCUTANEOUS at 13:20

## 2024-01-15 RX ADMIN — ATORVASTATIN CALCIUM 10 MILLIGRAM(S): 80 TABLET, FILM COATED ORAL at 22:20

## 2024-01-15 RX ADMIN — HEPARIN SODIUM 5000 UNIT(S): 5000 INJECTION INTRAVENOUS; SUBCUTANEOUS at 06:34

## 2024-01-15 RX ADMIN — Medication 1: at 08:47

## 2024-01-15 RX ADMIN — TAMSULOSIN HYDROCHLORIDE 0.8 MILLIGRAM(S): 0.4 CAPSULE ORAL at 22:20

## 2024-01-15 RX ADMIN — Medication 1: at 22:18

## 2024-01-15 RX ADMIN — HEPARIN SODIUM 5000 UNIT(S): 5000 INJECTION INTRAVENOUS; SUBCUTANEOUS at 22:23

## 2024-01-15 RX ADMIN — CHLORHEXIDINE GLUCONATE 1 APPLICATION(S): 213 SOLUTION TOPICAL at 06:35

## 2024-01-15 NOTE — PROGRESS NOTE ADULT - SUBJECTIVE AND OBJECTIVE BOX
OVERNIGHT EVENTS:    SUBJECTIVE / INTERVAL HPI: Patient seen and examined at bedside.     VITAL SIGNS:  Vital Signs Last 24 Hrs  T(C): 36.8 (14 Jan 2024 20:34), Max: 36.8 (14 Jan 2024 20:34)  T(F): 98.2 (14 Jan 2024 20:34), Max: 98.2 (14 Jan 2024 20:34)  HR: 62 (14 Jan 2024 20:34) (62 - 68)  BP: 120/56 (14 Jan 2024 20:34) (107/61 - 120/56)  BP(mean): 77 (14 Jan 2024 20:34) (77 - 77)  RR: 18 (14 Jan 2024 20:34) (18 - 19)  SpO2: 94% (14 Jan 2024 20:34) (94% - 94%)    Parameters below as of 14 Jan 2024 20:34  Patient On (Oxygen Delivery Method): room air        PHYSICAL EXAM:    General: NAD  HEENT: NC/AT; PERRL, anicteric sclera; MMM  Neck: supple w/o palpable nodularity  Cardiovascular: +S1/S2; RRR  Respiratory: CTA B/L; no W/R/R  Gastrointestinal: soft, NT/ND; +BSx4  Extremities: WWP; no edema, clubbing or cyanosis  Vascular: 2+ radial, DP/PT pulses B/L  Neurological: AAOx3; no focal deficits    MEDICATIONS:  MEDICATIONS  (STANDING):  atorvastatin 10 milliGRAM(s) Oral at bedtime  chlorhexidine 2% Cloths 1 Application(s) Topical <User Schedule>  dextrose 5%. 1000 milliLiter(s) (100 mL/Hr) IV Continuous <Continuous>  dextrose 5%. 1000 milliLiter(s) (50 mL/Hr) IV Continuous <Continuous>  dextrose 50% Injectable 25 Gram(s) IV Push once  dextrose 50% Injectable 25 Gram(s) IV Push once  dextrose 50% Injectable 12.5 Gram(s) IV Push once  glucagon  Injectable 1 milliGRAM(s) IntraMuscular once  heparin   Injectable 5000 Unit(s) SubCutaneous every 8 hours  insulin lispro (ADMELOG) corrective regimen sliding scale   SubCutaneous at bedtime  insulin lispro (ADMELOG) corrective regimen sliding scale   SubCutaneous three times a day before meals  tamsulosin 0.8 milliGRAM(s) Oral at bedtime    MEDICATIONS  (PRN):  acetaminophen     Tablet .. 650 milliGRAM(s) Oral every 6 hours PRN Temp greater or equal to 38C (100.4F), Mild Pain (1 - 3)  dextrose Oral Gel 15 Gram(s) Oral once PRN Blood Glucose LESS THAN 70 milliGRAM(s)/deciliter      ALLERGIES:  Allergies    No Known Allergies    Intolerances        LABS:                        11.2   9.09  )-----------( 233      ( 14 Jan 2024 05:30 )             33.8     01-14    137  |  102  |  19  ----------------------------<  182<H>  3.8   |  23  |  1.27    Ca    8.8      14 Jan 2024 05:30  Phos  2.2     01-14  Mg     2.0     01-14    TPro  7.4  /  Alb  3.2<L>  /  TBili  0.4  /  DBili  x   /  AST  16  /  ALT  12  /  AlkPhos  49  01-14      Urinalysis Basic - ( 14 Jan 2024 05:30 )    Color: x / Appearance: x / SG: x / pH: x  Gluc: 182 mg/dL / Ketone: x  / Bili: x / Urobili: x   Blood: x / Protein: x / Nitrite: x   Leuk Esterase: x / RBC: x / WBC x   Sq Epi: x / Non Sq Epi: x / Bacteria: x      CAPILLARY BLOOD GLUCOSE      POCT Blood Glucose.: 239 mg/dL (14 Jan 2024 22:03)      RADIOLOGY & ADDITIONAL TESTS: Reviewed.   OVERNIGHT EVENTS: ROBIN    SUBJECTIVE / INTERVAL HPI: Patient seen and examined at bedside, found sleeping soundly in bed. Pt woke up quickly to voice. He feels well and has no acute complaints. ROS negative.      VITAL SIGNS:  Vital Signs Last 24 Hrs  T(C): 36.8 (14 Jan 2024 20:34), Max: 36.8 (14 Jan 2024 20:34)  T(F): 98.2 (14 Jan 2024 20:34), Max: 98.2 (14 Jan 2024 20:34)  HR: 62 (14 Jan 2024 20:34) (62 - 68)  BP: 120/56 (14 Jan 2024 20:34) (107/61 - 120/56)  BP(mean): 77 (14 Jan 2024 20:34) (77 - 77)  RR: 18 (14 Jan 2024 20:34) (18 - 19)  SpO2: 94% (14 Jan 2024 20:34) (94% - 94%)    Parameters below as of 14 Jan 2024 20:34  Patient On (Oxygen Delivery Method): room air        PHYSICAL EXAM:    General: NAD, laying in bed comfortably  Head: NC/AT; MMM; PERRL; EOMI;  Neck: Supple; no JVD  Respiratory: CTAB; no wheezes/rales/rhonchi  Cardiovascular: Regular rhythm/rate; S1/S2+, no murmurs, rubs gallops   Gastrointestinal: Soft; NTND; bowel sounds normal and present  Extremities: WWP; no edema/cyanosis  Neurological: A&Ox3, conversing and answering questions appropriately    MEDICATIONS:  MEDICATIONS  (STANDING):  atorvastatin 10 milliGRAM(s) Oral at bedtime  chlorhexidine 2% Cloths 1 Application(s) Topical <User Schedule>  dextrose 5%. 1000 milliLiter(s) (100 mL/Hr) IV Continuous <Continuous>  dextrose 5%. 1000 milliLiter(s) (50 mL/Hr) IV Continuous <Continuous>  dextrose 50% Injectable 25 Gram(s) IV Push once  dextrose 50% Injectable 25 Gram(s) IV Push once  dextrose 50% Injectable 12.5 Gram(s) IV Push once  glucagon  Injectable 1 milliGRAM(s) IntraMuscular once  heparin   Injectable 5000 Unit(s) SubCutaneous every 8 hours  insulin lispro (ADMELOG) corrective regimen sliding scale   SubCutaneous at bedtime  insulin lispro (ADMELOG) corrective regimen sliding scale   SubCutaneous three times a day before meals  tamsulosin 0.8 milliGRAM(s) Oral at bedtime    MEDICATIONS  (PRN):  acetaminophen     Tablet .. 650 milliGRAM(s) Oral every 6 hours PRN Temp greater or equal to 38C (100.4F), Mild Pain (1 - 3)  dextrose Oral Gel 15 Gram(s) Oral once PRN Blood Glucose LESS THAN 70 milliGRAM(s)/deciliter      ALLERGIES:  Allergies    No Known Allergies    Intolerances        LABS:                        11.2   9.09  )-----------( 233      ( 14 Jan 2024 05:30 )             33.8     01-14    137  |  102  |  19  ----------------------------<  182<H>  3.8   |  23  |  1.27    Ca    8.8      14 Jan 2024 05:30  Phos  2.2     01-14  Mg     2.0     01-14    TPro  7.4  /  Alb  3.2<L>  /  TBili  0.4  /  DBili  x   /  AST  16  /  ALT  12  /  AlkPhos  49  01-14      Urinalysis Basic - ( 14 Jan 2024 05:30 )    Color: x / Appearance: x / SG: x / pH: x  Gluc: 182 mg/dL / Ketone: x  / Bili: x / Urobili: x   Blood: x / Protein: x / Nitrite: x   Leuk Esterase: x / RBC: x / WBC x   Sq Epi: x / Non Sq Epi: x / Bacteria: x      CAPILLARY BLOOD GLUCOSE      POCT Blood Glucose.: 239 mg/dL (14 Jan 2024 22:03)      RADIOLOGY & ADDITIONAL TESTS: Reviewed.

## 2024-01-15 NOTE — PROGRESS NOTE ADULT - PROBLEM SELECTOR PLAN 7
RESOLVED. Pt with BPH and DM recently switched from Farxiga by PCP to levemir insulin due to recurrent UTI. Had 2 episodes of UTI in the past month, last diagnosed on 1/4; ucx grew staph epi, cx was pansensitive, placed on 10-day course of Levaquin (completed 5 days outpatient prior to presenting to [facility]). UA (+) LE, WBC, bacteria. Ucx contaminated.   - Started CTX 1g IV qd x3 days (1/9-1/11) RESOLVED. Pt with BPH and DM recently switched from Farxiga by PCP to levemir insulin due to recurrent UTI. Had 2 episodes of UTI in the past month, last diagnosed on 1/4; ucx grew staph epi, cx was pansensitive, placed on 10-day course of Levaquin (completed 5 days outpatient prior to presenting to [facility]). UA (+) LE, WBC, bacteria. Ucx contaminated.   - Started CTX 1g IV qd x3 days (1/9-1/11) RESOLVED. Pt with BPH and DM recently switched from Farxiga by PCP to levemir insulin due to recurrent UTI. Had 2 episodes of UTI in the past month, last diagnosed on 1/4; ucx grew staph epi, cx was pansensitive, placed on 10-day course of Levaquin (completed 5 days outpatient prior to presenting to [facility]). UA (+) LE, WBC, bacteria. Ucx contaminated.   - Started CTX 1g IV qd x3 days (1/9-1/11)

## 2024-01-15 NOTE — PROGRESS NOTE ADULT - PROBLEM SELECTOR PLAN 5
RESOLVED. Pt with DM, was on home Metformin 1000 BID, Glimepiride 2mg BID; recently switched from Farxiga by PCP (due to recurrent UTI), to Levemir insulin 10 units in morning and 5 units at night. Found to have a blood glucose of 34 on admission, urine Glucose > 1000, A1c 9. S/p D50 50ml + 25ml boluses, D10 1000ml infusion at 30ml/h, 1 L NS bolus in the ED. Discontinued D10 drip and started diet   - FSG q2h   - start on Maame

## 2024-01-15 NOTE — PROGRESS NOTE ADULT - ASSESSMENT
89 yo man admitted with JULIO CESAR and hypoglycemia with UTI with urinary retenetion secondary to UTI  -kidney function has returned to normal  -wade in place - he failed a trial of voiding and wade had to be replaced  --at this time and age he is unable to handle wade at home and it will need to remain in place for 7-14 days until tamsulosin has helped to resolve the BPH  --will add finasteride 5mg as well  --can add tadalafil 5mg as well if needed outpatient  --he is an excellent candidate for BRIAN at his age with catheter - he is highly motivated to increase his strength to baseline as he used to live alone prior to this week long hospitalization with extended time in bed - please evaluated for BRIAN ASAP  -Diabetes = his kidney function is now normal again and he should be transitioned from sliding scale insulin to metformin 1000mg bid and januvia 100mg daily  -DVT prophylaxis  -d/w team and patient and family

## 2024-01-15 NOTE — PROGRESS NOTE ADULT - SUBJECTIVE AND OBJECTIVE BOX
HPI:  89 yr old male ( AAOx3 at baseline ), history of DM for the past 20 years, on oral medications for DM ( Metformin 1000 BID, Glimeperide 2mg BID ), was recently prescribed  farxiga ( unknown dose ) to be added to his oral medications for better glucose control. But due to repeated UTIs ( one was few weeks ago and one was 5 days ago for which he started Levofloxacin 500mg BID, 10 day course ). his PCP Dr. Lai Huggins ( [phone] ) thought possibly triggered from farxiga so stopped that and started him on levemir insulin. 10 units in morning and 5 units at night. The patient has not been eating well for the past few days. Around 5 pm today, the patient got his evening insulin and by 7pm he started feeling agitated, confused and sweaty. Son called EMS and they found him to have low blood glucose. Admitted for the management of hypoglycemia.    Medicine Progress Note    Patient is a 89y old  Male who presents with a chief complaint of Hypoglycemia (15 Braxton 2024 06:45)      SUBJECTIVE / OVERNIGHT EVENTS:    ADDITIONAL REVIEW OF SYSTEMS:    MEDICATIONS  (STANDING):  atorvastatin 10 milliGRAM(s) Oral at bedtime  chlorhexidine 2% Cloths 1 Application(s) Topical <User Schedule>  dextrose 5%. 1000 milliLiter(s) (100 mL/Hr) IV Continuous <Continuous>  dextrose 5%. 1000 milliLiter(s) (50 mL/Hr) IV Continuous <Continuous>  dextrose 50% Injectable 25 Gram(s) IV Push once  dextrose 50% Injectable 12.5 Gram(s) IV Push once  dextrose 50% Injectable 25 Gram(s) IV Push once  glucagon  Injectable 1 milliGRAM(s) IntraMuscular once  heparin   Injectable 5000 Unit(s) SubCutaneous every 8 hours  insulin lispro (ADMELOG) corrective regimen sliding scale   SubCutaneous at bedtime  insulin lispro (ADMELOG) corrective regimen sliding scale   SubCutaneous three times a day before meals  tamsulosin 0.8 milliGRAM(s) Oral at bedtime    MEDICATIONS  (PRN):  acetaminophen     Tablet .. 650 milliGRAM(s) Oral every 6 hours PRN Temp greater or equal to 38C (100.4F), Mild Pain (1 - 3)  dextrose Oral Gel 15 Gram(s) Oral once PRN Blood Glucose LESS THAN 70 milliGRAM(s)/deciliter    CAPILLARY BLOOD GLUCOSE      POCT Blood Glucose.: 239 mg/dL (14 Jan 2024 22:03)  POCT Blood Glucose.: 130 mg/dL (14 Jan 2024 17:29)  POCT Blood Glucose.: 287 mg/dL (14 Jan 2024 12:36)  POCT Blood Glucose.: 192 mg/dL (14 Jan 2024 08:45)    I&O's Summary    14 Jan 2024 07:01  -  15 Braxton 2024 07:00  --------------------------------------------------------  IN: 0 mL / OUT: 850 mL / NET: -850 mL        PHYSICAL EXAM:  Vital Signs Last 24 Hrs  T(C): 36.8 (14 Jan 2024 20:34), Max: 36.8 (14 Jan 2024 20:34)  T(F): 98.2 (14 Jan 2024 20:34), Max: 98.2 (14 Jan 2024 20:34)  HR: 62 (14 Jan 2024 20:34) (62 - 68)  BP: 120/56 (14 Jan 2024 20:34) (107/61 - 120/56)  BP(mean): 77 (14 Jan 2024 20:34) (77 - 77)  RR: 18 (14 Jan 2024 20:34) (18 - 19)  SpO2: 94% (14 Jan 2024 20:34) (94% - 94%)    NAD sleeping but arousable  No JVD  Chest clear  CV RRR s1s2 no murmur  Abd soft  Ext no edema      LABS:                        11.2   9.09  )-----------( 233      ( 14 Jan 2024 05:30 )             33.8     01-14    137  |  102  |  19  ----------------------------<  182<H>  3.8   |  23  |  1.27    Ca    8.8      14 Jan 2024 05:30  Phos  2.2     01-14  Mg     2.0     01-14    TPro  7.4  /  Alb  3.2<L>  /  TBili  0.4  /  DBili  x   /  AST  16  /  ALT  12  /  AlkPhos  49  01-14          Urinalysis Basic - ( 14 Jan 2024 05:30 )    Color: x / Appearance: x / SG: x / pH: x  Gluc: 182 mg/dL / Ketone: x  / Bili: x / Urobili: x   Blood: x / Protein: x / Nitrite: x   Leuk Esterase: x / RBC: x / WBC x   Sq Epi: x / Non Sq Epi: x / Bacteria: x       HPI:  89 yr old male ( AAOx3 at baseline ), history of DM for the past 20 years, on oral medications for DM ( Metformin 1000 BID, Glimeperide 2mg BID ), was recently prescribed  farxiga ( unknown dose ) to be added to his oral medications for better glucose control. But due to repeated UTIs ( one was few weeks ago and one was 5 days ago for which he started Levofloxacin 500mg BID, 10 day course ). his PCP Dr. Lia Huggins ( [phone] ) thought possibly triggered from farxiga so stopped that and started him on levemir insulin. 10 units in morning and 5 units at night. The patient has not been eating well for the past few days. Around 5 pm today, the patient got his evening insulin and by 7pm he started feeling agitated, confused and sweaty. Son called EMS and they found him to have low blood glucose. Admitted for the management of hypoglycemia.    Medicine Progress Note    Patient is a 89y old  Male who presents with a chief complaint of Hypoglycemia (15 Braxton 2024 06:45)      SUBJECTIVE / OVERNIGHT EVENTS:    ADDITIONAL REVIEW OF SYSTEMS:    MEDICATIONS  (STANDING):  atorvastatin 10 milliGRAM(s) Oral at bedtime  chlorhexidine 2% Cloths 1 Application(s) Topical <User Schedule>  dextrose 5%. 1000 milliLiter(s) (100 mL/Hr) IV Continuous <Continuous>  dextrose 5%. 1000 milliLiter(s) (50 mL/Hr) IV Continuous <Continuous>  dextrose 50% Injectable 25 Gram(s) IV Push once  dextrose 50% Injectable 12.5 Gram(s) IV Push once  dextrose 50% Injectable 25 Gram(s) IV Push once  glucagon  Injectable 1 milliGRAM(s) IntraMuscular once  heparin   Injectable 5000 Unit(s) SubCutaneous every 8 hours  insulin lispro (ADMELOG) corrective regimen sliding scale   SubCutaneous at bedtime  insulin lispro (ADMELOG) corrective regimen sliding scale   SubCutaneous three times a day before meals  tamsulosin 0.8 milliGRAM(s) Oral at bedtime    MEDICATIONS  (PRN):  acetaminophen     Tablet .. 650 milliGRAM(s) Oral every 6 hours PRN Temp greater or equal to 38C (100.4F), Mild Pain (1 - 3)  dextrose Oral Gel 15 Gram(s) Oral once PRN Blood Glucose LESS THAN 70 milliGRAM(s)/deciliter    CAPILLARY BLOOD GLUCOSE      POCT Blood Glucose.: 239 mg/dL (14 Jan 2024 22:03)  POCT Blood Glucose.: 130 mg/dL (14 Jan 2024 17:29)  POCT Blood Glucose.: 287 mg/dL (14 Jan 2024 12:36)  POCT Blood Glucose.: 192 mg/dL (14 Jan 2024 08:45)    I&O's Summary    14 Jan 2024 07:01  -  15 Braxton 2024 07:00  --------------------------------------------------------  IN: 0 mL / OUT: 850 mL / NET: -850 mL        PHYSICAL EXAM:  Vital Signs Last 24 Hrs  T(C): 36.8 (14 Jan 2024 20:34), Max: 36.8 (14 Jan 2024 20:34)  T(F): 98.2 (14 Jan 2024 20:34), Max: 98.2 (14 Jan 2024 20:34)  HR: 62 (14 Jan 2024 20:34) (62 - 68)  BP: 120/56 (14 Jan 2024 20:34) (107/61 - 120/56)  BP(mean): 77 (14 Jan 2024 20:34) (77 - 77)  RR: 18 (14 Jan 2024 20:34) (18 - 19)  SpO2: 94% (14 Jan 2024 20:34) (94% - 94%)    NAD sleeping but arousable  No JVD  Chest clear  CV RRR s1s2 no murmur  Abd soft  Ext no edema      LABS:                        11.2   9.09  )-----------( 233      ( 14 Jan 2024 05:30 )             33.8     01-14    137  |  102  |  19  ----------------------------<  182<H>  3.8   |  23  |  1.27    Ca    8.8      14 Jan 2024 05:30  Phos  2.2     01-14  Mg     2.0     01-14    TPro  7.4  /  Alb  3.2<L>  /  TBili  0.4  /  DBili  x   /  AST  16  /  ALT  12  /  AlkPhos  49  01-14          Urinalysis Basic - ( 14 Jan 2024 05:30 )    Color: x / Appearance: x / SG: x / pH: x  Gluc: 182 mg/dL / Ketone: x  / Bili: x / Urobili: x   Blood: x / Protein: x / Nitrite: x   Leuk Esterase: x / RBC: x / WBC x   Sq Epi: x / Non Sq Epi: x / Bacteria: x       HPI:  89 yr old male ( AAOx3 at baseline ), history of DM for the past 20 years, on oral medications for DM ( Metformin 1000 BID, Glimeperide 2mg BID ), was recently prescribed  farxiga ( unknown dose ) to be added to his oral medications for better glucose control. But due to repeated UTIs ( one was few weeks ago and one was 5 days ago for which he started Levofloxacin 500mg BID, 10 day course ). his PCP Dr. Lai Huggins ( [phone] ) thought possibly triggered from farxiga so stopped that and started him on levemir insulin. 10 units in morning and 5 units at night. The patient has not been eating well for the past few days. Around 5 pm today, the patient got his evening insulin and by 7pm he started feeling agitated, confused and sweaty. Son called EMS and they found him to have low blood glucose. Admitted for the management of hypoglycemia.    Medicine Progress Note    Patient is a 89y old  Male who presents with a chief complaint of Hypoglycemia (15 Braxton 2024 06:45)      SUBJECTIVE / OVERNIGHT EVENTS:    ADDITIONAL REVIEW OF SYSTEMS:    MEDICATIONS  (STANDING):  atorvastatin 10 milliGRAM(s) Oral at bedtime  chlorhexidine 2% Cloths 1 Application(s) Topical <User Schedule>  dextrose 5%. 1000 milliLiter(s) (100 mL/Hr) IV Continuous <Continuous>  dextrose 5%. 1000 milliLiter(s) (50 mL/Hr) IV Continuous <Continuous>  dextrose 50% Injectable 25 Gram(s) IV Push once  dextrose 50% Injectable 12.5 Gram(s) IV Push once  dextrose 50% Injectable 25 Gram(s) IV Push once  glucagon  Injectable 1 milliGRAM(s) IntraMuscular once  heparin   Injectable 5000 Unit(s) SubCutaneous every 8 hours  insulin lispro (ADMELOG) corrective regimen sliding scale   SubCutaneous at bedtime  insulin lispro (ADMELOG) corrective regimen sliding scale   SubCutaneous three times a day before meals  tamsulosin 0.8 milliGRAM(s) Oral at bedtime    MEDICATIONS  (PRN):  acetaminophen     Tablet .. 650 milliGRAM(s) Oral every 6 hours PRN Temp greater or equal to 38C (100.4F), Mild Pain (1 - 3)  dextrose Oral Gel 15 Gram(s) Oral once PRN Blood Glucose LESS THAN 70 milliGRAM(s)/deciliter    CAPILLARY BLOOD GLUCOSE      POCT Blood Glucose.: 239 mg/dL (14 Jan 2024 22:03)  POCT Blood Glucose.: 130 mg/dL (14 Jan 2024 17:29)  POCT Blood Glucose.: 287 mg/dL (14 Jan 2024 12:36)  POCT Blood Glucose.: 192 mg/dL (14 Jan 2024 08:45)    I&O's Summary    14 Jan 2024 07:01  -  15 Braxton 2024 07:00  --------------------------------------------------------  IN: 0 mL / OUT: 850 mL / NET: -850 mL        PHYSICAL EXAM:  Vital Signs Last 24 Hrs  T(C): 36.8 (14 Jan 2024 20:34), Max: 36.8 (14 Jan 2024 20:34)  T(F): 98.2 (14 Jan 2024 20:34), Max: 98.2 (14 Jan 2024 20:34)  HR: 62 (14 Jan 2024 20:34) (62 - 68)  BP: 120/56 (14 Jan 2024 20:34) (107/61 - 120/56)  BP(mean): 77 (14 Jan 2024 20:34) (77 - 77)  RR: 18 (14 Jan 2024 20:34) (18 - 19)  SpO2: 94% (14 Jan 2024 20:34) (94% - 94%)    NAD sleeping but arousable  No JVD  Chest clear  CV RRR s1s2 no murmur  Abd soft  Ext no edema      LABS:                        11.2   9.09  )-----------( 233      ( 14 Jan 2024 05:30 )             33.8     01-14    137  |  102  |  19  ----------------------------<  182<H>  3.8   |  23  |  1.27    Ca    8.8      14 Jan 2024 05:30  Phos  2.2     01-14  Mg     2.0     01-14    TPro  7.4  /  Alb  3.2<L>  /  TBili  0.4  /  DBili  x   /  AST  16  /  ALT  12  /  AlkPhos  49  01-14          Urinalysis Basic - ( 14 Jan 2024 05:30 )    Color: x / Appearance: x / SG: x / pH: x  Gluc: 182 mg/dL / Ketone: x  / Bili: x / Urobili: x   Blood: x / Protein: x / Nitrite: x   Leuk Esterase: x / RBC: x / WBC x   Sq Epi: x / Non Sq Epi: x / Bacteria: x

## 2024-01-15 NOTE — PROGRESS NOTE ADULT - PROBLEM SELECTOR PLAN 1
#obstructive JULIO CESAR on CKD iso BPH  #b/l hydronephrosis  IMPROVING.   On admission, Cr of 2.94 (baseline of 1.35 on 12/4/23); Cr continued to trend up to 3.16 on 1/9. Specific gravity of 1.023, concentrated  - Ensure adequate hydration with IV fluids  - Monitor BMP  - Strict I & Os  - Avoid nephrotoxic agents  - Dan placed by urology on 1/9  - Holding fluids iso continued diuresis

## 2024-01-15 NOTE — PROGRESS NOTE ADULT - PROBLEM SELECTOR PLAN 2
#Post-obstructive diuresis   Pt with history of BPH, on home tamsulosin 0.8mg PO qhs. Renal US with moderate bilateral hydro and mild increased renal parenchyma Found to be retaining on bladder scan on 1/9 with 800cc; unable to straight cath due to blood clot with resistance.   Pt urinating high volume following obstruction, consistent with post-obstructive diuresis  - Dan placed by urology on 1/9, TOV 1/13 while ambulate as tolerated  - C/w tamsulosin 0.8mg PO qhs  - Monitor I&O's  - will defer further IVF as pt is tolerating PO>

## 2024-01-16 ENCOUNTER — TRANSCRIPTION ENCOUNTER (OUTPATIENT)
Age: 89
End: 2024-01-16

## 2024-01-16 VITALS
SYSTOLIC BLOOD PRESSURE: 113 MMHG | HEART RATE: 84 BPM | RESPIRATION RATE: 18 BRPM | TEMPERATURE: 97 F | DIASTOLIC BLOOD PRESSURE: 71 MMHG | OXYGEN SATURATION: 96 %

## 2024-01-16 LAB
ANION GAP SERPL CALC-SCNC: 9 MMOL/L — SIGNIFICANT CHANGE UP (ref 5–17)
ANION GAP SERPL CALC-SCNC: 9 MMOL/L — SIGNIFICANT CHANGE UP (ref 5–17)
BASOPHILS # BLD AUTO: 0.03 K/UL — SIGNIFICANT CHANGE UP (ref 0–0.2)
BASOPHILS NFR BLD AUTO: 0.4 % — SIGNIFICANT CHANGE UP (ref 0–2)
BUN SERPL-MCNC: 17 MG/DL — SIGNIFICANT CHANGE UP (ref 7–23)
BUN SERPL-MCNC: 17 MG/DL — SIGNIFICANT CHANGE UP (ref 7–23)
CALCIUM SERPL-MCNC: 8.8 MG/DL — SIGNIFICANT CHANGE UP (ref 8.4–10.5)
CALCIUM SERPL-MCNC: 8.9 MG/DL — SIGNIFICANT CHANGE UP (ref 8.4–10.5)
CHLORIDE SERPL-SCNC: 100 MMOL/L — SIGNIFICANT CHANGE UP (ref 96–108)
CHLORIDE SERPL-SCNC: 103 MMOL/L — SIGNIFICANT CHANGE UP (ref 96–108)
CO2 SERPL-SCNC: 25 MMOL/L — SIGNIFICANT CHANGE UP (ref 22–31)
CO2 SERPL-SCNC: 26 MMOL/L — SIGNIFICANT CHANGE UP (ref 22–31)
CREAT SERPL-MCNC: 1.17 MG/DL — SIGNIFICANT CHANGE UP (ref 0.5–1.3)
CREAT SERPL-MCNC: 1.24 MG/DL — SIGNIFICANT CHANGE UP (ref 0.5–1.3)
EGFR: 51 ML/MIN/1.73M2 — LOW
EGFR: 55 ML/MIN/1.73M2 — LOW
EOSINOPHIL # BLD AUTO: 0.14 K/UL — SIGNIFICANT CHANGE UP (ref 0–0.5)
EOSINOPHIL NFR BLD AUTO: 1.7 % — SIGNIFICANT CHANGE UP (ref 0–6)
GLUCOSE SERPL-MCNC: 164 MG/DL — HIGH (ref 70–99)
GLUCOSE SERPL-MCNC: 252 MG/DL — HIGH (ref 70–99)
HCT VFR BLD CALC: 31 % — LOW (ref 39–50)
HCT VFR BLD CALC: 32.6 % — LOW (ref 39–50)
HGB BLD-MCNC: 10.3 G/DL — LOW (ref 13–17)
HGB BLD-MCNC: 10.8 G/DL — LOW (ref 13–17)
IMM GRANULOCYTES NFR BLD AUTO: 0.4 % — SIGNIFICANT CHANGE UP (ref 0–0.9)
LYMPHOCYTES # BLD AUTO: 1.25 K/UL — SIGNIFICANT CHANGE UP (ref 1–3.3)
LYMPHOCYTES # BLD AUTO: 15.6 % — SIGNIFICANT CHANGE UP (ref 13–44)
MAGNESIUM SERPL-MCNC: 1.6 MG/DL — SIGNIFICANT CHANGE UP (ref 1.6–2.6)
MAGNESIUM SERPL-MCNC: 1.8 MG/DL — SIGNIFICANT CHANGE UP (ref 1.6–2.6)
MCHC RBC-ENTMCNC: 30.2 PG — SIGNIFICANT CHANGE UP (ref 27–34)
MCHC RBC-ENTMCNC: 30.4 PG — SIGNIFICANT CHANGE UP (ref 27–34)
MCHC RBC-ENTMCNC: 33.1 GM/DL — SIGNIFICANT CHANGE UP (ref 32–36)
MCHC RBC-ENTMCNC: 33.2 GM/DL — SIGNIFICANT CHANGE UP (ref 32–36)
MCV RBC AUTO: 91.1 FL — SIGNIFICANT CHANGE UP (ref 80–100)
MCV RBC AUTO: 91.4 FL — SIGNIFICANT CHANGE UP (ref 80–100)
MONOCYTES # BLD AUTO: 0.72 K/UL — SIGNIFICANT CHANGE UP (ref 0–0.9)
MONOCYTES NFR BLD AUTO: 9 % — SIGNIFICANT CHANGE UP (ref 2–14)
NEUTROPHILS # BLD AUTO: 5.84 K/UL — SIGNIFICANT CHANGE UP (ref 1.8–7.4)
NEUTROPHILS NFR BLD AUTO: 72.9 % — SIGNIFICANT CHANGE UP (ref 43–77)
NRBC # BLD: 0 /100 WBCS — SIGNIFICANT CHANGE UP (ref 0–0)
NRBC # BLD: 0 /100 WBCS — SIGNIFICANT CHANGE UP (ref 0–0)
PHOSPHATE SERPL-MCNC: 2.2 MG/DL — LOW (ref 2.5–4.5)
PHOSPHATE SERPL-MCNC: 2.3 MG/DL — LOW (ref 2.5–4.5)
PLATELET # BLD AUTO: 236 K/UL — SIGNIFICANT CHANGE UP (ref 150–400)
PLATELET # BLD AUTO: 242 K/UL — SIGNIFICANT CHANGE UP (ref 150–400)
POTASSIUM SERPL-MCNC: 4.2 MMOL/L — SIGNIFICANT CHANGE UP (ref 3.5–5.3)
POTASSIUM SERPL-MCNC: 4.6 MMOL/L — SIGNIFICANT CHANGE UP (ref 3.5–5.3)
POTASSIUM SERPL-SCNC: 4.2 MMOL/L — SIGNIFICANT CHANGE UP (ref 3.5–5.3)
POTASSIUM SERPL-SCNC: 4.6 MMOL/L — SIGNIFICANT CHANGE UP (ref 3.5–5.3)
RBC # BLD: 3.39 M/UL — LOW (ref 4.2–5.8)
RBC # BLD: 3.58 M/UL — LOW (ref 4.2–5.8)
RBC # FLD: 14.5 % — SIGNIFICANT CHANGE UP (ref 10.3–14.5)
RBC # FLD: 14.6 % — HIGH (ref 10.3–14.5)
SODIUM SERPL-SCNC: 134 MMOL/L — LOW (ref 135–145)
SODIUM SERPL-SCNC: 138 MMOL/L — SIGNIFICANT CHANGE UP (ref 135–145)
WBC # BLD: 7.63 K/UL — SIGNIFICANT CHANGE UP (ref 3.8–10.5)
WBC # BLD: 8.01 K/UL — SIGNIFICANT CHANGE UP (ref 3.8–10.5)
WBC # FLD AUTO: 7.63 K/UL — SIGNIFICANT CHANGE UP (ref 3.8–10.5)
WBC # FLD AUTO: 8.01 K/UL — SIGNIFICANT CHANGE UP (ref 3.8–10.5)

## 2024-01-16 PROCEDURE — 84100 ASSAY OF PHOSPHORUS: CPT

## 2024-01-16 PROCEDURE — 36415 COLL VENOUS BLD VENIPUNCTURE: CPT

## 2024-01-16 PROCEDURE — 97116 GAIT TRAINING THERAPY: CPT

## 2024-01-16 PROCEDURE — 83735 ASSAY OF MAGNESIUM: CPT

## 2024-01-16 PROCEDURE — 86850 RBC ANTIBODY SCREEN: CPT

## 2024-01-16 PROCEDURE — 82962 GLUCOSE BLOOD TEST: CPT

## 2024-01-16 PROCEDURE — 86901 BLOOD TYPING SEROLOGIC RH(D): CPT

## 2024-01-16 PROCEDURE — 84300 ASSAY OF URINE SODIUM: CPT

## 2024-01-16 PROCEDURE — 80048 BASIC METABOLIC PNL TOTAL CA: CPT

## 2024-01-16 PROCEDURE — 80053 COMPREHEN METABOLIC PANEL: CPT

## 2024-01-16 PROCEDURE — 84540 ASSAY OF URINE/UREA-N: CPT

## 2024-01-16 PROCEDURE — 87086 URINE CULTURE/COLONY COUNT: CPT

## 2024-01-16 PROCEDURE — 83036 HEMOGLOBIN GLYCOSYLATED A1C: CPT

## 2024-01-16 PROCEDURE — 99285 EMERGENCY DEPT VISIT HI MDM: CPT

## 2024-01-16 PROCEDURE — 85025 COMPLETE CBC W/AUTO DIFF WBC: CPT

## 2024-01-16 PROCEDURE — 82570 ASSAY OF URINE CREATININE: CPT

## 2024-01-16 PROCEDURE — 97161 PT EVAL LOW COMPLEX 20 MIN: CPT

## 2024-01-16 PROCEDURE — 81001 URINALYSIS AUTO W/SCOPE: CPT

## 2024-01-16 PROCEDURE — 86900 BLOOD TYPING SEROLOGIC ABO: CPT

## 2024-01-16 PROCEDURE — 99221 1ST HOSP IP/OBS SF/LOW 40: CPT

## 2024-01-16 PROCEDURE — 80076 HEPATIC FUNCTION PANEL: CPT

## 2024-01-16 PROCEDURE — 85027 COMPLETE CBC AUTOMATED: CPT

## 2024-01-16 PROCEDURE — 76775 US EXAM ABDO BACK WALL LIM: CPT

## 2024-01-16 RX ORDER — METFORMIN HYDROCHLORIDE 850 MG/1
1000 TABLET ORAL EVERY 12 HOURS
Refills: 0 | Status: DISCONTINUED | OUTPATIENT
Start: 2024-01-16 | End: 2024-01-16

## 2024-01-16 RX ORDER — MAGNESIUM SULFATE 500 MG/ML
2 VIAL (ML) INJECTION ONCE
Refills: 0 | Status: COMPLETED | OUTPATIENT
Start: 2024-01-16 | End: 2024-01-16

## 2024-01-16 RX ORDER — LINAGLIPTIN 5 MG/1
5 TABLET, FILM COATED ORAL DAILY
Refills: 0 | Status: DISCONTINUED | OUTPATIENT
Start: 2024-01-16 | End: 2024-01-16

## 2024-01-16 RX ORDER — POTASSIUM PHOSPHATE, MONOBASIC POTASSIUM PHOSPHATE, DIBASIC 236; 224 MG/ML; MG/ML
30 INJECTION, SOLUTION INTRAVENOUS ONCE
Refills: 0 | Status: COMPLETED | OUTPATIENT
Start: 2024-01-16 | End: 2024-01-16

## 2024-01-16 RX ADMIN — LINAGLIPTIN 5 MILLIGRAM(S): 5 TABLET, FILM COATED ORAL at 12:25

## 2024-01-16 RX ADMIN — HEPARIN SODIUM 5000 UNIT(S): 5000 INJECTION INTRAVENOUS; SUBCUTANEOUS at 05:51

## 2024-01-16 RX ADMIN — Medication 25 GRAM(S): at 10:31

## 2024-01-16 RX ADMIN — POTASSIUM PHOSPHATE, MONOBASIC POTASSIUM PHOSPHATE, DIBASIC 83.33 MILLIMOLE(S): 236; 224 INJECTION, SOLUTION INTRAVENOUS at 12:36

## 2024-01-16 RX ADMIN — HEPARIN SODIUM 5000 UNIT(S): 5000 INJECTION INTRAVENOUS; SUBCUTANEOUS at 13:22

## 2024-01-16 NOTE — DISCHARGE NOTE NURSING/CASE MANAGEMENT/SOCIAL WORK - NSDCPEFALRISK_GEN_ALL_CORE
For information on Fall & Injury Prevention, visit: [email or web address] OR  [email or web address] OR  https://www.cdc.gov/steadi/patient.html For information on Fall & Injury Prevention, visit: [email or web address] OR  [email or web address] OR  https://www.cdc.gov/steadi/patient.html

## 2024-01-16 NOTE — PROGRESS NOTE ADULT - SUBJECTIVE AND OBJECTIVE BOX
OVERNIGHT EVENTS:    SUBJECTIVE / INTERVAL HPI: Patient seen and examined at bedside.     VITAL SIGNS:  Vital Signs Last 24 Hrs  T(C): 37.4 (16 Jan 2024 05:44), Max: 37.4 (16 Jan 2024 05:44)  T(F): 99.3 (16 Jan 2024 05:44), Max: 99.3 (16 Jan 2024 05:44)  HR: 93 (16 Jan 2024 05:44) (62 - 94)  BP: 156/89 (16 Jan 2024 05:44) (129/76 - 156/89)  BP(mean): --  RR: 16 (16 Jan 2024 05:44) (16 - 18)  SpO2: 93% (16 Jan 2024 05:44) (93% - 97%)    Parameters below as of 15 Braxton 2024 20:52  Patient On (Oxygen Delivery Method): room air        PHYSICAL EXAM:    General: NAD  HEENT: NC/AT; PERRL, anicteric sclera; MMM  Neck: supple w/o palpable nodularity  Cardiovascular: +S1/S2; RRR  Respiratory: CTA B/L; no W/R/R  Gastrointestinal: soft, NT/ND; +BSx4  Extremities: WWP; no edema, clubbing or cyanosis  Vascular: 2+ radial, DP/PT pulses B/L  Neurological: AAOx3; no focal deficits    MEDICATIONS:  MEDICATIONS  (STANDING):  atorvastatin 10 milliGRAM(s) Oral at bedtime  chlorhexidine 2% Cloths 1 Application(s) Topical <User Schedule>  dextrose 5%. 1000 milliLiter(s) (100 mL/Hr) IV Continuous <Continuous>  dextrose 5%. 1000 milliLiter(s) (50 mL/Hr) IV Continuous <Continuous>  dextrose 50% Injectable 25 Gram(s) IV Push once  dextrose 50% Injectable 25 Gram(s) IV Push once  dextrose 50% Injectable 12.5 Gram(s) IV Push once  glucagon  Injectable 1 milliGRAM(s) IntraMuscular once  heparin   Injectable 5000 Unit(s) SubCutaneous every 8 hours  insulin lispro (ADMELOG) corrective regimen sliding scale   SubCutaneous at bedtime  insulin lispro (ADMELOG) corrective regimen sliding scale   SubCutaneous three times a day before meals  lidocaine 2% Jelly 1 milliLiter(s) IntraUrethral daily  tamsulosin 0.8 milliGRAM(s) Oral at bedtime    MEDICATIONS  (PRN):  acetaminophen     Tablet .. 650 milliGRAM(s) Oral every 6 hours PRN Temp greater or equal to 38C (100.4F), Mild Pain (1 - 3)  dextrose Oral Gel 15 Gram(s) Oral once PRN Blood Glucose LESS THAN 70 milliGRAM(s)/deciliter      ALLERGIES:  Allergies    No Known Allergies    Intolerances        LABS:              CAPILLARY BLOOD GLUCOSE      POCT Blood Glucose.: 291 mg/dL (15 Braxton 2024 22:03)      RADIOLOGY & ADDITIONAL TESTS: Reviewed.   OVERNIGHT EVENTS: Urojet lidocaine jelly given for wade pain     SUBJECTIVE / INTERVAL HPI: Patient seen and examined at bedside. Pt found sleeping but woke up easily. Reports feeling well and has no acute complaints. ROS negative.     VITAL SIGNS:  Vital Signs Last 24 Hrs  T(C): 37.4 (16 Jan 2024 05:44), Max: 37.4 (16 Jan 2024 05:44)  T(F): 99.3 (16 Jan 2024 05:44), Max: 99.3 (16 Jan 2024 05:44)  HR: 93 (16 Jan 2024 05:44) (62 - 94)  BP: 156/89 (16 Jan 2024 05:44) (129/76 - 156/89)  BP(mean): --  RR: 16 (16 Jan 2024 05:44) (16 - 18)  SpO2: 93% (16 Jan 2024 05:44) (93% - 97%)    Parameters below as of 15 Braxton 2024 20:52  Patient On (Oxygen Delivery Method): room air        PHYSICAL EXAM:    General: NAD, laying in bed comfortably  Head: NC/AT; MMM; PERRL; EOMI;  Neck: Supple; no JVD  Respiratory: CTAB; no wheezes/rales/rhonchi  Cardiovascular: Regular rhythm/rate; S1/S2+, no murmurs, rubs gallops   Gastrointestinal: Soft; NTND; bowel sounds normal and present  Extremities: WWP; no edema/cyanosis  Neurological: A&Ox3, conversing and answering questions appropriately    MEDICATIONS:  MEDICATIONS  (STANDING):  atorvastatin 10 milliGRAM(s) Oral at bedtime  chlorhexidine 2% Cloths 1 Application(s) Topical <User Schedule>  dextrose 5%. 1000 milliLiter(s) (100 mL/Hr) IV Continuous <Continuous>  dextrose 5%. 1000 milliLiter(s) (50 mL/Hr) IV Continuous <Continuous>  dextrose 50% Injectable 25 Gram(s) IV Push once  dextrose 50% Injectable 25 Gram(s) IV Push once  dextrose 50% Injectable 12.5 Gram(s) IV Push once  glucagon  Injectable 1 milliGRAM(s) IntraMuscular once  heparin   Injectable 5000 Unit(s) SubCutaneous every 8 hours  insulin lispro (ADMELOG) corrective regimen sliding scale   SubCutaneous at bedtime  insulin lispro (ADMELOG) corrective regimen sliding scale   SubCutaneous three times a day before meals  lidocaine 2% Jelly 1 milliLiter(s) IntraUrethral daily  tamsulosin 0.8 milliGRAM(s) Oral at bedtime    MEDICATIONS  (PRN):  acetaminophen     Tablet .. 650 milliGRAM(s) Oral every 6 hours PRN Temp greater or equal to 38C (100.4F), Mild Pain (1 - 3)  dextrose Oral Gel 15 Gram(s) Oral once PRN Blood Glucose LESS THAN 70 milliGRAM(s)/deciliter      ALLERGIES:  Allergies    No Known Allergies    Intolerances        LABS:              CAPILLARY BLOOD GLUCOSE      POCT Blood Glucose.: 291 mg/dL (15 Braxton 2024 22:03)      RADIOLOGY & ADDITIONAL TESTS: Reviewed.

## 2024-01-16 NOTE — PROGRESS NOTE ADULT - PROVIDER SPECIALTY LIST ADULT
MICU
Cardiology
Cardiology
Internal Medicine
Urology
Cardiology
Cardiology
Internal Medicine
Internal Medicine
MICU
Cardiology
Internal Medicine

## 2024-01-16 NOTE — PROGRESS NOTE ADULT - PROBLEM SELECTOR PLAN 7
RESOLVED. Pt with BPH and DM recently switched from Farxiga by PCP to levemir insulin due to recurrent UTI. Had 2 episodes of UTI in the past month, last diagnosed on 1/4; ucx grew staph epi, cx was pansensitive, placed on 10-day course of Levaquin (completed 5 days outpatient prior to presenting to [facility]). UA (+) LE, WBC, bacteria. Ucx contaminated.   - Started CTX 1g IV qd x3 days (1/9-1/11) RESOLVED. Pt with BPH and DM recently switched from Farxiga by PCP to levemir insulin due to recurrent UTI. Had 2 episodes of UTI in the past month, last diagnosed on 1/4; ucx grew staph epi, cx was pansensitive, placed on 10-day course of Levaquin (completed 5 days outpatient prior to presenting to [facility]). UA (+) LE, WBC, bacteria. Ucx contaminated.   - Started CTX 1g IV qd x3 days (1/9-1/11)

## 2024-01-20 DIAGNOSIS — N17.9 ACUTE KIDNEY FAILURE, UNSPECIFIED: ICD-10-CM

## 2024-01-20 DIAGNOSIS — K59.00 CONSTIPATION, UNSPECIFIED: ICD-10-CM

## 2024-01-20 DIAGNOSIS — E86.0 DEHYDRATION: ICD-10-CM

## 2024-01-20 DIAGNOSIS — Z79.84 LONG TERM (CURRENT) USE OF ORAL HYPOGLYCEMIC DRUGS: ICD-10-CM

## 2024-01-20 DIAGNOSIS — N40.1 BENIGN PROSTATIC HYPERPLASIA WITH LOWER URINARY TRACT SYMPTOMS: ICD-10-CM

## 2024-01-20 DIAGNOSIS — N13.8 OTHER OBSTRUCTIVE AND REFLUX UROPATHY: ICD-10-CM

## 2024-01-20 DIAGNOSIS — E86.1 HYPOVOLEMIA: ICD-10-CM

## 2024-01-20 DIAGNOSIS — E11.22 TYPE 2 DIABETES MELLITUS WITH DIABETIC CHRONIC KIDNEY DISEASE: ICD-10-CM

## 2024-01-20 DIAGNOSIS — E11.649 TYPE 2 DIABETES MELLITUS WITH HYPOGLYCEMIA WITHOUT COMA: ICD-10-CM

## 2024-01-20 DIAGNOSIS — R53.81 OTHER MALAISE: ICD-10-CM

## 2024-01-20 DIAGNOSIS — N39.0 URINARY TRACT INFECTION, SITE NOT SPECIFIED: ICD-10-CM

## 2024-01-20 DIAGNOSIS — Z79.4 LONG TERM (CURRENT) USE OF INSULIN: ICD-10-CM

## 2024-01-20 DIAGNOSIS — R33.8 OTHER RETENTION OF URINE: ICD-10-CM

## 2024-01-20 DIAGNOSIS — G93.41 METABOLIC ENCEPHALOPATHY: ICD-10-CM

## 2024-01-20 DIAGNOSIS — E78.5 HYPERLIPIDEMIA, UNSPECIFIED: ICD-10-CM

## 2024-01-20 DIAGNOSIS — N13.30 UNSPECIFIED HYDRONEPHROSIS: ICD-10-CM

## 2024-01-20 DIAGNOSIS — R60.9 EDEMA, UNSPECIFIED: ICD-10-CM

## 2024-02-21 ENCOUNTER — INPATIENT (INPATIENT)
Facility: HOSPITAL | Age: 89
LOS: 5 days | Discharge: NURSING, REHAB OR LONG-TERM CARE | DRG: 725 | End: 2024-02-27
Attending: INTERNAL MEDICINE | Admitting: INTERNAL MEDICINE
Payer: MEDICARE

## 2024-02-21 VITALS
DIASTOLIC BLOOD PRESSURE: 62 MMHG | HEIGHT: 66 IN | RESPIRATION RATE: 16 BRPM | OXYGEN SATURATION: 95 % | TEMPERATURE: 98 F | HEART RATE: 70 BPM | SYSTOLIC BLOOD PRESSURE: 97 MMHG

## 2024-02-21 DIAGNOSIS — E87.1 HYPO-OSMOLALITY AND HYPONATREMIA: ICD-10-CM

## 2024-02-21 DIAGNOSIS — N39.0 URINARY TRACT INFECTION, SITE NOT SPECIFIED: ICD-10-CM

## 2024-02-21 DIAGNOSIS — Z29.9 ENCOUNTER FOR PROPHYLACTIC MEASURES, UNSPECIFIED: ICD-10-CM

## 2024-02-21 DIAGNOSIS — N17.9 ACUTE KIDNEY FAILURE, UNSPECIFIED: ICD-10-CM

## 2024-02-21 DIAGNOSIS — E78.5 HYPERLIPIDEMIA, UNSPECIFIED: ICD-10-CM

## 2024-02-21 DIAGNOSIS — G93.41 METABOLIC ENCEPHALOPATHY: ICD-10-CM

## 2024-02-21 DIAGNOSIS — N40.0 BENIGN PROSTATIC HYPERPLASIA WITHOUT LOWER URINARY TRACT SYMPTOMS: ICD-10-CM

## 2024-02-21 DIAGNOSIS — R33.9 RETENTION OF URINE, UNSPECIFIED: ICD-10-CM

## 2024-02-21 LAB
ALBUMIN SERPL ELPH-MCNC: 3.1 G/DL — LOW (ref 3.3–5)
ALP SERPL-CCNC: 65 U/L — SIGNIFICANT CHANGE UP (ref 40–120)
ALT FLD-CCNC: 6 U/L — LOW (ref 10–45)
ANION GAP SERPL CALC-SCNC: 11 MMOL/L — SIGNIFICANT CHANGE UP (ref 5–17)
ANION GAP SERPL CALC-SCNC: 8 MMOL/L — SIGNIFICANT CHANGE UP (ref 5–17)
APPEARANCE UR: ABNORMAL
AST SERPL-CCNC: 12 U/L — SIGNIFICANT CHANGE UP (ref 10–40)
BACTERIA # UR AUTO: ABNORMAL /HPF
BILIRUB SERPL-MCNC: 0.5 MG/DL — SIGNIFICANT CHANGE UP (ref 0.2–1.2)
BILIRUB UR-MCNC: NEGATIVE — SIGNIFICANT CHANGE UP
BUN SERPL-MCNC: 35 MG/DL — HIGH (ref 7–23)
BUN SERPL-MCNC: 36 MG/DL — HIGH (ref 7–23)
CALCIUM SERPL-MCNC: 8.3 MG/DL — LOW (ref 8.4–10.5)
CALCIUM SERPL-MCNC: 8.9 MG/DL — SIGNIFICANT CHANGE UP (ref 8.4–10.5)
CAST: 35 /LPF — HIGH (ref 0–4)
CHLORIDE SERPL-SCNC: 101 MMOL/L — SIGNIFICANT CHANGE UP (ref 96–108)
CHLORIDE SERPL-SCNC: 96 MMOL/L — SIGNIFICANT CHANGE UP (ref 96–108)
CO2 SERPL-SCNC: 22 MMOL/L — SIGNIFICANT CHANGE UP (ref 22–31)
CO2 SERPL-SCNC: 25 MMOL/L — SIGNIFICANT CHANGE UP (ref 22–31)
COLOR SPEC: YELLOW — SIGNIFICANT CHANGE UP
CREAT SERPL-MCNC: 2.4 MG/DL — HIGH (ref 0.5–1.3)
CREAT SERPL-MCNC: 2.63 MG/DL — HIGH (ref 0.5–1.3)
DIFF PNL FLD: ABNORMAL
EGFR: 21 ML/MIN/1.73M2 — LOW
EGFR: 23 ML/MIN/1.73M2 — LOW
GLUCOSE BLDC GLUCOMTR-MCNC: 219 MG/DL — HIGH (ref 70–99)
GLUCOSE BLDC GLUCOMTR-MCNC: 232 MG/DL — HIGH (ref 70–99)
GLUCOSE SERPL-MCNC: 248 MG/DL — HIGH (ref 70–99)
GLUCOSE SERPL-MCNC: 279 MG/DL — HIGH (ref 70–99)
GLUCOSE UR QL: NEGATIVE MG/DL — SIGNIFICANT CHANGE UP
HCT VFR BLD CALC: 29 % — LOW (ref 39–50)
HCT VFR BLD CALC: 29.3 % — LOW (ref 39–50)
HGB BLD-MCNC: 10.2 G/DL — LOW (ref 13–17)
HGB BLD-MCNC: 9.8 G/DL — LOW (ref 13–17)
KETONES UR-MCNC: ABNORMAL MG/DL
LACTATE SERPL-SCNC: 1.1 MMOL/L — SIGNIFICANT CHANGE UP (ref 0.5–2)
LEUKOCYTE ESTERASE UR-ACNC: ABNORMAL
MAGNESIUM SERPL-MCNC: 1.6 MG/DL — SIGNIFICANT CHANGE UP (ref 1.6–2.6)
MCHC RBC-ENTMCNC: 30.9 PG — SIGNIFICANT CHANGE UP (ref 27–34)
MCHC RBC-ENTMCNC: 31 PG — SIGNIFICANT CHANGE UP (ref 27–34)
MCHC RBC-ENTMCNC: 33.8 GM/DL — SIGNIFICANT CHANGE UP (ref 32–36)
MCHC RBC-ENTMCNC: 34.8 GM/DL — SIGNIFICANT CHANGE UP (ref 32–36)
MCV RBC AUTO: 89.1 FL — SIGNIFICANT CHANGE UP (ref 80–100)
MCV RBC AUTO: 91.5 FL — SIGNIFICANT CHANGE UP (ref 80–100)
NITRITE UR-MCNC: NEGATIVE — SIGNIFICANT CHANGE UP
NRBC # BLD: 0 /100 WBCS — SIGNIFICANT CHANGE UP (ref 0–0)
NRBC # BLD: 0 /100 WBCS — SIGNIFICANT CHANGE UP (ref 0–0)
PH UR: 5.5 — SIGNIFICANT CHANGE UP (ref 5–8)
PHOSPHATE SERPL-MCNC: 3.7 MG/DL — SIGNIFICANT CHANGE UP (ref 2.5–4.5)
PLATELET # BLD AUTO: 209 K/UL — SIGNIFICANT CHANGE UP (ref 150–400)
PLATELET # BLD AUTO: 213 K/UL — SIGNIFICANT CHANGE UP (ref 150–400)
POTASSIUM SERPL-MCNC: 5.1 MMOL/L — SIGNIFICANT CHANGE UP (ref 3.5–5.3)
POTASSIUM SERPL-MCNC: 5.1 MMOL/L — SIGNIFICANT CHANGE UP (ref 3.5–5.3)
POTASSIUM SERPL-SCNC: 5.1 MMOL/L — SIGNIFICANT CHANGE UP (ref 3.5–5.3)
POTASSIUM SERPL-SCNC: 5.1 MMOL/L — SIGNIFICANT CHANGE UP (ref 3.5–5.3)
PROT SERPL-MCNC: 6.9 G/DL — SIGNIFICANT CHANGE UP (ref 6–8.3)
PROT UR-MCNC: 300 MG/DL
RBC # BLD: 3.17 M/UL — LOW (ref 4.2–5.8)
RBC # BLD: 3.29 M/UL — LOW (ref 4.2–5.8)
RBC # FLD: 14 % — SIGNIFICANT CHANGE UP (ref 10.3–14.5)
RBC # FLD: 14.2 % — SIGNIFICANT CHANGE UP (ref 10.3–14.5)
RBC CASTS # UR COMP ASSIST: 225 /HPF — HIGH (ref 0–4)
SODIUM SERPL-SCNC: 129 MMOL/L — LOW (ref 135–145)
SODIUM SERPL-SCNC: 134 MMOL/L — LOW (ref 135–145)
SP GR SPEC: 1.01 — SIGNIFICANT CHANGE UP (ref 1–1.03)
SQUAMOUS # UR AUTO: >36 /HPF — HIGH (ref 0–5)
UROBILINOGEN FLD QL: 0.2 MG/DL — SIGNIFICANT CHANGE UP (ref 0.2–1)
WBC # BLD: 11.63 K/UL — HIGH (ref 3.8–10.5)
WBC # BLD: 11.79 K/UL — HIGH (ref 3.8–10.5)
WBC # FLD AUTO: 11.63 K/UL — HIGH (ref 3.8–10.5)
WBC # FLD AUTO: 11.79 K/UL — HIGH (ref 3.8–10.5)
WBC CLUMPS # UR AUTO: PRESENT
WBC UR QL: >998 /HPF — HIGH (ref 0–5)

## 2024-02-21 PROCEDURE — 93010 ELECTROCARDIOGRAM REPORT: CPT

## 2024-02-21 PROCEDURE — 51702 INSERT TEMP BLADDER CATH: CPT

## 2024-02-21 PROCEDURE — 99285 EMERGENCY DEPT VISIT HI MDM: CPT

## 2024-02-21 PROCEDURE — 74176 CT ABD & PELVIS W/O CONTRAST: CPT | Mod: 26,MA

## 2024-02-21 RX ORDER — GLUCAGON INJECTION, SOLUTION 0.5 MG/.1ML
1 INJECTION, SOLUTION SUBCUTANEOUS ONCE
Refills: 0 | Status: DISCONTINUED | OUTPATIENT
Start: 2024-02-21 | End: 2024-02-27

## 2024-02-21 RX ORDER — DEXTROSE 50 % IN WATER 50 %
12.5 SYRINGE (ML) INTRAVENOUS ONCE
Refills: 0 | Status: DISCONTINUED | OUTPATIENT
Start: 2024-02-21 | End: 2024-02-27

## 2024-02-21 RX ORDER — DEXTROSE 50 % IN WATER 50 %
15 SYRINGE (ML) INTRAVENOUS ONCE
Refills: 0 | Status: DISCONTINUED | OUTPATIENT
Start: 2024-02-21 | End: 2024-02-27

## 2024-02-21 RX ORDER — HEPARIN SODIUM 5000 [USP'U]/ML
5000 INJECTION INTRAVENOUS; SUBCUTANEOUS EVERY 8 HOURS
Refills: 0 | Status: DISCONTINUED | OUTPATIENT
Start: 2024-02-21 | End: 2024-02-27

## 2024-02-21 RX ORDER — KETOROLAC TROMETHAMINE 30 MG/ML
15 SYRINGE (ML) INJECTION ONCE
Refills: 0 | Status: DISCONTINUED | OUTPATIENT
Start: 2024-02-21 | End: 2024-02-21

## 2024-02-21 RX ORDER — HEPARIN SODIUM 5000 [USP'U]/ML
5000 INJECTION INTRAVENOUS; SUBCUTANEOUS EVERY 8 HOURS
Refills: 0 | Status: DISCONTINUED | OUTPATIENT
Start: 2024-02-21 | End: 2024-02-21

## 2024-02-21 RX ORDER — VANCOMYCIN HCL 1 G
1000 VIAL (EA) INTRAVENOUS ONCE
Refills: 0 | Status: COMPLETED | OUTPATIENT
Start: 2024-02-21 | End: 2024-02-21

## 2024-02-21 RX ORDER — LANOLIN ALCOHOL/MO/W.PET/CERES
3 CREAM (GRAM) TOPICAL AT BEDTIME
Refills: 0 | Status: DISCONTINUED | OUTPATIENT
Start: 2024-02-21 | End: 2024-02-27

## 2024-02-21 RX ORDER — SODIUM CHLORIDE 9 MG/ML
1500 INJECTION INTRAMUSCULAR; INTRAVENOUS; SUBCUTANEOUS ONCE
Refills: 0 | Status: COMPLETED | OUTPATIENT
Start: 2024-02-21 | End: 2024-02-21

## 2024-02-21 RX ORDER — SODIUM CHLORIDE 9 MG/ML
1000 INJECTION, SOLUTION INTRAVENOUS
Refills: 0 | Status: DISCONTINUED | OUTPATIENT
Start: 2024-02-21 | End: 2024-02-27

## 2024-02-21 RX ORDER — INSULIN LISPRO 100/ML
VIAL (ML) SUBCUTANEOUS
Refills: 0 | Status: DISCONTINUED | OUTPATIENT
Start: 2024-02-21 | End: 2024-02-21

## 2024-02-21 RX ORDER — DEXTROSE 50 % IN WATER 50 %
25 SYRINGE (ML) INTRAVENOUS ONCE
Refills: 0 | Status: DISCONTINUED | OUTPATIENT
Start: 2024-02-21 | End: 2024-02-27

## 2024-02-21 RX ORDER — PIPERACILLIN AND TAZOBACTAM 4; .5 G/20ML; G/20ML
3.38 INJECTION, POWDER, LYOPHILIZED, FOR SOLUTION INTRAVENOUS ONCE
Refills: 0 | Status: COMPLETED | OUTPATIENT
Start: 2024-02-21 | End: 2024-02-21

## 2024-02-21 RX ORDER — PHENAZOPYRIDINE HCL 100 MG
100 TABLET ORAL ONCE
Refills: 0 | Status: COMPLETED | OUTPATIENT
Start: 2024-02-21 | End: 2024-02-21

## 2024-02-21 RX ORDER — PIPERACILLIN AND TAZOBACTAM 4; .5 G/20ML; G/20ML
3.38 INJECTION, POWDER, LYOPHILIZED, FOR SOLUTION INTRAVENOUS EVERY 12 HOURS
Refills: 0 | Status: COMPLETED | OUTPATIENT
Start: 2024-02-21 | End: 2024-02-24

## 2024-02-21 RX ADMIN — Medication 100 MILLIGRAM(S): at 05:53

## 2024-02-21 RX ADMIN — SODIUM CHLORIDE 1500 MILLILITER(S): 9 INJECTION INTRAMUSCULAR; INTRAVENOUS; SUBCUTANEOUS at 03:00

## 2024-02-21 RX ADMIN — HEPARIN SODIUM 5000 UNIT(S): 5000 INJECTION INTRAVENOUS; SUBCUTANEOUS at 21:50

## 2024-02-21 RX ADMIN — PIPERACILLIN AND TAZOBACTAM 25 GRAM(S): 4; .5 INJECTION, POWDER, LYOPHILIZED, FOR SOLUTION INTRAVENOUS at 07:48

## 2024-02-21 RX ADMIN — Medication 250 MILLIGRAM(S): at 04:04

## 2024-02-21 RX ADMIN — PIPERACILLIN AND TAZOBACTAM 25 GRAM(S): 4; .5 INJECTION, POWDER, LYOPHILIZED, FOR SOLUTION INTRAVENOUS at 19:21

## 2024-02-21 RX ADMIN — Medication 15 MILLIGRAM(S): at 05:53

## 2024-02-21 RX ADMIN — PIPERACILLIN AND TAZOBACTAM 200 GRAM(S): 4; .5 INJECTION, POWDER, LYOPHILIZED, FOR SOLUTION INTRAVENOUS at 02:58

## 2024-02-21 RX ADMIN — Medication 3 MILLIGRAM(S): at 21:50

## 2024-02-21 RX ADMIN — HEPARIN SODIUM 5000 UNIT(S): 5000 INJECTION INTRAVENOUS; SUBCUTANEOUS at 14:24

## 2024-02-21 NOTE — ED ADULT NURSE NOTE - OBJECTIVE STATEMENT
89yM presents to ED for urinary retention for [facility] rehab. Son states they scanned him at the rehab center and he had about ~600ml; wade was placed but only blood return. Pt in NAD distress. Has hx of multiple UTI's. Denies fever/chills. 89yM presents to ED for urinary retention for [facility] rehab. Son states they scanned him at the rehab center and he had about ~600ml; wade was placed but only blood return and pt endorsed pain so wade was removed. Pt in NAD distress. Alert and oriented to self and location but unable to state the year. Has hx of multiple UTI's. Denies fever/chills.

## 2024-02-21 NOTE — H&P ADULT - NSHPPHYSICALEXAM_GEN_ALL_CORE
T(C): 36.3 (02-21-24 @ 07:13), Max: 36.6 (02-21-24 @ 01:30)  HR: 61 (02-21-24 @ 07:13) (61 - 70)  BP: 112/65 (02-21-24 @ 07:13) (97/62 - 112/65)  RR: 18 (02-21-24 @ 07:13) (16 - 18)  SpO2: 94% (02-21-24 @ 07:13) (94% - 95%)    General: NAD, laying in bed, speaking in full sentences  HEENT: head NC/AT, no conjunctival injection, EOMI, MMM  Neck: supple, no JVD  Cardio: RRR, +S1/S2, no M/R/G  Resp: lungs CTAB, no cough/wheezes/rales/rhonchi  Abdo: soft, NT, ND, +bowel sounds x4  Extremities: WWP, no edema/cyanosis/clubbing   Vasc: 2+ radial and DP pulses b/l  Neuro: A&Ox3  Psych: speech non-pressured, thoughts goal-oriented  Skin: dry, intact, no visible jaundice T(C): 36.3 (02-21-24 @ 07:13), Max: 36.6 (02-21-24 @ 01:30)  HR: 61 (02-21-24 @ 07:13) (61 - 70)  BP: 112/65 (02-21-24 @ 07:13) (97/62 - 112/65)  RR: 18 (02-21-24 @ 07:13) (16 - 18)  SpO2: 94% (02-21-24 @ 07:13) (94% - 95%)    General: NAD, laying in bed, speaking in full sentences  HEENT: head NC/AT, no conjunctival injection, EOMI, MMM  Neck: supple, no JVD  Cardio: RRR, +S1/S2, no M/R/G  Resp: lungs CTAB, no cough/wheezes/rales/rhonchi  Abdo: soft, NT, ND, +bowel sounds x4, no suprapubic tenderness  Extremities: WWP, no edema/cyanosis/clubbing   Vasc: 2+ radial and DP pulses b/l  Neuro: A&Ox3  Psych: speech non-pressured, thoughts goal-oriented  Skin: dry, intact, no visible jaundice

## 2024-02-21 NOTE — ED PROVIDER NOTE - CLINICAL SUMMARY MEDICAL DECISION MAKING FREE TEXT BOX
89-year-old male with urinary retention family is requesting urology will consult urology plan for CBC CMP may need CBI UA urine culture     POCUS shows 950 cc distended bladder with extensive sediment no visualized clot bladder wall thickening and edema no dirty shadowing no evidence of necrotizing infection

## 2024-02-21 NOTE — H&P ADULT - HISTORY OF PRESENT ILLNESS
In the ED:  Vitals: Temp 97.8 HR 70 BP 97/62 O2 Sat 95% on RA  Labs: WBC 11.63 Hg 10.2 Plt 209 Na 129 K 5.1 Cl 96 BUN 36 Cr 2.63 AST 12 ALT 6  Urine - turbid,   leukocyte esterase large wbc clumps present   Imaging: CTAP Moderate bilateral hydroureteronephrosis down to the level of thick-walled  bladder decompressed with Dan catheter. Mild perivesicular fat stranding.This wall thickening could be related marked trabeculation related to chronic bladder outlet obstruction secondary to markedly enlarged prostate, though somecomponent of underlying cystitis may also be present.  Interventions: ketorolac 15, pyridium, zosyn, vancomyc in, 1.5 L Patient is as 89 y/o M (baseline A&Ox3), DM, recurrent UTIs (recently in cipro) and urinary retention, presenting from [facility] for urinary retention and blood in wade. Pt found to have BS of 600cc at [facility], attempted to plsce wade and noted to have hematuria in which case wade was removed and pt sent to ED. In ED pt confused, wade placed by urology and pt now back to baseline mental status but does not recall coming to ED.       In the ED:  Vitals: Temp 97.8 HR 70 BP 97/62 O2 Sat 95% on RA  Labs: WBC 11.63 Hg 10.2 Plt 209 Na 129 K 5.1 Cl 96 BUN 36 Cr 2.63 AST 12 ALT 6  Urine - turbid,   leukocyte esterase large wbc clumps present   Imaging: CTAP Moderate bilateral hydroureteronephrosis down to the level of thick-walled  bladder decompressed with Wade catheter. Mild perivesicular fat stranding.This wall thickening could be related marked trabeculation related to chronic bladder outlet obstruction secondary to markedly enlarged prostate, though somecomponent of underlying cystitis may also be present.  Interventions: ketorolac 15, pyridium, zosyn, vancomyc in, 1.5 L

## 2024-02-21 NOTE — ED ADULT TRIAGE NOTE - CHIEF COMPLAINT QUOTE
Pt. presents with urinary rtn confirmed via ultrasound. Pt. is from [facility]. HX of UTI. They attempted to insert a wade without success.

## 2024-02-21 NOTE — H&P ADULT - PROBLEM SELECTOR PLAN 1
Initially altered upon arrival and does not recall coming to ED however now back to baseline mental status s/p wade placement. Encephalopathy likely 2/2 urinary retention and subsequent UTI. VSS. Physical exam unremarkable  - see management below

## 2024-02-21 NOTE — CONSULT NOTE ADULT - ASSESSMENT
Patient is a 89M w/ urinary retention,  team at bedside used Uro-jet, and sterile technique to place a 22fr 6 eye due to noted blood from meatus, upon insertion, white cloudy urine drained, no noted blood clots.   team exchanged 6 eye w/ an 18fr coude catheter after draining 850cc of cloudy white urine.      Recs:  - Consult for b/l hydro to the level of the bladder in the setting of urinary retention.  Taiwo 89 M (AOx3) hx of DM (use to be on SLG2 inhibitor but not anymore for past month), BPH managed with wade and tamsulosin 0.9mg,  HLD, recurrent UTI most recently on Cipro presents for retention and hematuria after what sounds like traumatic wade. At the nursing home he was found to have 600 cc in his bladder, wade was inserted with only 8cc came out and flushed with hematuria, which was removed and then they sent him to the ED. Had some dysuria and urgency but no other infectious urinary symptoms.  laced a 22 Fr 6 eye which drained purulent cloudy urine 800 cc, no clots. He is also DNR/DNI. He was started on zosyn aid ED is admitting to medicine. BPH manged by Dr. Diane. Now has 18 coude draining well. AVSS, not septic, no CVAT, wade draining pink tinged urine with sediments. WBC 11.63, Hgb 10.2 Cr 2.63 (1.1-1.2 baseline) UA suggests UTI large LE, negative nitrite, large blood, 998 WBC, 225 RBC. Lactate 1.1.  CTAP prelim shows moderate b/l hydroureteronephrosis to the level of the bladder that is now decompressed with wade, with some mild perivesicular fat stranding, thickened bladder wall.   Approximately 100cc prostate.    Plan  Abx per primary for presumed UTI   Wade for maximum drainage  Trend Cr   Repeat RBUS in 48-72 hours to reassess hydro   Monitor for POD   Continue flomax  MRI prostate in house to see if he is candidate for PAE   Discussed with attending

## 2024-02-21 NOTE — H&P ADULT - PROBLEM SELECTOR PLAN 2
found to have BS of 600cc at [facility] now s/p wade placement by urology in ED with 850cc output. CT showing b/l hydro and cystitis. Likely 2/2 BPH as prostate 100cc  - c/w wade  - repeat US in 48-72 hr to ensure resolution of hydro after wade placement  - c/w flomax  - MRI prostate to assess if candidate for PAE   - urology following, recs appreciated

## 2024-02-21 NOTE — CONSULT NOTE ADULT - SUBJECTIVE AND OBJECTIVE BOX
Patient is a 89y old  Male who presents with a chief complaint of urinary retention     HPI:    Patient is a 89M w/ PMH of DM, BPH, and recurrent UTI's. Patient resides at [facility] and was earlier today was noted to be have 600cc bladder scan at the nursing home.  Nursing home attempted to place a catheter but only 8cc noted to be drained, catheter was removed and was noted to have blood so patient was sent to [facility] ED.  Patient bladder scan in ED was approx 800cc, Patient has son at bedside, states he follow w/ Dr. Diane as his Urologist.  Patient denies n/v/f/c, dysuria, chest pain SOB.     Vital Signs Last 24 Hrs  T(C): 36.6 (21 Feb 2024 01:30), Max: 36.6 (21 Feb 2024 01:30)  T(F): 97.8 (21 Feb 2024 01:30), Max: 97.8 (21 Feb 2024 01:30)  HR: 70 (21 Feb 2024 01:30) (70 - 70)  BP: 97/62 (21 Feb 2024 01:30) (97/62 - 97/62)  BP(mean): --  RR: 16 (21 Feb 2024 01:30) (16 - 16)  SpO2: 95% (21 Feb 2024 01:30) (95% - 95%)    Parameters below as of 21 Feb 2024 01:30  Patient On (Oxygen Delivery Method): room air      I&O's Summary      PE:  Gen: Appears comfortable, no acute pain. Son is answering questions, but patient is speaking in full sentences as well and understands questions and will answer.  Abd: mildly distended, negative TTP, negative CVAT B/L  : Mild distension, negative TTP.  GREGORIA: Deferred.     LABS:                        10.2   11.63 )-----------( 209      ( 21 Feb 2024 01:36 )             29.3     02-21    129<L>  |  96  |  36<H>  ----------------------------<  248<H>  5.1   |  25  |  2.63<H>    Ca    8.9      21 Feb 2024 01:36    TPro  6.9  /  Alb  3.1<L>  /  TBili  0.5  /  DBili  x   /  AST  12  /  ALT  6<L>  /  AlkPhos  65  02-21      Cultures       Patient is a 89y old  Male who presents with a chief complaint of urinary retention     HPI:   Consult for b/l hydro to the level of the bladder in the setting of urinary retention.  Taiwo 89 M (AOx3) hx of DM (use to be on SLG2 inhibitor but not anymore for past month), BPH managed with wade and tamsulosin 0.9mg,  HLD, recurrent UTI most recently on Cipro presents for retention and hematuria after what sounds like traumatic wade. At the nursing home he was found to have 600 cc in his bladder, wade was inserted with only 8cc came out and flushed with hematuria, which was removed and then they sent him to the ED. Had some dysuria and urgency for 1-2 days but no other infectious urinary symptoms. Placed a 22 Fr 6 eye which drained purulent cloudy urine 800 cc, no clots. 18 coude light pink tinged urine was placed draining yellow urine with sediments. He was started on zosyn aid ED is admitting to medicine for UTI. His BPH manged by Dr. Diane. Patient denies n/v/f/c, chest pain SOB. He is also DNR/DNI.    Vital Signs Last 24 Hrs  T(C): 36.6 (21 Feb 2024 01:30), Max: 36.6 (21 Feb 2024 01:30)  T(F): 97.8 (21 Feb 2024 01:30), Max: 97.8 (21 Feb 2024 01:30)  HR: 70 (21 Feb 2024 01:30) (70 - 70)  BP: 97/62 (21 Feb 2024 01:30) (97/62 - 97/62)  BP(mean): --  RR: 16 (21 Feb 2024 01:30) (16 - 16)  SpO2: 95% (21 Feb 2024 01:30) (95% - 95%)    Parameters below as of 21 Feb 2024 01:30  Patient On (Oxygen Delivery Method): room air      I&O's Summary      PE:  Gen: Appears comfortable, no acute pain. Son is answering questions, but patient is speaking in full sentences as well and understands questions and will answer.  Abd: NTND negative TTP, negative   : No CVAT, no suprapubic pain, wade draining pink tinged urine with sediments   GREGORIA: Deferred.     LABS:                        10.2   11.63 )-----------( 209      ( 21 Feb 2024 01:36 )             29.3     02-21    129<L>  |  96  |  36<H>  ----------------------------<  248<H>  5.1   |  25  |  2.63<H>    Ca    8.9      21 Feb 2024 01:36    TPro  6.9  /  Alb  3.1<L>  /  TBili  0.5  /  DBili  x   /  AST  12  /  ALT  6<L>  /  AlkPhos  65  02-21      Cultures      < from: CT Abdomen and Pelvis No Cont (02.21.24 @ 02:52) >  ******PRELIMINARY REPORT******      ******PRELIMINARY REPORT******         ACC: [id number] EXAM:  CT ABDOMEN AND PELVIS   ORDERED BY: CHANDAN JIMÉNEZ     PROCEDURE DATE:  02/21/2024    ******PRELIMINARY REPORT******      ******PRELIMINARY REPORT******           INTERPRETATION:  [facility] RADIOLOGIST PRELIMINARY REPORT      Initial report created on 2/21/2024 5:05:27 AM EST  PROCEDURE INFORMATION:  Exam: CT Abdomen And Pelvis Without Contrast  Exam date and time: 2/21/2024 2:44 AM  Age: 89 years old  Clinical indication: Purulent urine renal failure    TECHNIQUE:  Imaging protocol: Computed tomography of the abdomen and pelvis without  contrast.    COMPARISON:  US RETROPERITONEUM LIMITED 1/9/2024 11:36 AM    FINDINGS:  Lungs: Mild dependent changes lung bases.    Liver: Normal. No mass.  Gallbladder and bile ducts: Normal. No calcified stones. No ductal   dilation.  Pancreas: Normal. No ductal dilation.  Spleen: Normal. No splenomegaly.  Adrenal glands: Normal. No mass.  Kidneys and ureters: Moderate bilateral hydroureteronephrosis down to the   level  of bladder.  Stomach and bowel: Unremarkable. No obstruction. No mucosal thickening.  Appendix: No evidence of appendicitis.    Intraperitoneal space: Unremarkable. No free air. No significant fluid  collection.  Vasculature: Unremarkable. No abdominal aortic aneurysm.  Lymph nodes: Unremarkable. No enlarged lymph nodes.  Urinary bladder: Wade catheter with moderately thick-walled bladder with  perivesicular fat stranding.  Reproductive: Markedly enlarged prostate measuring 6.4 cm in diameter   indenting  bladder base.  Bones/joints: Multilevel degenerative changes thoracolumbar spine.  Soft tissues: Unremarkable.    IMPRESSION:  Moderate bilateral hydroureteronephrosis down to the level of thick-walled  bladder decompressed with Wade catheter. Mild perivesicular fat   stranding.  This wall thickening could be related marked trabeculation related to   chronic  bladder outlet obstruction secondary to markedly enlarged prostate,   though some  component of underlying cystitis may also be present.        ******PRELIMINARY REPORT******      ******PRELIMINARY REPORT******         SANDRA SILVA M.D.;[facility] RADIOLOGIST  This document is a PRELIMINARY interpretation and is pending final  attending approval. Feb 21 2024  5:06AM    < end of copied text >

## 2024-02-21 NOTE — H&P ADULT - PROBLEM SELECTOR PLAN 5
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 s/p 1.5L in NS  - c/w wade  - trend Na  - [place] if Na does not improve

## 2024-02-21 NOTE — H&P ADULT - PROBLEM SELECTOR PLAN 8
F: s/p 1.5L   E: replete to K>4, Mg>2, Phos>2.5  N: dash  Ppx: heparin subq  Code status: DNR DNI    Dispo: [facility]

## 2024-02-21 NOTE — PATIENT PROFILE ADULT - FALL HARM RISK - HARM RISK INTERVENTIONS
Assistance with ambulation/Assistance OOB with selected safe patient handling equipment/Communicate Risk of Fall with Harm to all staff/Discuss with provider need for PT consult/Monitor gait and stability/Reinforce activity limits and safety measures with patient and family/Tailored Fall Risk Interventions/Visual Cue: Yellow wristband and red socks/Bed in lowest position, wheels locked, appropriate side rails in place/Call bell, personal items and telephone in reach/Instruct patient to call for assistance before getting out of bed or chair/Non-slip footwear when patient is out of bed/[place] to call system/Physically safe environment - no spills, clutter or unnecessary equipment/Purposeful Proactive Rounding/Room/bathroom lighting operational, light cord in reach

## 2024-02-21 NOTE — ED ADULT NURSE NOTE - NSFALLHARMRISKINTERV_ED_ALL_ED
Assistance OOB with selected safe patient handling equipment if applicable/Communicate risk of Fall with Harm to all staff, patient, and family/Provide visual cue: red socks, yellow wristband, yellow gown, etc/Reinforce activity limits and safety measures with patient and family/Bed in lowest position, wheels locked, appropriate side rails in place/Call bell, personal items and telephone in reach/Instruct patient to call for assistance before getting out of bed/chair/stretcher/Non-slip footwear applied when patient is off stretcher/[place] to call system/Physically safe environment - no spills, clutter or unnecessary equipment/Purposeful Proactive Rounding/Room/bathroom lighting operational, light cord in reach Assistance OOB with selected safe patient handling equipment if applicable/Assistance with ambulation/Communicate risk of Fall with Harm to all staff, patient, and family/Monitor gait and stability/Provide visual cue: red socks, yellow wristband, yellow gown, etc/Reinforce activity limits and safety measures with patient and family/Bed in lowest position, wheels locked, appropriate side rails in place/Call bell, personal items and telephone in reach/Instruct patient to call for assistance before getting out of bed/chair/stretcher/Non-slip footwear applied when patient is off stretcher/[place] to call system/Physically safe environment - no spills, clutter or unnecessary equipment/Purposeful Proactive Rounding/Room/bathroom lighting operational, light cord in reach

## 2024-02-21 NOTE — PROGRESS NOTE ADULT - SUBJECTIVE AND OBJECTIVE BOX
HPI: 89 yo man with diabetes on oral agents admitted from [facility] with BPH obstruction and UTI  -he was d/c'd from [facility] recently with wade in place and it was removed  ~2 weeks ago while at [facility] without issue and then yesterday there was concern for retention and they were unable to place wade at [facility] and he was sent to the ED where he had purlent dc after the wade was placed.  -mild leukocytosis  -afebrile  -feeling well but weak    PAST MEDICAL & SURGICAL HISTORY:  AODM  HLD  HTN  Deaf    MEDICATIONS  (STANDING):    MEDICATIONS  (PRN):     HPI: 89 yo man with diabetes on oral agents admitted from [facility] with BPH obstruction and UTI  -he was d/c'd from [facility] recently with wade in place and it was removed  ~2 weeks ago while at [facility] without issue and then yesterday there was concern for retention and they were unable to place wade at [facility] and he was sent to the ED where he had purlent dc after the wade was placed.  -mild leukocytosis  -afebrile  -feeling well but weak    89 yr old male ( AAOx3 at baseline ), history of DM for the past 20 years, on oral medications for DM ( Metformin 1000 BID, Glimeperide 2mg BID ), was recently prescribed  farxiga ( unknown dose ) to be added to his oral medications for better glucose control. But due to repeated UTIs ( one was few weeks ago and one was 5 days ago for which he started Levofloxacin 500mg BID, 10 day course ). his PCP Dr. Lai Huggins ( [phone] ) thought possibly triggered from farxiga so stopped that and started him on levemir insulin. 10 units in morning and 5 units at night. The patient has not been eating well for the past few days. Around 5 pm today, the patient got his evening insulin and by 7pm he started feeling agitated, confused and sweaty. Son called EMS and they found him to have low blood glucose. Admitted for the management of hypoglycemia.    Medicine Progress Note    Patient is a 89y old  Male who presents with a chief complaint of Hypoglycemia (15 Braxton 2024 06:45)      SUBJECTIVE / OVERNIGHT EVENTS:    ADDITIONAL REVIEW OF SYSTEMS:    MEDICATIONS  (STANDING):  atorvastatin 10 milliGRAM(s) Oral at bedtime  chlorhexidine 2% Cloths 1 Application(s) Topical <User Schedule>  dextrose 5%. 1000 milliLiter(s) (100 mL/Hr) IV Continuous <Continuous>  dextrose 5%. 1000 milliLiter(s) (50 mL/Hr) IV Continuous <Continuous>  dextrose 50% Injectable 25 Gram(s) IV Push once  dextrose 50% Injectable 12.5 Gram(s) IV Push once  dextrose 50% Injectable 25 Gram(s) IV Push once  glucagon  Injectable 1 milliGRAM(s) IntraMuscular once  heparin   Injectable 5000 Unit(s) SubCutaneous every 8 hours  insulin lispro (ADMELOG) corrective regimen sliding scale   SubCutaneous at bedtime  insulin lispro (ADMELOG) corrective regimen sliding scale   SubCutaneous three times a day before meals  tamsulosin 0.8 milliGRAM(s) Oral at bedtime    MEDICATIONS  (PRN):  acetaminophen     Tablet .. 650 milliGRAM(s) Oral every 6 hours PRN Temp greater or equal to 38C (100.4F), Mild Pain (1 - 3)  dextrose Oral Gel 15 Gram(s) Oral once PRN Blood Glucose LESS THAN 70 milliGRAM(s)/deciliter    ICU Vital Signs Last 24 Hrs  T(C): 36.3 (2024 07:13), Max: 36.6 (2024 01:30)  T(F): 97.4 (2024 07:13), Max: 97.8 (2024 01:30)  HR: 61 (2024 07:13) (61 - 70)  BP: 112/65 (2024 07:13) (97/62 - 112/65)  BP(mean): 74 (2024 05:56) (74 - 74)  ABP: --  ABP(mean): --  RR: 18 (2024 07:13) (16 - 18)  SpO2: 94% (2024 07:13) (94% - 95%)    O2 Parameters below as of 2024 07:13  Patient On (Oxygen Delivery Method): room air    Seated in bed in NAD with son at bedside  No JVD  Chest clear  CV RRR s1s2 II/VI REGGIE LSB  Abd soft  Ext no edema  NEuro grossly intact                          10.2   11.63 )-----------( 209      ( 2024 01:36 )             29.3         02-21    129<L>  |  96  |  36<H>  ----------------------------<  248<H>  5.1   |  25  |  2.63<H>    Ca    8.9      2024 01:36    TPro  6.9  /  Alb  3.1<L>  /  TBili  0.5  /  DBili  x   /  AST  12  /  ALT  6<L>  /  AlkPhos  65  02-21  Urinalysis Basic - ( 2024 01:36 )    Color: Yellow / Appearance: Turbid / S.011 / pH: x  Gluc: 248 mg/dL / Ketone: Trace mg/dL  / Bili: Negative / Urobili: 0.2 mg/dL   Blood: x / Protein: 300 mg/dL / Nitrite: Negative   Leuk Esterase: Large / RBC: 225 /HPF / WBC >998 /HPF   Sq Epi: x / Non Sq Epi: >36 /HPF / Bacteria: Many /HPF

## 2024-02-21 NOTE — ED PROVIDER NOTE - OBJECTIVE STATEMENT
89 yr old male ( AAOx3 at baseline ), history of DM for the past 20 years, on oral medications for DM ( Metformin 1000 BID, Glimeperide 2mg BID ), was recently prescribed  farxiga ( unknown dose ) to be added to his oral medications Recurrent UTIs most recently on Cipro here today for urinary retention and blood in Dan sent from [facility].  Patient had urinary retention was bladder scan found to have more than 600 cc in his bladder Dan catheter inserted only 8 cc came out and then was flushed and then blood started coming out Dan was then removed and patient was sent for evaluation.  Patient is denying any complaints abdominal pain fever chills cough.

## 2024-02-21 NOTE — ED PROVIDER NOTE - PROGRESS NOTE DETAILS
urology was able to advance catheter roughly 1 L of purulent urine was found, blood cultures broad-spectrum antibiotics and Noncon CT ordered patient with  JULIO CESAR likely for complicated UTI plan for likely admission pending CT abdomen pelvis without contrast CT without abscess urology following will admit for complicated UTI and JULIO CESAR CT without abscess urology following will admit for complicated UTI and JULIO CESAR, repeat vs stable, EKG NSR 72 normal axis and intervals no acute ischemia

## 2024-02-21 NOTE — H&P ADULT - NSHPLABSRESULTS_GEN_ALL_CORE
.  LABS:                         10.2   11.63 )-----------( 209      ( 2024 01:36 )             29.3         129<L>  |  96  |  36<H>  ----------------------------<  248<H>  5.1   |  25  |  2.63<H>    Ca    8.9      2024 01:36    TPro  6.9  /  Alb  3.1<L>  /  TBili  0.5  /  DBili  x   /  AST  12  /  ALT  6<L>  /  AlkPhos  65  -21      Urinalysis Basic - ( 2024 01:36 )    Color: Yellow / Appearance: Turbid / S.011 / pH: x  Gluc: 248 mg/dL / Ketone: Trace mg/dL  / Bili: Negative / Urobili: 0.2 mg/dL   Blood: x / Protein: 300 mg/dL / Nitrite: Negative   Leuk Esterase: Large / RBC: 225 /HPF / WBC >998 /HPF   Sq Epi: x / Non Sq Epi: >36 /HPF / Bacteria: Many /HPF            Lactate, Blood: 1.1 mmol/L ( @ 03:32)      RADIOLOGY, EKG & ADDITIONAL TESTS: Reviewed.
show

## 2024-02-21 NOTE — ED ADULT NURSE NOTE - CHIEF COMPLAINT QUOTE
Pt. presents with urinary rtn confirmed via ultrasound. Pt. is from [facility]. HX of UTI. They attempted to insert a wade without success.

## 2024-02-21 NOTE — PROGRESS NOTE ADULT - ASSESSMENT
89 yo man with AODM admitted with UTI in the setting of BPH with obstruction  -DM cont insulin protocol  -UTI abx - consult urology for managment  --consider accing finasterdie 5mg and tadalafil 5mg  -blood pressure controlled  -HLD on lipitor  -DVT prophyalxis  -PT consult and encourage OOB to chair and ambulation to avoid decompensation

## 2024-02-21 NOTE — ED ADULT TRIAGE NOTE - GLASGOW COMA SCALE: [employer] RESPONSE, MLM
Urine dipstick is unremarkable  Physical exam and history are consistent with tinea cruris  I am placing the patient on terbinafine  250 mg orally daily for a period 2 weeks  Advised patient to discontinue nystatin  He is to follow up with primary care provider if rash does not resolve  He expressed understanding  Jock Itch   WHAT YOU NEED TO KNOW:   Jock itch is a rash on your groin  The groin is the area between your abdomen and legs  Jock itch is usually easy to treat and prevent  It is caused by a fungus  The fungus also causes athlete's foot  DISCHARGE INSTRUCTIONS:   Medicines:   · Fungus medicine:  Jock itch is usually treated with a cream that kills the fungus  Apply the cream to the rash and the skin around it as directed  You may need to apply the cream 1 to 2 times each day for 2 weeks  You may be given this medicine as a pill if the cream does not help  · Take your medicine as directed  Contact your healthcare provider if you think your medicine is not helping or if you have side effects  Tell him or her if you are allergic to any medicine  Keep a list of the medicines, vitamins, and herbs you take  Include the amounts, and when and why you take them  Bring the list or the pill bottles to follow-up visits  Carry your medicine list with you in case of an emergency  Manage and prevent jock itch:   · Keep the area dry  · Wear light, loose clothes  Do not share clothes  · Do not wear wet clothes for long periods  Wash athletic gear after you play sports  · Bathe daily  Dry your skin completely after you bathe  Apply cream or powder after you bathe as directed if you get jock itch often  Wash your hands often to prevent the spread of the fungus  You may want to wear disposable gloves when you clean your feet  The gloves will keep the fungus from moving from your feet to your hands  · Use separate towels to dry each part of your body   Put your socks on before you put on your underwear so you do not spread the fungus from your feet to your groin  · Lose weight if you weigh more than your healthcare provider suggests  Follow up with your healthcare provider or skin specialist as directed: Your healthcare provider will examine your rash to see how well it is healing  If you take medicine as a pill, he may draw blood to check how your liver and kidneys are working  Write down your questions so you remember to ask them during your visits  Contact your healthcare provider or skin specialist if:   · Your signs and symptoms do not get better within 2 weeks of treatment  · Your signs and symptoms get worse or come back after treatment  · You get a rash on a part of your body other than your groin  · You have a fever  · You have questions or concerns about your condition or care  © Copyright [address] Information is for End User's use only and may not be sold, redistributed or otherwise used for commercial purposes  All illustrations and images included in CareNotes® are the copyrighted property of A D A M , Inc  or [facility] Cal Das   The above information is an  only  It is not intended as medical advice for individual conditions or treatments  Talk to your doctor, nurse or pharmacist before following any medical regimen to see if it is safe and effective for you  (M6) obeys commands

## 2024-02-21 NOTE — H&P ADULT - PROBLEM SELECTOR PLAN 3
P/w urinary retention and +UA. Pt not c/o dysuria or frequency however presented altered and has hx of recurrent UTIs. s/p vanc and zosyn in ED  - c/w zosyn  - f/u urine cx  - f/u blood cx

## 2024-02-21 NOTE — ED PROVIDER NOTE - ADMIT DISPOSITION PRESENT ON ADMISSION SEPSIS
"Subjective   Zi Jasso is a 53 y.o. female.     History of Present Illness   Upper Respiratory Infection: Patient complains of symptoms of a URI, possible sinusitis. Symptoms include congestion, cough, diarrhea and sore throat. Onset of symptoms was 8 days ago, gradually worsening since that time. She also c/o congestion and facial pain for the past 4 days .  She is drinking plenty of fluids. Evaluation to date: none. Treatment to date: antihistamines.    Patient also states for at least 3 months she's been having some chest pain located under the left breast.  Back in March she had a mammogram which showed density in the medial aspect of left breast that she had a spot compression which turned out to be normal.  The breast pain is tender to the touch it seems to be going more over on the right side now and she notices that it does get worse with exertion.  Her cholesterol was last checked in February was high with an LDL of 191.    The following portions of the patient's history were reviewed and updated as appropriate: allergies, current medications, past social history and problem list.    Review of Systems   HENT: Positive for congestion, sinus pain, sinus pressure and sore throat.    Neurological: Positive for headaches.   All other systems reviewed and are negative.      Objective   /68 (BP Location: Right arm, Patient Position: Sitting)  Pulse 74  Temp 97.6 °F (36.4 °C)  Ht 60.75\" (154.3 cm)  Wt 138 lb 12.8 oz (63 kg)  SpO2 99%  BMI 26.44 kg/m2  Physical Exam   Constitutional: She is oriented to person, place, and time. Vital signs are normal. She appears well-developed and well-nourished. No distress.   HENT:   Head: Normocephalic.   Right Ear: Tympanic membrane normal.   Left Ear: Tympanic membrane normal.   Nose: Rhinorrhea present.   Mouth/Throat: Posterior oropharyngeal erythema present.   Cardiovascular: Normal rate, regular rhythm and normal heart sounds.    Pulmonary/Chest: Effort normal " and breath sounds normal.   Lymphadenopathy:     She has cervical adenopathy.   Neurological: She is alert and oriented to person, place, and time. Gait normal.   Psychiatric: She has a normal mood and affect. Her behavior is normal. Judgment and thought content normal.   Vitals reviewed.      Assessment/Plan   Problem List Items Addressed This Visit        Respiratory    Sinusitis - Primary    Relevant Medications    doxycycline (VIBRAMYICN) 100 MG tablet    promethazine-codeine (PHENERGAN with CODEINE) 6.25-10 MG/5ML syrup    MethylPREDNISolone (MEDROL, ROYAL,) 4 MG tablet        rto prn  We will go ahead and treat her chest pain with a steroid-dependent it does not get better she's to return to the office on Monday to further evaluate the pain including a possible breast MRI and a heart workup.  Patient verbalizes understanding  Considering that she can push on the area and reproduce the pain I do not feel at this time it is necessary to do a heart workup today.      No

## 2024-02-21 NOTE — ED PROVIDER NOTE - PHYSICAL EXAMINATION
VITAL SIGNS: I have reviewed nursing notes and confirm.  CONSTITUTIONAL: Well appearing, in no acute distress.   SKIN:  warm and dry, no acute rash.   HEAD:  normocephalic, atraumatic.  EYES: EOM intact; conjunctiva and sclera clear.  ENT: No nasal discharge; airway clear.   NECK: Supple.  CARD: S1, S2, Regular rate and rhythm.   RESP:  Clear to auscultation b/l, no wheezes, rales or rhonchi.  ABD: Normal bowel sounds; soft; non-distended; non-tender; no guarding/ rebound.Distended bladder palpable on exam  EXT: Normal ROM. No peripheral edema. Pulses intact and equal b/l.  NEURO: Alert, oriented, grossly unremarkable  PSYCH: Cooperative, mood and affect appropriate.

## 2024-02-22 LAB
ANION GAP SERPL CALC-SCNC: 10 MMOL/L — SIGNIFICANT CHANGE UP (ref 5–17)
BASOPHILS # BLD AUTO: 0.02 K/UL — SIGNIFICANT CHANGE UP (ref 0–0.2)
BASOPHILS NFR BLD AUTO: 0.3 % — SIGNIFICANT CHANGE UP (ref 0–2)
BUN SERPL-MCNC: 22 MG/DL — SIGNIFICANT CHANGE UP (ref 7–23)
CALCIUM SERPL-MCNC: 8.4 MG/DL — SIGNIFICANT CHANGE UP (ref 8.4–10.5)
CHLORIDE SERPL-SCNC: 99 MMOL/L — SIGNIFICANT CHANGE UP (ref 96–108)
CO2 SERPL-SCNC: 25 MMOL/L — SIGNIFICANT CHANGE UP (ref 22–31)
CREAT SERPL-MCNC: 1.57 MG/DL — HIGH (ref 0.5–1.3)
CULTURE RESULTS: ABNORMAL
EGFR: 38 ML/MIN/1.73M2 — LOW
EOSINOPHIL # BLD AUTO: 0 K/UL — SIGNIFICANT CHANGE UP (ref 0–0.5)
EOSINOPHIL NFR BLD AUTO: 0 % — SIGNIFICANT CHANGE UP (ref 0–6)
GLUCOSE BLDC GLUCOMTR-MCNC: 178 MG/DL — HIGH (ref 70–99)
GLUCOSE BLDC GLUCOMTR-MCNC: 215 MG/DL — HIGH (ref 70–99)
GLUCOSE BLDC GLUCOMTR-MCNC: 269 MG/DL — HIGH (ref 70–99)
GLUCOSE BLDC GLUCOMTR-MCNC: 286 MG/DL — HIGH (ref 70–99)
GLUCOSE SERPL-MCNC: 239 MG/DL — HIGH (ref 70–99)
HCT VFR BLD CALC: 30.6 % — LOW (ref 39–50)
HGB BLD-MCNC: 10.2 G/DL — LOW (ref 13–17)
IMM GRANULOCYTES NFR BLD AUTO: 0.6 % — SIGNIFICANT CHANGE UP (ref 0–0.9)
LYMPHOCYTES # BLD AUTO: 0.56 K/UL — LOW (ref 1–3.3)
LYMPHOCYTES # BLD AUTO: 8.3 % — LOW (ref 13–44)
MAGNESIUM SERPL-MCNC: 1.4 MG/DL — LOW (ref 1.6–2.6)
MCHC RBC-ENTMCNC: 30.5 PG — SIGNIFICANT CHANGE UP (ref 27–34)
MCHC RBC-ENTMCNC: 33.3 GM/DL — SIGNIFICANT CHANGE UP (ref 32–36)
MCV RBC AUTO: 91.6 FL — SIGNIFICANT CHANGE UP (ref 80–100)
MONOCYTES # BLD AUTO: 0.4 K/UL — SIGNIFICANT CHANGE UP (ref 0–0.9)
MONOCYTES NFR BLD AUTO: 6 % — SIGNIFICANT CHANGE UP (ref 2–14)
NEUTROPHILS # BLD AUTO: 5.69 K/UL — SIGNIFICANT CHANGE UP (ref 1.8–7.4)
NEUTROPHILS NFR BLD AUTO: 84.8 % — HIGH (ref 43–77)
NRBC # BLD: 0 /100 WBCS — SIGNIFICANT CHANGE UP (ref 0–0)
PHOSPHATE SERPL-MCNC: 3.3 MG/DL — SIGNIFICANT CHANGE UP (ref 2.5–4.5)
PLATELET # BLD AUTO: 211 K/UL — SIGNIFICANT CHANGE UP (ref 150–400)
POTASSIUM SERPL-MCNC: 4.1 MMOL/L — SIGNIFICANT CHANGE UP (ref 3.5–5.3)
POTASSIUM SERPL-SCNC: 4.1 MMOL/L — SIGNIFICANT CHANGE UP (ref 3.5–5.3)
RBC # BLD: 3.34 M/UL — LOW (ref 4.2–5.8)
RBC # FLD: 14.3 % — SIGNIFICANT CHANGE UP (ref 10.3–14.5)
SODIUM SERPL-SCNC: 134 MMOL/L — LOW (ref 135–145)
SPECIMEN SOURCE: SIGNIFICANT CHANGE UP
WBC # BLD: 6.71 K/UL — SIGNIFICANT CHANGE UP (ref 3.8–10.5)
WBC # FLD AUTO: 6.71 K/UL — SIGNIFICANT CHANGE UP (ref 3.8–10.5)

## 2024-02-22 RX ORDER — TAMSULOSIN HYDROCHLORIDE 0.4 MG/1
0.8 CAPSULE ORAL AT BEDTIME
Refills: 0 | Status: DISCONTINUED | OUTPATIENT
Start: 2024-02-22 | End: 2024-02-27

## 2024-02-22 RX ORDER — INSULIN LISPRO 100/ML
VIAL (ML) SUBCUTANEOUS
Refills: 0 | Status: DISCONTINUED | OUTPATIENT
Start: 2024-02-22 | End: 2024-02-27

## 2024-02-22 RX ORDER — MAGNESIUM SULFATE 500 MG/ML
4 VIAL (ML) INJECTION ONCE
Refills: 0 | Status: COMPLETED | OUTPATIENT
Start: 2024-02-22 | End: 2024-02-22

## 2024-02-22 RX ORDER — FINASTERIDE 5 MG/1
5 TABLET, FILM COATED ORAL ONCE
Refills: 0 | Status: COMPLETED | OUTPATIENT
Start: 2024-02-22 | End: 2024-02-22

## 2024-02-22 RX ORDER — FINASTERIDE 5 MG/1
5 TABLET, FILM COATED ORAL DAILY
Refills: 0 | Status: DISCONTINUED | OUTPATIENT
Start: 2024-02-23 | End: 2024-02-27

## 2024-02-22 RX ADMIN — HEPARIN SODIUM 5000 UNIT(S): 5000 INJECTION INTRAVENOUS; SUBCUTANEOUS at 22:26

## 2024-02-22 RX ADMIN — FINASTERIDE 5 MILLIGRAM(S): 5 TABLET, FILM COATED ORAL at 09:31

## 2024-02-22 RX ADMIN — PIPERACILLIN AND TAZOBACTAM 25 GRAM(S): 4; .5 INJECTION, POWDER, LYOPHILIZED, FOR SOLUTION INTRAVENOUS at 19:00

## 2024-02-22 RX ADMIN — HEPARIN SODIUM 5000 UNIT(S): 5000 INJECTION INTRAVENOUS; SUBCUTANEOUS at 06:34

## 2024-02-22 RX ADMIN — Medication 3: at 13:52

## 2024-02-22 RX ADMIN — Medication 2: at 18:48

## 2024-02-22 RX ADMIN — Medication 25 GRAM(S): at 17:09

## 2024-02-22 RX ADMIN — TAMSULOSIN HYDROCHLORIDE 0.8 MILLIGRAM(S): 0.4 CAPSULE ORAL at 22:25

## 2024-02-22 RX ADMIN — PIPERACILLIN AND TAZOBACTAM 25 GRAM(S): 4; .5 INJECTION, POWDER, LYOPHILIZED, FOR SOLUTION INTRAVENOUS at 07:11

## 2024-02-22 RX ADMIN — HEPARIN SODIUM 5000 UNIT(S): 5000 INJECTION INTRAVENOUS; SUBCUTANEOUS at 14:00

## 2024-02-22 NOTE — PROGRESS NOTE ADULT - PROBLEM SELECTOR PLAN 3
P/w urinary retention and +UA. Pt not c/o dysuria or frequency however presented altered and has hx of recurrent UTIs. s/p vanc and zosyn in ED  - c/w zosyn  - f/u urine cx  - f/u blood cx 2/21: admitted w Cr 2.6, baseline 1.2; likely secondary to retention & subsequent obstruction  2/22: labwork pending (rescheduled for 2pm)    - continue to monitor BMP  - continue wade pending resolution of jean-paul

## 2024-02-22 NOTE — PROGRESS NOTE ADULT - SUBJECTIVE AND OBJECTIVE BOX
Overnight - sundowning vs metabolic encephalopathy last pm  -no fever  -tolerating po  -urine cx pending  -increasing cre with JULIO CESAR   -leukocytosis    MEDICATIONS  (STANDING):  dextrose 5%. 1000 milliLiter(s) (50 mL/Hr) IV Continuous <Continuous>  dextrose 5%. 1000 milliLiter(s) (100 mL/Hr) IV Continuous <Continuous>  dextrose 50% Injectable 25 Gram(s) IV Push once  dextrose 50% Injectable 12.5 Gram(s) IV Push once  dextrose 50% Injectable 25 Gram(s) IV Push once  glucagon  Injectable 1 milliGRAM(s) IntraMuscular once  heparin   Injectable 5000 Unit(s) SubCutaneous every 8 hours  piperacillin/tazobactam IVPB.. 3.375 Gram(s) IV Intermittent every 12 hours    MEDICATIONS  (PRN):  dextrose Oral Gel 15 Gram(s) Oral once PRN Blood Glucose LESS THAN 70 milliGRAM(s)/deciliter  melatonin 3 milliGRAM(s) Oral at bedtime PRN Insomnia    ICU Vital Signs Last 24 Hrs  T(C): 37 (22 Feb 2024 05:35), Max: 37.2 (21 Feb 2024 21:08)  T(F): 98.6 (22 Feb 2024 05:35), Max: 98.9 (21 Feb 2024 21:08)  HR: 79 (22 Feb 2024 05:35) (60 - 91)  BP: 130/69 (22 Feb 2024 05:35) (98/61 - 152/68)  BP(mean): --  ABP: --  ABP(mean): --  RR: 18 (22 Feb 2024 05:35) (18 - 20)  SpO2: 93% (22 Feb 2024 05:35) (93% - 95%)    O2 Parameters below as of 22 Feb 2024 05:35  Patient On (Oxygen Delivery Method): room air    NAD sleeping but arousable  No JVD  Chest clear  CV RRR s1s2 no murmur  Abd soft  Ext no edema                          9.8    11.79 )-----------( 213      ( 21 Feb 2024 10:00 )             29.0     CAPILLARY BLOOD GLUCOSE      POCT Blood Glucose.: 219 mg/dL (21 Feb 2024 22:08)  POCT Blood Glucose.: 232 mg/dL (21 Feb 2024 17:59)        02-21    134<L>  |  101  |  35<H>  ----------------------------<  279<H>  5.1   |  22  |  2.40<H>    Ca    8.3<L>      21 Feb 2024 10:00  Phos  3.7     02-21  Mg     1.6     02-21    TPro  6.9  /  Alb  3.1<L>  /  TBili  0.5  /  DBili  x   /  AST  12  /  ALT  6<L>  /  AlkPhos  65  02-21      ACC: [id number] EXAM:  CT ABDOMEN AND PELVIS   ORDERED BY: CHANDAN JIMÉNEZ     PROCEDURE DATE:  02/21/2024          INTERPRETATION:  Initial report created on 2/21/2024 5:05:27 AM EST  PROCEDURE INFORMATION:  Exam: CT Abdomen And Pelvis Without Contrast  Exam date and time: 2/21/2024 2:44 AM  Age: 89 years old  Clinical indication: Purulent urine renal failure    TECHNIQUE:  Imaging protocol: Computed tomography of the abdomen and pelvis without  contrast.    COMPARISON:  US RETROPERITONEUM LIMITED 1/9/2024 11:36 AM    FINDINGS:  Lungs: Mild dependent changes lung bases.    Liver: Normal. No mass.  Gallbladder and bile ducts: Normal. No calcified stones. No ductal   dilation.  Pancreas: Normal. No ductal dilation.  Spleen: Normal. No splenomegaly.  Adrenal glands: Normal. No mass.  Kidneys and ureters: Moderate bilateral hydroureteronephrosis down to the   level  of bladder.  Stomach and bowel: Unremarkable. No obstruction. No mucosal thickening.  Appendix: No evidence of appendicitis.    Intraperitoneal space: Unremarkable. No free air. No significant fluid  collection.  Vasculature: Unremarkable. No abdominal aortic aneurysm.  Lymph nodes: Unremarkable. No enlarged lymph nodes.  Urinary bladder: Dan catheter with moderately thick-walled bladder with  perivesicular fat stranding.  Reproductive: Markedly enlarged prostate measuring 6.4 cm in diameter   indenting  bladder base.  Bones/joints: Multilevel degenerative changes thoracolumbar spine.  Soft tissues: Unremarkable.    IMPRESSION:  Moderate bilateral hydroureteronephrosis down to the level of thick-walled  bladder decompressed with Dan catheter. Mild perivesicular fat   stranding.  This wall thickening could be related marked trabeculation related to   chronic  bladder outlet obstruction secondary to markedly enlarged prostate,   though some  component of underlying cystitis may also be present.

## 2024-02-22 NOTE — PROGRESS NOTE ADULT - ASSESSMENT
89 yo man admitted from [facility] with recurrent UTI in the setting of BPH   -worsening renal function - with bilateral hydro in the setting of BPH - please consult renal  -increasing leukocytosis   -stable anemia - check iron  -UTI on abx awaiting culture results  -AODM - -250 on sliding scale insulin  -DVT prophylaxiz  -BPH withj obstruction - wade in place - urology following  -Dispo pending resolution of UTI and worsening renal function  Dr. Landon

## 2024-02-22 NOTE — PROGRESS NOTE ADULT - PROBLEM SELECTOR PLAN 4
P/w Cr 2.6, baseline 1.2 likely 2/2 obstruction from urinary retention.   - c/w wade - follow up MRI, remainder of plan as above

## 2024-02-22 NOTE — PROGRESS NOTE ADULT - SUBJECTIVE AND OBJECTIVE BOX
**INCOMPLETE NOTE    OVERNIGHT EVENTS: None    SUBJECTIVE:  Patient seen and examined at bedside, comfortable, NAD. Denied fever, chest pain, dyspnea, abdominal pain.     Vital Signs Last 12 Hrs  T(F): 98.6 (02-22-24 @ 05:35), Max: 98.9 (02-21-24 @ 21:08)  HR: 79 (02-22-24 @ 05:35) (79 - 91)  BP: 130/69 (02-22-24 @ 05:35) (130/69 - 152/68)  BP(mean): --  RR: 18 (02-22-24 @ 05:35) (18 - 20)  SpO2: 93% (02-22-24 @ 05:35) (93% - 94%)  I&O's Summary    21 Feb 2024 07:01  -  22 Feb 2024 07:00  --------------------------------------------------------  IN: 0 mL / OUT: 1300 mL / NET: -1300 mL        PHYSICAL EXAM:  Constitutional: NAD, comfortable in bed.  HEENT: NC/AT, PERRLA, EOMI, no conjunctival pallor or scleral icterus, MMM  Neck: Supple, no JVD  Respiratory: CTA B/L. No w/r/r.   Cardiovascular: RRR, normal S1 and S2, no m/r/g.   Gastrointestinal: +BS, soft NTND, no guarding or rebound tenderness, no palpable masses   Extremities: wwp; no cyanosis, clubbing or edema.   Vascular: Pulses equal and strong throughout.   Neurological: AAOx3, no CN deficits, strength and sensation intact throughout.   Skin: No gross skin abnormalities or rashes        LABS:                        9.8    11.79 )-----------( 213      ( 21 Feb 2024 10:00 )             29.0     02-21    134<L>  |  101  |  35<H>  ----------------------------<  279<H>  5.1   |  22  |  2.40<H>    Ca    8.3<L>      21 Feb 2024 10:00  Phos  3.7     02-21  Mg     1.6     02-21    TPro  6.9  /  Alb  3.1<L>  /  TBili  0.5  /  DBili  x   /  AST  12  /  ALT  6<L>  /  AlkPhos  65  02-21      Urinalysis Basic - ( 21 Feb 2024 10:00 )    Color: x / Appearance: x / SG: x / pH: x  Gluc: 279 mg/dL / Ketone: x  / Bili: x / Urobili: x   Blood: x / Protein: x / Nitrite: x   Leuk Esterase: x / RBC: x / WBC x   Sq Epi: x / Non Sq Epi: x / Bacteria: x          RADIOLOGY & ADDITIONAL TESTS:    MEDICATIONS  (STANDING):  dextrose 5%. 1000 milliLiter(s) (50 mL/Hr) IV Continuous <Continuous>  dextrose 5%. 1000 milliLiter(s) (100 mL/Hr) IV Continuous <Continuous>  dextrose 50% Injectable 12.5 Gram(s) IV Push once  dextrose 50% Injectable 25 Gram(s) IV Push once  dextrose 50% Injectable 25 Gram(s) IV Push once  glucagon  Injectable 1 milliGRAM(s) IntraMuscular once  heparin   Injectable 5000 Unit(s) SubCutaneous every 8 hours  piperacillin/tazobactam IVPB.. 3.375 Gram(s) IV Intermittent every 12 hours    MEDICATIONS  (PRN):  dextrose Oral Gel 15 Gram(s) Oral once PRN Blood Glucose LESS THAN 70 milliGRAM(s)/deciliter  melatonin 3 milliGRAM(s) Oral at bedtime PRN Insomnia   OVERNIGHT EVENTS: None    SUBJECTIVE:  Patient seen and examined at bedside, comfortable, NAD. Denied fever, chest pain, dyspnea, abdominal (including suprapubic) pain.     Vital Signs Last 12 Hrs  T(F): 98.6 (02-22-24 @ 05:35), Max: 98.9 (02-21-24 @ 21:08)  HR: 79 (02-22-24 @ 05:35) (79 - 91)  BP: 130/69 (02-22-24 @ 05:35) (130/69 - 152/68)  BP(mean): --  RR: 18 (02-22-24 @ 05:35) (18 - 20)  SpO2: 93% (02-22-24 @ 05:35) (93% - 94%)  I&O's Summary    21 Feb 2024 07:01  -  22 Feb 2024 07:00  --------------------------------------------------------  IN: 0 mL / OUT: 1300 mL / NET: -1300 mL        PHYSICAL EXAM:  Constitutional: NAD, comfortable in bed.  HEENT: NC/AT, PERRLA, EOMI, no conjunctival pallor or scleral icterus, MMM  Neck: Supple, no JVD  Respiratory: CTA B/L. No w/r/r.   Cardiovascular: RRR, normal S1 and S2, no m/r/g.   Gastrointestinal: +BS, soft NT (no suprapubic tenderness), ND, no guarding or rebound tenderness, no palpable masses   Extremities: wwp; no cyanosis, clubbing or edema.   Vascular: Pulses equal and strong throughout.   Neurological: AAOx3, no CN deficits, strength and sensation intact throughout.   Skin: No gross skin abnormalities or rashes        LABS:                        9.8    11.79 )-----------( 213      ( 21 Feb 2024 10:00 )             29.0     02-21    134<L>  |  101  |  35<H>  ----------------------------<  279<H>  5.1   |  22  |  2.40<H>    Ca    8.3<L>      21 Feb 2024 10:00  Phos  3.7     02-21  Mg     1.6     02-21    TPro  6.9  /  Alb  3.1<L>  /  TBili  0.5  /  DBili  x   /  AST  12  /  ALT  6<L>  /  AlkPhos  65  02-21      Urinalysis Basic - ( 21 Feb 2024 10:00 )    Color: x / Appearance: x / SG: x / pH: x  Gluc: 279 mg/dL / Ketone: x  / Bili: x / Urobili: x   Blood: x / Protein: x / Nitrite: x   Leuk Esterase: x / RBC: x / WBC x   Sq Epi: x / Non Sq Epi: x / Bacteria: x          RADIOLOGY & ADDITIONAL TESTS:    MEDICATIONS  (STANDING):  dextrose 5%. 1000 milliLiter(s) (50 mL/Hr) IV Continuous <Continuous>  dextrose 5%. 1000 milliLiter(s) (100 mL/Hr) IV Continuous <Continuous>  dextrose 50% Injectable 12.5 Gram(s) IV Push once  dextrose 50% Injectable 25 Gram(s) IV Push once  dextrose 50% Injectable 25 Gram(s) IV Push once  glucagon  Injectable 1 milliGRAM(s) IntraMuscular once  heparin   Injectable 5000 Unit(s) SubCutaneous every 8 hours  piperacillin/tazobactam IVPB.. 3.375 Gram(s) IV Intermittent every 12 hours    MEDICATIONS  (PRN):  dextrose Oral Gel 15 Gram(s) Oral once PRN Blood Glucose LESS THAN 70 milliGRAM(s)/deciliter  melatonin 3 milliGRAM(s) Oral at bedtime PRN Insomnia

## 2024-02-22 NOTE — PROGRESS NOTE ADULT - PROBLEM SELECTOR PLAN 2
found to have BS of 600cc at [facility] now s/p wade placement by urology in ED with 850cc output. CT showing b/l hydro and cystitis. Likely 2/2 BPH as prostate 100cc  - c/w wade  - repeat US in 48-72 hr to ensure resolution of hydro after wade placement  - c/w flomax  - MRI prostate to assess if candidate for PAE   - urology following, recs appreciated 2/21 pt presented w urinary retention and +UA  no preceding complaints of dysuria, hematuria, or frequency, but pt presented w ams & has hx recurrent utis  given 1x dose vancomycin & zosyn in ed    - continue zosyn  - follow up urine cx  - follow up blood cx

## 2024-02-22 NOTE — PROGRESS NOTE ADULT - PROBLEM SELECTOR PLAN 5
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 s/p 1.5L in NS  - c/w wade  - trend Na  - Ulytes if Na does not improve 2/21: admitted w Na 129, deemed likely secondary to SIADH from urinary retention, voided 1.5L after wade placement  2/22: labwork pending (rescheduled for 2pm)  pt tolerating po    - continue to monitor BMP  - continue wade pending resolution of jean-paul  - follow up urine lytes if sodium does not improve

## 2024-02-22 NOTE — PROGRESS NOTE ADULT - PROBLEM SELECTOR PROBLEM 4
I spoke with natali mother she ask to mail the lab order    JULIO CESAR (acute kidney injury) BPH (benign prostatic hyperplasia)

## 2024-02-22 NOTE — PROGRESS NOTE ADULT - ASSESSMENT
89 y/o male (AOx3) with history of DM (was previously on SLG2 inhibitor but not for past month), HLD, BPH (managed by Dr. Diane, on tamsulosin 0.8mg), recurrent UTI, admitted to medicine for urinary retention, UTI, and hematuria likely 2/2 traumatic wade. At the nursing home he was found to have 600 cc in his bladder, wade was inserted with only 8cc came out and flushed with hematuria, which was removed and then they sent him to the ED. Had some dysuria and urgency but no other infectious urinary symptoms.  laced a 22 Fr 6 eye which drained purulent cloudy urine 800 cc, no clots; replaced with 18fr coude. He is also DNR/DNI. UA - large LE, negative nitrite, many WBC/RBC. CTAP shows moderate b/l hydroureteronephrosis to the level of the bladder that is now decompressed with wade, with some mild perivesicular fat stranding, thickened bladder wall.   Approximately 100cc prostate.    Recommendations:  -Continue wade for maximal bladder drainage  -Repeat RBUS in 48-72 hours (2/23 or 2/24) to reassess hydro   -MRI prostate in house to determine candidacy for PAE   -Abx per primary for presumed UTI   -Trend Cr   -Monitor for POD   -Continue flomax  -Discussed with attending  89 y/o male (AOx3) with history of DM (was previously on SLG2 inhibitor but not for past month), HLD, BPH (managed by Dr. Diane, on tamsulosin 0.8mg), recurrent UTI, admitted to medicine for urinary retention, UTI, and hematuria likely 2/2 traumatic wade. At the nursing home he was found to have 600 cc in his bladder, wade was inserted with only 8cc came out and flushed with hematuria, which was removed and then they sent him to the ED. Had some dysuria and urgency but no other infectious urinary symptoms.  laced a 22 Fr 6 eye which drained purulent cloudy urine 800 cc, no clots; replaced with 18fr coude. He is also DNR/DNI. UA - large LE, negative nitrite, many WBC/RBC. CTAP shows moderate b/l hydroureteronephrosis to the level of the bladder that is now decompressed with wade, with some mild perivesicular fat stranding, thickened bladder wall.   Approximately 100cc prostate.    Recommendations:  -Continue wade for maximal bladder drainage; would not recommend inpatient TOV  -Repeat RBUS in 48-72 hours (2/23 or 2/24) to reassess hydro   -MRI prostate in house to determine candidacy for PAE   -Abx per primary for presumed UTI   -Trend Cr   -Monitor for POD   -Continue flomax  -Discussed with attending

## 2024-02-22 NOTE — PROGRESS NOTE ADULT - PROBLEM SELECTOR PLAN 6
see management above RESOLVING  Initially altered upon arrival and does not recall coming to ED however now back to baseline mental status s/p wade placement. Encephalopathy likely 2/2 urinary retention and subsequent UTI. VSS. Physical exam unremarkable  2/22: significant improvement, with mental status returned to baseline, aox4    - continue management of urinary retention/UTI as above  - continue delirium precautions

## 2024-02-22 NOTE — PROGRESS NOTE ADULT - ASSESSMENT
Patient is as 89 y/o M (baseline A&Ox3), DM, recurrent UTIs (recently in cipro) and urinary retention, presenting [facility] for urinary retention found to have UTI.  89M PMHx DM, recurrent UTIs (recently treated w ciprofloxacin) and urinary retention, presenting from [facility] for AMS & urinary retention, found to have UTI for which he was started on tx w zosyn, now w significant improvement in ms.

## 2024-02-22 NOTE — PROGRESS NOTE ADULT - SUBJECTIVE AND OBJECTIVE BOX
UROLOGY PROGRESS NOTE    SUBJECTIVE: Patient seen and examined bedside. Patient denies acute complaints, denies fevers/chills or ab/flank pain.    heparin   Injectable 5000 Unit(s) SubCutaneous every 8 hours  piperacillin/tazobactam IVPB.. 3.375 Gram(s) IV Intermittent every 12 hours      Vital Signs Last 24 Hrs  T(C): 37 (22 Feb 2024 05:35), Max: 37.2 (21 Feb 2024 21:08)  T(F): 98.6 (22 Feb 2024 05:35), Max: 98.9 (21 Feb 2024 21:08)  HR: 79 (22 Feb 2024 05:35) (60 - 91)  BP: 130/69 (22 Feb 2024 05:35) (98/61 - 152/68)  BP(mean): --  RR: 18 (22 Feb 2024 05:35) (18 - 20)  SpO2: 93% (22 Feb 2024 05:35) (93% - 95%)    Parameters below as of 22 Feb 2024 05:35  Patient On (Oxygen Delivery Method): room air      I&O's Detail    21 Feb 2024 07:01  -  22 Feb 2024 07:00  --------------------------------------------------------  IN:  Total IN: 0 mL    OUT:    Intermittent Catheterization - Urethral (mL): 1300 mL  Total OUT: 1300 mL    Total NET: -1300 mL          PHYSICAL EXAM    General: NAD, resting comfortably in bed  C/V: NSR  Pulm: Nonlabored breathing, no respiratory distress on room air  Abd: soft, ND/NT, no rebound tenderness, no guarding  : wade draining clear yellow urine  Extrem: WWP, no edema, SCDs in place        LABS:                        9.8    11.79 )-----------( 213      ( 21 Feb 2024 10:00 )             29.0     02-21    134<L>  |  101  |  35<H>  ----------------------------<  279<H>  5.1   |  22  |  2.40<H>    Ca    8.3<L>      21 Feb 2024 10:00  Phos  3.7     02-21  Mg     1.6     02-21    TPro  6.9  /  Alb  3.1<L>  /  TBili  0.5  /  DBili  x   /  AST  12  /  ALT  6<L>  /  AlkPhos  65  02-21      Urinalysis Basic - ( 21 Feb 2024 10:00 )    Color: x / Appearance: x / SG: x / pH: x  Gluc: 279 mg/dL / Ketone: x  / Bili: x / Urobili: x   Blood: x / Protein: x / Nitrite: x   Leuk Esterase: x / RBC: x / WBC x   Sq Epi: x / Non Sq Epi: x / Bacteria: x        CULTURES:      Culture - Blood (collected 02-21-24 @ 11:17)  Source: .Blood Blood-Peripheral  Preliminary Report (02-22-24 @ 00:00):    No growth at 12 hours    Culture - Blood (collected 02-21-24 @ 03:32)  Source: .Blood Blood-Peripheral  Preliminary Report (02-22-24 @ 05:00):    No growth at 1 day.          RADIOLOGY & ADDITIONAL STUDIES:

## 2024-02-22 NOTE — PROGRESS NOTE ADULT - PROBLEM SELECTOR PLAN 8
F: s/p 1.5L   E: replete to K>4, Mg>2, Phos>2.5  N: dash  Ppx: heparin subq  Code status: DNR DNI    Dispo: [facility] F: as needed (s/p 1.5L on date of admission)  E: replete to K>4, Mg>2, Phos>2.5  N: DASH diet  Ppx: heparin subq  Code status: DNR, DNI    Dispo: [facility]

## 2024-02-22 NOTE — PROGRESS NOTE ADULT - PROBLEM SELECTOR PLAN 1
Initially altered upon arrival and does not recall coming to ED however now back to baseline mental status s/p wade placement. Encephalopathy likely 2/2 urinary retention and subsequent UTI. VSS. Physical exam unremarkable  - see management below 2/21: found to have BS of 600cc at [facility] now s/p wade placement by urology in ED with 850cc output  CT showing b/l hydronephrosis and cystitis; likely 2/2 BPH as prostate 100cc  urology following, recommendations appreciated    - continue wade pending transition to TOV  - follow up repeat US (48-72h after wade placement, tentatively 2/24) to ensure resolution of hydronephrosis  - continue flomax  - follow up MRI prostate to assess if candidate for PAE

## 2024-02-23 LAB
ANION GAP SERPL CALC-SCNC: 9 MMOL/L — SIGNIFICANT CHANGE UP (ref 5–17)
BASOPHILS # BLD AUTO: 0.02 K/UL — SIGNIFICANT CHANGE UP (ref 0–0.2)
BASOPHILS NFR BLD AUTO: 0.3 % — SIGNIFICANT CHANGE UP (ref 0–2)
BLD GP AB SCN SERPL QL: NEGATIVE — SIGNIFICANT CHANGE UP
BUN SERPL-MCNC: 16 MG/DL — SIGNIFICANT CHANGE UP (ref 7–23)
CALCIUM SERPL-MCNC: 8.5 MG/DL — SIGNIFICANT CHANGE UP (ref 8.4–10.5)
CHLORIDE SERPL-SCNC: 100 MMOL/L — SIGNIFICANT CHANGE UP (ref 96–108)
CO2 SERPL-SCNC: 26 MMOL/L — SIGNIFICANT CHANGE UP (ref 22–31)
CREAT SERPL-MCNC: 1.42 MG/DL — HIGH (ref 0.5–1.3)
EGFR: 43 ML/MIN/1.73M2 — LOW
EOSINOPHIL # BLD AUTO: 0.03 K/UL — SIGNIFICANT CHANGE UP (ref 0–0.5)
EOSINOPHIL NFR BLD AUTO: 0.4 % — SIGNIFICANT CHANGE UP (ref 0–6)
FERRITIN SERPL-MCNC: 238 NG/ML — SIGNIFICANT CHANGE UP (ref 30–400)
GLUCOSE BLDC GLUCOMTR-MCNC: 164 MG/DL — HIGH (ref 70–99)
GLUCOSE BLDC GLUCOMTR-MCNC: 187 MG/DL — HIGH (ref 70–99)
GLUCOSE BLDC GLUCOMTR-MCNC: 191 MG/DL — HIGH (ref 70–99)
GLUCOSE BLDC GLUCOMTR-MCNC: 221 MG/DL — HIGH (ref 70–99)
GLUCOSE BLDC GLUCOMTR-MCNC: 267 MG/DL — HIGH (ref 70–99)
GLUCOSE BLDC GLUCOMTR-MCNC: 275 MG/DL — HIGH (ref 70–99)
GLUCOSE SERPL-MCNC: 156 MG/DL — HIGH (ref 70–99)
HCT VFR BLD CALC: 28.8 % — LOW (ref 39–50)
HGB BLD-MCNC: 9.9 G/DL — LOW (ref 13–17)
IMM GRANULOCYTES NFR BLD AUTO: 0.5 % — SIGNIFICANT CHANGE UP (ref 0–0.9)
IRON SATN MFR SERPL: 14 % — LOW (ref 16–55)
IRON SATN MFR SERPL: 20 UG/DL — LOW (ref 45–165)
LYMPHOCYTES # BLD AUTO: 0.82 K/UL — LOW (ref 1–3.3)
LYMPHOCYTES # BLD AUTO: 11.1 % — LOW (ref 13–44)
MAGNESIUM SERPL-MCNC: 2.1 MG/DL — SIGNIFICANT CHANGE UP (ref 1.6–2.6)
MCHC RBC-ENTMCNC: 30.7 PG — SIGNIFICANT CHANGE UP (ref 27–34)
MCHC RBC-ENTMCNC: 34.4 GM/DL — SIGNIFICANT CHANGE UP (ref 32–36)
MCV RBC AUTO: 89.2 FL — SIGNIFICANT CHANGE UP (ref 80–100)
MONOCYTES # BLD AUTO: 0.73 K/UL — SIGNIFICANT CHANGE UP (ref 0–0.9)
MONOCYTES NFR BLD AUTO: 9.9 % — SIGNIFICANT CHANGE UP (ref 2–14)
NEUTROPHILS # BLD AUTO: 5.77 K/UL — SIGNIFICANT CHANGE UP (ref 1.8–7.4)
NEUTROPHILS NFR BLD AUTO: 77.8 % — HIGH (ref 43–77)
NRBC # BLD: 0 /100 WBCS — SIGNIFICANT CHANGE UP (ref 0–0)
PHOSPHATE SERPL-MCNC: 3.5 MG/DL — SIGNIFICANT CHANGE UP (ref 2.5–4.5)
PLATELET # BLD AUTO: 209 K/UL — SIGNIFICANT CHANGE UP (ref 150–400)
POTASSIUM SERPL-MCNC: 3.9 MMOL/L — SIGNIFICANT CHANGE UP (ref 3.5–5.3)
POTASSIUM SERPL-SCNC: 3.9 MMOL/L — SIGNIFICANT CHANGE UP (ref 3.5–5.3)
RBC # BLD: 3.23 M/UL — LOW (ref 4.2–5.8)
RBC # FLD: 14.4 % — SIGNIFICANT CHANGE UP (ref 10.3–14.5)
RH IG SCN BLD-IMP: NEGATIVE — SIGNIFICANT CHANGE UP
SODIUM SERPL-SCNC: 135 MMOL/L — SIGNIFICANT CHANGE UP (ref 135–145)
TIBC SERPL-MCNC: 143 UG/DL — LOW (ref 220–430)
UIBC SERPL-MCNC: 123 UG/DL — SIGNIFICANT CHANGE UP (ref 110–370)
WBC # BLD: 7.41 K/UL — SIGNIFICANT CHANGE UP (ref 3.8–10.5)
WBC # FLD AUTO: 7.41 K/UL — SIGNIFICANT CHANGE UP (ref 3.8–10.5)

## 2024-02-23 PROCEDURE — 76770 US EXAM ABDO BACK WALL COMP: CPT | Mod: 26

## 2024-02-23 RX ADMIN — HEPARIN SODIUM 5000 UNIT(S): 5000 INJECTION INTRAVENOUS; SUBCUTANEOUS at 22:10

## 2024-02-23 RX ADMIN — PIPERACILLIN AND TAZOBACTAM 25 GRAM(S): 4; .5 INJECTION, POWDER, LYOPHILIZED, FOR SOLUTION INTRAVENOUS at 06:44

## 2024-02-23 RX ADMIN — Medication 1: at 00:08

## 2024-02-23 RX ADMIN — FINASTERIDE 5 MILLIGRAM(S): 5 TABLET, FILM COATED ORAL at 14:37

## 2024-02-23 RX ADMIN — Medication 3: at 14:20

## 2024-02-23 RX ADMIN — TAMSULOSIN HYDROCHLORIDE 0.8 MILLIGRAM(S): 0.4 CAPSULE ORAL at 22:10

## 2024-02-23 RX ADMIN — HEPARIN SODIUM 5000 UNIT(S): 5000 INJECTION INTRAVENOUS; SUBCUTANEOUS at 14:22

## 2024-02-23 RX ADMIN — HEPARIN SODIUM 5000 UNIT(S): 5000 INJECTION INTRAVENOUS; SUBCUTANEOUS at 06:42

## 2024-02-23 RX ADMIN — Medication 1: at 19:47

## 2024-02-23 RX ADMIN — Medication 3: at 22:09

## 2024-02-23 RX ADMIN — Medication 1: at 10:11

## 2024-02-23 RX ADMIN — PIPERACILLIN AND TAZOBACTAM 25 GRAM(S): 4; .5 INJECTION, POWDER, LYOPHILIZED, FOR SOLUTION INTRAVENOUS at 19:34

## 2024-02-23 NOTE — PROGRESS NOTE ADULT - SUBJECTIVE AND OBJECTIVE BOX
OVERNIGHT EVENTS: None    SUBJECTIVE:  Patient seen and examined at bedside, comfortable, NAD. Denied fever, chest pain, dyspnea, abdominal pain.     Vital Signs Last 12 Hrs  T(F): 98.7 (02-22-24 @ 22:05), Max: 98.7 (02-22-24 @ 22:05)  HR: 63 (02-22-24 @ 22:05) (63 - 63)  BP: 109/60 (02-22-24 @ 22:05) (109/60 - 109/60)  BP(mean): --  RR: 18 (02-22-24 @ 22:05) (18 - 18)  SpO2: 93% (02-22-24 @ 22:05) (93% - 93%)  I&O's Summary    22 Feb 2024 07:01  -  23 Feb 2024 07:00  --------------------------------------------------------  IN: 0 mL / OUT: 1300 mL / NET: -1300 mL      PHYSICAL EXAM:  Constitutional: NAD, comfortable in bed.  HEENT: NC/AT, PERRLA, EOMI, no conjunctival pallor or scleral icterus, MMM  Neck: Supple, no JVD  Respiratory: CTA B/L. No w/r/r.   Cardiovascular: RRR, normal S1 and S2, no m/r/g.   Gastrointestinal: +BS, soft NT (no suprapubic tenderness), ND, no guarding or rebound tenderness, no palpable masses   Extremities: wwp; no cyanosis, clubbing or edema.   Vascular: Pulses equal and strong throughout.   Neurological: AAOx3, no CN deficits, strength and sensation intact throughout.   Skin: No gross skin abnormalities or rashes      LABS:                        10.2   6.71  )-----------( 211      ( 22 Feb 2024 15:06 )             30.6     02-22    134<L>  |  99  |  22  ----------------------------<  239<H>  4.1   |  25  |  1.57<H>    Ca    8.4      22 Feb 2024 15:06  Phos  3.3     02-22  Mg     1.4     02-22        Urinalysis Basic - ( 22 Feb 2024 15:06 )    Color: x / Appearance: x / SG: x / pH: x  Gluc: 239 mg/dL / Ketone: x  / Bili: x / Urobili: x   Blood: x / Protein: x / Nitrite: x   Leuk Esterase: x / RBC: x / WBC x   Sq Epi: x / Non Sq Epi: x / Bacteria: x          RADIOLOGY & ADDITIONAL TESTS:    MEDICATIONS  (STANDING):  dextrose 5%. 1000 milliLiter(s) (100 mL/Hr) IV Continuous <Continuous>  dextrose 5%. 1000 milliLiter(s) (50 mL/Hr) IV Continuous <Continuous>  dextrose 50% Injectable 25 Gram(s) IV Push once  dextrose 50% Injectable 12.5 Gram(s) IV Push once  dextrose 50% Injectable 25 Gram(s) IV Push once  finasteride 5 milliGRAM(s) Oral daily  glucagon  Injectable 1 milliGRAM(s) IntraMuscular once  heparin   Injectable 5000 Unit(s) SubCutaneous every 8 hours  insulin lispro (ADMELOG) corrective regimen sliding scale   SubCutaneous Before meals and at bedtime  piperacillin/tazobactam IVPB.. 3.375 Gram(s) IV Intermittent every 12 hours  tamsulosin 0.8 milliGRAM(s) Oral at bedtime    MEDICATIONS  (PRN):  dextrose Oral Gel 15 Gram(s) Oral once PRN Blood Glucose LESS THAN 70 milliGRAM(s)/deciliter  melatonin 3 milliGRAM(s) Oral at bedtime PRN Insomnia   OVERNIGHT EVENTS: None    SUBJECTIVE:  Patient seen and examined at bedside, comfortable, NAD. Denied fever, chest pain, dyspnea, abdominal pain.     Vital Signs Last 12 Hrs  T(F): 98.7 (02-22-24 @ 22:05), Max: 98.7 (02-22-24 @ 22:05)  HR: 63 (02-22-24 @ 22:05) (63 - 63)  BP: 109/60 (02-22-24 @ 22:05) (109/60 - 109/60)  BP(mean): --  RR: 18 (02-22-24 @ 22:05) (18 - 18)  SpO2: 93% (02-22-24 @ 22:05) (93% - 93%)  I&O's Summary    22 Feb 2024 07:01  -  23 Feb 2024 07:00  --------------------------------------------------------  IN: 0 mL / OUT: 1300 mL / NET: -1300 mL      PHYSICAL EXAM:  Constitutional: NAD, comfortable in bed.  HEENT: NC/AT, PERRLA, EOMI, no conjunctival pallor or scleral icterus, MMM  Neck: Supple, no JVD  Respiratory: CTA B/L. No w/r/r.   Cardiovascular: RRR, normal S1 and S2, no m/r/g.   GI/: +BS, soft NT (no suprapubic tenderness), ND, no guarding or rebound tenderness, no palpable masses, wade draining clear, yellow urine  Extremities: wwp; no cyanosis, clubbing or edema.   Vascular: Pulses equal and strong throughout.   Neurological: AAOx3, no CN deficits, strength and sensation intact throughout.   Skin: No gross skin abnormalities or rashes      LABS:                        10.2   6.71  )-----------( 211      ( 22 Feb 2024 15:06 )             30.6     02-22    134<L>  |  99  |  22  ----------------------------<  239<H>  4.1   |  25  |  1.57<H>    Ca    8.4      22 Feb 2024 15:06  Phos  3.3     02-22  Mg     1.4     02-22        Urinalysis Basic - ( 22 Feb 2024 15:06 )    Color: x / Appearance: x / SG: x / pH: x  Gluc: 239 mg/dL / Ketone: x  / Bili: x / Urobili: x   Blood: x / Protein: x / Nitrite: x   Leuk Esterase: x / RBC: x / WBC x   Sq Epi: x / Non Sq Epi: x / Bacteria: x          RADIOLOGY & ADDITIONAL TESTS:    MEDICATIONS  (STANDING):  dextrose 5%. 1000 milliLiter(s) (100 mL/Hr) IV Continuous <Continuous>  dextrose 5%. 1000 milliLiter(s) (50 mL/Hr) IV Continuous <Continuous>  dextrose 50% Injectable 25 Gram(s) IV Push once  dextrose 50% Injectable 12.5 Gram(s) IV Push once  dextrose 50% Injectable 25 Gram(s) IV Push once  finasteride 5 milliGRAM(s) Oral daily  glucagon  Injectable 1 milliGRAM(s) IntraMuscular once  heparin   Injectable 5000 Unit(s) SubCutaneous every 8 hours  insulin lispro (ADMELOG) corrective regimen sliding scale   SubCutaneous Before meals and at bedtime  piperacillin/tazobactam IVPB.. 3.375 Gram(s) IV Intermittent every 12 hours  tamsulosin 0.8 milliGRAM(s) Oral at bedtime    MEDICATIONS  (PRN):  dextrose Oral Gel 15 Gram(s) Oral once PRN Blood Glucose LESS THAN 70 milliGRAM(s)/deciliter  melatonin 3 milliGRAM(s) Oral at bedtime PRN Insomnia

## 2024-02-23 NOTE — PROGRESS NOTE ADULT - PROBLEM SELECTOR PLAN 3
2/21: admitted w Cr 2.6, baseline 1.2; likely secondary to retention & subsequent obstruction  2/22: labwork pending (rescheduled for 2pm)    - continue to monitor BMP  - continue wade pending resolution of jean-paul 2/21: admitted w Cr 2.6, baseline 1.2; likely secondary to retention & subsequent obstruction  2/22: labwork pending (rescheduled for 2pm)  2/23: continued improvement of bun/cr (16/1.42)    - continue to monitor BMP

## 2024-02-23 NOTE — PROGRESS NOTE ADULT - ASSESSMENT
89M PMHx DM, recurrent UTIs (recently treated w ciprofloxacin) and urinary retention, presenting from [facility] for [facility] & urinary retention, found to have UTI for which he was started on tx w zosyn, now w significant improvement in ms.

## 2024-02-23 NOTE — PROGRESS NOTE ADULT - PROBLEM SELECTOR PLAN 5
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

## 2024-02-23 NOTE — PROGRESS NOTE ADULT - PROBLEM SELECTOR PLAN 8
F: as needed (s/p 1.5L on date of admission)  E: replete to K>4, Mg>2, Phos>2.5  N: DASH diet  Ppx: heparin subq  Code status: DNR, DNI    Dispo: [facility]

## 2024-02-23 NOTE — PROGRESS NOTE ADULT - PROBLEM SELECTOR PLAN 2
2/21 pt presented w urinary retention and +UA  no preceding complaints of dysuria, hematuria, or frequency, but pt presented w [facility] & has hx recurrent utis  given 1x dose vancomycin & zosyn in ed    - continue zosyn  - follow up urine cx  - follow up blood cx 2/21 pt presented w urinary retention and +UA  no preceding complaints of dysuria, hematuria, or frequency, but pt presented w [facility] & has hx recurrent utis  given 1x dose vancomycin & zosyn in ed  2/23 no bacterial growth to date, +candida     - continue zosyn, discontinue/adjust antimicrobial pending urine/blood cx   - follow up blood cx

## 2024-02-23 NOTE — PROGRESS NOTE ADULT - PROBLEM SELECTOR PLAN 1
2/21: found to have BS of 600cc at [facility] now s/p wade placement by urology in ED with 850cc output  CT showing b/l hydronephrosis and cystitis; likely 2/2 BPH as prostate 100cc  urology following, recommendations appreciated    - continue wade pending transition to TOV  - follow up repeat US (48-72h after wade placement, tentatively 2/24) to ensure resolution of hydronephrosis  - continue flomax  - follow up MRI prostate to assess if candidate for PAE 2/21: found to have BS of 600cc at [facility] now s/p wade placement by urology in ED with 850cc output  CT showing b/l hydronephrosis and cystitis; likely 2/2 BPH as prostate 100cc  urology following, recommendations appreciated    - continue wade pending transition to TOV w resolution of hydronephrosis  - follow up repeat US (48-72h after wade placement, tentatively 2/24) to ensure resolution of hydronephrosis  - continue flomax  - follow up MRI prostate to assess if candidate for PAE

## 2024-02-23 NOTE — PROGRESS NOTE ADULT - PROBLEM SELECTOR PROBLEM 4
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 The risks and benefits of the procedure as outlined on the consent form were discussed with the patient. These risks include but are not limited to new pain, worsening of pain, infection, bleeding, permanent skin changes, allergic or unexpected drug reaction with minor or major consequences, nerve injury, dural puncture, headache, and/or paralysis.      Technique:   Patient was placed in a seating position with her left Arm and hand stretched out on the fluoroscopy table. The hand was rotated from supine medially to obtain images of the carpometacarpal joint on the volar aspect. Skin was prepped with chloroprep twice and then draped in a sterile fashion. A 25 GA needle was then utilized to administer 3 ml of  1% lidocaine for skin infiltration at the joint after fluoroscopic images confirmed the location of the joint (done with assisted of a 25 GA needle on skin surface). The 25 GA needle was then connected to a catheter and a syringe. Iohexal Contrast was administered to delineate the joint margin. After confirmation of location with the contrast, 1 ml of Dexamethasone 10 mg and 0.5 ml of 0.25% Bupivacaine were injected into the joint space. Patient tolerated the  procedure well.       Complications : None      EBL: None      Pre-Op Pain : 8/10      Post-Op Pain: 2/10      Discharge Instructions and Followup: Provided to patient.         LAURA ALDRIDGE MD   BPH (benign prostatic hyperplasia)

## 2024-02-23 NOTE — PROGRESS NOTE ADULT - ASSESSMENT
Hemodynamically stable s/p admiion for UTI from [facility]  -ucsx candida  -improving on abx but will need ID consult for c albicans (contaminant or infection>)  -adequate glucose control:  CAPILLARY BLOOD GLUCOSE      POCT Blood Glucose.: 221 mg/dL (23 Feb 2024 17:43)  POCT Blood Glucose.: 267 mg/dL (23 Feb 2024 13:50)  POCT Blood Glucose.: 164 mg/dL (23 Feb 2024 09:39)  POCT Blood Glucose.: 187 mg/dL (23 Feb 2024 00:04)  POCT Blood Glucose.: 178 mg/dL (22 Feb 2024 22:08)  POCT Blood Glucose.: 215 mg/dL (22 Feb 2024 18:24)    Blood pressure controlled  Needs pt to avoid decompensation  Dispo back to [facility] when inefection resolved  Need urology follow up re wade  D/W Team

## 2024-02-23 NOTE — PROGRESS NOTE ADULT - SUBJECTIVE AND OBJECTIVE BOX
Events:  UCx with candida  Urine clear  Foely in place  Afebrile    MEDICATIONS  (STANDING):  dextrose 5%. 1000 milliLiter(s) (100 mL/Hr) IV Continuous <Continuous>  dextrose 5%. 1000 milliLiter(s) (50 mL/Hr) IV Continuous <Continuous>  dextrose 50% Injectable 12.5 Gram(s) IV Push once  dextrose 50% Injectable 25 Gram(s) IV Push once  dextrose 50% Injectable 25 Gram(s) IV Push once  finasteride 5 milliGRAM(s) Oral daily  glucagon  Injectable 1 milliGRAM(s) IntraMuscular once  heparin   Injectable 5000 Unit(s) SubCutaneous every 8 hours  insulin lispro (ADMELOG) corrective regimen sliding scale   SubCutaneous Before meals and at bedtime  piperacillin/tazobactam IVPB.. 3.375 Gram(s) IV Intermittent every 12 hours  tamsulosin 0.8 milliGRAM(s) Oral at bedtime    MEDICATIONS  (PRN):  dextrose Oral Gel 15 Gram(s) Oral once PRN Blood Glucose LESS THAN 70 milliGRAM(s)/deciliter  melatonin 3 milliGRAM(s) Oral at bedtime PRN Insomnia    ICU Vital Signs Last 24 Hrs  T(C): 37.4 (23 Feb 2024 15:35), Max: 37.4 (23 Feb 2024 15:35)  T(F): 99.4 (23 Feb 2024 15:35), Max: 99.4 (23 Feb 2024 15:35)  HR: 70 (23 Feb 2024 15:35) (58 - 70)  BP: 130/69 (23 Feb 2024 15:35) (109/60 - 130/69)  BP(mean): 86 (23 Feb 2024 08:59) (86 - 86)  ABP: --  ABP(mean): --  RR: 18 (23 Feb 2024 15:35) (18 - 18)  SpO2: 93% (23 Feb 2024 15:35) (93% - 95%)    O2 Parameters below as of 23 Feb 2024 15:35  Patient On (Oxygen Delivery Method): room air    NAD easiliy arousable  No JVD  Chest clear  CV RRR s1s2 no murmur  abd soft  Ext no edema                            9.9    7.41  )-----------( 209      ( 23 Feb 2024 05:30 )             28.8       Culture - Blood (collected 21 Feb 2024 11:17)  Source: .Blood Blood-Peripheral  Preliminary Report (23 Feb 2024 12:00):    No growth at 2 days.    Culture - Blood (collected 21 Feb 2024 03:32)  Source: .Blood Blood-Peripheral  Preliminary Report (23 Feb 2024 06:00):    No growth at 2 days.    Culture - Urine (collected 21 Feb 2024 01:36)  Source: Clean Catch Clean Catch (Midstream)  Final Report (22 Feb 2024 13:18):    >100,000 CFU/ml Candida albicans        02-23    135  |  100  |  16  ----------------------------<  156<H>  3.9   |  26  |  1.42<H>    Ca    8.5      23 Feb 2024 05:30  Phos  3.5     02-23  Mg     2.1     02-23

## 2024-02-23 NOTE — PROGRESS NOTE ADULT - PROBLEM SELECTOR PLAN 6
RESOLVING  Initially altered upon arrival and does not recall coming to ED however now back to baseline mental status s/p wade placement. Encephalopathy likely 2/2 urinary retention and subsequent UTI. VSS. Physical exam unremarkable  2/22: significant improvement, with mental status returned to baseline, aox4    - continue management of urinary retention/UTI as above  - continue delirium precautions RESOLVED  Initially altered upon arrival and does not recall coming to ED however now back to baseline mental status s/p wade placement. Encephalopathy likely 2/2 urinary retention and subsequent UTI. VSS. Physical exam unremarkable  2/22: significant improvement, with mental status returned to baseline, aox4    - continue management of urinary retention/UTI as above  - continue delirium precautions

## 2024-02-24 LAB
ALBUMIN SERPL ELPH-MCNC: 2.7 G/DL — LOW (ref 3.3–5)
ALP SERPL-CCNC: 60 U/L — SIGNIFICANT CHANGE UP (ref 40–120)
ALT FLD-CCNC: 10 U/L — SIGNIFICANT CHANGE UP (ref 10–45)
ANION GAP SERPL CALC-SCNC: 11 MMOL/L — SIGNIFICANT CHANGE UP (ref 5–17)
AST SERPL-CCNC: 15 U/L — SIGNIFICANT CHANGE UP (ref 10–40)
BILIRUB SERPL-MCNC: 0.3 MG/DL — SIGNIFICANT CHANGE UP (ref 0.2–1.2)
BUN SERPL-MCNC: 14 MG/DL — SIGNIFICANT CHANGE UP (ref 7–23)
CALCIUM SERPL-MCNC: 8.3 MG/DL — LOW (ref 8.4–10.5)
CHLORIDE SERPL-SCNC: 99 MMOL/L — SIGNIFICANT CHANGE UP (ref 96–108)
CO2 SERPL-SCNC: 23 MMOL/L — SIGNIFICANT CHANGE UP (ref 22–31)
CREAT SERPL-MCNC: 1.24 MG/DL — SIGNIFICANT CHANGE UP (ref 0.5–1.3)
EGFR: 51 ML/MIN/1.73M2 — LOW
GLUCOSE BLDC GLUCOMTR-MCNC: 214 MG/DL — HIGH (ref 70–99)
GLUCOSE BLDC GLUCOMTR-MCNC: 257 MG/DL — HIGH (ref 70–99)
GLUCOSE BLDC GLUCOMTR-MCNC: 259 MG/DL — HIGH (ref 70–99)
GLUCOSE BLDC GLUCOMTR-MCNC: 299 MG/DL — HIGH (ref 70–99)
GLUCOSE SERPL-MCNC: 201 MG/DL — HIGH (ref 70–99)
MAGNESIUM SERPL-MCNC: 1.6 MG/DL — SIGNIFICANT CHANGE UP (ref 1.6–2.6)
PHOSPHATE SERPL-MCNC: 3.5 MG/DL — SIGNIFICANT CHANGE UP (ref 2.5–4.5)
POTASSIUM SERPL-MCNC: 4 MMOL/L — SIGNIFICANT CHANGE UP (ref 3.5–5.3)
POTASSIUM SERPL-SCNC: 4 MMOL/L — SIGNIFICANT CHANGE UP (ref 3.5–5.3)
PROT SERPL-MCNC: 6.5 G/DL — SIGNIFICANT CHANGE UP (ref 6–8.3)
SODIUM SERPL-SCNC: 133 MMOL/L — LOW (ref 135–145)

## 2024-02-24 RX ORDER — MAGNESIUM SULFATE 500 MG/ML
4 VIAL (ML) INJECTION ONCE
Refills: 0 | Status: COMPLETED | OUTPATIENT
Start: 2024-02-24 | End: 2024-02-24

## 2024-02-24 RX ORDER — SENNA PLUS 8.6 MG/1
1 TABLET ORAL AT BEDTIME
Refills: 0 | Status: DISCONTINUED | OUTPATIENT
Start: 2024-02-24 | End: 2024-02-27

## 2024-02-24 RX ORDER — POLYETHYLENE GLYCOL 3350 17 G/17G
17 POWDER, FOR SOLUTION ORAL
Refills: 0 | Status: DISCONTINUED | OUTPATIENT
Start: 2024-02-24 | End: 2024-02-27

## 2024-02-24 RX ADMIN — HEPARIN SODIUM 5000 UNIT(S): 5000 INJECTION INTRAVENOUS; SUBCUTANEOUS at 05:35

## 2024-02-24 RX ADMIN — SENNA PLUS 1 TABLET(S): 8.6 TABLET ORAL at 21:32

## 2024-02-24 RX ADMIN — PIPERACILLIN AND TAZOBACTAM 25 GRAM(S): 4; .5 INJECTION, POWDER, LYOPHILIZED, FOR SOLUTION INTRAVENOUS at 07:00

## 2024-02-24 RX ADMIN — Medication 3: at 13:54

## 2024-02-24 RX ADMIN — POLYETHYLENE GLYCOL 3350 17 GRAM(S): 17 POWDER, FOR SOLUTION ORAL at 19:43

## 2024-02-24 RX ADMIN — Medication 3: at 18:12

## 2024-02-24 RX ADMIN — HEPARIN SODIUM 5000 UNIT(S): 5000 INJECTION INTRAVENOUS; SUBCUTANEOUS at 13:57

## 2024-02-24 RX ADMIN — HEPARIN SODIUM 5000 UNIT(S): 5000 INJECTION INTRAVENOUS; SUBCUTANEOUS at 21:32

## 2024-02-24 RX ADMIN — FINASTERIDE 5 MILLIGRAM(S): 5 TABLET, FILM COATED ORAL at 12:43

## 2024-02-24 RX ADMIN — TAMSULOSIN HYDROCHLORIDE 0.8 MILLIGRAM(S): 0.4 CAPSULE ORAL at 21:31

## 2024-02-24 RX ADMIN — Medication 3: at 22:19

## 2024-02-24 RX ADMIN — Medication 25 GRAM(S): at 19:45

## 2024-02-24 RX ADMIN — Medication 2: at 10:02

## 2024-02-24 NOTE — PROGRESS NOTE ADULT - ASSESSMENT
89 yo man with DM admitted with recurrent BPH obstrctiuon, urinary retention and hydro after wade was removed as out patient  admitted with likley UTI but only c albicans has grown out of ucx to date on abx  will request urology revisit and advise on if he is a candidate for a procedure in the future as he has essentailly failed medical therapy at this time  Request ID consult on c albicans as I assume it is not pathogenic and it is unclear if he had a UTI in the first place  DVT prophylais  CAPILLARY BLOOD GLUCOSE      POCT Blood Glucose.: 299 mg/dL (24 Feb 2024 17:55)  POCT Blood Glucose.: 259 mg/dL (24 Feb 2024 13:45)  POCT Blood Glucose.: 214 mg/dL (24 Feb 2024 09:21)  POCT Blood Glucose.: 275 mg/dL (23 Feb 2024 21:54)  POCT Blood Glucose.: 191 mg/dL (23 Feb 2024 19:40)    persistent hyperglycemia - on sliding scale insulin - please consult endo for long acting insulin guidance  blood pressure controlled

## 2024-02-24 NOTE — PROGRESS NOTE ADULT - SUBJECTIVE AND OBJECTIVE BOX
No events overnight  tolerating PO   OOB to chaair today  Afebrile    UCx still with only C Albicans    MEDICATIONS  (STANDING):  dextrose 5%. 1000 milliLiter(s) (50 mL/Hr) IV Continuous <Continuous>  dextrose 5%. 1000 milliLiter(s) (100 mL/Hr) IV Continuous <Continuous>  dextrose 50% Injectable 12.5 Gram(s) IV Push once  dextrose 50% Injectable 25 Gram(s) IV Push once  dextrose 50% Injectable 25 Gram(s) IV Push once  finasteride 5 milliGRAM(s) Oral daily  glucagon  Injectable 1 milliGRAM(s) IntraMuscular once  heparin   Injectable 5000 Unit(s) SubCutaneous every 8 hours  insulin lispro (ADMELOG) corrective regimen sliding scale   SubCutaneous Before meals and at bedtime  magnesium sulfate  IVPB 4 Gram(s) IV Intermittent once  polyethylene glycol 3350 17 Gram(s) Oral two times a day  senna 1 Tablet(s) Oral at bedtime  tamsulosin 0.8 milliGRAM(s) Oral at bedtime    MEDICATIONS  (PRN):  dextrose Oral Gel 15 Gram(s) Oral once PRN Blood Glucose LESS THAN 70 milliGRAM(s)/deciliter  melatonin 3 milliGRAM(s) Oral at bedtime PRN Insomnia    ICU Vital Signs Last 24 Hrs  T(C): 36.8 (24 Feb 2024 15:51), Max: 37.1 (23 Feb 2024 21:05)  T(F): 98.3 (24 Feb 2024 15:51), Max: 98.7 (23 Feb 2024 21:05)  HR: 70 (24 Feb 2024 15:51) (67 - 72)  BP: 118/73 (24 Feb 2024 15:51) (118/73 - 143/79)  BP(mean): --  ABP: --  ABP(mean): --  RR: 17 (24 Feb 2024 15:51) (14 - 18)  SpO2: 91% (24 Feb 2024 15:51) (91% - 93%)    O2 Parameters below as of 24 Feb 2024 15:51  Patient On (Oxygen Delivery Method): room air    NAD seated in bed with family at the bedside eating  No JVD  Chest clear  CV RRR s1s2 REGGIE LSB II/VI  Abd sfot  Ext no edema  Dan in place                          9.9    7.41  )-----------( 209      ( 23 Feb 2024 05:30 )             28.8   02-24    133<L>  |  99  |  14  ----------------------------<  201<H>  4.0   |  23  |  1.24    Ca    8.3<L>      24 Feb 2024 05:30  Phos  3.5     02-24  Mg     1.6     02-24    TPro  6.5  /  Alb  2.7<L>  /  TBili  0.3  /  DBili  x   /  AST  15  /  ALT  10  /  AlkPhos  60  02-24

## 2024-02-25 LAB
ALBUMIN SERPL ELPH-MCNC: 2.9 G/DL — LOW (ref 3.3–5)
ALP SERPL-CCNC: 61 U/L — SIGNIFICANT CHANGE UP (ref 40–120)
ALT FLD-CCNC: 10 U/L — SIGNIFICANT CHANGE UP (ref 10–45)
ANION GAP SERPL CALC-SCNC: 10 MMOL/L — SIGNIFICANT CHANGE UP (ref 5–17)
AST SERPL-CCNC: 12 U/L — SIGNIFICANT CHANGE UP (ref 10–40)
BASOPHILS # BLD AUTO: 0.04 K/UL — SIGNIFICANT CHANGE UP (ref 0–0.2)
BASOPHILS NFR BLD AUTO: 0.5 % — SIGNIFICANT CHANGE UP (ref 0–2)
BILIRUB SERPL-MCNC: 0.3 MG/DL — SIGNIFICANT CHANGE UP (ref 0.2–1.2)
BUN SERPL-MCNC: 14 MG/DL — SIGNIFICANT CHANGE UP (ref 7–23)
CALCIUM SERPL-MCNC: 8.3 MG/DL — LOW (ref 8.4–10.5)
CHLORIDE SERPL-SCNC: 97 MMOL/L — SIGNIFICANT CHANGE UP (ref 96–108)
CO2 SERPL-SCNC: 25 MMOL/L — SIGNIFICANT CHANGE UP (ref 22–31)
CREAT SERPL-MCNC: 1.14 MG/DL — SIGNIFICANT CHANGE UP (ref 0.5–1.3)
EGFR: 56 ML/MIN/1.73M2 — LOW
EOSINOPHIL # BLD AUTO: 0.17 K/UL — SIGNIFICANT CHANGE UP (ref 0–0.5)
EOSINOPHIL NFR BLD AUTO: 2.1 % — SIGNIFICANT CHANGE UP (ref 0–6)
GLUCOSE BLDC GLUCOMTR-MCNC: 187 MG/DL — HIGH (ref 70–99)
GLUCOSE BLDC GLUCOMTR-MCNC: 263 MG/DL — HIGH (ref 70–99)
GLUCOSE BLDC GLUCOMTR-MCNC: 286 MG/DL — HIGH (ref 70–99)
GLUCOSE BLDC GLUCOMTR-MCNC: 323 MG/DL — HIGH (ref 70–99)
GLUCOSE SERPL-MCNC: 204 MG/DL — HIGH (ref 70–99)
HCT VFR BLD CALC: 31.6 % — LOW (ref 39–50)
HGB BLD-MCNC: 10.8 G/DL — LOW (ref 13–17)
IMM GRANULOCYTES NFR BLD AUTO: 0.3 % — SIGNIFICANT CHANGE UP (ref 0–0.9)
LYMPHOCYTES # BLD AUTO: 1.08 K/UL — SIGNIFICANT CHANGE UP (ref 1–3.3)
LYMPHOCYTES # BLD AUTO: 13.6 % — SIGNIFICANT CHANGE UP (ref 13–44)
MAGNESIUM SERPL-MCNC: 2.1 MG/DL — SIGNIFICANT CHANGE UP (ref 1.6–2.6)
MCHC RBC-ENTMCNC: 30.9 PG — SIGNIFICANT CHANGE UP (ref 27–34)
MCHC RBC-ENTMCNC: 34.2 GM/DL — SIGNIFICANT CHANGE UP (ref 32–36)
MCV RBC AUTO: 90.3 FL — SIGNIFICANT CHANGE UP (ref 80–100)
MONOCYTES # BLD AUTO: 0.73 K/UL — SIGNIFICANT CHANGE UP (ref 0–0.9)
MONOCYTES NFR BLD AUTO: 9.2 % — SIGNIFICANT CHANGE UP (ref 2–14)
NEUTROPHILS # BLD AUTO: 5.9 K/UL — SIGNIFICANT CHANGE UP (ref 1.8–7.4)
NEUTROPHILS NFR BLD AUTO: 74.3 % — SIGNIFICANT CHANGE UP (ref 43–77)
NRBC # BLD: 0 /100 WBCS — SIGNIFICANT CHANGE UP (ref 0–0)
PHOSPHATE SERPL-MCNC: 2.9 MG/DL — SIGNIFICANT CHANGE UP (ref 2.5–4.5)
PLATELET # BLD AUTO: 256 K/UL — SIGNIFICANT CHANGE UP (ref 150–400)
POTASSIUM SERPL-MCNC: 4 MMOL/L — SIGNIFICANT CHANGE UP (ref 3.5–5.3)
POTASSIUM SERPL-SCNC: 4 MMOL/L — SIGNIFICANT CHANGE UP (ref 3.5–5.3)
PROT SERPL-MCNC: 6.7 G/DL — SIGNIFICANT CHANGE UP (ref 6–8.3)
RBC # BLD: 3.5 M/UL — LOW (ref 4.2–5.8)
RBC # FLD: 14.1 % — SIGNIFICANT CHANGE UP (ref 10.3–14.5)
SODIUM SERPL-SCNC: 132 MMOL/L — LOW (ref 135–145)
WBC # BLD: 7.94 K/UL — SIGNIFICANT CHANGE UP (ref 3.8–10.5)
WBC # FLD AUTO: 7.94 K/UL — SIGNIFICANT CHANGE UP (ref 3.8–10.5)

## 2024-02-25 RX ADMIN — HEPARIN SODIUM 5000 UNIT(S): 5000 INJECTION INTRAVENOUS; SUBCUTANEOUS at 21:31

## 2024-02-25 RX ADMIN — POLYETHYLENE GLYCOL 3350 17 GRAM(S): 17 POWDER, FOR SOLUTION ORAL at 06:07

## 2024-02-25 RX ADMIN — HEPARIN SODIUM 5000 UNIT(S): 5000 INJECTION INTRAVENOUS; SUBCUTANEOUS at 06:07

## 2024-02-25 RX ADMIN — Medication 1: at 22:18

## 2024-02-25 RX ADMIN — Medication 4: at 14:00

## 2024-02-25 RX ADMIN — SENNA PLUS 1 TABLET(S): 8.6 TABLET ORAL at 21:30

## 2024-02-25 RX ADMIN — HEPARIN SODIUM 5000 UNIT(S): 5000 INJECTION INTRAVENOUS; SUBCUTANEOUS at 14:15

## 2024-02-25 RX ADMIN — Medication 3: at 18:32

## 2024-02-25 RX ADMIN — POLYETHYLENE GLYCOL 3350 17 GRAM(S): 17 POWDER, FOR SOLUTION ORAL at 19:15

## 2024-02-25 RX ADMIN — TAMSULOSIN HYDROCHLORIDE 0.8 MILLIGRAM(S): 0.4 CAPSULE ORAL at 21:30

## 2024-02-25 RX ADMIN — FINASTERIDE 5 MILLIGRAM(S): 5 TABLET, FILM COATED ORAL at 13:10

## 2024-02-25 RX ADMIN — Medication 3: at 09:41

## 2024-02-25 NOTE — PROGRESS NOTE ADULT - SUBJECTIVE AND OBJECTIVE BOX
No events - admitted with ?UTI and BPH with obstruction and JULIO CESAR now with wade and resolution of JULIO CESAR  =to complete abx  CAPILLARY BLOOD GLUCOSE      POCT Blood Glucose.: 323 mg/dL (25 Feb 2024 13:35)  POCT Blood Glucose.: 263 mg/dL (25 Feb 2024 09:29)  POCT Blood Glucose.: 257 mg/dL (24 Feb 2024 22:12)  POCT Blood Glucose.: 299 mg/dL (24 Feb 2024 17:55)    MEDICATIONS  (STANDING):  dextrose 5%. 1000 milliLiter(s) (50 mL/Hr) IV Continuous <Continuous>  dextrose 5%. 1000 milliLiter(s) (100 mL/Hr) IV Continuous <Continuous>  dextrose 50% Injectable 25 Gram(s) IV Push once  dextrose 50% Injectable 12.5 Gram(s) IV Push once  dextrose 50% Injectable 25 Gram(s) IV Push once  finasteride 5 milliGRAM(s) Oral daily  glucagon  Injectable 1 milliGRAM(s) IntraMuscular once  heparin   Injectable 5000 Unit(s) SubCutaneous every 8 hours  insulin lispro (ADMELOG) corrective regimen sliding scale   SubCutaneous Before meals and at bedtime  polyethylene glycol 3350 17 Gram(s) Oral two times a day  senna 1 Tablet(s) Oral at bedtime  tamsulosin 0.8 milliGRAM(s) Oral at bedtime    MEDICATIONS  (PRN):  dextrose Oral Gel 15 Gram(s) Oral once PRN Blood Glucose LESS THAN 70 milliGRAM(s)/deciliter  melatonin 3 milliGRAM(s) Oral at bedtime PRN Insomnia    ICU Vital Signs Last 24 Hrs  T(C): 36.6 (25 Feb 2024 15:42), Max: 36.6 (25 Feb 2024 15:42)  T(F): 97.9 (25 Feb 2024 15:42), Max: 97.9 (25 Feb 2024 15:42)  HR: 73 (25 Feb 2024 15:42) (71 - 89)  BP: 110/69 (25 Feb 2024 15:42) (110/69 - 130/73)  BP(mean): --  ABP: --  ABP(mean): --  RR: 18 (25 Feb 2024 15:42) (16 - 18)  SpO2: 94% (25 Feb 2024 15:42) (91% - 95%)    O2 Parameters below as of 25 Feb 2024 15:42  Patient On (Oxygen Delivery Method): room air    NAD  No JVD  Chest CTA  CV RRR s1s2 II/VI REGGIE LSB  Abd soft  Ext no edema                          10.8   7.94  )-----------( 256      ( 25 Feb 2024 08:17 )             31.6   02-25    132<L>  |  97  |  14  ----------------------------<  204<H>  4.0   |  25  |  1.14    Ca    8.3<L>      25 Feb 2024 08:17  Phos  2.9     02-25  Mg     2.1     02-25    TPro  6.7  /  Alb  2.9<L>  /  TBili  0.3  /  DBili  x   /  AST  12  /  ALT  10  /  AlkPhos  61  02-25

## 2024-02-25 NOTE — PROGRESS NOTE ADULT - PROBLEM SELECTOR PLAN 3
2/21: admitted w Cr 2.6, baseline 1.2; likely secondary to retention & subsequent obstruction  2/22: labwork pending (rescheduled for 2pm)  2/23: continued improvement of bun/cr (16/1.42)    - continue to monitor BMP

## 2024-02-25 NOTE — PROGRESS NOTE ADULT - PROBLEM SELECTOR PLAN 8
F: as needed (s/p 1.5L on date of admission)  E: replete to K>4, Mg>2, Phos>2.5  N: DASH diet  Ppx: heparin subq  Code status: DNR, DNI    Dispo: [facility]

## 2024-02-25 NOTE — PROGRESS NOTE ADULT - ASSESSMENT
89 yo man with diabetes and BPH admitted with JULIO CESAR and ?UTI now with wade and resolution of JULIO CESAR  -BPH on tamsulosin and finasteride  -on abx   -ucx no bacterial growth to date  -DVT prophy  -Endo consult for elevated FSS on current regimen  -will likely dc to [facility] with wade  -HLD on statin  -blood pressure controlled  -d/w family and team

## 2024-02-25 NOTE — PROGRESS NOTE ADULT - PROBLEM SELECTOR PLAN 5
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

## 2024-02-25 NOTE — PROGRESS NOTE ADULT - PROBLEM SELECTOR PLAN 6
RESOLVED  Initially altered upon arrival and does not recall coming to ED however now back to baseline mental status s/p wade placement. Encephalopathy likely 2/2 urinary retention and subsequent UTI. VSS. Physical exam unremarkable  2/22: significant improvement, with mental status returned to baseline, aox4    - continue management of urinary retention/UTI as above  - continue delirium precautions

## 2024-02-25 NOTE — PROGRESS NOTE ADULT - PROBLEM SELECTOR PLAN 1
2/21: found to have BS of 600cc at [facility] now s/p wade placement by urology in ED with 850cc output  CT showing b/l hydronephrosis and cystitis; likely 2/2 BPH as prostate 100cc  urology following, recommendations appreciated    - continue wade pending transition to TOV w resolution of hydronephrosis  - follow up repeat US (48-72h after wade placement, tentatively 2/24) to ensure resolution of hydronephrosis  - continue flomax  - follow up MRI prostate to assess if candidate for PAE

## 2024-02-25 NOTE — PROGRESS NOTE ADULT - PROBLEM SELECTOR PLAN 2
2/21 pt presented w urinary retention and +UA  no preceding complaints of dysuria, hematuria, or frequency, but pt presented w ams & has hx recurrent utis  given 1x dose vancomycin & zosyn in ed  2/23 no bacterial growth to date, +candida     - continue zosyn, discontinue/adjust antimicrobial pending urine/blood cx   - follow up blood cx

## 2024-02-25 NOTE — PROGRESS NOTE ADULT - SUBJECTIVE AND OBJECTIVE BOX
OVERNIGHT EVENTS: None    SUBJECTIVE:  Patient seen and examined at bedside, comfortable, NAD. Denied fever, chest pain, dyspnea, abdominal pain.     Vital Signs Last 12 Hrs  T(F): 97.3 (02-25-24 @ 11:53), Max: 97.5 (02-25-24 @ 06:13)  HR: 89 (02-25-24 @ 11:53) (74 - 89)  BP: 130/73 (02-25-24 @ 11:53) (125/74 - 130/73)  BP(mean): --  RR: 17 (02-25-24 @ 11:53) (17 - 17)  SpO2: 95% (02-25-24 @ 11:53) (92% - 95%)  I&O's Summary    24 Feb 2024 07:01  -  25 Feb 2024 07:00  --------------------------------------------------------  IN: 350 mL / OUT: 1250 mL / NET: -900 mL    25 Feb 2024 07:01  -  25 Feb 2024 12:14  --------------------------------------------------------  IN: 0 mL / OUT: 450 mL / NET: -450 mL      Constitutional: NAD, comfortable in bed.  HEENT: NC/AT, PERRLA, EOMI, no conjunctival pallor or scleral icterus, MMM  Neck: Supple, no JVD  Respiratory: CTA B/L. No w/r/r.   Cardiovascular: RRR, normal S1 and S2, no m/r/g.   GI/: +BS, soft NT (no suprapubic tenderness), ND, no guarding or rebound tenderness, no palpable masses, wade draining clear, yellow urine  Extremities: wwp; no cyanosis, clubbing or edema.   Vascular: Pulses equal and strong throughout.   Neurological: AAOx3, no CN deficits, strength and sensation intact throughout.   Skin: No gross skin abnormalities or rashes      LABS:                        10.8   7.94  )-----------( 256      ( 25 Feb 2024 08:17 )             31.6     02-25    132<L>  |  97  |  14  ----------------------------<  204<H>  4.0   |  25  |  1.14    Ca    8.3<L>      25 Feb 2024 08:17  Phos  2.9     02-25  Mg     2.1     02-25    TPro  6.7  /  Alb  2.9<L>  /  TBili  0.3  /  DBili  x   /  AST  12  /  ALT  10  /  AlkPhos  61  02-25      Urinalysis Basic - ( 25 Feb 2024 08:17 )    Color: x / Appearance: x / SG: x / pH: x  Gluc: 204 mg/dL / Ketone: x  / Bili: x / Urobili: x   Blood: x / Protein: x / Nitrite: x   Leuk Esterase: x / RBC: x / WBC x   Sq Epi: x / Non Sq Epi: x / Bacteria: x          RADIOLOGY & ADDITIONAL TESTS:    MEDICATIONS  (STANDING):  dextrose 5%. 1000 milliLiter(s) (50 mL/Hr) IV Continuous <Continuous>  dextrose 5%. 1000 milliLiter(s) (100 mL/Hr) IV Continuous <Continuous>  dextrose 50% Injectable 25 Gram(s) IV Push once  dextrose 50% Injectable 12.5 Gram(s) IV Push once  dextrose 50% Injectable 25 Gram(s) IV Push once  finasteride 5 milliGRAM(s) Oral daily  glucagon  Injectable 1 milliGRAM(s) IntraMuscular once  heparin   Injectable 5000 Unit(s) SubCutaneous every 8 hours  insulin lispro (ADMELOG) corrective regimen sliding scale   SubCutaneous Before meals and at bedtime  polyethylene glycol 3350 17 Gram(s) Oral two times a day  senna 1 Tablet(s) Oral at bedtime  tamsulosin 0.8 milliGRAM(s) Oral at bedtime    MEDICATIONS  (PRN):  dextrose Oral Gel 15 Gram(s) Oral once PRN Blood Glucose LESS THAN 70 milliGRAM(s)/deciliter  melatonin 3 milliGRAM(s) Oral at bedtime PRN Insomnia

## 2024-02-26 LAB
ANION GAP SERPL CALC-SCNC: 11 MMOL/L — SIGNIFICANT CHANGE UP (ref 5–17)
BASOPHILS # BLD AUTO: 0.03 K/UL — SIGNIFICANT CHANGE UP (ref 0–0.2)
BASOPHILS NFR BLD AUTO: 0.4 % — SIGNIFICANT CHANGE UP (ref 0–2)
BUN SERPL-MCNC: 18 MG/DL — SIGNIFICANT CHANGE UP (ref 7–23)
CALCIUM SERPL-MCNC: 8.4 MG/DL — SIGNIFICANT CHANGE UP (ref 8.4–10.5)
CHLORIDE SERPL-SCNC: 99 MMOL/L — SIGNIFICANT CHANGE UP (ref 96–108)
CO2 SERPL-SCNC: 27 MMOL/L — SIGNIFICANT CHANGE UP (ref 22–31)
CREAT SERPL-MCNC: 1.1 MG/DL — SIGNIFICANT CHANGE UP (ref 0.5–1.3)
CULTURE RESULTS: SIGNIFICANT CHANGE UP
CULTURE RESULTS: SIGNIFICANT CHANGE UP
EGFR: 59 ML/MIN/1.73M2 — LOW
EOSINOPHIL # BLD AUTO: 0.18 K/UL — SIGNIFICANT CHANGE UP (ref 0–0.5)
EOSINOPHIL NFR BLD AUTO: 2.4 % — SIGNIFICANT CHANGE UP (ref 0–6)
GLUCOSE BLDC GLUCOMTR-MCNC: 190 MG/DL — HIGH (ref 70–99)
GLUCOSE BLDC GLUCOMTR-MCNC: 227 MG/DL — HIGH (ref 70–99)
GLUCOSE BLDC GLUCOMTR-MCNC: 270 MG/DL — HIGH (ref 70–99)
GLUCOSE BLDC GLUCOMTR-MCNC: 280 MG/DL — HIGH (ref 70–99)
GLUCOSE SERPL-MCNC: 259 MG/DL — HIGH (ref 70–99)
HCT VFR BLD CALC: 29.8 % — LOW (ref 39–50)
HGB BLD-MCNC: 10.2 G/DL — LOW (ref 13–17)
IMM GRANULOCYTES NFR BLD AUTO: 0.4 % — SIGNIFICANT CHANGE UP (ref 0–0.9)
LYMPHOCYTES # BLD AUTO: 1.21 K/UL — SIGNIFICANT CHANGE UP (ref 1–3.3)
LYMPHOCYTES # BLD AUTO: 16.3 % — SIGNIFICANT CHANGE UP (ref 13–44)
MAGNESIUM SERPL-MCNC: 1.8 MG/DL — SIGNIFICANT CHANGE UP (ref 1.6–2.6)
MCHC RBC-ENTMCNC: 30.7 PG — SIGNIFICANT CHANGE UP (ref 27–34)
MCHC RBC-ENTMCNC: 34.2 GM/DL — SIGNIFICANT CHANGE UP (ref 32–36)
MCV RBC AUTO: 89.8 FL — SIGNIFICANT CHANGE UP (ref 80–100)
MONOCYTES # BLD AUTO: 0.74 K/UL — SIGNIFICANT CHANGE UP (ref 0–0.9)
MONOCYTES NFR BLD AUTO: 10 % — SIGNIFICANT CHANGE UP (ref 2–14)
NEUTROPHILS # BLD AUTO: 5.22 K/UL — SIGNIFICANT CHANGE UP (ref 1.8–7.4)
NEUTROPHILS NFR BLD AUTO: 70.5 % — SIGNIFICANT CHANGE UP (ref 43–77)
NRBC # BLD: 0 /100 WBCS — SIGNIFICANT CHANGE UP (ref 0–0)
PHOSPHATE SERPL-MCNC: 2.7 MG/DL — SIGNIFICANT CHANGE UP (ref 2.5–4.5)
PLATELET # BLD AUTO: 277 K/UL — SIGNIFICANT CHANGE UP (ref 150–400)
POTASSIUM SERPL-MCNC: 4.3 MMOL/L — SIGNIFICANT CHANGE UP (ref 3.5–5.3)
POTASSIUM SERPL-SCNC: 4.3 MMOL/L — SIGNIFICANT CHANGE UP (ref 3.5–5.3)
RBC # BLD: 3.32 M/UL — LOW (ref 4.2–5.8)
RBC # FLD: 14.1 % — SIGNIFICANT CHANGE UP (ref 10.3–14.5)
SODIUM SERPL-SCNC: 137 MMOL/L — SIGNIFICANT CHANGE UP (ref 135–145)
SPECIMEN SOURCE: SIGNIFICANT CHANGE UP
SPECIMEN SOURCE: SIGNIFICANT CHANGE UP
WBC # BLD: 7.41 K/UL — SIGNIFICANT CHANGE UP (ref 3.8–10.5)
WBC # FLD AUTO: 7.41 K/UL — SIGNIFICANT CHANGE UP (ref 3.8–10.5)

## 2024-02-26 RX ADMIN — POLYETHYLENE GLYCOL 3350 17 GRAM(S): 17 POWDER, FOR SOLUTION ORAL at 18:25

## 2024-02-26 RX ADMIN — HEPARIN SODIUM 5000 UNIT(S): 5000 INJECTION INTRAVENOUS; SUBCUTANEOUS at 05:34

## 2024-02-26 RX ADMIN — HEPARIN SODIUM 5000 UNIT(S): 5000 INJECTION INTRAVENOUS; SUBCUTANEOUS at 13:22

## 2024-02-26 RX ADMIN — Medication 1: at 21:55

## 2024-02-26 RX ADMIN — SENNA PLUS 1 TABLET(S): 8.6 TABLET ORAL at 21:35

## 2024-02-26 RX ADMIN — Medication 3: at 13:22

## 2024-02-26 RX ADMIN — Medication 3: at 09:09

## 2024-02-26 RX ADMIN — FINASTERIDE 5 MILLIGRAM(S): 5 TABLET, FILM COATED ORAL at 13:22

## 2024-02-26 RX ADMIN — HEPARIN SODIUM 5000 UNIT(S): 5000 INJECTION INTRAVENOUS; SUBCUTANEOUS at 21:36

## 2024-02-26 RX ADMIN — Medication 2: at 18:24

## 2024-02-26 RX ADMIN — TAMSULOSIN HYDROCHLORIDE 0.8 MILLIGRAM(S): 0.4 CAPSULE ORAL at 21:35

## 2024-02-26 NOTE — DIETITIAN INITIAL EVALUATION ADULT - NSPROEDALEARNPREF_GEN_A_NUR
consistent carbohydrate diet, importance of adequate protein and fiber, reading nutrition labels/verbal instruction

## 2024-02-26 NOTE — DIETITIAN INITIAL EVALUATION ADULT - OTHER CALCULATIONS
Based on Standards of Care pt within % IBW (100%) thus actual body weight used for all calculations. Needs adjusted for advanced age.

## 2024-02-26 NOTE — PROGRESS NOTE ADULT - SUBJECTIVE AND OBJECTIVE BOX
HPI: Patient is as 89 y/o M (baseline A&Ox3), DM, recurrent UTIs (recently in cipro) and urinary retention, presenting from [facility] for urinary retention and blood in wade. Pt found to have BS of 600cc at [facility], attempted to plsce wade and noted to have hematuria in which case wade was removed and pt sent to ED. In ED pt confused, wade placed by urology and pt now back to baseline mental status but does not recall coming to ED.     Initial work up was concerning for UTI but Ucx was negative for bacteria and he has improved with wade.  JULIO CESAR has resolved  He would like to proceed with [facility]    MEDICATIONS  (STANDING):  dextrose 5%. 1000 milliLiter(s) (100 mL/Hr) IV Continuous <Continuous>  dextrose 5%. 1000 milliLiter(s) (50 mL/Hr) IV Continuous <Continuous>  dextrose 50% Injectable 12.5 Gram(s) IV Push once  dextrose 50% Injectable 25 Gram(s) IV Push once  dextrose 50% Injectable 25 Gram(s) IV Push once  finasteride 5 milliGRAM(s) Oral daily  glucagon  Injectable 1 milliGRAM(s) IntraMuscular once  heparin   Injectable 5000 Unit(s) SubCutaneous every 8 hours  insulin lispro (ADMELOG) corrective regimen sliding scale   SubCutaneous Before meals and at bedtime  polyethylene glycol 3350 17 Gram(s) Oral two times a day  senna 1 Tablet(s) Oral at bedtime  tamsulosin 0.8 milliGRAM(s) Oral at bedtime    MEDICATIONS  (PRN):  dextrose Oral Gel 15 Gram(s) Oral once PRN Blood Glucose LESS THAN 70 milliGRAM(s)/deciliter  melatonin 3 milliGRAM(s) Oral at bedtime PRN Insomnia    ICU Vital Signs Last 24 Hrs  T(C): 36.6 (26 Feb 2024 17:50), Max: 36.6 (26 Feb 2024 17:50)  T(F): 97.9 (26 Feb 2024 17:50), Max: 97.9 (26 Feb 2024 17:50)  HR: 76 (26 Feb 2024 17:50) (66 - 76)  BP: 129/68 (26 Feb 2024 17:50) (113/64 - 155/81)  BP(mean): --  ABP: --  ABP(mean): --  RR: 17 (26 Feb 2024 17:50) (17 - 18)  SpO2: 93% (26 Feb 2024 17:50) (92% - 93%)    O2 Parameters below as of 26 Feb 2024 17:50  Patient On (Oxygen Delivery Method): room air        NAD seated in bed with family at the bedside eating  No JVD  Chest clear  CV RRR s1s2 II/VI REGGIE LSB  Abd soft  Ext no edema                          10.2   7.41  )-----------( 277      ( 26 Feb 2024 08:58 )             29.8   02-26    137  |  99  |  18  ----------------------------<  259<H>  4.3   |  27  |  1.10    Ca    8.4      26 Feb 2024 08:58  Phos  2.7     02-26  Mg     1.8     02-26    TPro  6.7  /  Alb  2.9<L>  /  TBili  0.3  /  DBili  x   /  AST  12  /  ALT  10  /  AlkPhos  61  02-25    CAPILLARY BLOOD GLUCOSE      POCT Blood Glucose.: 227 mg/dL (26 Feb 2024 17:49)  POCT Blood Glucose.: 270 mg/dL (26 Feb 2024 13:09)  POCT Blood Glucose.: 280 mg/dL (26 Feb 2024 08:59)  POCT Blood Glucose.: 187 mg/dL (25 Feb 2024 21:57)

## 2024-02-26 NOTE — DIETITIAN INITIAL EVALUATION ADULT - NSFNSPHYEXAMSKINFT_GEN_A_CORE
Pressure Injury 1: sacrum, Stage I  Pressure Injury 2: none, none  Pressure Injury 3: none, none  Pressure Injury 4: none, none  Pressure Injury 5: none, none  Pressure Injury 6: none, none  Pressure Injury 7: none, none  Pressure Injury 8: none, none  Pressure Injury 9: none, none  Pressure Injury 10: none, none  Pressure Injury 11: none, none, Pressure Injury 1: sacrum, Stage I  Pressure Injury 2: none, none  Pressure Injury 3: none, none  Pressure Injury 4: none, none  Pressure Injury 5: none, none  Pressure Injury 6: none, none  Pressure Injury 7: none, none  Pressure Injury 8: none, none  Pressure Injury 9: none, none  Pressure Injury 10: none, none  Pressure Injury 11: none, none

## 2024-02-26 NOTE — DIETITIAN INITIAL EVALUATION ADULT - PERTINENT MEDS FT
MEDICATIONS  (STANDING):  dextrose 5%. 1000 milliLiter(s) (100 mL/Hr) IV Continuous <Continuous>  dextrose 5%. 1000 milliLiter(s) (50 mL/Hr) IV Continuous <Continuous>  dextrose 50% Injectable 25 Gram(s) IV Push once  dextrose 50% Injectable 12.5 Gram(s) IV Push once  dextrose 50% Injectable 25 Gram(s) IV Push once  finasteride 5 milliGRAM(s) Oral daily  glucagon  Injectable 1 milliGRAM(s) IntraMuscular once  heparin   Injectable 5000 Unit(s) SubCutaneous every 8 hours  insulin lispro (ADMELOG) corrective regimen sliding scale   SubCutaneous Before meals and at bedtime  polyethylene glycol 3350 17 Gram(s) Oral two times a day  senna 1 Tablet(s) Oral at bedtime  tamsulosin 0.8 milliGRAM(s) Oral at bedtime    MEDICATIONS  (PRN):  dextrose Oral Gel 15 Gram(s) Oral once PRN Blood Glucose LESS THAN 70 milliGRAM(s)/deciliter  melatonin 3 milliGRAM(s) Oral at bedtime PRN Insomnia

## 2024-02-26 NOTE — DIETITIAN INITIAL EVALUATION ADULT - OTHER INFO
89 y/o M (baseline A&Ox3), DM, recurrent UTIs (recently in cipro) and urinary retention, presenting [facility] for urinary [facility] and blood in wade. Pt found to have BS of 600cc at [facility], attempted to plsce wade and noted to have hematuria in which case wade was removed and pt sent to ED.     Patient seen at bedside for nutrition assessment. Current diet order: consistent carbohydrates. No known food allergies. RD interview conducted with patient's son at bedside. No difficulty chewing/swallowing reported. Reports good appetite, consumed 50-75% of breakfast which included eggs, diet soda, fruit, and oatmeal. Son reports that PTA, patient was at [facility] and was also eating well there. Reports he was receiving Glucerna shakes and wishes to continue them here. Provided nutrition education discussing consistent carbohydrate diet, importance of adequate protein and fiber, reading nutrition labels. Son appreciative, verbalized understanding. Son reports blood sugars have been higher here than they normally are, typically manages DM with oral medications. Dosing weight: 142 pounds, BMI 23, denies significant weight loss in the past year. Stage 1 pressure ulcer to sacrum. No edema documented. Denies nausea/vomiting/diarrhea/constipation. Labs: HgbA1c 9%, blood sugar 187-323 x24 hours. Meds: insulin, bowel regimen. Observed with muscle wasting to temples and clavicles, however likely in setting of age related sarcopenia since patient is eating well. Based on ASPEN guidelines, pt does not meet criteria for malnutrition. No cultural, ethnic, Jainism food preferences reported. See nutrition recommendations below.

## 2024-02-26 NOTE — DIETITIAN INITIAL EVALUATION ADULT - PERTINENT LABORATORY DATA
02-26    137  |  99  |  18  ----------------------------<  259<H>  4.3   |  27  |  1.10    Ca    8.4      26 Feb 2024 08:58  Phos  2.7     02-26  Mg     1.8     02-26    TPro  6.7  /  Alb  2.9<L>  /  TBili  0.3  /  DBili  x   /  AST  12  /  ALT  10  /  AlkPhos  61  02-25  POCT Blood Glucose.: 280 mg/dL (02-26-24 @ 08:59)  A1C with Estimated Average Glucose Result: 9.0 % (01-09-24 @ 05:30)

## 2024-02-26 NOTE — DIETITIAN INITIAL EVALUATION ADULT - ADD RECOMMEND
1. Continue with consistent carbohydrate diet  2. Encourage pt to meet nutritional needs as able   3. Monitor labs: electrolytes, cmp, fingersticks  4. Monitor weights   5. Pain and bowel regimen per team   6. Will continue to assess/honor food preferences as able  7. Monitor adherence to diet education

## 2024-02-26 NOTE — PROGRESS NOTE ADULT - ASSESSMENT
89 yo man admitted from [facility] with JULIO CESAR and urinary retention with severe BPH with ?UTI:  Initial work up was concerning for UTI but Ucx was negative for bacteria and he has improved with wade.  JULIO CESAR has resolved  He would like to proceed with [facility]  Poor glycemic control - met with nutritionist today who worked on home foods being brougth to the patient which were sugar free or low glucose but higher in carbs than he should be eating  -family now aware  -remains on insulin but should be converted to long acting insulin with sliding scale supplemnt for [facility]  -DVT prophylaxis  -Will pursue [facility] with SW and DC patient with Wade in place and I will arrange out patient urology follow up

## 2024-02-27 ENCOUNTER — TRANSCRIPTION ENCOUNTER (OUTPATIENT)
Age: 89
End: 2024-02-27

## 2024-02-27 VITALS
RESPIRATION RATE: 18 BRPM | DIASTOLIC BLOOD PRESSURE: 61 MMHG | SYSTOLIC BLOOD PRESSURE: 98 MMHG | OXYGEN SATURATION: 94 % | TEMPERATURE: 98 F | HEART RATE: 95 BPM

## 2024-02-27 LAB
ANION GAP SERPL CALC-SCNC: 11 MMOL/L — SIGNIFICANT CHANGE UP (ref 5–17)
BASOPHILS # BLD AUTO: 0.02 K/UL — SIGNIFICANT CHANGE UP (ref 0–0.2)
BASOPHILS NFR BLD AUTO: 0.2 % — SIGNIFICANT CHANGE UP (ref 0–2)
BUN SERPL-MCNC: 15 MG/DL — SIGNIFICANT CHANGE UP (ref 7–23)
CALCIUM SERPL-MCNC: 8.6 MG/DL — SIGNIFICANT CHANGE UP (ref 8.4–10.5)
CHLORIDE SERPL-SCNC: 98 MMOL/L — SIGNIFICANT CHANGE UP (ref 96–108)
CO2 SERPL-SCNC: 25 MMOL/L — SIGNIFICANT CHANGE UP (ref 22–31)
CREAT SERPL-MCNC: 1.05 MG/DL — SIGNIFICANT CHANGE UP (ref 0.5–1.3)
EGFR: 62 ML/MIN/1.73M2 — SIGNIFICANT CHANGE UP
EOSINOPHIL # BLD AUTO: 0.15 K/UL — SIGNIFICANT CHANGE UP (ref 0–0.5)
EOSINOPHIL NFR BLD AUTO: 1.8 % — SIGNIFICANT CHANGE UP (ref 0–6)
GLUCOSE BLDC GLUCOMTR-MCNC: 212 MG/DL — HIGH (ref 70–99)
GLUCOSE BLDC GLUCOMTR-MCNC: 252 MG/DL — HIGH (ref 70–99)
GLUCOSE SERPL-MCNC: 190 MG/DL — HIGH (ref 70–99)
HCT VFR BLD CALC: 30.7 % — LOW (ref 39–50)
HGB BLD-MCNC: 10.1 G/DL — LOW (ref 13–17)
IMM GRANULOCYTES NFR BLD AUTO: 0.5 % — SIGNIFICANT CHANGE UP (ref 0–0.9)
LYMPHOCYTES # BLD AUTO: 1.23 K/UL — SIGNIFICANT CHANGE UP (ref 1–3.3)
LYMPHOCYTES # BLD AUTO: 15 % — SIGNIFICANT CHANGE UP (ref 13–44)
MAGNESIUM SERPL-MCNC: 1.6 MG/DL — SIGNIFICANT CHANGE UP (ref 1.6–2.6)
MCHC RBC-ENTMCNC: 30.1 PG — SIGNIFICANT CHANGE UP (ref 27–34)
MCHC RBC-ENTMCNC: 32.9 GM/DL — SIGNIFICANT CHANGE UP (ref 32–36)
MCV RBC AUTO: 91.6 FL — SIGNIFICANT CHANGE UP (ref 80–100)
MONOCYTES # BLD AUTO: 0.67 K/UL — SIGNIFICANT CHANGE UP (ref 0–0.9)
MONOCYTES NFR BLD AUTO: 8.2 % — SIGNIFICANT CHANGE UP (ref 2–14)
NEUTROPHILS # BLD AUTO: 6.11 K/UL — SIGNIFICANT CHANGE UP (ref 1.8–7.4)
NEUTROPHILS NFR BLD AUTO: 74.3 % — SIGNIFICANT CHANGE UP (ref 43–77)
NRBC # BLD: 0 /100 WBCS — SIGNIFICANT CHANGE UP (ref 0–0)
PHOSPHATE SERPL-MCNC: 2.7 MG/DL — SIGNIFICANT CHANGE UP (ref 2.5–4.5)
PLATELET # BLD AUTO: 286 K/UL — SIGNIFICANT CHANGE UP (ref 150–400)
POTASSIUM SERPL-MCNC: 4.1 MMOL/L — SIGNIFICANT CHANGE UP (ref 3.5–5.3)
POTASSIUM SERPL-SCNC: 4.1 MMOL/L — SIGNIFICANT CHANGE UP (ref 3.5–5.3)
RBC # BLD: 3.35 M/UL — LOW (ref 4.2–5.8)
RBC # FLD: 14 % — SIGNIFICANT CHANGE UP (ref 10.3–14.5)
SODIUM SERPL-SCNC: 134 MMOL/L — LOW (ref 135–145)
WBC # BLD: 8.22 K/UL — SIGNIFICANT CHANGE UP (ref 3.8–10.5)
WBC # FLD AUTO: 8.22 K/UL — SIGNIFICANT CHANGE UP (ref 3.8–10.5)

## 2024-02-27 RX ORDER — INSULIN GLARGINE 100 [IU]/ML
5 INJECTION, SOLUTION SUBCUTANEOUS AT BEDTIME
Refills: 0 | Status: DISCONTINUED | OUTPATIENT
Start: 2024-02-27 | End: 2024-02-27

## 2024-02-27 RX ORDER — INSULIN GLARGINE 100 [IU]/ML
5 INJECTION, SOLUTION SUBCUTANEOUS
Qty: 0 | Refills: 0 | DISCHARGE
Start: 2024-02-27

## 2024-02-27 RX ORDER — LANOLIN ALCOHOL/MO/W.PET/CERES
1 CREAM (GRAM) TOPICAL
Qty: 0 | Refills: 0 | DISCHARGE
Start: 2024-02-27

## 2024-02-27 RX ORDER — SENNA PLUS 8.6 MG/1
1 TABLET ORAL
Qty: 0 | Refills: 0 | DISCHARGE
Start: 2024-02-27

## 2024-02-27 RX ORDER — POLYETHYLENE GLYCOL 3350 17 G/17G
17 POWDER, FOR SOLUTION ORAL
Qty: 0 | Refills: 0 | DISCHARGE
Start: 2024-02-27

## 2024-02-27 RX ORDER — INSULIN ASPART 100 [IU]/ML
0 INJECTION, SOLUTION SUBCUTANEOUS
Qty: 0 | Refills: 0 | DISCHARGE

## 2024-02-27 RX ORDER — MAGNESIUM SULFATE 500 MG/ML
2 VIAL (ML) INJECTION ONCE
Refills: 0 | Status: COMPLETED | OUTPATIENT
Start: 2024-02-27 | End: 2024-02-27

## 2024-02-27 RX ORDER — METFORMIN HYDROCHLORIDE 850 MG/1
1 TABLET ORAL
Refills: 0 | DISCHARGE

## 2024-02-27 RX ADMIN — HEPARIN SODIUM 5000 UNIT(S): 5000 INJECTION INTRAVENOUS; SUBCUTANEOUS at 06:22

## 2024-02-27 RX ADMIN — POLYETHYLENE GLYCOL 3350 17 GRAM(S): 17 POWDER, FOR SOLUTION ORAL at 06:23

## 2024-02-27 RX ADMIN — Medication 25 GRAM(S): at 10:31

## 2024-02-27 RX ADMIN — FINASTERIDE 5 MILLIGRAM(S): 5 TABLET, FILM COATED ORAL at 12:32

## 2024-02-27 RX ADMIN — Medication 3: at 09:54

## 2024-02-27 RX ADMIN — Medication 2: at 13:47

## 2024-02-27 RX ADMIN — HEPARIN SODIUM 5000 UNIT(S): 5000 INJECTION INTRAVENOUS; SUBCUTANEOUS at 13:47

## 2024-02-27 NOTE — DISCHARGE NOTE PROVIDER - NSDCCPCAREPLAN_GEN_ALL_CORE_FT
PRINCIPAL DISCHARGE DIAGNOSIS  Diagnosis: BPH with urinary obstruction  Assessment and Plan of Treatment: Prostatic obstruction is an obstruction of urine flow through the prostate due to pressure from the prostate against the urethra, which impedes the body’s ability to pass urine. Prostatic obstruction can occur in varying severity from light cases with limited symptoms and some continued flow of urine, to complete and acute obstructions with severe symptoms and no urine passage.   Prostatic obstruction is typically caused by either benign prostatic enlargement (known as BPH) or prostatic cancer. Benign prostatic enlargement is a common condition among older men. Most men are believed to have a continued prostatic growth throughout life, which at a certain age will place sufficient pressure on the urethra to limit the flow of urine.  To alleviate your obstruction you now have a wade catheter in place. Please follow the care measures advised to you for best maintaining your wade and avoiding infection. You will follow up with your doctors to continue to work on a long-term solution to manage this obstruction.

## 2024-02-27 NOTE — DISCHARGE NOTE NURSING/CASE MANAGEMENT/SOCIAL WORK - PATIENT PORTAL LINK FT
You can access the FollowMyHealth Patient Portal offered by [facility] by registering at the following website: [email or web address]. By joining [employer]’s FollowMyHealth portal, you will also be able to view your health information using other applications (apps) compatible with our system.

## 2024-02-27 NOTE — PROGRESS NOTE ADULT - ASSESSMENT
Hemodynamically stable with [id number] glucose in the setting of low dose insulin and poor adherence to diet:  -UTI/BPH with obstruction and JULIO CESAR now resolved with wade - he will be discharged to [facility] with wade  -DM - convert to long acting insulin and sliding scale  -D/W  DC to [facility]  -DVT prophylaxis  D/W family and team

## 2024-02-27 NOTE — DISCHARGE NOTE PROVIDER - CARE PROVIDER_API CALL
Colin Brennan  Cardiovascular Disease  [address], [address]  [place] [address]  Phone: [phone]  Fax: [phone]  Established Patient  Follow Up Time: 1 week   Lai Huggins  Cardiovascular Disease  [address] [place] [address]  Phone: [phone]  Fax: [phone]  Established Patient  Follow Up Time: 2 weeks

## 2024-02-27 NOTE — DISCHARGE NOTE PROVIDER - CARE PROVIDERS DIRECT ADDRESSES
[email or web address]

## 2024-02-27 NOTE — DISCHARGE NOTE PROVIDER - HOSPITAL COURSE
89M PMHx DM, recurrent UTIs (recently treated w ciprofloxacin) and urinary retention, presenting from [facility] for AMS, found to have urinary retention, treated with wade placement (w rapid improvement in mental status following relief of obstruction), and complete, 3-day course of ceftriaxone treatment for UTI.    1. Urinary retention  Hx recurrent UTIs  BPH  2/21: 600cc on bladder scan at [facility] rehab, wade placed by urology in ED w 850cc output           CT showing b/l hydronephrosis and cystitis; likely 2/2 BPH as prostate 100cc, approximated 6.5cm diameter  2/24: Us w moderate bilateral hydronephrosis, right greater left           Bladder decompressed w wade in place           Bladder wall markedly thickened, possibly secondary to chronic bladder outlet obstruction iso enlarged prostate  s/p tx w ceftriaxone x3 days, UCx with no bacterial growth, +candida  - in light of findings above, plan to discharge with wade & outpatient follow up with urology; pt will benefit from MRI of prostate to assess whether he is PAE candidate           Urology follow up for wade care, possible TOV, medication adjustments/further management of BPH & associated obstruction  - continue flomax    2. JULIO CESAR (acute kidney injury)  admitted 2/21 w Cr 2.6, baseline 1.2; likely secondary to retention & subsequent obstruction    - follow up weekly BMP  - continue wade    3. DM  ha1c january 2024  recently discontinued farxiga given recurrent utis  on januvia and metforomin outpatient, but will to continue sliding scale & basal insulin following discharge & while in rehab  - continue low-dose sliding scale & 5u lantus qhs    Physical Exam at time of discharge:  General: NAD  HEENT: PERRL, EOM, anicteric sclera, MMM  Neck: supple, no JVD  Respiratory: CTA B/L; no W/R/R  Cardiovascular: +S1/S2; RRR; no M/R/G  GI/: soft, NT/ND; +BS x4, no suprapubic tenderness, wade in place draining clear, yellow urine    Extremities: WWP; 2+ peripheral pulses B/L; no LE edema  Skin: normal color and turgor; no rash  Neurologic: AOx3

## 2024-02-27 NOTE — DISCHARGE NOTE PROVIDER - NSDCMRMEDTOKEN_GEN_ALL_CORE_FT
alfuzosin 10 mg oral tablet, extended release: 1 tab(s) orally once a day  atorvastatin 10 mg oral tablet: 1 tab(s) orally once a day  finasteride 5 mg oral tablet: 1 tab(s) orally once a day  heparin: 1 subcutaneous once a day  insulin aspart 100 units/mL injectable solution: injectable every 6 hours 151 - 200: 1 units sq  201 - 250: 2 units sq  251 - 300: 3 units sq  301 - 350: 4 units sq  351 - 400: 1 units sq  greater than 400: 6 units sq &amp; contact MD    POCT every 6 hours, daily  insulin glargine 100 units/mL subcutaneous solution: 5 unit(s) subcutaneous once a day (at bedtime)  melatonin 3 mg oral tablet: 1 tab(s) orally once a day (at bedtime) As needed Insomnia  polyethylene glycol 3350 oral powder for reconstitution: 17 gram(s) orally 2 times a day  senna leaf extract oral tablet: 1 tab(s) orally once a day (at bedtime)  tadalafil 5 mg oral tablet: 1 tab(s) orally once a day as needed for difficulty urinating  tamsulosin 0.4 mg oral capsule: 2 cap(s) orally once a day (at bedtime)

## 2024-02-27 NOTE — DISCHARGE NOTE PROVIDER - PROVIDER TOKENS
PROVIDER:[TOKEN:[[id number]],FOLLOWUP:[1 week],ESTABLISHEDPATIENT:[T]] PROVIDER:[TOKEN:[[id number]],FOLLOWUP:[2 weeks],ESTABLISHEDPATIENT:[T]]

## 2024-02-27 NOTE — PROGRESS NOTE ADULT - REASON FOR ADMISSION
UTI urinary retention
UTI urinary retention
UTI with obstruction
UTI urinary retention

## 2024-02-27 NOTE — PHYSICAL THERAPY INITIAL EVALUATION ADULT - ADDITIONAL COMMENTS
Pt is an admission from [facility], upon speaking to son at bedside, goal is to return to [facility] after hospital admission to improve amb distance and functional capacity.

## 2024-02-27 NOTE — PHYSICAL THERAPY INITIAL EVALUATION ADULT - GAIT DEVIATIONS NOTED, PT EVAL
decreased lore/decreased velocity of limb motion/decreased step length/decreased stride length/decreased weight-shifting ability

## 2024-02-27 NOTE — PROGRESS NOTE ADULT - SUBJECTIVE AND OBJECTIVE BOX
No events overnight  Patient and his family met with nutrtionist last pm about lower carb intake  MRI of prostate delayted    MEDICATIONS  (STANDING):  dextrose 5%. 1000 milliLiter(s) (100 mL/Hr) IV Continuous <Continuous>  dextrose 5%. 1000 milliLiter(s) (50 mL/Hr) IV Continuous <Continuous>  dextrose 50% Injectable 25 Gram(s) IV Push once  dextrose 50% Injectable 12.5 Gram(s) IV Push once  dextrose 50% Injectable 25 Gram(s) IV Push once  finasteride 5 milliGRAM(s) Oral daily  glucagon  Injectable 1 milliGRAM(s) IntraMuscular once  heparin   Injectable 5000 Unit(s) SubCutaneous every 8 hours  insulin glargine Injectable (LANTUS) 5 Unit(s) SubCutaneous at bedtime  insulin lispro (ADMELOG) corrective regimen sliding scale   SubCutaneous Before meals and at bedtime  polyethylene glycol 3350 17 Gram(s) Oral two times a day  senna 1 Tablet(s) Oral at bedtime  tamsulosin 0.8 milliGRAM(s) Oral at bedtime    MEDICATIONS  (PRN):  dextrose Oral Gel 15 Gram(s) Oral once PRN Blood Glucose LESS THAN 70 milliGRAM(s)/deciliter  melatonin 3 milliGRAM(s) Oral at bedtime PRN Insomnia    ICU Vital Signs Last 24 Hrs  T(C): 36.9 (27 Feb 2024 06:04), Max: 36.9 (26 Feb 2024 20:42)  T(F): 98.4 (27 Feb 2024 06:04), Max: 98.4 (26 Feb 2024 20:42)  HR: 78 (27 Feb 2024 06:04) (66 - 78)  BP: 138/79 (27 Feb 2024 06:04) (128/69 - 138/79)  BP(mean): --  ABP: --  ABP(mean): --  RR: 17 (27 Feb 2024 06:04) (17 - 18)  SpO2: 92% (27 Feb 2024 06:04) (92% - 93%)    O2 Parameters below as of 27 Feb 2024 06:04  Patient On (Oxygen Delivery Method): room air    CAPILLARY BLOOD GLUCOSE      POCT Blood Glucose.: 190 mg/dL (26 Feb 2024 21:50)  POCT Blood Glucose.: 227 mg/dL (26 Feb 2024 17:49)  POCT Blood Glucose.: 270 mg/dL (26 Feb 2024 13:09)  POCT Blood Glucose.: 280 mg/dL (26 Feb 2024 08:59)    NAD laying flat in bed able to sit up  No JVDChest clear   CV RRRS!S2 II/VI REGGIE LSB  Abd soft  Ext no edema                          10.2   7.41  )-----------( 277      ( 26 Feb 2024 08:58 )             29.8   02-26    137  |  99  |  18  ----------------------------<  259<H>  4.3   |  27  |  1.10    Ca    8.4      26 Feb 2024 08:58  Phos  2.7     02-26  Mg     1.8     02-26    TPro  6.7  /  Alb  2.9<L>  /  TBili  0.3  /  DBili  x   /  AST  12  /  ALT  10  /  AlkPhos  61  02-25

## 2024-02-27 NOTE — PROGRESS NOTE ADULT - PROVIDER SPECIALTY LIST ADULT
Cardiology
Urology
Cardiology
Internal Medicine

## 2024-02-28 PROCEDURE — 83605 ASSAY OF LACTIC ACID: CPT

## 2024-02-28 PROCEDURE — 86901 BLOOD TYPING SEROLOGIC RH(D): CPT

## 2024-02-28 PROCEDURE — 83540 ASSAY OF IRON: CPT

## 2024-02-28 PROCEDURE — 80053 COMPREHEN METABOLIC PANEL: CPT

## 2024-02-28 PROCEDURE — 96375 TX/PRO/DX INJ NEW DRUG ADDON: CPT

## 2024-02-28 PROCEDURE — 82962 GLUCOSE BLOOD TEST: CPT

## 2024-02-28 PROCEDURE — 80048 BASIC METABOLIC PNL TOTAL CA: CPT

## 2024-02-28 PROCEDURE — 76770 US EXAM ABDO BACK WALL COMP: CPT

## 2024-02-28 PROCEDURE — 99285 EMERGENCY DEPT VISIT HI MDM: CPT

## 2024-02-28 PROCEDURE — 85027 COMPLETE CBC AUTOMATED: CPT

## 2024-02-28 PROCEDURE — 85025 COMPLETE CBC W/AUTO DIFF WBC: CPT

## 2024-02-28 PROCEDURE — 87086 URINE CULTURE/COLONY COUNT: CPT

## 2024-02-28 PROCEDURE — 97161 PT EVAL LOW COMPLEX 20 MIN: CPT

## 2024-02-28 PROCEDURE — 81001 URINALYSIS AUTO W/SCOPE: CPT

## 2024-02-28 PROCEDURE — 82728 ASSAY OF FERRITIN: CPT

## 2024-02-28 PROCEDURE — 36415 COLL VENOUS BLD VENIPUNCTURE: CPT

## 2024-02-28 PROCEDURE — 96374 THER/PROPH/DIAG INJ IV PUSH: CPT

## 2024-02-28 PROCEDURE — 86900 BLOOD TYPING SEROLOGIC ABO: CPT

## 2024-02-28 PROCEDURE — 86850 RBC ANTIBODY SCREEN: CPT

## 2024-02-28 PROCEDURE — 87040 BLOOD CULTURE FOR BACTERIA: CPT

## 2024-02-28 PROCEDURE — 74176 CT ABD & PELVIS W/O CONTRAST: CPT | Mod: MA

## 2024-02-28 PROCEDURE — 84100 ASSAY OF PHOSPHORUS: CPT

## 2024-02-28 PROCEDURE — 83550 IRON BINDING TEST: CPT

## 2024-02-28 PROCEDURE — 93005 ELECTROCARDIOGRAM TRACING: CPT

## 2024-02-28 PROCEDURE — 83735 ASSAY OF MAGNESIUM: CPT

## 2024-03-05 DIAGNOSIS — R33.8 OTHER RETENTION OF URINE: ICD-10-CM

## 2024-03-05 DIAGNOSIS — N17.9 ACUTE KIDNEY FAILURE, UNSPECIFIED: ICD-10-CM

## 2024-03-05 DIAGNOSIS — N40.1 BENIGN PROSTATIC HYPERPLASIA WITH LOWER URINARY TRACT SYMPTOMS: ICD-10-CM

## 2024-03-05 DIAGNOSIS — G93.41 METABOLIC ENCEPHALOPATHY: ICD-10-CM

## 2024-03-05 DIAGNOSIS — E11.9 TYPE 2 DIABETES MELLITUS WITHOUT COMPLICATIONS: ICD-10-CM

## 2024-03-05 DIAGNOSIS — E87.1 HYPO-OSMOLALITY AND HYPONATREMIA: ICD-10-CM

## 2024-03-05 DIAGNOSIS — E78.5 HYPERLIPIDEMIA, UNSPECIFIED: ICD-10-CM

## 2024-03-05 DIAGNOSIS — N13.6 PYONEPHROSIS: ICD-10-CM

## 2024-03-15 ENCOUNTER — INPATIENT (INPATIENT)
Facility: HOSPITAL | Age: 89
LOS: 5 days | Discharge: NURSING, REHAB OR LONG-TERM CARE | DRG: 698 | End: 2024-03-21
Attending: INTERNAL MEDICINE | Admitting: INTERNAL MEDICINE
Payer: MEDICARE

## 2024-03-15 VITALS
WEIGHT: 130.07 LBS | HEART RATE: 96 BPM | DIASTOLIC BLOOD PRESSURE: 63 MMHG | RESPIRATION RATE: 16 BRPM | TEMPERATURE: 99 F | OXYGEN SATURATION: 98 % | HEIGHT: 66 IN | SYSTOLIC BLOOD PRESSURE: 131 MMHG

## 2024-03-15 DIAGNOSIS — E87.5 HYPERKALEMIA: ICD-10-CM

## 2024-03-15 DIAGNOSIS — N17.9 ACUTE KIDNEY FAILURE, UNSPECIFIED: ICD-10-CM

## 2024-03-15 DIAGNOSIS — R33.9 RETENTION OF URINE, UNSPECIFIED: ICD-10-CM

## 2024-03-15 DIAGNOSIS — N39.0 URINARY TRACT INFECTION, SITE NOT SPECIFIED: ICD-10-CM

## 2024-03-15 DIAGNOSIS — Z29.9 ENCOUNTER FOR PROPHYLACTIC MEASURES, UNSPECIFIED: ICD-10-CM

## 2024-03-15 DIAGNOSIS — E78.5 HYPERLIPIDEMIA, UNSPECIFIED: ICD-10-CM

## 2024-03-15 DIAGNOSIS — N40.0 BENIGN PROSTATIC HYPERPLASIA WITHOUT LOWER URINARY TRACT SYMPTOMS: ICD-10-CM

## 2024-03-15 LAB
ANION GAP SERPL CALC-SCNC: 16 MMOL/L — SIGNIFICANT CHANGE UP (ref 5–17)
ANION GAP SERPL CALC-SCNC: 21 MMOL/L — HIGH (ref 5–17)
ANISOCYTOSIS BLD QL: SLIGHT — SIGNIFICANT CHANGE UP
APPEARANCE UR: ABNORMAL
BACTERIA # UR AUTO: ABNORMAL /HPF
BASOPHILS # BLD AUTO: 0 K/UL — SIGNIFICANT CHANGE UP (ref 0–0.2)
BASOPHILS NFR BLD AUTO: 0 % — SIGNIFICANT CHANGE UP (ref 0–2)
BILIRUB UR-MCNC: ABNORMAL
BUN SERPL-MCNC: 72 MG/DL — HIGH (ref 7–23)
BUN SERPL-MCNC: 73 MG/DL — HIGH (ref 7–23)
CALCIUM SERPL-MCNC: 9.2 MG/DL — SIGNIFICANT CHANGE UP (ref 8.4–10.5)
CALCIUM SERPL-MCNC: 9.6 MG/DL — SIGNIFICANT CHANGE UP (ref 8.4–10.5)
CAST: 0 /LPF — SIGNIFICANT CHANGE UP (ref 0–4)
CHLORIDE SERPL-SCNC: 95 MMOL/L — LOW (ref 96–108)
CHLORIDE SERPL-SCNC: 98 MMOL/L — SIGNIFICANT CHANGE UP (ref 96–108)
CO2 SERPL-SCNC: 17 MMOL/L — LOW (ref 22–31)
CO2 SERPL-SCNC: 19 MMOL/L — LOW (ref 22–31)
COLOR SPEC: ABNORMAL
CREAT SERPL-MCNC: 4.94 MG/DL — HIGH (ref 0.5–1.3)
CREAT SERPL-MCNC: 5.25 MG/DL — HIGH (ref 0.5–1.3)
DIFF PNL FLD: ABNORMAL
EGFR: 10 ML/MIN/1.73M2 — LOW
EGFR: 9 ML/MIN/1.73M2 — LOW
EOSINOPHIL # BLD AUTO: 0 K/UL — SIGNIFICANT CHANGE UP (ref 0–0.5)
EOSINOPHIL NFR BLD AUTO: 0 % — SIGNIFICANT CHANGE UP (ref 0–6)
GLUCOSE BLDC GLUCOMTR-MCNC: 335 MG/DL — HIGH (ref 70–99)
GLUCOSE SERPL-MCNC: 229 MG/DL — HIGH (ref 70–99)
GLUCOSE SERPL-MCNC: 284 MG/DL — HIGH (ref 70–99)
GLUCOSE UR QL: NEGATIVE MG/DL — SIGNIFICANT CHANGE UP
HCT VFR BLD CALC: 35.6 % — LOW (ref 39–50)
HGB BLD-MCNC: 12 G/DL — LOW (ref 13–17)
HYPOCHROMIA BLD QL: SLIGHT — SIGNIFICANT CHANGE UP
KETONES UR-MCNC: NEGATIVE MG/DL — SIGNIFICANT CHANGE UP
LACTATE SERPL-SCNC: 3 MMOL/L — HIGH (ref 0.5–2)
LEUKOCYTE ESTERASE UR-ACNC: ABNORMAL
LYMPHOCYTES # BLD AUTO: 0.32 K/UL — LOW (ref 1–3.3)
LYMPHOCYTES # BLD AUTO: 2.6 % — LOW (ref 13–44)
MANUAL SMEAR VERIFICATION: SIGNIFICANT CHANGE UP
MCHC RBC-ENTMCNC: 31.6 PG — SIGNIFICANT CHANGE UP (ref 27–34)
MCHC RBC-ENTMCNC: 33.7 GM/DL — SIGNIFICANT CHANGE UP (ref 32–36)
MCV RBC AUTO: 93.7 FL — SIGNIFICANT CHANGE UP (ref 80–100)
MONOCYTES # BLD AUTO: 0.87 K/UL — SIGNIFICANT CHANGE UP (ref 0–0.9)
MONOCYTES NFR BLD AUTO: 7 % — SIGNIFICANT CHANGE UP (ref 2–14)
NEUTROPHILS # BLD AUTO: 11.23 K/UL — HIGH (ref 1.8–7.4)
NEUTROPHILS NFR BLD AUTO: 88.6 % — HIGH (ref 43–77)
NEUTS BAND # BLD: 1.8 % — SIGNIFICANT CHANGE UP (ref 0–8)
NITRITE UR-MCNC: POSITIVE
OVALOCYTES BLD QL SMEAR: SLIGHT — SIGNIFICANT CHANGE UP
PH UR: 5 — SIGNIFICANT CHANGE UP (ref 5–8)
PLAT MORPH BLD: ABNORMAL
PLATELET # BLD AUTO: 259 K/UL — SIGNIFICANT CHANGE UP (ref 150–400)
POIKILOCYTOSIS BLD QL AUTO: SLIGHT — SIGNIFICANT CHANGE UP
POTASSIUM SERPL-MCNC: 6 MMOL/L — HIGH (ref 3.5–5.3)
POTASSIUM SERPL-MCNC: 6.4 MMOL/L — CRITICAL HIGH (ref 3.5–5.3)
POTASSIUM SERPL-SCNC: 6 MMOL/L — HIGH (ref 3.5–5.3)
POTASSIUM SERPL-SCNC: 6.4 MMOL/L — CRITICAL HIGH (ref 3.5–5.3)
PROT UR-MCNC: 100 MG/DL
RBC # BLD: 3.8 M/UL — LOW (ref 4.2–5.8)
RBC # FLD: 15.2 % — HIGH (ref 10.3–14.5)
RBC BLD AUTO: ABNORMAL
RBC CASTS # UR COMP ASSIST: >1900 /HPF — HIGH (ref 0–4)
SODIUM SERPL-SCNC: 133 MMOL/L — LOW (ref 135–145)
SODIUM SERPL-SCNC: 133 MMOL/L — LOW (ref 135–145)
SP GR SPEC: 1.01 — SIGNIFICANT CHANGE UP (ref 1–1.03)
SPHEROCYTES BLD QL SMEAR: SLIGHT — SIGNIFICANT CHANGE UP
SQUAMOUS # UR AUTO: >36 /HPF — HIGH (ref 0–5)
UROBILINOGEN FLD QL: 0.2 MG/DL — SIGNIFICANT CHANGE UP (ref 0.2–1)
WBC # BLD: 12.42 K/UL — HIGH (ref 3.8–10.5)
WBC # FLD AUTO: 12.42 K/UL — HIGH (ref 3.8–10.5)
WBC UR QL: >998 /HPF — HIGH (ref 0–5)

## 2024-03-15 PROCEDURE — 99222 1ST HOSP IP/OBS MODERATE 55: CPT | Mod: GC

## 2024-03-15 PROCEDURE — 99285 EMERGENCY DEPT VISIT HI MDM: CPT

## 2024-03-15 RX ORDER — INSULIN LISPRO 100/ML
VIAL (ML) SUBCUTANEOUS
Refills: 0 | Status: DISCONTINUED | OUTPATIENT
Start: 2024-03-15 | End: 2024-03-21

## 2024-03-15 RX ORDER — LIDOCAINE HCL 20 MG/ML
20 VIAL (ML) INJECTION ONCE
Refills: 0 | Status: COMPLETED | OUTPATIENT
Start: 2024-03-15 | End: 2024-03-15

## 2024-03-15 RX ORDER — DEXTROSE 50 % IN WATER 50 %
15 SYRINGE (ML) INTRAVENOUS ONCE
Refills: 0 | Status: DISCONTINUED | OUTPATIENT
Start: 2024-03-15 | End: 2024-03-21

## 2024-03-15 RX ORDER — DEXTROSE 50 % IN WATER 50 %
25 SYRINGE (ML) INTRAVENOUS ONCE
Refills: 0 | Status: DISCONTINUED | OUTPATIENT
Start: 2024-03-15 | End: 2024-03-21

## 2024-03-15 RX ORDER — HEPARIN SODIUM 5000 [USP'U]/ML
5000 INJECTION INTRAVENOUS; SUBCUTANEOUS EVERY 12 HOURS
Refills: 0 | Status: DISCONTINUED | OUTPATIENT
Start: 2024-03-16 | End: 2024-03-21

## 2024-03-15 RX ORDER — ACETAMINOPHEN 500 MG
1000 TABLET ORAL ONCE
Refills: 0 | Status: DISCONTINUED | OUTPATIENT
Start: 2024-03-15 | End: 2024-03-15

## 2024-03-15 RX ORDER — SODIUM CHLORIDE 9 MG/ML
1000 INJECTION, SOLUTION INTRAVENOUS
Refills: 0 | Status: DISCONTINUED | OUTPATIENT
Start: 2024-03-15 | End: 2024-03-21

## 2024-03-15 RX ORDER — GLUCAGON INJECTION, SOLUTION 0.5 MG/.1ML
1 INJECTION, SOLUTION SUBCUTANEOUS ONCE
Refills: 0 | Status: DISCONTINUED | OUTPATIENT
Start: 2024-03-15 | End: 2024-03-21

## 2024-03-15 RX ORDER — SODIUM CHLORIDE 9 MG/ML
1000 INJECTION, SOLUTION INTRAVENOUS ONCE
Refills: 0 | Status: COMPLETED | OUTPATIENT
Start: 2024-03-15 | End: 2024-03-15

## 2024-03-15 RX ORDER — CALCIUM GLUCONATE 100 MG/ML
2 VIAL (ML) INTRAVENOUS ONCE
Refills: 0 | Status: COMPLETED | OUTPATIENT
Start: 2024-03-15 | End: 2024-03-15

## 2024-03-15 RX ORDER — PIPERACILLIN AND TAZOBACTAM 4; .5 G/20ML; G/20ML
4.5 INJECTION, POWDER, LYOPHILIZED, FOR SOLUTION INTRAVENOUS EVERY 12 HOURS
Refills: 0 | Status: DISCONTINUED | OUTPATIENT
Start: 2024-03-16 | End: 2024-03-18

## 2024-03-15 RX ORDER — DEXTROSE 50 % IN WATER 50 %
50 SYRINGE (ML) INTRAVENOUS ONCE
Refills: 0 | Status: COMPLETED | OUTPATIENT
Start: 2024-03-15 | End: 2024-03-15

## 2024-03-15 RX ORDER — SODIUM CHLORIDE 9 MG/ML
1000 INJECTION INTRAMUSCULAR; INTRAVENOUS; SUBCUTANEOUS ONCE
Refills: 0 | Status: COMPLETED | OUTPATIENT
Start: 2024-03-15 | End: 2024-03-15

## 2024-03-15 RX ORDER — DEXTROSE 50 % IN WATER 50 %
12.5 SYRINGE (ML) INTRAVENOUS ONCE
Refills: 0 | Status: DISCONTINUED | OUTPATIENT
Start: 2024-03-15 | End: 2024-03-21

## 2024-03-15 RX ORDER — INSULIN LISPRO 100/ML
4 VIAL (ML) SUBCUTANEOUS ONCE
Refills: 0 | Status: DISCONTINUED | OUTPATIENT
Start: 2024-03-15 | End: 2024-03-16

## 2024-03-15 RX ORDER — INSULIN HUMAN 100 [IU]/ML
5 INJECTION, SOLUTION SUBCUTANEOUS ONCE
Refills: 0 | Status: COMPLETED | OUTPATIENT
Start: 2024-03-15 | End: 2024-03-15

## 2024-03-15 RX ORDER — SODIUM ZIRCONIUM CYCLOSILICATE 10 G/10G
10 POWDER, FOR SUSPENSION ORAL ONCE
Refills: 0 | Status: COMPLETED | OUTPATIENT
Start: 2024-03-15 | End: 2024-03-15

## 2024-03-15 RX ORDER — PIPERACILLIN AND TAZOBACTAM 4; .5 G/20ML; G/20ML
4.5 INJECTION, POWDER, LYOPHILIZED, FOR SOLUTION INTRAVENOUS ONCE
Refills: 0 | Status: COMPLETED | OUTPATIENT
Start: 2024-03-15 | End: 2024-03-15

## 2024-03-15 RX ORDER — ACETAMINOPHEN 500 MG
650 TABLET ORAL ONCE
Refills: 0 | Status: COMPLETED | OUTPATIENT
Start: 2024-03-15 | End: 2024-03-15

## 2024-03-15 RX ORDER — INSULIN LISPRO 100/ML
VIAL (ML) SUBCUTANEOUS AT BEDTIME
Refills: 0 | Status: DISCONTINUED | OUTPATIENT
Start: 2024-03-16 | End: 2024-03-21

## 2024-03-15 RX ORDER — ACETAMINOPHEN 500 MG
650 TABLET ORAL EVERY 6 HOURS
Refills: 0 | Status: DISCONTINUED | OUTPATIENT
Start: 2024-03-15 | End: 2024-03-21

## 2024-03-15 RX ORDER — CEFTRIAXONE 500 MG/1
1000 INJECTION, POWDER, FOR SOLUTION INTRAMUSCULAR; INTRAVENOUS ONCE
Refills: 0 | Status: COMPLETED | OUTPATIENT
Start: 2024-03-15 | End: 2024-03-15

## 2024-03-15 RX ORDER — PIPERACILLIN AND TAZOBACTAM 4; .5 G/20ML; G/20ML
4.5 INJECTION, POWDER, LYOPHILIZED, FOR SOLUTION INTRAVENOUS ONCE
Refills: 0 | Status: COMPLETED | OUTPATIENT
Start: 2024-03-16 | End: 2024-03-16

## 2024-03-15 RX ADMIN — SODIUM ZIRCONIUM CYCLOSILICATE 10 GRAM(S): 10 POWDER, FOR SUSPENSION ORAL at 16:41

## 2024-03-15 RX ADMIN — CEFTRIAXONE 100 MILLIGRAM(S): 500 INJECTION, POWDER, FOR SOLUTION INTRAMUSCULAR; INTRAVENOUS at 17:23

## 2024-03-15 RX ADMIN — PIPERACILLIN AND TAZOBACTAM 200 GRAM(S): 4; .5 INJECTION, POWDER, LYOPHILIZED, FOR SOLUTION INTRAVENOUS at 22:51

## 2024-03-15 RX ADMIN — Medication 50 MILLILITER(S): at 22:48

## 2024-03-15 RX ADMIN — Medication 20 MILLILITER(S): at 16:35

## 2024-03-15 RX ADMIN — Medication 200 GRAM(S): at 16:27

## 2024-03-15 RX ADMIN — INSULIN HUMAN 5 UNIT(S): 100 INJECTION, SOLUTION SUBCUTANEOUS at 22:48

## 2024-03-15 RX ADMIN — INSULIN HUMAN 5 UNIT(S): 100 INJECTION, SOLUTION SUBCUTANEOUS at 16:45

## 2024-03-15 RX ADMIN — Medication 650 MILLIGRAM(S): at 16:26

## 2024-03-15 RX ADMIN — SODIUM CHLORIDE 1000 MILLILITER(S): 9 INJECTION INTRAMUSCULAR; INTRAVENOUS; SUBCUTANEOUS at 16:44

## 2024-03-15 RX ADMIN — SODIUM CHLORIDE 1000 MILLILITER(S): 9 INJECTION, SOLUTION INTRAVENOUS at 22:33

## 2024-03-15 RX ADMIN — Medication 50 MILLILITER(S): at 16:45

## 2024-03-15 RX ADMIN — SODIUM CHLORIDE 1000 MILLILITER(S): 9 INJECTION INTRAMUSCULAR; INTRAVENOUS; SUBCUTANEOUS at 18:35

## 2024-03-15 NOTE — H&P ADULT - PROBLEM SELECTOR PLAN 2
found to have BS of 400cc  now s/p wade placement by urology in ED Patient w/ chronic wade iso urinary retention/obstruction, presented from nursing home with obstructed wade. Wade replaced in the ED by urology with drainage of 400cc of purulent urine.   - monitor urine output, strict I/Os  - treat UTI as below Patient with hyperkalemia to 6.4 on arrival to the ED. Likely iso post-obstructive JULIO CESAR, given hyperkalemia cocktail + lokelma 10mg q8h x 2. Repeat K downtrending, no EKG changes noted.   - f/u BMP q4-6h until K normalizes  - caution with insulin/dextrose cocktail given poor renal function  - if K continues to be elevated, consider lokelma 10mg q8h  - monitor UOP

## 2024-03-15 NOTE — CONSULT NOTE ADULT - ATTENDING COMMENTS
89 M with DM, BPH with chronic Dan, recurrent UTIs, presents with hematuria, urinary retention, pyuria, and fever. Labs were significant for elevated creatinine and potassium. Physical exam as above.   1. Bladder outlet obstruction  2. UTI  3. JULIO CESAR due to UTO  4. Hyperkalemia  5. Sepsis  6. DM  - iv fluids  - Lokelma  - Zosyn

## 2024-03-15 NOTE — H&P ADULT - ASSESSMENT
Patient is as 89 y/o M (baseline A&Ox3), DM, recurrent UTIs (recently in cipro) and urinary retention, presenting from [facility] for urinary retention/hematuria found to have UTI and hyperkalemia.

## 2024-03-15 NOTE — H&P ADULT - NSHPLABSRESULTS_GEN_ALL_CORE
LABS:                         12.0   12.42 )-----------( 259      ( 15 Mar 2024 15:06 )             35.6     03-15    133<L>  |  98  |  73<H>  ----------------------------<  284<H>  6.0<H>   |  19<L>  |  4.94<H>    Ca    9.2      15 Mar 2024 18:56        Urinalysis Basic - ( 15 Mar 2024 18:56 )    Color: x / Appearance: x / SG: x / pH: x  Gluc: 284 mg/dL / Ketone: x  / Bili: x / Urobili: x   Blood: x / Protein: x / Nitrite: x   Leuk Esterase: x / RBC: x / WBC x   Sq Epi: x / Non Sq Epi: x / Bacteria: x                RADIOLOGY, EKG & ADDITIONAL TESTS: Reviewed

## 2024-03-15 NOTE — ED ADULT NURSE NOTE - PATIENT'S PREFERRED PRONOUN
Contacted patient to advise she needs to call [facility] at [phone] to see if the provider recommended by her employer is in network. She understood   Him/He

## 2024-03-15 NOTE — H&P ADULT - NSHPPHYSICALEXAM_GEN_ALL_CORE
T(C): 37.9 (03-15-24 @ 20:04), Max: 38.1 (03-15-24 @ 17:20)  HR: 98 (03-15-24 @ 20:04) (96 - 111)  BP: 100/56 (03-15-24 @ 20:04) (100/56 - 155/66)  RR: 18 (03-15-24 @ 20:04) (16 - 18)  SpO2: 93% (03-15-24 @ 20:04) (93% - 98%)    General: NAD, laying in bed, speaking in full sentences  HEENT: head NC/AT, no conjunctival injection, EOMI, MMM  Neck: supple, no JVD  Cardio: RRR, +S1/S2, no M/R/G  Resp: lungs CTAB, no cough/wheezes/rales/rhonchi  Abdo:  soft, NT, mildly distended; no guarding or rebound  Extremities: WWP, no edema/cyanosis/clubbing   Vasc: 2+ radial and DP pulses b/l  Neuro: A&Ox3  Psych: speech non-pressured, thoughts goal-oriented  Skin: dry, intact, no visible jaundice

## 2024-03-15 NOTE — ED ADULT NURSE NOTE - NSFALLHARMRISKINTERV_ED_ALL_ED
Assistance OOB with selected safe patient handling equipment if applicable/Assistance with ambulation/Communicate risk of Fall with Harm to all staff, patient, and family/Monitor gait and stability/Provide visual cue: red socks, yellow wristband, yellow gown, etc/Reinforce activity limits and safety measures with patient and family/Bed in lowest position, wheels locked, appropriate side rails in place/Call bell, personal items and telephone in reach/Instruct patient to call for assistance before getting out of bed/chair/stretcher/Non-slip footwear applied when patient is off stretcher/[place] to call system/Physically safe environment - no spills, clutter or unnecessary equipment/Purposeful Proactive Rounding/Room/bathroom lighting operational, light cord in reach

## 2024-03-15 NOTE — H&P ADULT - PROBLEM SELECTOR PLAN 5
see management above. Patient noted to have JULIO CESAR with Cr 5.25 over baseline 1.05. found to have BS of 400cc  now s/p wade placement by urology in ED   - s/p 2 L IVF  - f/u urine lytes  - trend Cr  - avoid nephrotoxic drugs, renally dose meds  - will consider obtaining urine lytes if not improving

## 2024-03-15 NOTE — ED PROVIDER NOTE - OBJECTIVE STATEMENT
89 year old male with history of DM, BPH s/p Wade, recurrent UTIs, presenting with hematuria/urinary retention x 1d. Per son at bedside--noted at [facility] yesterday around 11AM to have minimal UOP, bladder scan showing 400cc, new Wade placed with subsequent hematuria noted to urine bag with good output, then today also noted to have minimal urine output so when attempting to flush wade, it became dislodged. Sent here for further management. No known fevers, chills, vomiting, ab pain. ?Started on cipro yesterday empirically.

## 2024-03-15 NOTE — H&P ADULT - PROBLEM SELECTOR PLAN 3
P/w urinary retention and +UA. Pt has hx of recurrent UTIs. s/p ceftriazone in in ED  - c/w zosyn  - f/u urine cx  - f/u blood cx. Patient w/ chronic wade iso urinary retention/obstruction, presented from nursing home with obstructed wade. Wade replaced in the ED by urology with drainage of 400cc of purulent urine.   - monitor urine output, strict I/Os  - treat UTI as below

## 2024-03-15 NOTE — ED ADULT NURSE NOTE - OBJECTIVE STATEMENT
Pt is a 89 yo M bibems from [facility] rehab for dislodged wade catheter. Per pt's son at bedside, pt's wade was replaced yesterday afternoon after staff noticed decreased output in drainage bag and blood in tube upon flushing. New wade was placed at that time, however today pt continued to have decreased urine output and when catheter was flushed, tube was dislodged and taken out; pt transferred here for further w/u and new catheter.  Per son, similar episode occurred last month but states urine cultures were negative, but felt pt was similarly more lethargic like he is today. States pt is at baseline mental status (a&o to self and place).   On exam, pt endorses abd pain; distention and tenderness noted to suprapubic region.

## 2024-03-15 NOTE — H&P ADULT - PROBLEM SELECTOR PLAN 7
F: s/p 2 L   E: replete to K>4, Mg>2, Phos>2.5  N: dash  Ppx: heparin subq  Code status: DNR DNI    Dispo: Tele Home medications: Metformin 1000mg BID; Sitaliptin 100mg   - hold home medications  - ISS

## 2024-03-15 NOTE — ED ADULT NURSE NOTE - CHIEF COMPLAINT QUOTE
Pt presents to ED from [facility] by EMS. Son at bedside reports " He was having some bleeding in his Dan, they tried to clear a blockage at the NH and they Dan got pulled out". Pt hard of hearing, poor historian.

## 2024-03-15 NOTE — ED PROVIDER NOTE - PROGRESS NOTE DETAILS
Mike Price MD: Patient given hyperK cocktail for K level of 6.4 in setting of postobstructive JULIO CESAR. Urinary catheter placed by urology team with brian pus output per uro PA--will send UA and give IV CTX dose. Will give IVF bolus, plan for rpt bmp, and pending K level will admit.

## 2024-03-15 NOTE — ED PROVIDER NOTE - OTHER FINDINGS
favor baseline isolated tall T wave in V2 over true peak T wave 2/2 to hyperkalemia; no QRS widening

## 2024-03-15 NOTE — H&P ADULT - HISTORY OF PRESENT ILLNESS
89 year old male with history of DM, BPH s/p Wade, recurrent UTIs, presenting with hematuria/urinary retention x 1d. Per son at bedside--noted at [facility] re[facility] yesterday around 11AM to have minimal UOP, bladder scan showing 400cc, new Wade placed with subsequent hematuria noted to urine bag with good output, then today also noted to have minimal urine output so when attempting to flush wade, it became dislodged. Sent here for further management. No known fevers, chills, vomiting, ab pain. ?Started on cipro yesterday empirically.    ED Course:  Vitals: 100.5 F, , /63, RR 16 (98%) on RA  Labs: WBC 12.42; Hgb 12; Na 133; K 6.4; HCO3 17; AG 21; BUN 72; Cr 5.25; Glucose 229  UA positive for protein; LE; nitrite; WBC >998; RBC >1900; few bacteria; epithelial cells  EKG: Sinus tach ; no peaked T waves  Interventions:  CTX, 2L bolus; wade exchanged; d50; insulin 5 units; calcium gluconate 2g; Lokelma 10 g   89 year old male with history of DM, BPH s/p Wade, recurrent UTIs, presenting with hematuria/urinary retention for the past 24 hours. Per son at bedside, his father was at [facility] rehab yesterday with minimal urine output. A bladder scan was done at the rehab center and the bladder scan was 400cc. A new Wade was placed at the rehab, however the son noted that the urine output did not improve. Hematuria was also noted in the urine bag. No known fevers, chills, vomiting, ab pain. Patient was started on cipro yesterday empirically at the rehab center. In the ED, wade was replaced. Of note patient was recently admitted for similar circumstances on 2/21.    ED Course:  Vitals: 100.5 F, , /63, RR 16 (98%) on RA  Labs: WBC 12.42; Hgb 12; Na 133; K 6.4; HCO3 17; AG 21; BUN 72; Cr 5.25; Glucose 229  UA positive for protein; LE; nitrite; WBC >998; RBC >1900; few bacteria; epithelial cells  EKG: Sinus tach ; no peaked T waves  Interventions:  CTX, 2L bolus; wade exchanged; d50; insulin 5 units; calcium gluconate 2g; Lokelma 10 g   89 year old male with history of DM, BPH (follows with Dr Diane) s/p Wade, recurrent UTIs, presenting with hematuria/urinary retention for the past 24 hours. Per son at bedside, [facility] with minimal urine output. A bladder scan was done and he was found to have 400cc in his bladder. A new Wade was placed at the rehab, however the son noted that the urine output did not improve. Hematuria was also noted in the urine bag. Patient stated he had some dysuria and urgency but not other urinary symptoms. No known fevers, chills, vomiting, ab pain. Patient was started on cipro yesterday empirically at the rehab center. In the ED, wade was replaced. Of note patient was recently admitted for similar circumstances on 2/21.    ED Course:  Vitals: 100.5 F, , /63, RR 16 (98%) on RA  Labs: WBC 12.42; Hgb 12; Na 133; K 6.4; HCO3 17; AG 21; BUN 72; Cr 5.25; Glucose 229  UA positive for protein; LE; nitrite; WBC >998; RBC >1900; few bacteria; epithelial cells  EKG: Sinus tach ; no peaked T waves  Interventions:  CTX, 2L bolus; wade exchanged; d50; insulin 5 units; calcium gluconate 2g; Lokelma 10 g

## 2024-03-15 NOTE — ED PROVIDER NOTE - PHYSICAL EXAMINATION
Gen - NAD, comfortable; A+Ox3   HEENT - NCAT, EOMI, moist mucous membranes, clear oropharynx  Neck - supple  Resp - CTAB, no increased WOB  CV -  RRR, no m/r/g  Abd - soft, NT, mildly distended; no guarding or rebound  MSK - FROM of b/l UE and LE, no gross deformities  Extrem - no LE edema/erythema/tenderness  Neuro - no focal motor or sensation deficits  Skin - warm, well perfused   (chaperone RN moises) - nl ext genitalia including scrotum, small amount of dried blood at urethral meatus

## 2024-03-15 NOTE — ED ADULT TRIAGE NOTE - CHIEF COMPLAINT QUOTE
Pt presents to ED from [facility] by EMS. Son at bedside reports " He was having some bleeding in his Dan, they tried to clear a blockage at the NH and they Dan got pulled out". Pt hard of hearing, poor historian.

## 2024-03-15 NOTE — H&P ADULT - PROBLEM SELECTOR PLAN 1
K (6.4). Patient asymptomatic. EKG without peaked T waves  -Tele monitoring  -f/u BMP q1h (monitor K response)  -q1h Glucose x6h (check for hypoGlycemia) Patient with hyperkalemia to 6.4 on arrival to the ED. Likely iso post-obstructive JULIO CESAR, given hyperkalemia cocktail + lokelma 10mg q8h x 2. Repeat K downtrending, no EKG changes noted.   - f/u BMP q4-6h until K normalizes  - caution with insulin/dextrose cocktail given poor renal function  - if K continues to be elevated, consider lokelma 10mg q8h  - monitor UOP On admission met 3/4 SIRS criteria with T 100.5/<96.8, , RR > 20, WBC 12.42.  Likely source UTI due to urinary tract obstruction.    - Lactate 3.0  - c/w zosyn  - f/u blood cxs, UA/Ucx  - s/p 2L  IVF in ED

## 2024-03-15 NOTE — H&P ADULT - PROBLEM SELECTOR PLAN 9
F: s/p 2 L   E: replete to K>4, Mg>2, Phos>2.5  N: dash  Ppx: heparin subq  Code status: DNR DNI    Dispo: Tele

## 2024-03-15 NOTE — H&P ADULT - PROBLEM SELECTOR PLAN 4
Patient noted to have JULIO CESAR with Cr 5.25 over baseline 1.05. found to have BS of 400cc  now s/p wade placement by urology in ED   - s/p 2 L IVF  - f/u urine lytes  - trend Cr  - avoid nephrotoxic drugs, renally dose meds  - will consider obtaining urine lytes if not improving P/w urinary retention and +UA. Pt has hx of recurrent UTIs. s/p ceftriazone in in ED  - c/w zosyn  - f/u urine cx  - f/u blood cx.

## 2024-03-15 NOTE — ED PROVIDER NOTE - CLINICAL SUMMARY MEDICAL DECISION MAKING FREE TEXT BOX
89 year old male with history of DM, BPH s/p Dan, recurrent UTIs, presenting with hematuria/urinary retention x 1d. 89 year old male with history of DM, BPH s/p Wade, recurrent UTIs, presenting with hematuria/urinary retention x 1d. Overall comfortable here with good vitals/afebrile. On bedside bladder scan patient has ~500cc in bladder. Has known hx of BPH requiring ?chronic wade. Recently admitted for postobstructive JULIO CESAR and hematuria. Urology consulted for eval. Will check bmp/cbc.

## 2024-03-15 NOTE — H&P ADULT - PROBLEM SELECTOR PLAN 6
Home medication(s): lipitor 40mg qhs  - continue home medication Home medications: finasteride 5mg; Tamsulosin 0.8mg   - c/w home medications

## 2024-03-16 DIAGNOSIS — E11.9 TYPE 2 DIABETES MELLITUS WITHOUT COMPLICATIONS: ICD-10-CM

## 2024-03-16 DIAGNOSIS — A41.9 SEPSIS, UNSPECIFIED ORGANISM: ICD-10-CM

## 2024-03-16 DIAGNOSIS — G93.41 METABOLIC ENCEPHALOPATHY: ICD-10-CM

## 2024-03-16 LAB
A1C WITH ESTIMATED AVERAGE GLUCOSE RESULT: 9.1 % — HIGH (ref 4–5.6)
ANION GAP SERPL CALC-SCNC: 11 MMOL/L — SIGNIFICANT CHANGE UP (ref 5–17)
ANION GAP SERPL CALC-SCNC: 14 MMOL/L — SIGNIFICANT CHANGE UP (ref 5–17)
ANION GAP SERPL CALC-SCNC: 14 MMOL/L — SIGNIFICANT CHANGE UP (ref 5–17)
ANION GAP SERPL CALC-SCNC: 17 MMOL/L — SIGNIFICANT CHANGE UP (ref 5–17)
BASOPHILS # BLD AUTO: 0.01 K/UL — SIGNIFICANT CHANGE UP (ref 0–0.2)
BASOPHILS NFR BLD AUTO: 0.1 % — SIGNIFICANT CHANGE UP (ref 0–2)
BUN SERPL-MCNC: 71 MG/DL — HIGH (ref 7–23)
BUN SERPL-MCNC: 77 MG/DL — HIGH (ref 7–23)
BUN SERPL-MCNC: 77 MG/DL — HIGH (ref 7–23)
BUN SERPL-MCNC: 82 MG/DL — HIGH (ref 7–23)
CALCIUM SERPL-MCNC: 8.5 MG/DL — SIGNIFICANT CHANGE UP (ref 8.4–10.5)
CALCIUM SERPL-MCNC: 8.7 MG/DL — SIGNIFICANT CHANGE UP (ref 8.4–10.5)
CALCIUM SERPL-MCNC: 8.8 MG/DL — SIGNIFICANT CHANGE UP (ref 8.4–10.5)
CALCIUM SERPL-MCNC: 9 MG/DL — SIGNIFICANT CHANGE UP (ref 8.4–10.5)
CHLORIDE SERPL-SCNC: 102 MMOL/L — SIGNIFICANT CHANGE UP (ref 96–108)
CHLORIDE SERPL-SCNC: 98 MMOL/L — SIGNIFICANT CHANGE UP (ref 96–108)
CHLORIDE SERPL-SCNC: 99 MMOL/L — SIGNIFICANT CHANGE UP (ref 96–108)
CHLORIDE SERPL-SCNC: 99 MMOL/L — SIGNIFICANT CHANGE UP (ref 96–108)
CO2 SERPL-SCNC: 15 MMOL/L — LOW (ref 22–31)
CO2 SERPL-SCNC: 17 MMOL/L — LOW (ref 22–31)
CO2 SERPL-SCNC: 19 MMOL/L — LOW (ref 22–31)
CO2 SERPL-SCNC: 21 MMOL/L — LOW (ref 22–31)
CREAT ?TM UR-MCNC: 74 MG/DL — SIGNIFICANT CHANGE UP
CREAT SERPL-MCNC: 4.56 MG/DL — HIGH (ref 0.5–1.3)
CREAT SERPL-MCNC: 5.75 MG/DL — HIGH (ref 0.5–1.3)
CREAT SERPL-MCNC: 5.8 MG/DL — HIGH (ref 0.5–1.3)
CREAT SERPL-MCNC: 5.82 MG/DL — HIGH (ref 0.5–1.3)
EGFR: 11 ML/MIN/1.73M2 — LOW
EGFR: 8 ML/MIN/1.73M2 — LOW
EOSINOPHIL # BLD AUTO: 0 K/UL — SIGNIFICANT CHANGE UP (ref 0–0.5)
EOSINOPHIL NFR BLD AUTO: 0 % — SIGNIFICANT CHANGE UP (ref 0–6)
ESTIMATED AVERAGE GLUCOSE: 214 MG/DL — HIGH (ref 68–114)
GLUCOSE BLDC GLUCOMTR-MCNC: 113 MG/DL — HIGH (ref 70–99)
GLUCOSE BLDC GLUCOMTR-MCNC: 170 MG/DL — HIGH (ref 70–99)
GLUCOSE BLDC GLUCOMTR-MCNC: 209 MG/DL — HIGH (ref 70–99)
GLUCOSE SERPL-MCNC: 115 MG/DL — HIGH (ref 70–99)
GLUCOSE SERPL-MCNC: 159 MG/DL — HIGH (ref 70–99)
GLUCOSE SERPL-MCNC: 211 MG/DL — HIGH (ref 70–99)
GLUCOSE SERPL-MCNC: 368 MG/DL — HIGH (ref 70–99)
HCT VFR BLD CALC: 27.7 % — LOW (ref 39–50)
HGB BLD-MCNC: 9.6 G/DL — LOW (ref 13–17)
IMM GRANULOCYTES NFR BLD AUTO: 0.5 % — SIGNIFICANT CHANGE UP (ref 0–0.9)
LACTATE SERPL-SCNC: 2 MMOL/L — SIGNIFICANT CHANGE UP (ref 0.5–2)
LYMPHOCYTES # BLD AUTO: 0.28 K/UL — LOW (ref 1–3.3)
LYMPHOCYTES # BLD AUTO: 3.4 % — LOW (ref 13–44)
MAGNESIUM SERPL-MCNC: 1.6 MG/DL — SIGNIFICANT CHANGE UP (ref 1.6–2.6)
MCHC RBC-ENTMCNC: 31.6 PG — SIGNIFICANT CHANGE UP (ref 27–34)
MCHC RBC-ENTMCNC: 34.7 GM/DL — SIGNIFICANT CHANGE UP (ref 32–36)
MCV RBC AUTO: 91.1 FL — SIGNIFICANT CHANGE UP (ref 80–100)
MONOCYTES # BLD AUTO: 0.61 K/UL — SIGNIFICANT CHANGE UP (ref 0–0.9)
MONOCYTES NFR BLD AUTO: 7.4 % — SIGNIFICANT CHANGE UP (ref 2–14)
NEUTROPHILS # BLD AUTO: 7.28 K/UL — SIGNIFICANT CHANGE UP (ref 1.8–7.4)
NEUTROPHILS NFR BLD AUTO: 88.6 % — HIGH (ref 43–77)
NRBC # BLD: 0 /100 WBCS — SIGNIFICANT CHANGE UP (ref 0–0)
OSMOLALITY UR: 313 MOSM/KG — SIGNIFICANT CHANGE UP (ref 300–900)
PHOSPHATE SERPL-MCNC: 3.7 MG/DL — SIGNIFICANT CHANGE UP (ref 2.5–4.5)
PLATELET # BLD AUTO: 188 K/UL — SIGNIFICANT CHANGE UP (ref 150–400)
POTASSIUM SERPL-MCNC: 5.4 MMOL/L — HIGH (ref 3.5–5.3)
POTASSIUM SERPL-MCNC: 5.6 MMOL/L — HIGH (ref 3.5–5.3)
POTASSIUM SERPL-MCNC: 5.6 MMOL/L — HIGH (ref 3.5–5.3)
POTASSIUM SERPL-MCNC: 5.7 MMOL/L — HIGH (ref 3.5–5.3)
POTASSIUM SERPL-SCNC: 5.4 MMOL/L — HIGH (ref 3.5–5.3)
POTASSIUM SERPL-SCNC: 5.6 MMOL/L — HIGH (ref 3.5–5.3)
POTASSIUM SERPL-SCNC: 5.6 MMOL/L — HIGH (ref 3.5–5.3)
POTASSIUM SERPL-SCNC: 5.7 MMOL/L — HIGH (ref 3.5–5.3)
POTASSIUM UR-SCNC: 42 MMOL/L — SIGNIFICANT CHANGE UP
RBC # BLD: 3.04 M/UL — LOW (ref 4.2–5.8)
RBC # FLD: 15 % — HIGH (ref 10.3–14.5)
SODIUM SERPL-SCNC: 130 MMOL/L — LOW (ref 135–145)
SODIUM SERPL-SCNC: 130 MMOL/L — LOW (ref 135–145)
SODIUM SERPL-SCNC: 131 MMOL/L — LOW (ref 135–145)
SODIUM SERPL-SCNC: 135 MMOL/L — SIGNIFICANT CHANGE UP (ref 135–145)
SODIUM UR-SCNC: 39 MMOL/L — SIGNIFICANT CHANGE UP
UUN UR-MCNC: 361 MG/DL — SIGNIFICANT CHANGE UP
WBC # BLD: 8.22 K/UL — SIGNIFICANT CHANGE UP (ref 3.8–10.5)
WBC # FLD AUTO: 8.22 K/UL — SIGNIFICANT CHANGE UP (ref 3.8–10.5)

## 2024-03-16 PROCEDURE — 99233 SBSQ HOSP IP/OBS HIGH 50: CPT | Mod: GC

## 2024-03-16 RX ORDER — FINASTERIDE 5 MG/1
5 TABLET, FILM COATED ORAL DAILY
Refills: 0 | Status: DISCONTINUED | OUTPATIENT
Start: 2024-03-16 | End: 2024-03-21

## 2024-03-16 RX ORDER — SODIUM ZIRCONIUM CYCLOSILICATE 10 G/10G
10 POWDER, FOR SUSPENSION ORAL ONCE
Refills: 0 | Status: DISCONTINUED | OUTPATIENT
Start: 2024-03-16 | End: 2024-03-16

## 2024-03-16 RX ORDER — INSULIN HUMAN 100 [IU]/ML
5 INJECTION, SOLUTION SUBCUTANEOUS ONCE
Refills: 0 | Status: COMPLETED | OUTPATIENT
Start: 2024-03-16 | End: 2024-03-17

## 2024-03-16 RX ORDER — METFORMIN HYDROCHLORIDE 850 MG/1
1 TABLET ORAL
Refills: 0 | DISCHARGE

## 2024-03-16 RX ORDER — SODIUM CHLORIDE 9 MG/ML
250 INJECTION INTRAMUSCULAR; INTRAVENOUS; SUBCUTANEOUS ONCE
Refills: 0 | Status: COMPLETED | OUTPATIENT
Start: 2024-03-16 | End: 2024-03-16

## 2024-03-16 RX ORDER — DEXTROSE 50 % IN WATER 50 %
50 SYRINGE (ML) INTRAVENOUS ONCE
Refills: 0 | Status: COMPLETED | OUTPATIENT
Start: 2024-03-16 | End: 2024-03-16

## 2024-03-16 RX ORDER — INSULIN LISPRO 100/ML
6 VIAL (ML) SUBCUTANEOUS ONCE
Refills: 0 | Status: COMPLETED | OUTPATIENT
Start: 2024-03-16 | End: 2024-03-16

## 2024-03-16 RX ORDER — SODIUM ZIRCONIUM CYCLOSILICATE 10 G/10G
10 POWDER, FOR SUSPENSION ORAL EVERY 8 HOURS
Refills: 0 | Status: COMPLETED | OUTPATIENT
Start: 2024-03-16 | End: 2024-03-19

## 2024-03-16 RX ORDER — SITAGLIPTIN 50 MG/1
1 TABLET, FILM COATED ORAL
Refills: 0 | DISCHARGE

## 2024-03-16 RX ORDER — SODIUM CHLORIDE 9 MG/ML
1000 INJECTION, SOLUTION INTRAVENOUS
Refills: 0 | Status: DISCONTINUED | OUTPATIENT
Start: 2024-03-16 | End: 2024-03-17

## 2024-03-16 RX ORDER — INSULIN HUMAN 100 [IU]/ML
5 INJECTION, SOLUTION SUBCUTANEOUS ONCE
Refills: 0 | Status: COMPLETED | OUTPATIENT
Start: 2024-03-16 | End: 2024-03-16

## 2024-03-16 RX ORDER — POLYETHYLENE GLYCOL 3350 17 G/17G
17 POWDER, FOR SOLUTION ORAL DAILY
Refills: 0 | Status: DISCONTINUED | OUTPATIENT
Start: 2024-03-16 | End: 2024-03-21

## 2024-03-16 RX ORDER — DEXTROSE 50 % IN WATER 50 %
25 SYRINGE (ML) INTRAVENOUS ONCE
Refills: 0 | Status: COMPLETED | OUTPATIENT
Start: 2024-03-16 | End: 2024-03-16

## 2024-03-16 RX ORDER — LANOLIN ALCOHOL/MO/W.PET/CERES
3 CREAM (GRAM) TOPICAL AT BEDTIME
Refills: 0 | Status: DISCONTINUED | OUTPATIENT
Start: 2024-03-16 | End: 2024-03-21

## 2024-03-16 RX ORDER — SODIUM CHLORIDE 9 MG/ML
1000 INJECTION INTRAMUSCULAR; INTRAVENOUS; SUBCUTANEOUS ONCE
Refills: 0 | Status: COMPLETED | OUTPATIENT
Start: 2024-03-16 | End: 2024-03-16

## 2024-03-16 RX ORDER — WATER FOR INHALATION
1000 VIAL, NEBULIZER (ML) INHALATION
Refills: 0 | Status: DISCONTINUED | OUTPATIENT
Start: 2024-03-16 | End: 2024-03-17

## 2024-03-16 RX ORDER — TAMSULOSIN HYDROCHLORIDE 0.4 MG/1
0.8 CAPSULE ORAL AT BEDTIME
Refills: 0 | Status: DISCONTINUED | OUTPATIENT
Start: 2024-03-16 | End: 2024-03-21

## 2024-03-16 RX ORDER — SODIUM CHLORIDE 9 MG/ML
1000 INJECTION INTRAMUSCULAR; INTRAVENOUS; SUBCUTANEOUS
Refills: 0 | Status: DISCONTINUED | OUTPATIENT
Start: 2024-03-16 | End: 2024-03-16

## 2024-03-16 RX ORDER — ATORVASTATIN CALCIUM 80 MG/1
40 TABLET, FILM COATED ORAL AT BEDTIME
Refills: 0 | Status: DISCONTINUED | OUTPATIENT
Start: 2024-03-16 | End: 2024-03-21

## 2024-03-16 RX ORDER — SENNA PLUS 8.6 MG/1
2 TABLET ORAL AT BEDTIME
Refills: 0 | Status: DISCONTINUED | OUTPATIENT
Start: 2024-03-16 | End: 2024-03-21

## 2024-03-16 RX ORDER — SODIUM ZIRCONIUM CYCLOSILICATE 10 G/10G
10 POWDER, FOR SUSPENSION ORAL ONCE
Refills: 0 | Status: COMPLETED | OUTPATIENT
Start: 2024-03-16 | End: 2024-03-16

## 2024-03-16 RX ORDER — CALCIUM GLUCONATE 100 MG/ML
2 VIAL (ML) INTRAVENOUS ONCE
Refills: 0 | Status: COMPLETED | OUTPATIENT
Start: 2024-03-16 | End: 2024-03-16

## 2024-03-16 RX ADMIN — HEPARIN SODIUM 5000 UNIT(S): 5000 INJECTION INTRAVENOUS; SUBCUTANEOUS at 17:40

## 2024-03-16 RX ADMIN — PIPERACILLIN AND TAZOBACTAM 25 GRAM(S): 4; .5 INJECTION, POWDER, LYOPHILIZED, FOR SOLUTION INTRAVENOUS at 07:20

## 2024-03-16 RX ADMIN — Medication 2: at 07:20

## 2024-03-16 RX ADMIN — FINASTERIDE 5 MILLIGRAM(S): 5 TABLET, FILM COATED ORAL at 11:46

## 2024-03-16 RX ADMIN — PIPERACILLIN AND TAZOBACTAM 25 GRAM(S): 4; .5 INJECTION, POWDER, LYOPHILIZED, FOR SOLUTION INTRAVENOUS at 17:40

## 2024-03-16 RX ADMIN — HEPARIN SODIUM 5000 UNIT(S): 5000 INJECTION INTRAVENOUS; SUBCUTANEOUS at 07:20

## 2024-03-16 RX ADMIN — Medication 50 MILLILITER(S): at 18:32

## 2024-03-16 RX ADMIN — INSULIN HUMAN 5 UNIT(S): 100 INJECTION, SOLUTION SUBCUTANEOUS at 09:49

## 2024-03-16 RX ADMIN — SODIUM CHLORIDE 500 MILLILITER(S): 9 INJECTION INTRAMUSCULAR; INTRAVENOUS; SUBCUTANEOUS at 11:46

## 2024-03-16 RX ADMIN — Medication 25 MILLILITER(S): at 09:49

## 2024-03-16 RX ADMIN — SODIUM CHLORIDE 1000 MILLILITER(S): 9 INJECTION INTRAMUSCULAR; INTRAVENOUS; SUBCUTANEOUS at 14:56

## 2024-03-16 RX ADMIN — TAMSULOSIN HYDROCHLORIDE 0.8 MILLIGRAM(S): 0.4 CAPSULE ORAL at 21:51

## 2024-03-16 RX ADMIN — Medication 3 MILLIGRAM(S): at 00:57

## 2024-03-16 RX ADMIN — ATORVASTATIN CALCIUM 40 MILLIGRAM(S): 80 TABLET, FILM COATED ORAL at 21:50

## 2024-03-16 RX ADMIN — Medication 200 GRAM(S): at 23:57

## 2024-03-16 RX ADMIN — INSULIN HUMAN 5 UNIT(S): 100 INJECTION, SOLUTION SUBCUTANEOUS at 18:32

## 2024-03-16 RX ADMIN — Medication 6 UNIT(S): at 00:50

## 2024-03-16 RX ADMIN — SODIUM CHLORIDE 100 MILLILITER(S): 9 INJECTION, SOLUTION INTRAVENOUS at 18:10

## 2024-03-16 RX ADMIN — SODIUM ZIRCONIUM CYCLOSILICATE 10 GRAM(S): 10 POWDER, FOR SUSPENSION ORAL at 00:57

## 2024-03-16 RX ADMIN — Medication 25 MILLILITER(S): at 23:10

## 2024-03-16 NOTE — PROGRESS NOTE ADULT - SUBJECTIVE AND OBJECTIVE BOX
HPI:  89 year old male with history of DM, BPH (follows with Dr Diane) s/p Wade, recurrent UTIs, presenting with hematuria/urinary retention for the past 24 hours. Per son at bedside, the patient was at [facility] rehab yesterday with minimal urine output. A bladder scan was done and he was found to have 400cc in his bladder. A new Wade was placed at the rehab, however the son noted that the urine output did not improve. Hematuria was also noted in the urine bag. Patient stated he had some dysuria and urgency but not other urinary symptoms. No known fevers, chills, vomiting, ab pain. Patient was started on cipro yesterday empirically at the rehab center. In the ED, wade was replaced. Of note patient was recently admitted for similar circumstances on 2/21.    On admission cre 5.25->4.94->4.56->5.75->5.82                          9.6    8.22  )-----------( 188      ( 16 Mar 2024 07:37 )             27.7     03-16    135  |  102  |  77<H>  ----------------------------<  115<H>  5.6<H>   |  19<L>  |  5.82<H>    Ca    8.7      16 Mar 2024 17:18  Phos  3.7     03-16  Mg     1.6     03-16      Culture - Blood (collected 15 Mar 2024 22:13)  Source: .Blood Blood-Venous  Preliminary Report (16 Mar 2024 12:00):    No growth at 12 hours    Culture - Blood (collected 15 Mar 2024 22:01)  Source: .Blood Blood-Venous  Preliminary Report (16 Mar 2024 12:00):    No growth at 12 hours    Culture - Urine (collected 15 Mar 2024 17:49)  Source: Catheterized None  Preliminary Report (16 Mar 2024 09:08):    Culture in progress    Urinalysis with Rflx Culture (collected 15 Mar 2024 17:49)    CAPILLARY BLOOD GLUCOSE      POCT Blood Glucose.: 113 mg/dL (16 Mar 2024 16:56)  POCT Blood Glucose.: 170 mg/dL (16 Mar 2024 07:18)  POCT Blood Glucose.: 335 mg/dL (15 Mar 2024 22:44)

## 2024-03-16 NOTE — PROGRESS NOTE ADULT - PROBLEM SELECTOR PLAN 6
Home medications: finasteride 5mg; Tamsulosin 0.8mg   - c/w home medications P/w urinary retention and +UA. Pt has hx of recurrent UTIs. s/p ceftriazone in in ED  - c/w zosyn  - f/u urine cx  - f/u blood cx.

## 2024-03-16 NOTE — PROGRESS NOTE ADULT - PROBLEM SELECTOR PLAN 2
Patient with hyperkalemia to 6.4 on arrival to the ED. Likely iso post-obstructive JULIO CESAR, given hyperkalemia cocktail + lokelma 10mg q8h x 2. Repeat K downtrending, no EKG changes noted.   - f/u BMP q4-6h until K normalizes  - s/p additional 5units insulin/dextrose 3/16 iso K of 5.6 (only responded to 10 lokelma by 1 unit)  - f/u repeat BMP at 4:00 3/16  - monitor UOP; 25cc/hr between 6am-1pm 03/16  - caution with insulin/dextrose cocktail given poor renal function

## 2024-03-16 NOTE — PROGRESS NOTE ADULT - PROBLEM SELECTOR PLAN 4
P/w urinary retention and +UA. Pt has hx of recurrent UTIs. s/p ceftriazone in in ED  - c/w zosyn  - f/u urine cx  - f/u blood cx. Patient w/ chronic wade iso urinary retention/obstruction, presented from nursing home with obstructed wade. Wade replaced in the ED by urology with drainage of 400cc of purulent urine.   - UOP 25cc/hr between 6am-1pm 03/16      - s/p 250cc NS      - started maintenance fluids NS @ 60cc/hr   - treat UTI as below  - bladder scan on 3/16 1pm 32 (no evidence of retention while w/ new wade)    -outpatient urology follow up Patient with hyperkalemia to 6.4 on arrival to the ED. Likely iso post-obstructive JULIO CESAR, given hyperkalemia cocktail + lokelma 10mg q8h x 2. Repeat K downtrending, no EKG changes noted.   - f/u BMP q4-6h until K normalizes  - s/p additional 5units insulin/dextrose 3/16 iso K of 5.6 (only responded to 10 lokelma by 1 unit)  - f/u repeat BMP at 4:00 3/16  - monitor UOP; 25cc/hr between 6am-1pm 03/16  - caution with insulin/dextrose cocktail given poor renal function

## 2024-03-16 NOTE — PROGRESS NOTE ADULT - ASSESSMENT
Patient is as 89 y/o M (baseline A&Ox3), DM, recurrent UTIs (recently in cipro) and urinary retention, presenting from [facility] for urinary retention/hematuria found to have UTI and hyperkalemia.    P89 y/o M w/ PMH of DM, BPH s/p chronic Wade, recurrent UTIs, presenting from [facility] rehab due to urinary retention x 1d, admitted for post-obstructive JULIO CESAR and UTI iso retention. At [facility] 03/14, noted to have minimal UOP, bladder scan showing 400cc, new wade placed with subsequent hematuria noted to urine bag with good output  then 03/15 noted to have minimal urine output. s/p new wade placed by urology [facility]. Draining well, uop ~33cc/hr. Admitted to [facility] iso hyperkalemia to 6.4, s/p hyperK cocktail. Patient's hyperkalemia is coming down appropriately, and he is medically stable for transfer to [facility].

## 2024-03-16 NOTE — PROVIDER CONTACT NOTE (OTHER) - SITUATION
PT transferred  from 7 lach. Stage I pressure injury present upon arrival on 4 uris. MD Brunner made aware.

## 2024-03-16 NOTE — PROGRESS NOTE ADULT - ASSESSMENT
P89 y/o M w/ PMH of DM, BPH s/p chronic Wade, recurrent UTIs, presenting from [facility] rehab due to urinary retention x 1d, admitted for post-obstructive JULIO CESAR and UTI iso retention. At [facility] 03/14, noted to have minimal UOP, bladder scan showing 400cc, new wade placed with subsequent hematuria noted to urine bag with good output  then 03/15 noted to have minimal urine output. s/p new wade placed by urology [facility]. Draining well, uop ~33cc/hr. Admitted to [facility] iso hyperkalemia to 6.4, s/p hyperK cocktail. Patient's hyperkalemia is coming down appropriately, and he is medically stable for transfer to [facility].

## 2024-03-16 NOTE — PROGRESS NOTE ADULT - PROBLEM SELECTOR PLAN 1
On admission met 3/4 SIRS criteria with T 100.5/<96.8, , RR > 20, WBC 12.42.  Likely source UTI due to urinary tract obstruction.    - Lactate 3.0  - c/w zosyn  - f/u blood cxs, UA/Ucx  - s/p 2L  IVF in ED RESOLVED. On admission met 3/4 SIRS criteria with T 100.5/<96.8, , RR > 20, WBC 12.42.  Likely source UTI due to urinary tract obstruction.    - Lactate 3.0  - c/w zosyn  - f/u blood cxs, UA/Ucx  - s/p 2L  IVF in ED  - continue to monitor fever curve RESOLVED. On admission met 3/4 SIRS criteria with T 100.5/<96.8, , RR > 20, WBC 12.42.  Likely source UTI due to urinary tract obstruction.    - Lactate 3.0 --> 2.0  - c/w zosyn  - f/u blood cxs, UA/Ucx  - s/p 2L  IVF in ED  - continue to monitor fever curve

## 2024-03-16 NOTE — PROGRESS NOTE ADULT - PROBLEM SELECTOR PLAN 3
Patient w/ chronic wade iso urinary retention/obstruction, presented from nursing home with obstructed wade. Wade replaced in the ED by urology with drainage of 400cc of purulent urine.   - UOP 25cc/hr between 6am-1pm 03/16      - s/p 250cc NS      - started maintenance fluids NS @ 60cc/hr   - treat UTI as below  - bladder scan on 3/16 1pm 32 (no evidence of retention while w/ new wade)    -outpatient urology follow up

## 2024-03-16 NOTE — PROGRESS NOTE ADULT - PROBLEM SELECTOR PLAN 1
RESOLVED. On admission met 3/4 SIRS criteria with T 100.5/<96.8, , RR > 20, WBC 12.42.  Likely source UTI due to urinary tract obstruction.    - Lactate 3.0 --> 2.0  - c/w zosyn  - f/u blood cxs, UA/Ucx  - s/p 2L  IVF in ED  - continue to monitor fever curve

## 2024-03-16 NOTE — PROGRESS NOTE ADULT - PROBLEM SELECTOR PLAN 9
F: s/p 2 L   E: replete to K>4, Mg>2, Phos>2.5  N: dash  Ppx: heparin subq  Code status: DNR DNI    Dispo: Tele F: s/p 2 L   E: replete to K>4, Mg>2, Phos>2.5  N: dash  Ppx: heparin subq  Code status: DNR DNI    Dispo: RM[facility] Home medication(s): lipitor 40mg qhs  - continue home medication

## 2024-03-16 NOTE — PROGRESS NOTE ADULT - PROBLEM SELECTOR PLAN 10
F: s/p 2 L   E: replete to K>4, Mg>2, Phos>2.5  N: dash  Ppx: heparin subq  Code status: DNR DNI    Dispo: RMF

## 2024-03-16 NOTE — PROGRESS NOTE ADULT - PROBLEM SELECTOR PROBLEM 5
Called pt to discuss  States she believes last time she went to the pharmacy, they told her that her previous ocp  was no longer available and switched her to different  so thinks that may be the cause of her symptoms and really doesn't want to be on it anymore   She feels that the best option for her is the mirena--has had this in the past and her mom and her have discussed it as well and thinks this is best   She is wondering if she still needs an appointment to discuss this or if can just make an appt to have this placed,   Please advise    JULIO CESAR (acute kidney injury)

## 2024-03-16 NOTE — PROGRESS NOTE ADULT - ASSESSMENT
89 yo man with DM readmitted with obstructive BPH, UTI, and JULIO CESAR  -metabolic encephalopathy  -UTI on IV abx  -Diabetes on SSRI with improved glycemic control since admission  -JULIO CESAR - renal following - electrolytes K=5.6  -DVT prophylaxis  -need to discuss DNR status - I will confirm with family  -case d/w team

## 2024-03-16 NOTE — PROGRESS NOTE ADULT - PROBLEM SELECTOR PLAN 4
P/w urinary retention and +UA. Pt has hx of recurrent UTIs. s/p ceftriazone in in ED  - c/w zosyn  - f/u urine cx  - f/u blood cx.

## 2024-03-16 NOTE — PROGRESS NOTE ADULT - PROBLEM SELECTOR PLAN 3
Patient w/ chronic wade iso urinary retention/obstruction, presented from nursing home with obstructed wade. Wade replaced in the ED by urology with drainage of 400cc of purulent urine.   - monitor urine output, strict I/Os  - treat UTI as below Patient w/ chronic wade iso urinary retention/obstruction, presented from nursing home with obstructed wade. Wade replaced in the ED by urology with drainage of 400cc of purulent urine.   - UOP 25cc/hr between 6am-1pm 03/16      - s/p 250cc NS      - started maintenance fluids NS @ 60cc/hr   - treat UTI as below  - bladder scan on 3/16 1pm 32 (no evidence of retention while w/ new wade)    -outpatient urology follow up Patient with hyperkalemia to 6.4 on arrival to the ED. Likely iso post-obstructive JULIO CESAR, given hyperkalemia cocktail + lokelma 10mg q8h x 2. Repeat K downtrending, no EKG changes noted.   - f/u BMP q4-6h until K normalizes  - s/p additional 5units insulin/dextrose 3/16 iso K of 5.6 (only responded to 10 lokelma by 1 unit)  - f/u repeat BMP at 4:00 3/16  - monitor UOP; 25cc/hr between 6am-1pm 03/16  - caution with insulin/dextrose cocktail given poor renal function Likely in the setting of UTI and uremia. Per patient's son and daughter, patient is very sharp, A&Ox3 at baseline.     -treat UTI as below  -treat JULIO CESAR as above  -continue to monitor mental status

## 2024-03-16 NOTE — PROGRESS NOTE ADULT - SUBJECTIVE AND OBJECTIVE BOX
**INCOMPLETE NOTE    OVERNIGHT EVENTS:    SUBJECTIVE:  Patient seen and examined at bedside.    Vital Signs Last 12 Hrs  T(F): 97.4 (03-16-24 @ 05:42), Max: 100.2 (03-15-24 @ 20:04)  HR: 76 (03-16-24 @ 04:30) (76 - 98)  BP: 95/52 (03-16-24 @ 04:30) (95/52 - 108/63)  BP(mean): 67 (03-16-24 @ 04:30) (67 - 81)  RR: 18 (03-16-24 @ 04:30) (18 - 18)  SpO2: 96% (03-16-24 @ 04:30) (93% - 96%)  I&O's Summary    15 Mar 2024 07:01  -  16 Mar 2024 07:00  --------------------------------------------------------  IN: 0 mL / OUT: 750 mL / NET: -750 mL        PHYSICAL EXAM:  Constitutional: NAD, comfortable in bed.  HEENT: NC/AT, PERRLA, EOMI, no conjunctival pallor or scleral icterus, MMM  Neck: Supple, no JVD  Respiratory: CTA B/L. No w/r/r.   Cardiovascular: RRR, normal S1 and S2, no m/r/g.   Gastrointestinal: +BS, soft NTND, no guarding or rebound tenderness, no palpable masses   Extremities: wwp; no cyanosis, clubbing or edema.   Vascular: Pulses equal and strong throughout.   Neurological: AAOx3, no CN deficits, strength and sensation intact throughout.   Skin: No gross skin abnormalities or rashes        LABS:                        12.0   12.42 )-----------( 259      ( 15 Mar 2024 15:06 )             35.6     03-15    130<L>  |  98  |  71<H>  ----------------------------<  368<H>  5.7<H>   |  15<L>  |  4.56<H>    Ca    8.8      15 Mar 2024 23:50        Urinalysis Basic - ( 15 Mar 2024 23:50 )    Color: x / Appearance: x / SG: x / pH: x  Gluc: 368 mg/dL / Ketone: x  / Bili: x / Urobili: x   Blood: x / Protein: x / Nitrite: x   Leuk Esterase: x / RBC: x / WBC x   Sq Epi: x / Non Sq Epi: x / Bacteria: x          RADIOLOGY & ADDITIONAL TESTS:    MEDICATIONS  (STANDING):  atorvastatin 40 milliGRAM(s) Oral at bedtime  dextrose 5%. 1000 milliLiter(s) (100 mL/Hr) IV Continuous <Continuous>  dextrose 5%. 1000 milliLiter(s) (50 mL/Hr) IV Continuous <Continuous>  dextrose 50% Injectable 25 Gram(s) IV Push once  dextrose 50% Injectable 12.5 Gram(s) IV Push once  dextrose 50% Injectable 25 Gram(s) IV Push once  finasteride 5 milliGRAM(s) Oral daily  glucagon  Injectable 1 milliGRAM(s) IntraMuscular once  heparin   Injectable 5000 Unit(s) SubCutaneous every 12 hours  insulin lispro (ADMELOG) corrective regimen sliding scale   SubCutaneous three times a day before meals  insulin lispro (ADMELOG) corrective regimen sliding scale   SubCutaneous at bedtime  melatonin 3 milliGRAM(s) Oral at bedtime  piperacillin/tazobactam IVPB.. 4.5 Gram(s) IV Intermittent every 12 hours  tamsulosin 0.8 milliGRAM(s) Oral at bedtime    MEDICATIONS  (PRN):  acetaminophen     Tablet .. 650 milliGRAM(s) Oral every 6 hours PRN Temp greater or equal to 38C (100.4F), Mild Pain (1 - 3)  dextrose Oral Gel 15 Gram(s) Oral once PRN Blood Glucose LESS THAN 70 milliGRAM(s)/deciliter   ****************** TRANSFER NOTE [facility] TO [facility] ******************    HOSPITAL COURSE:        ----------------------------------------    OVERNIGHT EVENTS:    SUBJECTIVE:  Patient seen and examined at bedside.    Vital Signs Last 12 Hrs  T(F): 97.4 (03-16-24 @ 05:42), Max: 100.2 (03-15-24 @ 20:04)  HR: 76 (03-16-24 @ 04:30) (76 - 98)  BP: 95/52 (03-16-24 @ 04:30) (95/52 - 108/63)  BP(mean): 67 (03-16-24 @ 04:30) (67 - 81)  RR: 18 (03-16-24 @ 04:30) (18 - 18)  SpO2: 96% (03-16-24 @ 04:30) (93% - 96%)  I&O's Summary    15 Mar 2024 07:01  -  16 Mar 2024 07:00  --------------------------------------------------------  IN: 0 mL / OUT: 750 mL / NET: -750 mL        PHYSICAL EXAM:  Constitutional: NAD, comfortable in bed.  HEENT: NC/AT, PERRLA, EOMI, no conjunctival pallor or scleral icterus, MMM  Neck: Supple, no JVD  Respiratory: CTA B/L. No w/r/r.   Cardiovascular: RRR, normal S1 and S2, no m/r/g.   Gastrointestinal: +BS, soft NTND, no guarding or rebound tenderness, no palpable masses   Extremities: wwp; no cyanosis, clubbing or edema.   Vascular: Pulses equal and strong throughout.   Neurological: AAOx3, no CN deficits, strength and sensation intact throughout.   Skin: No gross skin abnormalities or rashes        LABS:                        12.0   12.42 )-----------( 259      ( 15 Mar 2024 15:06 )             35.6     03-15    130<L>  |  98  |  71<H>  ----------------------------<  368<H>  5.7<H>   |  15<L>  |  4.56<H>    Ca    8.8      15 Mar 2024 23:50        Urinalysis Basic - ( 15 Mar 2024 23:50 )    Color: x / Appearance: x / SG: x / pH: x  Gluc: 368 mg/dL / Ketone: x  / Bili: x / Urobili: x   Blood: x / Protein: x / Nitrite: x   Leuk Esterase: x / RBC: x / WBC x   Sq Epi: x / Non Sq Epi: x / Bacteria: x          RADIOLOGY & ADDITIONAL TESTS:    MEDICATIONS  (STANDING):  atorvastatin 40 milliGRAM(s) Oral at bedtime  dextrose 5%. 1000 milliLiter(s) (100 mL/Hr) IV Continuous <Continuous>  dextrose 5%. 1000 milliLiter(s) (50 mL/Hr) IV Continuous <Continuous>  dextrose 50% Injectable 25 Gram(s) IV Push once  dextrose 50% Injectable 12.5 Gram(s) IV Push once  dextrose 50% Injectable 25 Gram(s) IV Push once  finasteride 5 milliGRAM(s) Oral daily  glucagon  Injectable 1 milliGRAM(s) IntraMuscular once  heparin   Injectable 5000 Unit(s) SubCutaneous every 12 hours  insulin lispro (ADMELOG) corrective regimen sliding scale   SubCutaneous three times a day before meals  insulin lispro (ADMELOG) corrective regimen sliding scale   SubCutaneous at bedtime  melatonin 3 milliGRAM(s) Oral at bedtime  piperacillin/tazobactam IVPB.. 4.5 Gram(s) IV Intermittent every 12 hours  tamsulosin 0.8 milliGRAM(s) Oral at bedtime    MEDICATIONS  (PRN):  acetaminophen     Tablet .. 650 milliGRAM(s) Oral every 6 hours PRN Temp greater or equal to 38C (100.4F), Mild Pain (1 - 3)  dextrose Oral Gel 15 Gram(s) Oral once PRN Blood Glucose LESS THAN 70 milliGRAM(s)/deciliter   ****************** TRANSFER NOTE [facility] TO [facility] ******************    HOSPITAL COURSE:    89 y/o M w/ PMH of DM, BPH s/p chronic Wade, recurrent UTIs, presenting from [facility] rehab due to urinary retention x 1d, admitted for post-obstructive JULIO CESAR and UTI iso retention. At [facility] 03/14, noted to have minimal UOP, bladder scan showing 400cc, new wade placed with subsequent hematuria noted to urine bag with good output  then 03/15 noted to have minimal urine output. s/p new wade placed by urology [facility]. Draining well, uop ~33cc/hr. Admitted to tele iso hyperkalemia to 6.4, s/p hyperK cocktail. Patient's hyperkalemia is coming down appropriately, and he is medically stable for transfer to [facility].  ----------------------------------------    OVERNIGHT EVENTS: admitted to  for hyperK. urology replaced wade in the ED. blood cx, ucx sent. no hx of resitant UTI lactate at 10pm 3. broadened to zosyn given lives at NH and multipole recent abx for UTI. ISS given DM. bedtime FSG >300, ordered one-time 6u (per bedtime coverage). 12am K 5.7, ordered lokelma 10 x 1. melatonin given. lactate 2.    SUBJECTIVE:  Patient seen and examined at bedside, patient sleeping.     Vital Signs Last 12 Hrs  T(F): 97.4 (03-16-24 @ 05:42), Max: 100.2 (03-15-24 @ 20:04)  HR: 76 (03-16-24 @ 04:30) (76 - 98)  BP: 95/52 (03-16-24 @ 04:30) (95/52 - 108/63)  BP(mean): 67 (03-16-24 @ 04:30) (67 - 81)  RR: 18 (03-16-24 @ 04:30) (18 - 18)  SpO2: 96% (03-16-24 @ 04:30) (93% - 96%)  I&O's Summary    15 Mar 2024 07:01  -  16 Mar 2024 07:00  --------------------------------------------------------  IN: 0 mL / OUT: 750 mL / NET: -750 mL        PHYSICAL EXAM:  Gen - NAD, comfortable; A+Ox3   HEENT - NCAT, EOMI, dry mucous membranes, clear oropharynx  Neck - supple  Resp - CTAB, no increased WOB  CV -  RRR, no m/r/g  Abd - soft, NT, mildly distended; no guarding or rebound  MSK - FROM of b/l UE and LE, no gross deformities  Extrem - no LE edema/erythema/tenderness  Neuro - no focal motor or sensation deficits  Skin - warm, well perfused          LABS:                        12.0   12.42 )-----------( 259      ( 15 Mar 2024 15:06 )             35.6     03-15    130<L>  |  98  |  71<H>  ----------------------------<  368<H>  5.7<H>   |  15<L>  |  4.56<H>    Ca    8.8      15 Mar 2024 23:50        Urinalysis Basic - ( 15 Mar 2024 23:50 )    Color: x / Appearance: x / SG: x / pH: x  Gluc: 368 mg/dL / Ketone: x  / Bili: x / Urobili: x   Blood: x / Protein: x / Nitrite: x   Leuk Esterase: x / RBC: x / WBC x   Sq Epi: x / Non Sq Epi: x / Bacteria: x          RADIOLOGY & ADDITIONAL TESTS:    MEDICATIONS  (STANDING):  atorvastatin 40 milliGRAM(s) Oral at bedtime  dextrose 5%. 1000 milliLiter(s) (100 mL/Hr) IV Continuous <Continuous>  dextrose 5%. 1000 milliLiter(s) (50 mL/Hr) IV Continuous <Continuous>  dextrose 50% Injectable 25 Gram(s) IV Push once  dextrose 50% Injectable 12.5 Gram(s) IV Push once  dextrose 50% Injectable 25 Gram(s) IV Push once  finasteride 5 milliGRAM(s) Oral daily  glucagon  Injectable 1 milliGRAM(s) IntraMuscular once  heparin   Injectable 5000 Unit(s) SubCutaneous every 12 hours  insulin lispro (ADMELOG) corrective regimen sliding scale   SubCutaneous three times a day before meals  insulin lispro (ADMELOG) corrective regimen sliding scale   SubCutaneous at bedtime  melatonin 3 milliGRAM(s) Oral at bedtime  piperacillin/tazobactam IVPB.. 4.5 Gram(s) IV Intermittent every 12 hours  tamsulosin 0.8 milliGRAM(s) Oral at bedtime    MEDICATIONS  (PRN):  acetaminophen     Tablet .. 650 milliGRAM(s) Oral every 6 hours PRN Temp greater or equal to 38C (100.4F), Mild Pain (1 - 3)  dextrose Oral Gel 15 Gram(s) Oral once PRN Blood Glucose LESS THAN 70 milliGRAM(s)/deciliter   ****************** TRANSFER NOTE [facility] TO [facility] ******************    HOSPITAL COURSE:    89 y/o M w/ PMH of DM, BPH s/p chronic Wade, recurrent UTIs, presenting from [facility] rehab due to urinary retention x 1d, admitted for post-obstructive JULIO CESAR and sepsis 2/2 UTI iso retention. At [facility] 03/14, noted to have minimal UOP, bladder scan showing 400cc, new wade placed with subsequent hematuria noted to urine bag with good output  then 03/15 noted to have minimal urine output. s/p new wade placed by urology [facility]. Draining well, uop ~33cc/hr. Admitted to [facility] iso hyperkalemia to 6.4, s/p hyperK cocktail. Patient's hyperkalemia is coming down appropriately, and he is medically stable for transfer to [facility].  ----------------------------------------    OVERNIGHT EVENTS: admitted to  for hyperK. urology replaced wade in the ED. blood cx, ucx sent. no hx of resitant UTI lactate at 10pm 3. broadened to zosyn given lives at NH and multipole recent abx for UTI. ISS given DM. bedtime FSG >300, ordered one-time 6u (per bedtime coverage). 12am K 5.7, ordered lokelma 10 x 1. melatonin given. lactate 2.    SUBJECTIVE:  Patient seen and examined at bedside, patient sleeping.     Vital Signs Last 12 Hrs  T(F): 97.4 (03-16-24 @ 05:42), Max: 100.2 (03-15-24 @ 20:04)  HR: 76 (03-16-24 @ 04:30) (76 - 98)  BP: 95/52 (03-16-24 @ 04:30) (95/52 - 108/63)  BP(mean): 67 (03-16-24 @ 04:30) (67 - 81)  RR: 18 (03-16-24 @ 04:30) (18 - 18)  SpO2: 96% (03-16-24 @ 04:30) (93% - 96%)  I&O's Summary    15 Mar 2024 07:01  -  16 Mar 2024 07:00  --------------------------------------------------------  IN: 0 mL / OUT: 750 mL / NET: -750 mL        PHYSICAL EXAM:  Gen - NAD, comfortable; A+Ox3   HEENT - NCAT, EOMI, dry mucous membranes, clear oropharynx  Neck - supple  Resp - CTAB, no increased WOB  CV -  RRR, no m/r/g  Abd - soft, NT, mildly distended; no guarding or rebound  MSK - FROM of b/l UE and LE, no gross deformities  Extrem - no LE edema/erythema/tenderness  Neuro - no focal motor or sensation deficits  Skin - warm, well perfused          LABS:                        12.0   12.42 )-----------( 259      ( 15 Mar 2024 15:06 )             35.6     03-15    130<L>  |  98  |  71<H>  ----------------------------<  368<H>  5.7<H>   |  15<L>  |  4.56<H>    Ca    8.8      15 Mar 2024 23:50        Urinalysis Basic - ( 15 Mar 2024 23:50 )    Color: x / Appearance: x / SG: x / pH: x  Gluc: 368 mg/dL / Ketone: x  / Bili: x / Urobili: x   Blood: x / Protein: x / Nitrite: x   Leuk Esterase: x / RBC: x / WBC x   Sq Epi: x / Non Sq Epi: x / Bacteria: x          RADIOLOGY & ADDITIONAL TESTS:    MEDICATIONS  (STANDING):  atorvastatin 40 milliGRAM(s) Oral at bedtime  dextrose 5%. 1000 milliLiter(s) (100 mL/Hr) IV Continuous <Continuous>  dextrose 5%. 1000 milliLiter(s) (50 mL/Hr) IV Continuous <Continuous>  dextrose 50% Injectable 25 Gram(s) IV Push once  dextrose 50% Injectable 12.5 Gram(s) IV Push once  dextrose 50% Injectable 25 Gram(s) IV Push once  finasteride 5 milliGRAM(s) Oral daily  glucagon  Injectable 1 milliGRAM(s) IntraMuscular once  heparin   Injectable 5000 Unit(s) SubCutaneous every 12 hours  insulin lispro (ADMELOG) corrective regimen sliding scale   SubCutaneous three times a day before meals  insulin lispro (ADMELOG) corrective regimen sliding scale   SubCutaneous at bedtime  melatonin 3 milliGRAM(s) Oral at bedtime  piperacillin/tazobactam IVPB.. 4.5 Gram(s) IV Intermittent every 12 hours  tamsulosin 0.8 milliGRAM(s) Oral at bedtime    MEDICATIONS  (PRN):  acetaminophen     Tablet .. 650 milliGRAM(s) Oral every 6 hours PRN Temp greater or equal to 38C (100.4F), Mild Pain (1 - 3)  dextrose Oral Gel 15 Gram(s) Oral once PRN Blood Glucose LESS THAN 70 milliGRAM(s)/deciliter   ****************** TRANSFER NOTE [facility] TO [facility] ******************    HOSPITAL COURSE:    89 y/o M w/ PMH of DM, BPH s/p chronic Wade, recurrent UTIs, presenting from [facility] rehab due to urinary retention x 1d, admitted for post-obstructive JULIO CESAR and sepsis 2/2 UTI iso retention. At [facility] 03/14, noted to have minimal UOP, bladder scan showing 400cc, new wade placed with subsequent hematuria noted to urine bag with good output  then 03/15 noted to have minimal urine output. s/p new wade placed by urology [facility]. Draining well, uop ~33cc/hr. Admitted to [facility] iso hyperkalemia to 6.4, s/p hyperK cocktail. Patient's hyperkalemia is coming down appropriately, and he is medically stable for transfer to [facility].  ----------------------------------------    OVERNIGHT EVENTS: admitted to  for hyperK. urology replaced wade in the ED. blood cx, ucx sent. no hx of resitant UTI lactate at 10pm 3. broadened to zosyn given lives at NH and multipole recent abx for UTI. ISS given DM. bedtime FSG >300, ordered one-time 6u (per bedtime coverage). 12am K 5.7, ordered lokelma 10 x 1. melatonin given. lactate 2.    SUBJECTIVE:  Patient seen and examined at bedside, patient sleeping.     Vital Signs Last 12 Hrs  T(F): 97.4 (03-16-24 @ 05:42), Max: 100.2 (03-15-24 @ 20:04)  HR: 76 (03-16-24 @ 04:30) (76 - 98)  BP: 95/52 (03-16-24 @ 04:30) (95/52 - 108/63)  BP(mean): 67 (03-16-24 @ 04:30) (67 - 81)  RR: 18 (03-16-24 @ 04:30) (18 - 18)  SpO2: 96% (03-16-24 @ 04:30) (93% - 96%)  I&O's Summary    15 Mar 2024 07:01  -  16 Mar 2024 07:00  --------------------------------------------------------  IN: 0 mL / OUT: 750 mL / NET: -750 mL        PHYSICAL EXAM:  Gen - NAD, comfortable  HEENT - NCAT, EOMI, dry mucous membranes, clear oropharynx  Neck - supple  Resp - CTAB, no increased WOB  CV -  RRR, no m/r/g  Abd - soft, NT, mildly distended; no guarding or rebound  MSK - FROM of b/l UE and LE, no gross deformities  Extrem - no LE edema/erythema/tenderness  Neuro - lethargic; A&O 1-2 (baseline A&Ox3)  Skin - warm, well perfused          LABS:                        12.0   12.42 )-----------( 259      ( 15 Mar 2024 15:06 )             35.6     03-15    130<L>  |  98  |  71<H>  ----------------------------<  368<H>  5.7<H>   |  15<L>  |  4.56<H>    Ca    8.8      15 Mar 2024 23:50        Urinalysis Basic - ( 15 Mar 2024 23:50 )    Color: x / Appearance: x / SG: x / pH: x  Gluc: 368 mg/dL / Ketone: x  / Bili: x / Urobili: x   Blood: x / Protein: x / Nitrite: x   Leuk Esterase: x / RBC: x / WBC x   Sq Epi: x / Non Sq Epi: x / Bacteria: x          RADIOLOGY & ADDITIONAL TESTS:    MEDICATIONS  (STANDING):  atorvastatin 40 milliGRAM(s) Oral at bedtime  dextrose 5%. 1000 milliLiter(s) (100 mL/Hr) IV Continuous <Continuous>  dextrose 5%. 1000 milliLiter(s) (50 mL/Hr) IV Continuous <Continuous>  dextrose 50% Injectable 25 Gram(s) IV Push once  dextrose 50% Injectable 12.5 Gram(s) IV Push once  dextrose 50% Injectable 25 Gram(s) IV Push once  finasteride 5 milliGRAM(s) Oral daily  glucagon  Injectable 1 milliGRAM(s) IntraMuscular once  heparin   Injectable 5000 Unit(s) SubCutaneous every 12 hours  insulin lispro (ADMELOG) corrective regimen sliding scale   SubCutaneous three times a day before meals  insulin lispro (ADMELOG) corrective regimen sliding scale   SubCutaneous at bedtime  melatonin 3 milliGRAM(s) Oral at bedtime  piperacillin/tazobactam IVPB.. 4.5 Gram(s) IV Intermittent every 12 hours  tamsulosin 0.8 milliGRAM(s) Oral at bedtime    MEDICATIONS  (PRN):  acetaminophen     Tablet .. 650 milliGRAM(s) Oral every 6 hours PRN Temp greater or equal to 38C (100.4F), Mild Pain (1 - 3)  dextrose Oral Gel 15 Gram(s) Oral once PRN Blood Glucose LESS THAN 70 milliGRAM(s)/deciliter

## 2024-03-16 NOTE — PROGRESS NOTE ADULT - PROBLEM SELECTOR PLAN 5
Patient noted to have JULIO CESAR with Cr 5.25 over baseline 1.05. found to have BS of 400cc  now s/p wade placement by urology in ED   - s/p 2 L IVF  - trend Cr: uptrending on 03/16 AM from 4.56 --> 5.75  - f/u urine lytes   - avoid nephrotoxic drugs, renally dose meds

## 2024-03-16 NOTE — PROGRESS NOTE ADULT - PROBLEM SELECTOR PLAN 5
Patient noted to have JULIO CESAR with Cr 5.25 over baseline 1.05. found to have BS of 400cc  now s/p wade placement by urology in ED   - s/p 2 L IVF  - f/u urine lytes  - trend Cr  - avoid nephrotoxic drugs, renally dose meds  - will consider obtaining urine lytes if not improving Patient noted to have JULIO CESAR with Cr 5.25 over baseline 1.05. found to have BS of 400cc  now s/p wade placement by urology in ED   - s/p 2 L IVF  - trend Cr: uptrending on 03/16 AM from 4.56 --> 5.75  - f/u urine lytes   - avoid nephrotoxic drugs, renally dose meds P/w urinary retention and +UA. Pt has hx of recurrent UTIs. s/p ceftriazone in in ED  - c/w zosyn  - f/u urine cx  - f/u blood cx. Patient w/ chronic wade iso urinary retention/obstruction, presented from nursing home with obstructed wade. Wade replaced in the ED by urology with drainage of 400cc of purulent urine.   - UOP 25cc/hr between 6am-1pm 03/16      - s/p 250cc NS      - started maintenance fluids NS @ 60cc/hr   - treat UTI as below  - bladder scan on 3/16 1pm 32 (no evidence of retention while w/ new wade)    -outpatient urology follow up

## 2024-03-16 NOTE — PROGRESS NOTE ADULT - SUBJECTIVE AND OBJECTIVE BOX
OVERNIGHT EVENTS:    SUBJECTIVE:  Patient seen and examined at bedside.    Vital Signs Last 12 Hrs  T(F): 99.1 (03-16-24 @ 10:00), Max: 99.1 (03-16-24 @ 10:00)  HR: 66 (03-16-24 @ 12:52) (66 - 78)  BP: 96/55 (03-16-24 @ 12:52) (91/52 - 96/55)  BP(mean): 71 (03-16-24 @ 12:52) (67 - 71)  RR: 14 (03-16-24 @ 12:52) (14 - 18)  SpO2: 94% (03-16-24 @ 12:52) (94% - 96%)  I&O's Summary    15 Mar 2024 07:01  -  16 Mar 2024 07:00  --------------------------------------------------------  IN: 0 mL / OUT: 750 mL / NET: -750 mL    16 Mar 2024 07:01  -  16 Mar 2024 14:19  --------------------------------------------------------  IN: 0 mL / OUT: 175 mL / NET: -175 mL        PHYSICAL EXAM:  Constitutional: NAD, comfortable in bed.  HEENT: NC/AT, PERRLA, EOMI, no conjunctival pallor or scleral icterus, MMM  Neck: Supple, no JVD  Respiratory: CTA B/L. No w/r/r.   Cardiovascular: RRR, normal S1 and S2, no m/r/g.   Gastrointestinal: +BS, soft NTND, no guarding or rebound tenderness, no palpable masses   Extremities: wwp; no cyanosis, clubbing or edema.   Vascular: Pulses equal and strong throughout.   Neurological: AAOx3, no CN deficits, strength and sensation intact throughout.   Skin: No gross skin abnormalities or rashes        LABS:                        9.6    8.22  )-----------( 188      ( 16 Mar 2024 07:37 )             27.7     03-16    131<L>  |  99  |  82<H>  ----------------------------<  159<H>  5.6<H>   |  21<L>  |  5.75<H>    Ca    9.0      16 Mar 2024 07:37  Phos  3.7     03-16  Mg     1.6     03-16        Urinalysis Basic - ( 16 Mar 2024 07:37 )    Color: x / Appearance: x / SG: x / pH: x  Gluc: 159 mg/dL / Ketone: x  / Bili: x / Urobili: x   Blood: x / Protein: x / Nitrite: x   Leuk Esterase: x / RBC: x / WBC x   Sq Epi: x / Non Sq Epi: x / Bacteria: x          RADIOLOGY & ADDITIONAL TESTS:    MEDICATIONS  (STANDING):  atorvastatin 40 milliGRAM(s) Oral at bedtime  dextrose 5%. 1000 milliLiter(s) (50 mL/Hr) IV Continuous <Continuous>  dextrose 5%. 1000 milliLiter(s) (100 mL/Hr) IV Continuous <Continuous>  dextrose 50% Injectable 25 Gram(s) IV Push once  dextrose 50% Injectable 25 Gram(s) IV Push once  dextrose 50% Injectable 12.5 Gram(s) IV Push once  finasteride 5 milliGRAM(s) Oral daily  glucagon  Injectable 1 milliGRAM(s) IntraMuscular once  heparin   Injectable 5000 Unit(s) SubCutaneous every 12 hours  insulin lispro (ADMELOG) corrective regimen sliding scale   SubCutaneous three times a day before meals  insulin lispro (ADMELOG) corrective regimen sliding scale   SubCutaneous at bedtime  melatonin 3 milliGRAM(s) Oral at bedtime  piperacillin/tazobactam IVPB.. 4.5 Gram(s) IV Intermittent every 12 hours  sodium chloride 0.9% Bolus 1000 milliLiter(s) IV Bolus once  tamsulosin 0.8 milliGRAM(s) Oral at bedtime    MEDICATIONS  (PRN):  acetaminophen     Tablet .. 650 milliGRAM(s) Oral every 6 hours PRN Temp greater or equal to 38C (100.4F), Mild Pain (1 - 3)  dextrose Oral Gel 15 Gram(s) Oral once PRN Blood Glucose LESS THAN 70 milliGRAM(s)/deciliter

## 2024-03-16 NOTE — PATIENT PROFILE ADULT - PATIENT REPRESENTATIVE: ( YOU CAN CHOOSE ANY PERSON THAT CAN ASSIST YOU WITH YOUR HEALTH CARE PREFERENCES, DOES NOT HAVE TO BE A SPOUSE, IMMEDIATE FAMILY OR SIGNIFICANT OTHER/PARTNER)
Physical Therapy Discharge    Visit Type: Discharge Summary  Visit: 9  Referring Provider: Abel Santos MD  Medical Diagnosis (from order): Diagnosis Information    Diagnosis  781.2 (ICD-9-CM) - R26.9 (ICD-10-CM) - Abnormal gait  729.5 (ICD-9-CM) - M79.604 (ICD-10-CM) - Pain of right leg  729.5 (ICD-9-CM) - M79.605 (ICD-10-CM) - Pain of left leg  780.79 (ICD-9-CM) - R53.1 (ICD-10-CM) - General weakness         SUBJECTIVE                                                                                                               Pt attends therapy with her sister present.  Pt reports intermittent performance of HEP.  She states overall her back pain is better but still present at times. She feels her balance is better. She plans to continue with her HEP which she requested to review today in detail for proper performance.  Current Functional Limitations: Walks with RW.     Pain / Symptoms  - Pain rating (out of 10): Current: 0       OBJECTIVE                                                                                                                     Strength  (out of 5 unless noted, standard test position unless noted)   Hip:    - Flexion:        • Left: 4-        • Right: 4-    - Abduction:        • Left: 3-        • Right: 3-                    Outcome/Assessments  Outcome Measures:   Lower Extremity Functional Scale: LEFS Calculated Total: 25 (0=extreme difficulty; 80=no difficulty) see flowsheet for additional documentation      Treatment     Therapeutic Exercise  Access Code: [email or web address]  URL: [email or web address]  Date: 07/17/2023  Prepared by: Lauren Patterson    Exercises  - Standing Heel Raise with Support  - 10 reps  - Seated Scapular Retraction  - 1-2 x daily - 10 reps - 2-3 hold  - TL Sidebending Stretch - Single Arm Overhead  - 5 reps - 2 hold  - Sit to Stand with Hands on Knees  - 3 x daily - 5 reps  - Seated Marching with Opposite Shoulder Flexion  - 1-2 x daily - 10  reps - 2-3 hold  - Seated Hip Abduction with Resistance  - 1 x daily - 6 x weekly - 1-2 sets - 10-15 reps - 2 hold  - Standing March with Counter Support  - 1 x daily - 6 x weekly - 1-2 sets - 10-15 reps  - Standing Single Leg Stance with Counter Support  - 1 x daily - 6 x weekly - 1-2 sets - 3-5 reps - 3-5 hold.  All exercises performed for 10 reps with correction and instruction of pt's sister regarding safety/fall prevention    Skilled input: verbal instruction/cues, tactile instruction/cues and posture correction    Home Exercise Program  See above from [employer].      ASSESSMENT                                                                                                          To date the patient has made gains as expected.    Pt has made only very mild gains and she is I with HEP therefore DC to HEP.  Pain/symptoms after session (out of 10): 0  Education:   - Results of above outlined education: Verbalizes understanding and Demonstrates understanding      Goals  Decrease pain/symptoms to 1-3/10/10  Improve involved strength to 4/5 and   Steadily maximize ROM for steadier gait  The above improvements in impairments to assist in obtaining goals listed below  Long Term Goals: to be met by end of plan of care  1. Community ambulation with AD, SBA for safety, with pain 0-2/10, including stairs using SR/s Status: met   2. Patient will stand for 10-15 minutes for ADLs, house-chores Status: partially met variable stand tolerance from day to day  3. Patient will be able to perform functional transfers with safe technique, much easier with progressively decreasing pain. Status: met   4. Patient will be independent with progressed and modified home exercise program, and the prophylaxis to minimize/prevent the recurrence.    Status: met       Therapy procedure time and total treatment time can be found documented on the Time Entry flowsheet     declines

## 2024-03-16 NOTE — PATIENT PROFILE ADULT - FALL HARM RISK - HARM RISK INTERVENTIONS
Assistance with ambulation/Assistance OOB with selected safe patient handling equipment/Communicate Risk of Fall with Harm to all staff/Discuss with provider need for PT consult/Monitor gait and stability/Reinforce activity limits and safety measures with patient and family/Tailored Fall Risk Interventions/Visual Cue: Yellow wristband and red socks/Bed in lowest position, wheels locked, appropriate side rails in place/Call bell, personal items and telephone in reach/Instruct patient to call for assistance before getting out of bed or chair/Non-slip footwear when patient is out of bed/[place] to call system/Physically safe environment - no spills, clutter or unnecessary equipment/Purposeful Proactive Rounding/Room/bathroom lighting operational, light cord in reach

## 2024-03-16 NOTE — PROGRESS NOTE ADULT - PROBLEM SELECTOR PLAN 8
Home medication(s): lipitor 40mg qhs  - continue home medication Home medications: Metformin 1000mg BID; Sitaliptin 100mg   - hold home medications  - ISS

## 2024-03-16 NOTE — PROGRESS NOTE ADULT - PROBLEM SELECTOR PLAN 2
Patient with hyperkalemia to 6.4 on arrival to the ED. Likely iso post-obstructive JULIO CESAR, given hyperkalemia cocktail + lokelma 10mg q8h x 2. Repeat K downtrending, no EKG changes noted.   - f/u BMP q4-6h until K normalizes  - caution with insulin/dextrose cocktail given poor renal function  - if K continues to be elevated, consider lokelma 10mg q8h  - monitor UOP Patient with hyperkalemia to 6.4 on arrival to the ED. Likely iso post-obstructive JULIO CESAR, given hyperkalemia cocktail + lokelma 10mg q8h x 2. Repeat K downtrending, no EKG changes noted.   - f/u BMP q4-6h until K normalizes  - s/p additional 5units insulin/dextrose 3/16 iso K of 5.6 (only responded to 10 lokelma by 1 unit)  - f/u repeat BMP at 4:00 3/16  - monitor UOP; 25cc/hr between 6am-1pm 03/16  - caution with insulin/dextrose cocktail given poor renal function Patient noted to have JULIO CESAR with Cr 5.25 over baseline 1.05. found to have BS of 400cc  now s/p wade placement by urology in ED   - s/p 2 L IVF  - trend Cr: uptrending on 03/16 AM from 4.56 --> 5.75  - per documented UOP, 25cc/hr between 6am-1pm 03/16 (oliguric)  - f/u urine lytes --> Fena correlates with intrinsic renal disease  - 3/16 consulted nephrology; f/u recs      - per verbal recommendations, started 1L NS bolus   - avoid nephrotoxic drugs, renally dose meds

## 2024-03-16 NOTE — PROGRESS NOTE ADULT - PROBLEM SELECTOR PLAN 7
Home medications: Metformin 1000mg BID; Sitaliptin 100mg   - hold home medications  - ISS Home medications: finasteride 5mg; Tamsulosin 0.8mg   - c/w home medications

## 2024-03-17 DIAGNOSIS — B37.7 CANDIDAL SEPSIS: ICD-10-CM

## 2024-03-17 LAB
-  CANDIDA ALBICANS: SIGNIFICANT CHANGE UP
ALBUMIN SERPL ELPH-MCNC: 2.5 G/DL — LOW (ref 3.3–5)
ALP SERPL-CCNC: 51 U/L — SIGNIFICANT CHANGE UP (ref 40–120)
ALT FLD-CCNC: 10 U/L — SIGNIFICANT CHANGE UP (ref 10–45)
ANION GAP SERPL CALC-SCNC: 15 MMOL/L — SIGNIFICANT CHANGE UP (ref 5–17)
AST SERPL-CCNC: 19 U/L — SIGNIFICANT CHANGE UP (ref 10–40)
BASOPHILS # BLD AUTO: 0.01 K/UL — SIGNIFICANT CHANGE UP (ref 0–0.2)
BASOPHILS NFR BLD AUTO: 0.1 % — SIGNIFICANT CHANGE UP (ref 0–2)
BILIRUB SERPL-MCNC: 0.3 MG/DL — SIGNIFICANT CHANGE UP (ref 0.2–1.2)
BUN SERPL-MCNC: 79 MG/DL — HIGH (ref 7–23)
CALCIUM SERPL-MCNC: 8.9 MG/DL — SIGNIFICANT CHANGE UP (ref 8.4–10.5)
CHLORIDE SERPL-SCNC: 99 MMOL/L — SIGNIFICANT CHANGE UP (ref 96–108)
CO2 SERPL-SCNC: 19 MMOL/L — LOW (ref 22–31)
CREAT SERPL-MCNC: 5.9 MG/DL — HIGH (ref 0.5–1.3)
CULTURE RESULTS: ABNORMAL
CYSTATIN C SERPL-MCNC: 3.73 MG/L — SIGNIFICANT CHANGE UP
EGFR: 8 ML/MIN/1.73M2 — LOW
EOSINOPHIL # BLD AUTO: 0.04 K/UL — SIGNIFICANT CHANGE UP (ref 0–0.5)
EOSINOPHIL NFR BLD AUTO: 0.5 % — SIGNIFICANT CHANGE UP (ref 0–6)
GFR/BSA.PRED SERPLBLD CYS-BASED-ARV: 11 ML/MIN/1.73M2 — LOW
GLUCOSE BLDC GLUCOMTR-MCNC: 177 MG/DL — HIGH (ref 70–99)
GLUCOSE BLDC GLUCOMTR-MCNC: 211 MG/DL — HIGH (ref 70–99)
GLUCOSE BLDC GLUCOMTR-MCNC: 212 MG/DL — HIGH (ref 70–99)
GLUCOSE SERPL-MCNC: 179 MG/DL — HIGH (ref 70–99)
GRAM STN FLD: ABNORMAL
GRAM STN FLD: ABNORMAL
HCT VFR BLD CALC: 27.8 % — LOW (ref 39–50)
HGB BLD-MCNC: 9.5 G/DL — LOW (ref 13–17)
IMM GRANULOCYTES NFR BLD AUTO: 0.5 % — SIGNIFICANT CHANGE UP (ref 0–0.9)
LYMPHOCYTES # BLD AUTO: 0.47 K/UL — LOW (ref 1–3.3)
LYMPHOCYTES # BLD AUTO: 6.3 % — LOW (ref 13–44)
MAGNESIUM SERPL-MCNC: 1.6 MG/DL — SIGNIFICANT CHANGE UP (ref 1.6–2.6)
MCHC RBC-ENTMCNC: 31.6 PG — SIGNIFICANT CHANGE UP (ref 27–34)
MCHC RBC-ENTMCNC: 34.2 GM/DL — SIGNIFICANT CHANGE UP (ref 32–36)
MCV RBC AUTO: 92.4 FL — SIGNIFICANT CHANGE UP (ref 80–100)
METHOD TYPE: SIGNIFICANT CHANGE UP
MONOCYTES # BLD AUTO: 0.56 K/UL — SIGNIFICANT CHANGE UP (ref 0–0.9)
MONOCYTES NFR BLD AUTO: 7.5 % — SIGNIFICANT CHANGE UP (ref 2–14)
NEUTROPHILS # BLD AUTO: 6.33 K/UL — SIGNIFICANT CHANGE UP (ref 1.8–7.4)
NEUTROPHILS NFR BLD AUTO: 85.1 % — HIGH (ref 43–77)
NRBC # BLD: 0 /100 WBCS — SIGNIFICANT CHANGE UP (ref 0–0)
PHOSPHATE SERPL-MCNC: 4.6 MG/DL — HIGH (ref 2.5–4.5)
PLATELET # BLD AUTO: 154 K/UL — SIGNIFICANT CHANGE UP (ref 150–400)
POTASSIUM SERPL-MCNC: 5 MMOL/L — SIGNIFICANT CHANGE UP (ref 3.5–5.3)
POTASSIUM SERPL-SCNC: 5 MMOL/L — SIGNIFICANT CHANGE UP (ref 3.5–5.3)
PROT SERPL-MCNC: 5.9 G/DL — LOW (ref 6–8.3)
RBC # BLD: 3.01 M/UL — LOW (ref 4.2–5.8)
RBC # FLD: 15.3 % — HIGH (ref 10.3–14.5)
SODIUM SERPL-SCNC: 133 MMOL/L — LOW (ref 135–145)
SPECIMEN SOURCE: SIGNIFICANT CHANGE UP
WBC # BLD: 7.45 K/UL — SIGNIFICANT CHANGE UP (ref 3.8–10.5)
WBC # FLD AUTO: 7.45 K/UL — SIGNIFICANT CHANGE UP (ref 3.8–10.5)

## 2024-03-17 PROCEDURE — 76770 US EXAM ABDO BACK WALL COMP: CPT | Mod: 26

## 2024-03-17 PROCEDURE — 99222 1ST HOSP IP/OBS MODERATE 55: CPT

## 2024-03-17 RX ORDER — CASPOFUNGIN ACETATE 7 MG/ML
50 INJECTION, POWDER, LYOPHILIZED, FOR SOLUTION INTRAVENOUS EVERY 24 HOURS
Refills: 0 | Status: DISCONTINUED | OUTPATIENT
Start: 2024-03-17 | End: 2024-03-18

## 2024-03-17 RX ORDER — SODIUM CHLORIDE 9 MG/ML
1000 INJECTION, SOLUTION INTRAVENOUS
Refills: 0 | Status: DISCONTINUED | OUTPATIENT
Start: 2024-03-17 | End: 2024-03-18

## 2024-03-17 RX ORDER — CASPOFUNGIN ACETATE 7 MG/ML
70 INJECTION, POWDER, LYOPHILIZED, FOR SOLUTION INTRAVENOUS ONCE
Refills: 0 | Status: COMPLETED | OUTPATIENT
Start: 2024-03-17 | End: 2024-03-17

## 2024-03-17 RX ORDER — MAGNESIUM SULFATE 500 MG/ML
1 VIAL (ML) INJECTION ONCE
Refills: 0 | Status: COMPLETED | OUTPATIENT
Start: 2024-03-17 | End: 2024-03-17

## 2024-03-17 RX ADMIN — Medication 100 GRAM(S): at 08:57

## 2024-03-17 RX ADMIN — SODIUM ZIRCONIUM CYCLOSILICATE 10 GRAM(S): 10 POWDER, FOR SUSPENSION ORAL at 18:00

## 2024-03-17 RX ADMIN — Medication 4: at 13:39

## 2024-03-17 RX ADMIN — SENNA PLUS 2 TABLET(S): 8.6 TABLET ORAL at 22:11

## 2024-03-17 RX ADMIN — HEPARIN SODIUM 5000 UNIT(S): 5000 INJECTION INTRAVENOUS; SUBCUTANEOUS at 06:56

## 2024-03-17 RX ADMIN — Medication 3 MILLIGRAM(S): at 22:11

## 2024-03-17 RX ADMIN — CASPOFUNGIN ACETATE 260 MILLIGRAM(S): 7 INJECTION, POWDER, LYOPHILIZED, FOR SOLUTION INTRAVENOUS at 22:11

## 2024-03-17 RX ADMIN — SODIUM CHLORIDE 100 MILLILITER(S): 9 INJECTION, SOLUTION INTRAVENOUS at 13:49

## 2024-03-17 RX ADMIN — ATORVASTATIN CALCIUM 40 MILLIGRAM(S): 80 TABLET, FILM COATED ORAL at 22:11

## 2024-03-17 RX ADMIN — INSULIN HUMAN 5 UNIT(S): 100 INJECTION, SOLUTION SUBCUTANEOUS at 02:24

## 2024-03-17 RX ADMIN — CASPOFUNGIN ACETATE 260 MILLIGRAM(S): 7 INJECTION, POWDER, LYOPHILIZED, FOR SOLUTION INTRAVENOUS at 06:55

## 2024-03-17 RX ADMIN — Medication 100 MILLILITER(S): at 00:19

## 2024-03-17 RX ADMIN — FINASTERIDE 5 MILLIGRAM(S): 5 TABLET, FILM COATED ORAL at 12:27

## 2024-03-17 RX ADMIN — SODIUM ZIRCONIUM CYCLOSILICATE 10 GRAM(S): 10 POWDER, FOR SUSPENSION ORAL at 08:04

## 2024-03-17 RX ADMIN — PIPERACILLIN AND TAZOBACTAM 25 GRAM(S): 4; .5 INJECTION, POWDER, LYOPHILIZED, FOR SOLUTION INTRAVENOUS at 06:56

## 2024-03-17 RX ADMIN — TAMSULOSIN HYDROCHLORIDE 0.8 MILLIGRAM(S): 0.4 CAPSULE ORAL at 22:11

## 2024-03-17 RX ADMIN — HEPARIN SODIUM 5000 UNIT(S): 5000 INJECTION INTRAVENOUS; SUBCUTANEOUS at 18:00

## 2024-03-17 RX ADMIN — PIPERACILLIN AND TAZOBACTAM 25 GRAM(S): 4; .5 INJECTION, POWDER, LYOPHILIZED, FOR SOLUTION INTRAVENOUS at 18:00

## 2024-03-17 NOTE — CONSULT NOTE ADULT - ASSESSMENT
89 M wit hx of DM, BPH (on tamsuolsin 0.8mg), retention and Britni admitted to medicine for UTI. At this time no complaints, resting comfortably. Family inquiring about long term management of BPH/retention. AVSS. WBC 7, Cr 5.9. RBUS shows no hydro b/l, 100cc prostate.  Currently on zosyn for UTI per primary. Bcx growing yeast, pending urine culture.    Patient family was interested SPT. Discussed the pros and cons of SPT versus wade. Infections risk of SPT and intra-urethral wade is the same and both would need to be changed on monthly basis but, but SPT does not cause urethral erosion. Patient could also consider surgical management of bladder obstruction with home urologist Dr. Diane. Patient's family also inquired about PAE which he could follow up with Dr. Prescott for further information. If patient would like PAE, could consider getting inpatient prostate MRI while in house to facilitate outpatient PAE discussion. Reached out to Dr. Huggins, pending his call back. Further discussion can be continued outpatient after UTI resolves.     Recs;  Continue wade given hx of UTI and retention   Flomax 0.8qd and finasteride  Prostate MRI in house if patient would like PAE outpatient discussion with Dr. Prescott   Abx per primary team  Nephro recommendations, consider resending urinelytes   F/u urine culture   F/u outpatient with Dr. Prescott for further PAE discussion and home urologist Dr. Diane   Discussed with attending 
Patient is as 89 y/o M with PMH of DM, recurrent UTIs (recently in cipro) , Chronic urinary retention with Dan, now with obstructive UTI and ARF.   Pte is coming from [facility] where Dan was not working properly.   Lying flat in his bed in no respiratory distress, saturating 94% in ra.   Dan with cloudy urine, but as per family much improved.     sCr baseline 1.1 (2/2024), now 5.8  K 5.6 (improving); on 3/15 with K of 6.5 EKG showed no changes suggestive of hyperkalemia.   HCO3 19  BUN 77  A1c 9.1  UA: suggestive of UTI/Dan  U. Na 39(89 y/o pte)  Noted intermittent episodes of hypotension.   uop: 750 ml (s/p Dan placement)    US:   Right kidney measures 8.8 cm and left kidney measures 9.4 cm.  Moderate bilateral hydronephrosis, right greater left.  Dan catheter is present within the urinary bladder which is decompressed. Bladder wall markedly thickened.  Prostate is enlarged measuring 7.0 x 7.6 x 6.4 cm with a volume 185 cc.    Imp: Post renal JULIO CESAR, now with Dan placed.   Pte has chronic hydroureteronephrosis.     Plan:  Lokelma 10g q8h until K<5.0.  Start bowel regimen (Senna/Miralax), ensure pte has bowel movement (iso hyperkalemia on Lokelma)   Switch IVF to HCO3 drip(dc LR iso hyperkalemia). Dilute 3 amp of HCO3(50 meq each) in 1L sterile water, run @ 100ml/hr until complete 1L.   Strict I&O.  Urology eval.    Avoid hypotension, SBP>100 as possible.   Renal diet.   Management of UTI as per primary team, renally dosed abx (on Zosyn)  Trend K, if hyperkalemia worsens will treat with D50% and Reg. Inulin cautiously (avoid hypoglycemia)  Obtain Cystatin C    
89 yo M with DM, HLD, CKD, BPH, recurrent UTIs admitted 3/15 with JULIO CESAR on CKD and urinary retention. 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  He has defervesced, leukocytosis has resolved (both with relief of obstruction prior to start of anti-fungal therapy).  Though can presume C. albicans is susceptible to fluconazole in absence of prior azole therapy, would start with caspofungin until he clears his cultures.    Suggest:  - F/U blood cultures from 3/15 for ID by growth and formal susceptibility  - F/U surveillance blood culture from 3/17  - TTE  - Caspofungin 50 mg IV q24h  Recommendations discussed with primary team - will follow with you - team 1. 
89 year old male with history of DM, BPH s/p Dan, recurrent UTIs, presenting with hematuria/urinary retention x 1d with ICU consulted for hyperK       #hyperkalemia   ***

## 2024-03-17 NOTE — CONSULT NOTE ADULT - CONSULT REQUESTED DATE/TIME
17-Mar-2024
Pt arrives to the ED with a CC of LT dorsal foot pain with an onset of 1 month ago that got worse yesterday. Pt has seen a podiatrist. 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 Pt took Tylenol at 1300 with mild relief. Pt denies redness or injury to the area.   
15-Mar-2024 21:10
16-Mar-2024 19:15
17-Mar-2024 12:19

## 2024-03-17 NOTE — PROGRESS NOTE ADULT - SUBJECTIVE AND OBJECTIVE BOX
89 yo man readimitted with urinary rentetion in the setting of BPH with wade  -culture yeileded candida started on antifungal and ID following  -urology consulted for possible suprapubic catheter but will need to wait status of candida treatment  -blood pressure stable  -cognitve status improved today    MEDICATIONS  (STANDING):  atorvastatin 40 milliGRAM(s) Oral at bedtime  dextrose 5%. 1000 milliLiter(s) (50 mL/Hr) IV Continuous <Continuous>  dextrose 5%. 1000 milliLiter(s) (100 mL/Hr) IV Continuous <Continuous>  dextrose 50% Injectable 25 Gram(s) IV Push once  dextrose 50% Injectable 12.5 Gram(s) IV Push once  dextrose 50% Injectable 25 Gram(s) IV Push once  finasteride 5 milliGRAM(s) Oral daily  glucagon  Injectable 1 milliGRAM(s) IntraMuscular once  heparin   Injectable 5000 Unit(s) SubCutaneous every 12 hours  insulin lispro (ADMELOG) corrective regimen sliding scale   SubCutaneous three times a day before meals  insulin lispro (ADMELOG) corrective regimen sliding scale   SubCutaneous at bedtime  lactated ringers. 1000 milliLiter(s) (100 mL/Hr) IV Continuous <Continuous>  melatonin 3 milliGRAM(s) Oral at bedtime  piperacillin/tazobactam IVPB.. 4.5 Gram(s) IV Intermittent every 12 hours  polyethylene glycol 3350 17 Gram(s) Oral daily  senna 2 Tablet(s) Oral at bedtime  sodium zirconium cyclosilicate 10 Gram(s) Oral every 8 hours  tamsulosin 0.8 milliGRAM(s) Oral at bedtime    MEDICATIONS  (PRN):  acetaminophen     Tablet .. 650 milliGRAM(s) Oral every 6 hours PRN Temp greater or equal to 38C (100.4F), Mild Pain (1 - 3)  dextrose Oral Gel 15 Gram(s) Oral once PRN Blood Glucose LESS THAN 70 milliGRAM(s)/deciliter    Vital Signs Last 24 Hrs  T(C): 36.8 (03-17-24 @ 11:44), Max: 36.8 (03-17-24 @ 11:44)  T(F): 98.2 (03-17-24 @ 11:44), Max: 98.2 (03-17-24 @ 11:44)  HR: 67 (03-17-24 @ 11:44) (63 - 67)  BP: 116/61 (03-17-24 @ 11:44) (105/57 - 116/61)  BP(mean): --  RR: 16 (03-17-24 @ 11:44) (16 - 17)  SpO2: 95% (03-17-24 @ 11:44) (94% - 95%)    Sleeping but arousable and recognizes me  No JVD  Chest clear  CV RRR s1s2 ii/VI REGGIE LSB unchanged  Abd soft  Ext no edema                          9.5    7.45  )-----------( 154      ( 17 Mar 2024 06:52 )             27.8   03-17    133<L>  |  99  |  79<H>  ----------------------------<  179<H>  5.0   |  19<L>  |  5.90<H>    Ca    8.9      17 Mar 2024 06:52  Phos  4.6     03-17  Mg     1.6     03-17    TPro  5.9<L>  /  Alb  2.5<L>  /  TBili  0.3  /  DBili  x   /  AST  19  /  ALT  10  /  AlkPhos  51  03-17    Culture - Blood (collected 15 Mar 2024 22:13)  Source: .Blood Blood-Venous  Gram Stain (17 Mar 2024 01:56):    Aerobic Bottle: Yeast    Result called to and read back by_ CURTIS GODINEZ RN (04UR) 03/17/2024 01:54:52  Preliminary Report (17 Mar 2024 01:56):    Culture in progress    Culture - Blood (collected 15 Mar 2024 22:01)  Source: .Blood Blood-Venous  Gram Stain (17 Mar 2024 01:55):    Aerobic Bottle: Yeast    Result called to and read back by_ CURTIS GODINEZ RN (04UR) 03/17/2024 01:54:52    ***Blood Panel PCR results on this specimen are available    approximately 3 hours after the Gram stain result.***    Gram stain, PCR, and/or culture results may not always    correspond due to difference in methodologies.    ************************************************************    This PCR assay was performed using [employer] BCID2 panel.    This assay tests for bacterial, fungal and resistance gene    targets.  Preliminary Report (17 Mar 2024 01:55):    Culture in progress  Organism: Blood Culture PCR (17 Mar 2024 01:57)  Organism: Blood Culture PCR (17 Mar 2024 01:57)    Urinalysis with Rflx Culture (collected 15 Mar 2024 17:49)    Culture - Urine (collected 15 Mar 2024 17:49)  Source: Catheterized None  Final Report (17 Mar 2024 14:50):    >100,000 CFU/ml Candida albicans

## 2024-03-17 NOTE — PROGRESS NOTE ADULT - SUBJECTIVE AND OBJECTIVE BOX
Evaluated at bedside, with family member. Pte sleeping comfortable, Wade with clear urine.   BP stable, in ra, lying flat in his bed.     Allergies:  No Known Allergies    REVIEW OF SYSTEMS:  All other review of systems is negative unless indicated above.    PHYSICAL EXAM:  Constitutional: NAD  Neck: No JVD  Respiratory: CTAB, no wheezes, rales or rhonchi  Cardiovascular: S1, S2, RRR  Gastrointestinal: ND/NT, +BS  Extremities:  No peripheral edema in LEs.   Neurological: A/O x 3, no focal motor deficits.   : + wade.     RADIOLOGY & ADDITIONAL STUDIES: Reviewed.     Hospital Medications:   MEDICATIONS  (STANDING):  atorvastatin 40 milliGRAM(s) Oral at bedtime  dextrose 5%. 1000 milliLiter(s) (100 mL/Hr) IV Continuous <Continuous>  dextrose 5%. 1000 milliLiter(s) (50 mL/Hr) IV Continuous <Continuous>  dextrose 50% Injectable 25 Gram(s) IV Push once  dextrose 50% Injectable 12.5 Gram(s) IV Push once  dextrose 50% Injectable 25 Gram(s) IV Push once  finasteride 5 milliGRAM(s) Oral daily  glucagon  Injectable 1 milliGRAM(s) IntraMuscular once  heparin   Injectable 5000 Unit(s) SubCutaneous every 12 hours  insulin lispro (ADMELOG) corrective regimen sliding scale   SubCutaneous three times a day before meals  insulin lispro (ADMELOG) corrective regimen sliding scale   SubCutaneous at bedtime  melatonin 3 milliGRAM(s) Oral at bedtime  piperacillin/tazobactam IVPB.. 4.5 Gram(s) IV Intermittent every 12 hours  polyethylene glycol 3350 17 Gram(s) Oral daily  senna 2 Tablet(s) Oral at bedtime  sodium zirconium cyclosilicate 10 Gram(s) Oral every 8 hours  sterile water 1000 milliLiter(s) (100 mL/Hr) IV Continuous <Continuous>  tamsulosin 0.8 milliGRAM(s) Oral at bedtime    VITALS:  T(F): 98 (03-16-24 @ 17:57), Max: 98 (03-16-24 @ 17:57)  HR: 63 (03-16-24 @ 17:57)  BP: 105/57 (03-16-24 @ 17:57)  RR: 17 (03-16-24 @ 17:57)  SpO2: 94% (03-16-24 @ 17:57)  Wt(kg): --    03-15 @ 07:01  -  03-16 @ 07:00  --------------------------------------------------------  IN: 0 mL / OUT: 750 mL / NET: -750 mL    03-16 @ 07:01  -  03-17 @ 07:00  --------------------------------------------------------  IN: 0 mL / OUT: 775 mL / NET: -775 mL    LABS:  03-17    133<L>  |  99  |  79<H>  ----------------------------<  179<H>  5.0   |  19<L>  |  5.90<H>    Ca    8.9      17 Mar 2024 06:52  Phos  4.6     03-17  Mg     1.6     03-17    TPro  5.9<L>  /  Alb  2.5<L>  /  TBili  0.3  /  DBili      /  AST  19  /  ALT  10  /  AlkPhos  51  03-17                          9.5    7.45  )-----------( 154      ( 17 Mar 2024 06:52 )             27.8

## 2024-03-17 NOTE — PROGRESS NOTE ADULT - ASSESSMENT
P89 y/o M w/ PMH of DM, BPH s/p chronic Wade, recurrent UTIs, presenting from [facility] rehab due to urinary retention x 1d, admitted for post-obstructive JULIO CESAR and UTI iso retention. At [facility] 03/14, noted to have minimal UOP, bladder scan showing 400cc, new wade placed with subsequent hematuria noted to urine bag with good output  then 03/15 noted to have minimal urine output. s/p new wade placed by urology [facility]. Draining well, uop ~33cc/hr. Admitted to [facility] iso hyperkalemia to 6.4, s/p hyperK cocktail. Patient's hyperkalemia is coming down appropriately, and he is medically stable for transfer to [facility].

## 2024-03-17 NOTE — PROGRESS NOTE ADULT - PROBLEM SELECTOR PLAN 3
Patient w/ chronic wade iso urinary retention/obstruction, presented from nursing home with obstructed wade. Wade replaced in the ED by urology with drainage of 400cc of purulent urine. Urology consulted  -f/u urology recs  - UOP 25cc/hr between 6am-1pm 03/16      - s/p 250cc NS      - started maintenance fluids NS @ 60cc/hr   - treat UTI as below  - bladder scan on 3/16 1pm 32 (no evidence of retention while w/ new wade)    -outpatient urology follow up

## 2024-03-17 NOTE — PROGRESS NOTE ADULT - PROBLEM SELECTOR PLAN 1
#Severe sepsis POA -  met 3/4 SIRS criteria with T 100.5/<96.8, , RR > 20, WBC 12.42.  Likely source UTI due to urinary tract obstruction + candidemia. ID consulted. Blood Cx growing candida albicans Lactate 3.0 --> 2.0  - c/w zosyn until urine culture results  - Caspofungin 50 mg QD  - f/u blood cxs, UA/Ucx  - s/p 2L  IVF in ED  - continue to monitor fever curve

## 2024-03-17 NOTE — PROGRESS NOTE ADULT - SUBJECTIVE AND OBJECTIVE BOX
SUBJECTIVE/OVERNIGHT EVENTS: No acute overnight events. Pt seen in AM at bedside, resting comfortably in bed, and does not appear to be in any acute distress.     VITAL SIGNS:  Vital Signs Last 24 Hrs  T(C): 36.7 (16 Mar 2024 17:57), Max: 36.7 (16 Mar 2024 17:57)  T(F): 98 (16 Mar 2024 17:57), Max: 98 (16 Mar 2024 17:57)  HR: 63 (16 Mar 2024 17:57) (63 - 82)  BP: 105/57 (16 Mar 2024 17:57) (96/55 - 120/62)  BP(mean): 84 (16 Mar 2024 15:30) (71 - 84)  RR: 17 (16 Mar 2024 17:57) (14 - 17)  SpO2: 94% (16 Mar 2024 17:57) (94% - 94%)    Parameters below as of 16 Mar 2024 17:57  Patient On (Oxygen Delivery Method): room air    PHYSICAL EXAM:  Constitutional: NAD, comfortable in bed.  HEENT: NC/AT, PERRLA, EOMI, no conjunctival pallor or scleral icterus, MMM  Neck: Supple, no JVD  Respiratory: CTA B/L. No w/r/r.   Cardiovascular: RRR, normal S1 and S2, no m/r/g.   Gastrointestinal: +BS, soft NTND, no guarding or rebound tenderness, no palpable masses    folwy in place minimal urine in bag no blood  Extremities: wwp; no cyanosis, clubbing or edema.   Vascular: Pulses equal and strong throughout.   Neurological: AAOx3, no CN deficits, strength and sensation intact throughout.   Skin: No gross skin abnormalities or rashes    MEDICATIONS:  MEDICATIONS  (STANDING):  atorvastatin 40 milliGRAM(s) Oral at bedtime  dextrose 5%. 1000 milliLiter(s) (100 mL/Hr) IV Continuous <Continuous>  dextrose 5%. 1000 milliLiter(s) (50 mL/Hr) IV Continuous <Continuous>  dextrose 50% Injectable 25 Gram(s) IV Push once  dextrose 50% Injectable 25 Gram(s) IV Push once  dextrose 50% Injectable 12.5 Gram(s) IV Push once  finasteride 5 milliGRAM(s) Oral daily  glucagon  Injectable 1 milliGRAM(s) IntraMuscular once  heparin   Injectable 5000 Unit(s) SubCutaneous every 12 hours  insulin lispro (ADMELOG) corrective regimen sliding scale   SubCutaneous at bedtime  insulin lispro (ADMELOG) corrective regimen sliding scale   SubCutaneous three times a day before meals  melatonin 3 milliGRAM(s) Oral at bedtime  piperacillin/tazobactam IVPB.. 4.5 Gram(s) IV Intermittent every 12 hours  polyethylene glycol 3350 17 Gram(s) Oral daily  senna 2 Tablet(s) Oral at bedtime  sodium zirconium cyclosilicate 10 Gram(s) Oral every 8 hours  sterile water 1000 milliLiter(s) (100 mL/Hr) IV Continuous <Continuous>  tamsulosin 0.8 milliGRAM(s) Oral at bedtime    MEDICATIONS  (PRN):  acetaminophen     Tablet .. 650 milliGRAM(s) Oral every 6 hours PRN Temp greater or equal to 38C (100.4F), Mild Pain (1 - 3)  dextrose Oral Gel 15 Gram(s) Oral once PRN Blood Glucose LESS THAN 70 milliGRAM(s)/deciliter      ALLERGIES:  Allergies    No Known Allergies    Intolerances        LABS:                        9.5    7.45  )-----------( 154      ( 17 Mar 2024 06:52 )             27.8     03-17    133<L>  |  99  |  79<H>  ----------------------------<  179<H>  5.0   |  19<L>  |  5.90<H>    Ca    8.9      17 Mar 2024 06:52  Phos  4.6     03-17  Mg     1.6     03-17    TPro  5.9<L>  /  Alb  2.5<L>  /  TBili  0.3  /  DBili  x   /  AST  19  /  ALT  10  /  AlkPhos  51  03-17        RADIOLOGY & ADDITIONAL TESTS: Reviewed.

## 2024-03-17 NOTE — PROGRESS NOTE ADULT - ASSESSMENT
Patient is as 89 y/o M with PMH of DM, recurrent UTIs (recently in cipro) , Chronic urinary retention with Dan, now with obstructive UTI and ARF.   Pte is coming from [facility] where Dan was not working properly.         2/23/24 US:   Right kidney measures 8.8 cm and left kidney measures 9.4 cm.  Moderate bilateral hydronephrosis, right greater left.  Dan catheter is present within the urinary bladder which is decompressed. Bladder wall markedly thickened.  Prostate is enlarged measuring 7.0 x 7.6 x 6.4 cm with a volume 185 cc.    3/17/24 US(s/p 24h Dan placement):   1. No hydronephrosis or nephrolithiasis.  2. Markedly thickened, trabeculated bladder wall similar to prior exam with Dan in place. Recommend clinical correlation. Small amount of fluid within the bladder.  3. Prostatomegaly.      sCr baseline 1.1 (2/2024)  sCr now 5.9 (?plateauing)  Hyperkalemia improved.   AGMA iso JULIO CESAR  BUN 70s  Mild hyponatremia.     Last 24h uop: 775 ml (Dan)  Last 24h volume balance: -775ml (not accurate, pte received IVF. )    Imp: Non oliguric JULIO CESAR.   Pte has chronic hydroureteronephrosis, now with Dan placed (3/16).   Cystatin C 3.7, GFR 11    Plan:  Lokelma 10g once.   Cont bowel regimen (Senna/Miralax), ensure pte has bowel movement (iso hyperkalemia on Lokelma)   Once HCO3>22 and K<5.0 can switch IVF to LR @ 80ml/hr, continuous.   Please ensure pte is having Strict I&O.  Urology eval.    Avoid hypotension, SBP>100 as possible.   Renal diet.   Management of UTI as per primary team, renally dosed abx (on Zosyn)  No indication for RRT at this point.

## 2024-03-17 NOTE — PROGRESS NOTE ADULT - ASSESSMENT
Hemodynamically stable and more lucid today with BPH/wade. candida in the urine  UTI with fungemia following ID recs    >100,000 CFU/ml Candida albicans  -Adequate glycemic controlCAPILLARY BLOOD GLUCOSE  POCT Blood Glucose.: 211 mg/dL (17 Mar 2024 13:09)  POCT Blood Glucose.: 212 mg/dL (17 Mar 2024 02:18)  POCT Blood Glucose.: 209 mg/dL (16 Mar 2024 21:53)    DVT prophyalxis  Renal following for rising cre (JULIO CESAR seconady to obstruction  -potassium improved    Family aware and case discussed with team

## 2024-03-17 NOTE — CONSULT NOTE ADULT - SUBJECTIVE AND OBJECTIVE BOX
NEPHROLOGY CONSULTATION NOTE    Patient is as 89 y/o M with PMH of DM, recurrent UTIs (recently in cipro) , Chronic urinary retention with Wade, now with obstructive UTI and ARF.     PAST MEDICAL & SURGICAL HISTORY:    Allergies:  No Known Allergies    Home Medications Reviewed  Hospital Medications:   MEDICATIONS  (STANDING):  atorvastatin 40 milliGRAM(s) Oral at bedtime  dextrose 5%. 1000 milliLiter(s) (100 mL/Hr) IV Continuous <Continuous>  dextrose 5%. 1000 milliLiter(s) (50 mL/Hr) IV Continuous <Continuous>  dextrose 50% Injectable 25 Gram(s) IV Push once  dextrose 50% Injectable 25 Gram(s) IV Push once  dextrose 50% Injectable 12.5 Gram(s) IV Push once  finasteride 5 milliGRAM(s) Oral daily  glucagon  Injectable 1 milliGRAM(s) IntraMuscular once  heparin   Injectable 5000 Unit(s) SubCutaneous every 12 hours  insulin lispro (ADMELOG) corrective regimen sliding scale   SubCutaneous three times a day before meals  insulin lispro (ADMELOG) corrective regimen sliding scale   SubCutaneous at bedtime  lactated ringers. 1000 milliLiter(s) (100 mL/Hr) IV Continuous <Continuous>  melatonin 3 milliGRAM(s) Oral at bedtime  piperacillin/tazobactam IVPB.. 4.5 Gram(s) IV Intermittent every 12 hours  tamsulosin 0.8 milliGRAM(s) Oral at bedtime    SOCIAL HISTORY:  Denies ETOH,Smoking,   FAMILY HISTORY:        REVIEW OF SYSTEMS:  All other review of systems is negative unless indicated above.    VITALS:  Vital Signs Last 24 Hrs  T(C): 36.7 (16 Mar 2024 17:57), Max: 37.9 (15 Mar 2024 20:04)  T(F): 98 (16 Mar 2024 17:57), Max: 100.2 (15 Mar 2024 20:04)  HR: 63 (16 Mar 2024 17:57) (63 - 98)  BP: 105/57 (16 Mar 2024 17:57) (91/52 - 120/62)  BP(mean): 84 (16 Mar 2024 15:30) (67 - 84)  RR: 17 (16 Mar 2024 17:57) (14 - 18)  SpO2: 94% (16 Mar 2024 17:57) (93% - 96%)    Parameters below as of 16 Mar 2024 17:57  Patient On (Oxygen Delivery Method): room air        03-15 @ 07:01  -  03-16 @ 07:00  --------------------------------------------------------  IN: 0 mL / OUT: 750 mL / NET: -750 mL    03-16 @ 07:01  -  03-16 @ 19:16  --------------------------------------------------------  IN: 0 mL / OUT: 175 mL / NET: -175 mL          PHYSICAL EXAM:  Constitutional: NAD  Neck: No JVD  Respiratory: CTAB, no wheezes, rales or rhonchi  Cardiovascular: S1, S2, RRR  Gastrointestinal: ND/NT, +BS  Extremities:  No peripheral edema in LEs.   Neurological: A/O x 3, no focal motor deficits.   : + wade.     LABS:  03-16    135  |  102  |  77<H>  ----------------------------<  115<H>  5.6<H>   |  19<L>  |  5.82<H>    Ca    8.7      16 Mar 2024 17:18  Phos  3.7     03-16  Mg     1.6     03-16      Creatinine Trend: 5.82 <--, 5.75 <--, 4.56 <--, 4.94 <--, 5.25 <--, 1.05 <--, 1.10 <--, 1.14 <--, 1.24 <--, 1.42 <--, 1.57 <--, 2.40 <--, 2.63 <--                        9.6    8.22  )-----------( 188      ( 16 Mar 2024 07:37 )             27.7     Urine Studies:  Urinalysis Basic - ( 16 Mar 2024 17:18 )    Color:  / Appearance:  / SG:  / pH:   Gluc: 115 mg/dL / Ketone:   / Bili:  / Urobili:    Blood:  / Protein:  / Nitrite:    Leuk Esterase:  / RBC:  / WBC    Sq Epi:  / Non Sq Epi:  / Bacteria:       Creatinine, Random Urine: 74 mg/dL (03-16 @ 07:31)  Osmolality, Random Urine: 313 mosm/kg (03-15 @ 22:24)  Potassium, Random Urine: 42 mmol/L (03-15 @ 22:24)  Sodium, Random Urine: 39 mmol/L (03-15 @ 22:24)            RADIOLOGY & ADDITIONAL STUDIES: Reviewed.                 
HPI:  89 yo M with DM, HLD, CKD, BPH, recurrent UTIs admitted 3/15 with JULIO CESAR on CKD and urinary retention.  ID consult for candidemia.  Most recently admitted from 2/21-2/27 for urinary retention/UTI.  Urine culture grew Candida albicans, was treated with ceftriaxone X 3 d.  He was discharged to [facility] with Dan catheter in place.  He was readmitted on 3/15 with minimal urine output, bladder volume 400 ml.  New Dan was placed but he still did not have output.  Was transferred to the ED, where he had T 98.8 -->100.5 (o), HR 96 -->111.  Labs with WBC 12.4 with 89% PMNs, H/H 12.0/35.6, plt 259, Na 133, K+ 6.4, HCO2 17, BUN 72, Cr 5.25 (<--1.05 on 2/27), UA with >998 WBCs, >1900 RBCs, >36 epith, nitrite positive.  Dan was replaced, he was given a dose of ceftriaxone and admitted for further management.  He was started on pip-tazo.  He defervesced, WBC today 7.5.  Blood cultures from 3/15 grew yeast in aerobic bottle of 2/2 sets, BCID with C. albicans.  He was given caspofungin.  ID consult requested.      PAST MEDICAL & SURGICAL HISTORY:    As above      MEDICATIONS  (STANDING):  atorvastatin 40 milliGRAM(s) Oral at bedtime  dextrose 5%. 1000 milliLiter(s) (50 mL/Hr) IV Continuous <Continuous>  dextrose 5%. 1000 milliLiter(s) (100 mL/Hr) IV Continuous <Continuous>  dextrose 50% Injectable 25 Gram(s) IV Push once  dextrose 50% Injectable 12.5 Gram(s) IV Push once  dextrose 50% Injectable 25 Gram(s) IV Push once  finasteride 5 milliGRAM(s) Oral daily  glucagon  Injectable 1 milliGRAM(s) IntraMuscular once  heparin   Injectable 5000 Unit(s) SubCutaneous every 12 hours  insulin lispro (ADMELOG) corrective regimen sliding scale   SubCutaneous three times a day before meals  insulin lispro (ADMELOG) corrective regimen sliding scale   SubCutaneous at bedtime  lactated ringers. 1000 milliLiter(s) (100 mL/Hr) IV Continuous <Continuous>  melatonin 3 milliGRAM(s) Oral at bedtime  piperacillin/tazobactam IVPB.. 4.5 Gram(s) IV Intermittent every 12 hours  polyethylene glycol 3350 17 Gram(s) Oral daily  senna 2 Tablet(s) Oral at bedtime  sodium zirconium cyclosilicate 10 Gram(s) Oral every 8 hours  tamsulosin 0.8 milliGRAM(s) Oral at bedtime    MEDICATIONS  (PRN):  acetaminophen     Tablet .. 650 milliGRAM(s) Oral every 6 hours PRN Temp greater or equal to 38C (100.4F), Mild Pain (1 - 3)  dextrose Oral Gel 15 Gram(s) Oral once PRN Blood Glucose LESS THAN 70 milliGRAM(s)/deciliter      Allergies    No Known Allergies    Intolerances        SOCIAL HISTORY:  Had been living independently until admission in Dec.  Has been at [facility].  Hope is that he will be able to return home.    FAMILY HISTORY:  No pertinent family history in first degree relatives.       ROS:  No pain.  Limited - [place]    Vital Signs Last 24 Hrs  T(C): 36.8 (17 Mar 2024 17:07), Max: 36.8 (17 Mar 2024 11:44)  T(F): 98.2 (17 Mar 2024 17:07), Max: 98.2 (17 Mar 2024 11:44)  HR: 66 (17 Mar 2024 17:07) (63 - 67)  BP: 94/53 (17 Mar 2024 17:07) (94/53 - 116/61)  BP(mean): --  RR: 17 (17 Mar 2024 17:07) (16 - 17)  SpO2: 93% (17 Mar 2024 17:07) (93% - 95%)    Parameters below as of 17 Mar 2024 17:07  Patient On (Oxygen Delivery Method): room air        PE:  Elderly, somnolent but arousable, answering appropriately, making jokes  HEENT:  NC  Neck:  Supple, no adenopathy  Lungs:  Clear to auscultation  Cor:  RRR, S1, S2, no murmur appreciated  Abd:  Symmetric, normoactive BS.  Soft, suprapubic tenderness to palpation, no guarding or rebound.  No CVAT  :  Dan draining yellow urine  Extrem:  No cyanosis or edema  Skin:  No rashes.    LABS:                        9.5    7.45  )-----------( 154      ( 17 Mar 2024 06:52 )             27.8     03-17    133<L>  |  99  |  79<H>  ----------------------------<  179<H>  5.0   |  19<L>  |  5.90<H>    Ca    8.9      17 Mar 2024 06:52  Phos  4.6     03-17  Mg     1.6     03-17    TPro  5.9<L>  /  Alb  2.5<L>  /  TBili  0.3  /  DBili  x   /  AST  19  /  ALT  10  /  AlkPhos  51  03-17    Urinalysis Basic - ( 17 Mar 2024 06:52 )    Color: x / Appearance: x / SG: x / pH: x  Gluc: 179 mg/dL / Ketone: x  / Bili: x / Urobili: x   Blood: x / Protein: x / Nitrite: x   Leuk Esterase: x / RBC: x / WBC x   Sq Epi: x / Non Sq Epi: x / Bacteria: x        MICROBIOLOGY:    Blood cultures:  3/15 X 2 – yeast in aerobic bottle of 2/2 sets;  BCID with Candida albicans  Urine culture 3/15 –>10^5 C. albicans    RADIOLOGY & ADDITIONAL STUDIES:    < from: US Retroperitoneal Complete (03.17.24 @ 09:49) >  IMPRESSION:    1.  No hydronephrosis or nephrolithiasis.  2.  Markedly thickened, trabeculated bladder wall similar to prior exam   with Dan in place. Recommend clinical correlation. Small amount of   fluid within the bladder.  3.  Prostatomegaly.    < end of copied text >  
ICU Note    HPI:  89 year old male with history of DM, BPH s/p Wade, recurrent UTIs, presenting with hematuria/urinary retention x 1d. Per son at bedside--noted at [facility] rehab yesterday around 11AM to have minimal UOP, bladder scan showing 400cc, new Wade placed with subsequent hematuria noted to urine bag with good output, then today also noted to have minimal urine output so when attempting to flush wade, it became dislodged. Sent here for further management. No known fevers, chills, vomiting, ab pain. ?Started on cipro yesterday empirically.    In ED   VS noteable for 100.5 oral, HR 11.  Labs WBC 12 Lactate pending   UA+ (sent on new wade) for UTI  Interventions: CTX, 1L bolus  1L in wade after changing      ADDITIONAL MEDICINE HPI:    REVIEW OF SYSTEMS:   Otherwise negative except as specified in HPI    PAST MEDICAL HISTORY:     PAST SURGICAL HISTORY:    FAMILY HISTORY:    SOCIAL HISTORY:  Tobacco use:  EtOH use:  Illicit drug use:    MEDICATIONS:  MEDICATIONS  (STANDING):    MEDICATIONS  (PRN):      ALLERGIES:  Allergies    No Known Allergies    Intolerances        VITAL SIGNS:  Vital Signs Last 24 Hrs  T(C): 37.9 (15 Mar 2024 20:04), Max: 38.1 (15 Mar 2024 17:20)  T(F): 100.2 (15 Mar 2024 20:04), Max: 100.5 (15 Mar 2024 17:20)  HR: 98 (15 Mar 2024 20:04) (96 - 111)  BP: 100/56 (15 Mar 2024 20:04) (100/56 - 155/66)  BP(mean): --  RR: 18 (15 Mar 2024 20:04) (16 - 18)  SpO2: 93% (15 Mar 2024 20:04) (93% - 98%)    Parameters below as of 15 Mar 2024 20:04  Patient On (Oxygen Delivery Method): room air          Gen - NAD, comfortable; A+Ox3   HEENT - NCAT, EOMI, dry mucous membranes, clear oropharynx  Neck - supple  Resp - CTAB, no increased WOB  CV -  RRR, no m/r/g  Abd - soft, NT, mildly distended; no guarding or rebound  MSK - FROM of b/l UE and LE, no gross deformities  Extrem - no LE edema/erythema/tenderness  Neuro - no focal motor or sensation deficits  Skin - warm, well perfused    LABS:                        12.0   12.42 )-----------( 259      ( 15 Mar 2024 15:06 )             35.6     03-15    133<L>  |  98  |  73<H>  ----------------------------<  284<H>  6.0<H>   |  19<L>  |  4.94<H>    Ca    9.2      15 Mar 2024 18:56        Urinalysis Basic - ( 15 Mar 2024 18:56 )    Color: x / Appearance: x / SG: x / pH: x  Gluc: 284 mg/dL / Ketone: x  / Bili: x / Urobili: x   Blood: x / Protein: x / Nitrite: x   Leuk Esterase: x / RBC: x / WBC x   Sq Epi: x / Non Sq Epi: x / Bacteria: x          CAPILLARY BLOOD GLUCOSE      POCT Blood Glucose.: 225 mg/dL (15 Mar 2024 16:45)          RADIOLOGY & ADDITIONAL TESTS: Reviewed.
Patient is a 89y old  Male who presents with a chief complaint of UTI JULIO CESAR (16 Mar 2024 20:17)      HPI:  89 year old male with history of DM, BPH (follows with Dr Diane) s/p Wade, recurrent UTIs, presenting with hematuria/urinary retention for the past 24 hours. Per son at bedside, the patient was at [facility] rehab yesterday with minimal urine output. A bladder scan was done and he was found to have 400cc in his bladder. A new Wade was placed at the rehab, however the son noted that the urine output did not improve. Hematuria was also noted in the urine bag. Patient stated he had some dysuria and urgency but not other urinary symptoms. No known fevers, chills, vomiting, ab pain. Patient was started on cipro yesterday empirically at the rehab center. In the ED, wade was replaced. Of note patient was recently admitted for similar circumstances on 2/21.     consult note:   89 M wit hx of DM, BPH (on tamsuolsin 0.8mg), retention and Britni admitted to medicine for UTI. HPI completed by son at bedside. Has had a wade on and off for the past 2.5 months for retention and was found to have non-draining wade at rehab and was found to have hematuria/retention.  Wade was replaced at [facility] and admitted to medicine for management of UTI. At this time no complaints, resting comfortably. Family inquiring about long term management of BPH/retention.       ED Course:  Vitals: 100.5 F, , /63, RR 16 (98%) on RA  Labs: WBC 12.42; Hgb 12; Na 133; K 6.4; HCO3 17; AG 21; BUN 72; Cr 5.25; Glucose 229  UA positive for protein; LE; nitrite; WBC >998; RBC >1900; few bacteria; epithelial cells  EKG: Sinus tach ; no peaked T waves  Interventions:  CTX, 2L bolus; wade exchanged; d50; insulin 5 units; calcium gluconate 2g; Lokelma 10 g   (15 Mar 2024 21:53)      Vital Signs Last 24 Hrs  T(C): 36.7 (16 Mar 2024 17:57), Max: 36.7 (16 Mar 2024 17:57)  T(F): 98 (16 Mar 2024 17:57), Max: 98 (16 Mar 2024 17:57)  HR: 63 (16 Mar 2024 17:57) (63 - 82)  BP: 105/57 (16 Mar 2024 17:57) (96/55 - 120/62)  BP(mean): 84 (16 Mar 2024 15:30) (71 - 84)  RR: 17 (16 Mar 2024 17:57) (14 - 17)  SpO2: 94% (16 Mar 2024 17:57) (94% - 94%)    Parameters below as of 16 Mar 2024 17:57  Patient On (Oxygen Delivery Method): room air      I&O's Summary    16 Mar 2024 07:01  -  17 Mar 2024 07:00  --------------------------------------------------------  IN: 0 mL / OUT: 775 mL / NET: -775 mL        PE:  Gen: NAD, resting comfortably  Abd: NTND  : No CVAT b/l, wade draining yellow urine with purulent sediments   GREGORIA:    LABS:                        9.5    7.45  )-----------( 154      ( 17 Mar 2024 06:52 )             27.8     03-17    133<L>  |  99  |  79<H>  ----------------------------<  179<H>  5.0   |  19<L>  |  5.90<H>    Ca    8.9      17 Mar 2024 06:52  Phos  4.6     03-17  Mg     1.6     03-17    TPro  5.9<L>  /  Alb  2.5<L>  /  TBili  0.3  /  DBili  x   /  AST  19  /  ALT  10  /  AlkPhos  51  03-17      Cultures  Culture Results:   Culture in progress (03-15 @ 22:13)  Culture Results:   Culture in progress (03-15 @ 22:01)  Culture Results:   Culture in progress (03-15 @ 17:49)      A/P    < from: US Retroperitoneal Complete (03.17.24 @ 09:49) >    ACC: [id number] EXAM:  US RETROPERITONEAL COMPLETE   ORDERED BY: MAIKEL NAVAS     PROCEDURE DATE:  03/17/2024          INTERPRETATION:  CLINICAL INFORMATION: Assess for hydronephrosis    COMPARISON: Retroperitoneal ultrasound from 12/23/2024 and CT of the   abdomen and pelvis from 2/21/2024.    TECHNIQUE: Sonography of the kidneys and bladder.    FINDINGS:    Right kidney: 10.5 cm. No renal mass, hydronephrosis or calculi.    Left kidney: 11.4 cm. No renal mass, hydronephrosis or calculi.    Urinary bladder: Markedly thickened and trabeculated bladder wall similar   to prior exam. Wade in place. Small amount of fluid within the bladder.    Prostate: 5.6 x 5.6 x 6.2 cm. Volume: 100 cc.    IMPRESSION:    1.  No hydronephrosis or nephrolithiasis.  2.  Markedly thickened, trabeculated bladder wall similar to prior exam   with Wade in place. Recommend clinical correlation. Small amount of   fluid within the bladder.  3.  Prostatomegaly.        --- End of Report ---          KHALED ALTAWIL MD; Resident Radiologist  This document has been electronically signed.  PRIYANK ELLISON MD; Attending Radiologist  This document has been electronically signed. Mar 17 2024 10:14AM    < end of copied text >

## 2024-03-17 NOTE — PROGRESS NOTE ADULT - PROBLEM SELECTOR PLAN 2
Patient with hyperkalemia to 6.4 on arrival to the ED. Likely iso post-obstructive JULIO CESAR, given hyperkalemia cocktail + lokelma 10mg q8h x 2. Repeat K downtrending, no EKG changes noted. Nephrology consulted    - f/u nephrology recs  - f/u BMP q4-6h until K normalizes  - s/p additional 5units insulin/dextrose 3/16 iso K of 5.6 (only responded to 10 lokelma by 1 unit)  - monitor UOP;  - caution with insulin/dextrose cocktail given poor renal function

## 2024-03-18 ENCOUNTER — RESULT REVIEW (OUTPATIENT)
Age: 89
End: 2024-03-18

## 2024-03-18 LAB
ANION GAP SERPL CALC-SCNC: 14 MMOL/L — SIGNIFICANT CHANGE UP (ref 5–17)
BASOPHILS # BLD AUTO: 0.01 K/UL — SIGNIFICANT CHANGE UP (ref 0–0.2)
BASOPHILS NFR BLD AUTO: 0.1 % — SIGNIFICANT CHANGE UP (ref 0–2)
BUN SERPL-MCNC: 76 MG/DL — HIGH (ref 7–23)
CALCIUM SERPL-MCNC: 8.4 MG/DL — SIGNIFICANT CHANGE UP (ref 8.4–10.5)
CHLORIDE SERPL-SCNC: 100 MMOL/L — SIGNIFICANT CHANGE UP (ref 96–108)
CO2 SERPL-SCNC: 20 MMOL/L — LOW (ref 22–31)
CREAT SERPL-MCNC: 5.78 MG/DL — HIGH (ref 0.5–1.3)
EGFR: 8 ML/MIN/1.73M2 — LOW
EOSINOPHIL # BLD AUTO: 0.1 K/UL — SIGNIFICANT CHANGE UP (ref 0–0.5)
EOSINOPHIL NFR BLD AUTO: 1.4 % — SIGNIFICANT CHANGE UP (ref 0–6)
GLUCOSE BLDC GLUCOMTR-MCNC: 111 MG/DL — HIGH (ref 70–99)
GLUCOSE BLDC GLUCOMTR-MCNC: 172 MG/DL — HIGH (ref 70–99)
GLUCOSE BLDC GLUCOMTR-MCNC: 184 MG/DL — HIGH (ref 70–99)
GLUCOSE BLDC GLUCOMTR-MCNC: 197 MG/DL — HIGH (ref 70–99)
GLUCOSE BLDC GLUCOMTR-MCNC: 208 MG/DL — HIGH (ref 70–99)
GLUCOSE BLDC GLUCOMTR-MCNC: 215 MG/DL — HIGH (ref 70–99)
GLUCOSE BLDC GLUCOMTR-MCNC: 252 MG/DL — HIGH (ref 70–99)
GLUCOSE BLDC GLUCOMTR-MCNC: 311 MG/DL — HIGH (ref 70–99)
GLUCOSE SERPL-MCNC: 228 MG/DL — HIGH (ref 70–99)
HCT VFR BLD CALC: 28.1 % — LOW (ref 39–50)
HGB BLD-MCNC: 9.5 G/DL — LOW (ref 13–17)
IMM GRANULOCYTES NFR BLD AUTO: 1.1 % — HIGH (ref 0–0.9)
LYMPHOCYTES # BLD AUTO: 0.62 K/UL — LOW (ref 1–3.3)
LYMPHOCYTES # BLD AUTO: 8.7 % — LOW (ref 13–44)
MAGNESIUM SERPL-MCNC: 1.6 MG/DL — SIGNIFICANT CHANGE UP (ref 1.6–2.6)
MCHC RBC-ENTMCNC: 30.8 PG — SIGNIFICANT CHANGE UP (ref 27–34)
MCHC RBC-ENTMCNC: 33.8 GM/DL — SIGNIFICANT CHANGE UP (ref 32–36)
MCV RBC AUTO: 91.2 FL — SIGNIFICANT CHANGE UP (ref 80–100)
MONOCYTES # BLD AUTO: 0.59 K/UL — SIGNIFICANT CHANGE UP (ref 0–0.9)
MONOCYTES NFR BLD AUTO: 8.3 % — SIGNIFICANT CHANGE UP (ref 2–14)
NEUTROPHILS # BLD AUTO: 5.72 K/UL — SIGNIFICANT CHANGE UP (ref 1.8–7.4)
NEUTROPHILS NFR BLD AUTO: 80.4 % — HIGH (ref 43–77)
NRBC # BLD: 0 /100 WBCS — SIGNIFICANT CHANGE UP (ref 0–0)
PHOSPHATE SERPL-MCNC: 4.5 MG/DL — SIGNIFICANT CHANGE UP (ref 2.5–4.5)
PLATELET # BLD AUTO: 156 K/UL — SIGNIFICANT CHANGE UP (ref 150–400)
POTASSIUM SERPL-MCNC: 4.3 MMOL/L — SIGNIFICANT CHANGE UP (ref 3.5–5.3)
POTASSIUM SERPL-SCNC: 4.3 MMOL/L — SIGNIFICANT CHANGE UP (ref 3.5–5.3)
RBC # BLD: 3.08 M/UL — LOW (ref 4.2–5.8)
RBC # FLD: 15.2 % — HIGH (ref 10.3–14.5)
SODIUM SERPL-SCNC: 134 MMOL/L — LOW (ref 135–145)
WBC # BLD: 7.12 K/UL — SIGNIFICANT CHANGE UP (ref 3.8–10.5)
WBC # FLD AUTO: 7.12 K/UL — SIGNIFICANT CHANGE UP (ref 3.8–10.5)

## 2024-03-18 PROCEDURE — 93306 TTE W/DOPPLER COMPLETE: CPT | Mod: 26

## 2024-03-18 PROCEDURE — 99232 SBSQ HOSP IP/OBS MODERATE 35: CPT

## 2024-03-18 PROCEDURE — 99233 SBSQ HOSP IP/OBS HIGH 50: CPT

## 2024-03-18 RX ORDER — SODIUM CHLORIDE 9 MG/ML
1000 INJECTION, SOLUTION INTRAVENOUS
Refills: 0 | Status: DISCONTINUED | OUTPATIENT
Start: 2024-03-18 | End: 2024-03-20

## 2024-03-18 RX ORDER — FLUCONAZOLE 150 MG/1
400 TABLET ORAL ONCE
Refills: 0 | Status: DISCONTINUED | OUTPATIENT
Start: 2024-03-18 | End: 2024-03-18

## 2024-03-18 RX ORDER — MAGNESIUM SULFATE 500 MG/ML
2 VIAL (ML) INJECTION ONCE
Refills: 0 | Status: COMPLETED | OUTPATIENT
Start: 2024-03-18 | End: 2024-03-18

## 2024-03-18 RX ORDER — FLUCONAZOLE 150 MG/1
400 TABLET ORAL ONCE
Refills: 0 | Status: COMPLETED | OUTPATIENT
Start: 2024-03-18 | End: 2024-03-18

## 2024-03-18 RX ORDER — FLUCONAZOLE 150 MG/1
200 TABLET ORAL EVERY 24 HOURS
Refills: 0 | Status: DISCONTINUED | OUTPATIENT
Start: 2024-03-18 | End: 2024-03-18

## 2024-03-18 RX ORDER — FLUCONAZOLE 150 MG/1
200 TABLET ORAL EVERY 24 HOURS
Refills: 0 | Status: DISCONTINUED | OUTPATIENT
Start: 2024-03-19 | End: 2024-03-21

## 2024-03-18 RX ADMIN — Medication 6: at 14:33

## 2024-03-18 RX ADMIN — TAMSULOSIN HYDROCHLORIDE 0.8 MILLIGRAM(S): 0.4 CAPSULE ORAL at 21:51

## 2024-03-18 RX ADMIN — ATORVASTATIN CALCIUM 40 MILLIGRAM(S): 80 TABLET, FILM COATED ORAL at 21:52

## 2024-03-18 RX ADMIN — POLYETHYLENE GLYCOL 3350 17 GRAM(S): 17 POWDER, FOR SOLUTION ORAL at 12:32

## 2024-03-18 RX ADMIN — SODIUM ZIRCONIUM CYCLOSILICATE 10 GRAM(S): 10 POWDER, FOR SUSPENSION ORAL at 01:56

## 2024-03-18 RX ADMIN — SODIUM CHLORIDE 80 MILLILITER(S): 9 INJECTION, SOLUTION INTRAVENOUS at 18:31

## 2024-03-18 RX ADMIN — SODIUM ZIRCONIUM CYCLOSILICATE 10 GRAM(S): 10 POWDER, FOR SUSPENSION ORAL at 12:32

## 2024-03-18 RX ADMIN — HEPARIN SODIUM 5000 UNIT(S): 5000 INJECTION INTRAVENOUS; SUBCUTANEOUS at 18:31

## 2024-03-18 RX ADMIN — FINASTERIDE 5 MILLIGRAM(S): 5 TABLET, FILM COATED ORAL at 12:32

## 2024-03-18 RX ADMIN — Medication 25 GRAM(S): at 17:19

## 2024-03-18 RX ADMIN — Medication 3 MILLIGRAM(S): at 21:52

## 2024-03-18 RX ADMIN — FLUCONAZOLE 100 MILLIGRAM(S): 150 TABLET ORAL at 18:30

## 2024-03-18 RX ADMIN — SENNA PLUS 2 TABLET(S): 8.6 TABLET ORAL at 21:52

## 2024-03-18 RX ADMIN — SODIUM ZIRCONIUM CYCLOSILICATE 10 GRAM(S): 10 POWDER, FOR SUSPENSION ORAL at 18:31

## 2024-03-18 RX ADMIN — Medication 4: at 18:31

## 2024-03-18 RX ADMIN — PIPERACILLIN AND TAZOBACTAM 25 GRAM(S): 4; .5 INJECTION, POWDER, LYOPHILIZED, FOR SOLUTION INTRAVENOUS at 06:29

## 2024-03-18 RX ADMIN — HEPARIN SODIUM 5000 UNIT(S): 5000 INJECTION INTRAVENOUS; SUBCUTANEOUS at 06:29

## 2024-03-18 NOTE — PROGRESS NOTE ADULT - PROBLEM SELECTOR PLAN 2
RESOLVED  Patient with hyperkalemia to 6.4 on arrival to the ED. Likely iso post-obstructive JULIO CESAR, given hyperkalemia cocktail + lokelma 10mg q8h x 2. Repeat K downtrending, no EKG changes noted. Nephrology consulted    - f/u nephrology recs  - f/u Daily BMP  - monitor UOP;  - caution with insulin/dextrose cocktail given poor renal function

## 2024-03-18 NOTE — PROGRESS NOTE ADULT - PROBLEM SELECTOR PLAN 1
#Severe sepsis POA -  met 3/4 SIRS criteria with T 100.5/<96.8, , RR > 20, WBC 12.42.  Likely source UTI due to urinary tract obstruction + candidemia. ID consulted.   Cutltures: Blood Cx scandida surveillance NGTD, urine Cx candida  Blood Cx growing candida albicans Lactate 3.0 --> 2.0  - Dced Zosyn and caspofungin  - Fluconnizole 400 mg x1 day followed by 200 QD  - opthalmology consult

## 2024-03-18 NOTE — PROGRESS NOTE ADULT - SUBJECTIVE AND OBJECTIVE BOX
89 yo man admitted with change in mental status, wade obstruction, JULIO CESAR and now candidemia:  -much improved today  -blood pressure controlled  -more lucid    MEDICATIONS  (STANDING):  atorvastatin 40 milliGRAM(s) Oral at bedtime  dextrose 5%. 1000 milliLiter(s) (100 mL/Hr) IV Continuous <Continuous>  dextrose 5%. 1000 milliLiter(s) (50 mL/Hr) IV Continuous <Continuous>  dextrose 50% Injectable 25 Gram(s) IV Push once  dextrose 50% Injectable 12.5 Gram(s) IV Push once  dextrose 50% Injectable 25 Gram(s) IV Push once  finasteride 5 milliGRAM(s) Oral daily  fluconAZOLE IVPB 400 milliGRAM(s) IV Intermittent once  glucagon  Injectable 1 milliGRAM(s) IntraMuscular once  heparin   Injectable 5000 Unit(s) SubCutaneous every 12 hours  insulin lispro (ADMELOG) corrective regimen sliding scale   SubCutaneous three times a day before meals  insulin lispro (ADMELOG) corrective regimen sliding scale   SubCutaneous at bedtime  lactated ringers. 1000 milliLiter(s) (80 mL/Hr) IV Continuous <Continuous>  melatonin 3 milliGRAM(s) Oral at bedtime  polyethylene glycol 3350 17 Gram(s) Oral daily  senna 2 Tablet(s) Oral at bedtime  sodium zirconium cyclosilicate 10 Gram(s) Oral every 8 hours  tamsulosin 0.8 milliGRAM(s) Oral at bedtime    MEDICATIONS  (PRN):  acetaminophen     Tablet .. 650 milliGRAM(s) Oral every 6 hours PRN Temp greater or equal to 38C (100.4F), Mild Pain (1 - 3)  dextrose Oral Gel 15 Gram(s) Oral once PRN Blood Glucose LESS THAN 70 milliGRAM(s)/deciliter    ICU Vital Signs Last 24 Hrs  T(C): 36.5 (18 Mar 2024 16:13), Max: 36.8 (18 Mar 2024 10:35)  T(F): 97.7 (18 Mar 2024 16:13), Max: 98.2 (18 Mar 2024 10:35)  HR: 64 (18 Mar 2024 16:13) (64 - 77)  BP: 111/66 (18 Mar 2024 16:13) (105/63 - 121/68)  BP(mean): --  ABP: --  ABP(mean): --  RR: 17 (18 Mar 2024 16:13) (16 - 18)  SpO2: 92% (18 Mar 2024 16:13) (92% - 95%)    O2 Parameters below as of 18 Mar 2024 16:13  Patient On (Oxygen Delivery Method): room air    NAD laying flat easily arousable  No JVD  Chest clear  CV RRR s1s2  Abd soft non-tender  Ext no edema  N                        9.5    7.12  )-----------( 156      ( 18 Mar 2024 05:30 )             28.1   03-18    134<L>  |  100  |  76<H>  ----------------------------<  228<H>  4.3   |  20<L>  |  5.78<H>    Ca    8.4      18 Mar 2024 05:30  Phos  4.5     03-18  Mg     1.6     03-18    TPro  5.9<L>  /  Alb  2.5<L>  /  TBili  0.3  /  DBili  x   /  AST  19  /  ALT  10  /  AlkPhos  51  03-17      Culture - Blood (collected 17 Mar 2024 04:10)  Source: .Blood Blood-Peripheral  Preliminary Report (18 Mar 2024 05:00):    No growth at 1 day.    Culture - Blood (collected 15 Mar 2024 22:13)  Source: .Blood Blood-Venous  Gram Stain (17 Mar 2024 01:56):    Aerobic Bottle: Yeast    Result called to and read back by_ CURTIS GODINEZ RN (04UR) 03/17/2024 01:54:52  Preliminary Report (18 Mar 2024 12:09):    Growth in aerobic bottle: Candida albicans    Susceptibility to follow.    Culture - Blood (collected 15 Mar 2024 22:01)  Source: .Blood Blood-Venous  Gram Stain (17 Mar 2024 01:55):    Aerobic Bottle: Yeast    Result called to and read back by_ CURTIS GODINEZ RN (04UR) 03/17/2024 01:54:52    ***Blood Panel PCR results on this specimen are available    approximately 3 hours after the Gram stain result.***    Gram stain, PCR, and/or culture results may not always    correspond due to difference in methodologies.    ************************************************************    This PCR assay was performed using [employer] BCID2 panel.    This assay tests for bacterial, fungal and resistance gene    targets.  Preliminary Report (18 Mar 2024 12:09):    Growth in aerobic bottle: Candida albicans    Susceptibility to follow.  Organism: Blood Culture PCR (17 Mar 2024 01:57)  Organism: Blood Culture PCR (17 Mar 2024 01:57)          CONCLUSIONS:     1. Normal left and right ventricular size and systolic function.   2. No significant valvular disease.   3. No pericardial effusion.   4. No prior echo is available for comparison.    ---------------------------------------------------------------------------  -----  SPECTRAL DOPPLER ANALYSIS:    Tricuspid Valve and PA/RV Systolic Pressure: TR Max Velocity: RA   Pressure: 3 mmHg  RVSP/PASP:  TAPSE:           RV S':       10 cm/s      ---------------------------------------------------------------------------  -----  FINDINGS:    Left Ventricle:  The left ventricle is normal in size, wall thickness, and systolic   function with a calculated ejection fraction of 65%. There are no   regional wall motion abnormalities seen. There is normal left ventricular   diastolic function and filling pressure.    Right Ventricle:  The right ventricle is normal in size. Right ventricular systolic   function is normal. RV tissue Doppler S' is 10.30 cm/s (normal >10 cm/s).    Left Atrium:  The left atrium is normal in size.    Right Atrium:  The right atrium is normal in size.    Aortic Valve:  The aortic valve is calcified (morphology not well seen). There is no   evidence of aortic regurgitation.    Mitral Valve:  There is mild mitral annular calcification. There is trace mitral   regurgitation.    Tricuspid Valve:  Structurally normal tricuspid valve with normal leaflet excursion. There   is trace tricuspid regurgitation. There was insufficient tricuspid   regurgitation detected from which to calculate pulmonary artery systolic   pressure.    Inferior Vena Cava:  The inferior vena cava is normal in size (<2.1cm) with normal inspiratory   collapse (>50%) consistent with normal right atrial pressure (  3, range 0-5mmHg).    Pulmonic Valve:  Structurally normal pulmonic valve with normal leaflet excursion. There   is trace pulmonic regurgitation.    Aorta:  The aortic root is normal in size.    Pericardium:  No pericardial effusion is seen. Gabapentin Pregnancy And Lactation Text: This medication is Pregnancy Category C and isn't considered safe during pregnancy. It is excreted in breast milk.

## 2024-03-18 NOTE — PROGRESS NOTE ADULT - SUBJECTIVE AND OBJECTIVE BOX
Called pt with his lab  His psa is elevated will consult urology  +c  Diff called in flagyl    He states he is feeling better already Infectious Disease Progress Note:    INTERVAL HPI/OVERNIGHT EVENTS:    As per primary team, no o/n events. Patient was seen and examined at bedside. Case discussed with attending physician during morning rounds. Patient resting comfortably, somnolent but arousable.       PAST MEDICAL HISTORY:  URINARY RETENTION  HLD (hyperlipidemia)  BPH (benign prostatic hyperplasia)  JULIO CESAR (acute kidney injury)  Urinary retention  UTI (urinary tract infection)  Hyperkalemia  Sepsis  Diabetes mellitus  Metabolic encephalopathy  Candidemia      ROS: Unable to obtain      Allergies    No Known Allergies    Intolerances        ANTIBIOTICS/RELEVANT:  antimicrobials  fluconAZOLE IVPB 400 milliGRAM(s) IV Intermittent once    immunologic:    OTHER:  acetaminophen     Tablet .. 650 milliGRAM(s) Oral every 6 hours PRN  atorvastatin 40 milliGRAM(s) Oral at bedtime  dextrose 5%. 1000 milliLiter(s) IV Continuous <Continuous>  dextrose 5%. 1000 milliLiter(s) IV Continuous <Continuous>  dextrose 50% Injectable 25 Gram(s) IV Push once  dextrose 50% Injectable 12.5 Gram(s) IV Push once  dextrose 50% Injectable 25 Gram(s) IV Push once  dextrose Oral Gel 15 Gram(s) Oral once PRN  finasteride 5 milliGRAM(s) Oral daily  glucagon  Injectable 1 milliGRAM(s) IntraMuscular once  heparin   Injectable 5000 Unit(s) SubCutaneous every 12 hours  insulin lispro (ADMELOG) corrective regimen sliding scale   SubCutaneous three times a day before meals  insulin lispro (ADMELOG) corrective regimen sliding scale   SubCutaneous at bedtime  melatonin 3 milliGRAM(s) Oral at bedtime  polyethylene glycol 3350 17 Gram(s) Oral daily  senna 2 Tablet(s) Oral at bedtime  sodium zirconium cyclosilicate 10 Gram(s) Oral every 8 hours  tamsulosin 0.8 milliGRAM(s) Oral at bedtime      Objective:  Vital Signs Last 24 Hrs  T(C): 36.4 (18 Mar 2024 05:20), Max: 36.8 (17 Mar 2024 17:07)  T(F): 97.6 (18 Mar 2024 05:20), Max: 98.2 (17 Mar 2024 17:07)  HR: 77 (18 Mar 2024 05:20) (66 - 77)  BP: 121/68 (18 Mar 2024 05:20) (94/53 - 121/68)  RR: 18 (18 Mar 2024 05:20) (17 - 18)  SpO2: 94% (18 Mar 2024 05:20) (93% - 95%)    Parameters below as of 18 Mar 2024 05:20  Patient On (Oxygen Delivery Method): room air        PHYSICAL EXAM:  Constitutional: elderly male, resting comfortably; NAD  HEENT: NC/AT, EOMI, anicteric sclera, MMM  Neck: supple  Respiratory: CTA b/l, no W/R/R, no retractions  Cardiovascular: +S1/S2; RRR; no M/R/G  Gastrointestinal: soft, (+) BS, NT/ND; no rebound or guarding  Genitourinary: Dan in place, draining yellow urine, no suprapubic tenderness  Extremities: no joint swelling, edema or erythema  Dermatologic: warm, dry, intact; no rashes, wounds, or scars  Neurologic: somnolent, arousable to voice, not oriented, answering questions  Psychiatric: calm, cooperative    LABS:                        9.5    7.12  )-----------( 156      ( 18 Mar 2024 05:30 )             28.1     03-18    134<L>  |  100  |  76<H>  ----------------------------<  228<H>  4.3   |  20<L>  |  5.78<H>    Ca    8.4      18 Mar 2024 05:30  Phos  4.5     03-18  Mg     1.6     03-18    TPro  5.9<L>  /  Alb  2.5<L>  /  TBili  0.3  /  DBili  x   /  AST  19  /  ALT  10  /  AlkPhos  51  03-17      Urinalysis Basic - ( 18 Mar 2024 05:30 )    Color: x / Appearance: x / SG: x / pH: x  Gluc: 228 mg/dL / Ketone: x  / Bili: x / Urobili: x   Blood: x / Protein: x / Nitrite: x   Leuk Esterase: x / RBC: x / WBC x   Sq Epi: x / Non Sq Epi: x / Bacteria: x      MICROBIOLOGY:  3/15: BCx growing Candida albicans confirmed by PCR & culture 2/2 sets; susceptibilities pending  3/15: UCx growing >100k Candida albicans  3/17: BCx NGTD     RADIOLOGY & ADDITIONAL STUDIES: Reviewed

## 2024-03-18 NOTE — PROGRESS NOTE ADULT - ASSESSMENT
P89 y/o M w/ PMH of DM, BPH s/p chronic Wade, recurrent UTIs, presenting from [facility] rehab due to urinary retention x 1d, admitted for post-obstructive JULIO CESAR and UTI iso retention. At [facility] 03/14, noted to have minimal UOP, bladder scan showing 400cc, new wade placed with subsequent hematuria noted to urine bag with good output  then 03/15 noted to have minimal urine output. s/p new wade placed by urology [facility]. Draining well, uop ~33cc/hr. Admitted to [facility] iso hyperkalemia to 6.4, s/p hyperK cocktail. Patient's hyperkalemia is coming down appropriately, and he is medically stable for transfer to [facility].

## 2024-03-18 NOTE — PROGRESS NOTE ADULT - ASSESSMENT
89 yo man with diabetes and BPH with obstructed wade and JULIO CESAR on admission with change in mental status now much improved mental status  -blood culture c albicans follwed by ID on antifungal therapy  -thoracic echo negative for endocarditis  -renal function improved today  -mental status back at baseline   -DVT prophylaxis  -glycemic control on insulin:  CAPILLARY BLOOD GLUCOSE  POCT Blood Glucose.: 252 mg/dL (18 Mar 2024 14:28)  POCT Blood Glucose.: 215 mg/dL (18 Mar 2024 09:21)  POCT Blood Glucose.: 177 mg/dL (17 Mar 2024 21:50)  POCT Blood Glucose.: 184 mg/dL (17 Mar 2024 18:00)    Will d/w ID timing of antifungal therapy and follow up blood cultures  Will d/w team dispo to [facility] and then follow up with urology when candidemia is treated for suprapubinc cath

## 2024-03-18 NOTE — PROGRESS NOTE ADULT - ASSESSMENT
Patient is as 89 y/o M with PMH of DM, recurrent UTIs. Chronic urinary retention with Dan, now with obstructive UTI and non oliguric JULIO CESAR.    Pte is coming from [facility] where Dan was not working properly.     3/17/24 US(s/p 24h Dan placement):   1. No hydronephrosis or nephrolithiasis.  2. Markedly thickened, trabeculated bladder wall similar to prior exam with Dan in place. Recommend clinical correlation. Small amount of fluid within the bladder.  3. Prostatomegaly.      sCr baseline 1.1 (2/2024)  sCr now 5.9->5.7  Hyperkalemia improved.   AGMA resolved.   BUN 70s (stable)  Pseudohyponatremia (corrected NA ~136)    Last 24h uop: 1.4L (increased form 775 ml the day before)  I&O inaccurate  (intake not recorded)    Imp: Non oliguric JULIO CESAR.   Pte has chronic hydroureteronephrosis, now with Dan placed (3/16).   Cystatin C 3.7, GFR 11 (3/17)    Plan:  IVF with LR @ 80ml/hr, continuous (keep running until tomorrow morning)  Please ensure pte is having Strict I&O.  Follow Urology recs.   Avoid hypotension, SBP>100 as possible.   Renal diet.   Management of UTI/Candidemia as per primary team (Caspofungin not need "renal adjustment")  Obtain Cystatin C on 3/19 am  Repeat UA, U. Na on 3/19 am.   Replenish Mg with 2g IV, once.   No indication for RRT at this point.

## 2024-03-18 NOTE — PROGRESS NOTE ADULT - SUBJECTIVE AND OBJECTIVE BOX
SUBJECTIVE/OVERNIGHT EVENTS: No acute overnight events. Pt seen in AM at bedside, resting comfortably in bed, and does not appear to be in any acute distress. Urine in wade bag    VITAL SIGNS:  Vital Signs Last 24 Hrs  T(C): 36.4 (18 Mar 2024 05:20), Max: 36.8 (17 Mar 2024 17:07)  T(F): 97.6 (18 Mar 2024 05:20), Max: 98.2 (17 Mar 2024 17:07)  HR: 77 (18 Mar 2024 05:20) (66 - 77)  BP: 121/68 (18 Mar 2024 05:20) (94/53 - 121/68)  BP(mean): --  RR: 18 (18 Mar 2024 05:20) (17 - 18)  SpO2: 94% (18 Mar 2024 05:20) (93% - 95%)    Parameters below as of 18 Mar 2024 05:20  Patient On (Oxygen Delivery Method): room air    PHYSICAL EXAM:  Constitutional: NAD, comfortable in bed.  HEENT: NC/AT, PERRLA, EOMI, no conjunctival pallor or scleral icterus, MMM  Neck: Supple, no JVD  Respiratory: CTA B/L. No w/r/r.   Cardiovascular: RRR, normal S1 and S2, no m/r/g.   Gastrointestinal: +BS, soft NTND, no guarding or rebound tenderness, no palpable masses    folwy in place minimal urine in bag no blood  Extremities: wwp; no cyanosis, clubbing or edema.   Vascular: Pulses equal and strong throughout.   Neurological: AAOx3, no CN deficits, strength and sensation intact throughout.   Skin: No gross skin abnormalities or     MEDICATIONS:  MEDICATIONS  (STANDING):  atorvastatin 40 milliGRAM(s) Oral at bedtime  caspofungin IVPB 50 milliGRAM(s) IV Intermittent every 24 hours  dextrose 5%. 1000 milliLiter(s) (50 mL/Hr) IV Continuous <Continuous>  dextrose 5%. 1000 milliLiter(s) (100 mL/Hr) IV Continuous <Continuous>  dextrose 50% Injectable 25 Gram(s) IV Push once  dextrose 50% Injectable 12.5 Gram(s) IV Push once  dextrose 50% Injectable 25 Gram(s) IV Push once  finasteride 5 milliGRAM(s) Oral daily  glucagon  Injectable 1 milliGRAM(s) IntraMuscular once  heparin   Injectable 5000 Unit(s) SubCutaneous every 12 hours  insulin lispro (ADMELOG) corrective regimen sliding scale   SubCutaneous three times a day before meals  insulin lispro (ADMELOG) corrective regimen sliding scale   SubCutaneous at bedtime  lactated ringers. 1000 milliLiter(s) (100 mL/Hr) IV Continuous <Continuous>  melatonin 3 milliGRAM(s) Oral at bedtime  polyethylene glycol 3350 17 Gram(s) Oral daily  senna 2 Tablet(s) Oral at bedtime  sodium zirconium cyclosilicate 10 Gram(s) Oral every 8 hours  tamsulosin 0.8 milliGRAM(s) Oral at bedtime    MEDICATIONS  (PRN):  acetaminophen     Tablet .. 650 milliGRAM(s) Oral every 6 hours PRN Temp greater or equal to 38C (100.4F), Mild Pain (1 - 3)  dextrose Oral Gel 15 Gram(s) Oral once PRN Blood Glucose LESS THAN 70 milliGRAM(s)/deciliter      ALLERGIES:  Allergies    No Known Allergies    Intolerances        LABS:                        9.5    7.12  )-----------( 156      ( 18 Mar 2024 05:30 )             28.1     03-18    134<L>  |  100  |  76<H>  ----------------------------<  228<H>  4.3   |  20<L>  |  5.78<H>    Ca    8.4      18 Mar 2024 05:30  Phos  4.5     03-18  Mg     1.6     03-18    TPro  5.9<L>  /  Alb  2.5<L>  /  TBili  0.3  /  DBili  x   /  AST  19  /  ALT  10  /  AlkPhos  51  03-17        RADIOLOGY & ADDITIONAL TESTS: Reviewed.

## 2024-03-18 NOTE — PROGRESS NOTE ADULT - ASSESSMENT
89 yo M with DM, HLD, CKD, BPH, and recurrent UTIs. Admitted 3/15 with JULIO CESAR on CKD and urinary retention. 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 He has improved and leukocytosis resolved, likely from relief of obstruction prior to start of anti-fungal therapy. Started with Caspofungin until surveillance cultures cleared. Now that BCx (3/17) clear for 24 hours, would start azole for presumed susceptibility to C. albicans in absence of prior azole therapy. Azoles also have better penetration in the urine, so it is preferred agent.     Recommendations:  1. Stop Caspofungin  2. Start Fluconazole 400mg IV once today, then 200mg IV q24h starting tomorrow  3. Follow up blood cultures from 3/15 for formal susceptibility  4. Follow up surveillance blood culture from 3/17  5. Obtain TTE  6. Please consult ophthalmology for fundoscopy to screen for intraocular candida.    ID Team 1 will follow. Discussed with primary team. Final note pending attending attestation.

## 2024-03-19 LAB
ANION GAP SERPL CALC-SCNC: 16 MMOL/L — SIGNIFICANT CHANGE UP (ref 5–17)
BUN SERPL-MCNC: 59 MG/DL — HIGH (ref 7–23)
CALCIUM SERPL-MCNC: 8.7 MG/DL — SIGNIFICANT CHANGE UP (ref 8.4–10.5)
CHLORIDE SERPL-SCNC: 104 MMOL/L — SIGNIFICANT CHANGE UP (ref 96–108)
CO2 SERPL-SCNC: 21 MMOL/L — LOW (ref 22–31)
CREAT SERPL-MCNC: 5.08 MG/DL — HIGH (ref 0.5–1.3)
CYSTATIN C SERPL-MCNC: 2.97 MG/L — SIGNIFICANT CHANGE UP
EGFR: 9 ML/MIN/1.73M2 — LOW
GFR/BSA.PRED SERPLBLD CYS-BASED-ARV: 16 ML/MIN/1.73M2 — LOW
GLUCOSE BLDC GLUCOMTR-MCNC: 117 MG/DL — HIGH (ref 70–99)
GLUCOSE BLDC GLUCOMTR-MCNC: 153 MG/DL — HIGH (ref 70–99)
GLUCOSE BLDC GLUCOMTR-MCNC: 159 MG/DL — HIGH (ref 70–99)
GLUCOSE BLDC GLUCOMTR-MCNC: 169 MG/DL — HIGH (ref 70–99)
GLUCOSE BLDC GLUCOMTR-MCNC: 179 MG/DL — HIGH (ref 70–99)
GLUCOSE BLDC GLUCOMTR-MCNC: 198 MG/DL — HIGH (ref 70–99)
GLUCOSE SERPL-MCNC: 142 MG/DL — HIGH (ref 70–99)
MAGNESIUM SERPL-MCNC: 1.8 MG/DL — SIGNIFICANT CHANGE UP (ref 1.6–2.6)
PHOSPHATE SERPL-MCNC: 4.1 MG/DL — SIGNIFICANT CHANGE UP (ref 2.5–4.5)
POTASSIUM SERPL-MCNC: 3.9 MMOL/L — SIGNIFICANT CHANGE UP (ref 3.5–5.3)
POTASSIUM SERPL-SCNC: 3.9 MMOL/L — SIGNIFICANT CHANGE UP (ref 3.5–5.3)
SODIUM SERPL-SCNC: 141 MMOL/L — SIGNIFICANT CHANGE UP (ref 135–145)

## 2024-03-19 PROCEDURE — 99232 SBSQ HOSP IP/OBS MODERATE 35: CPT

## 2024-03-19 PROCEDURE — 99232 SBSQ HOSP IP/OBS MODERATE 35: CPT | Mod: GC

## 2024-03-19 RX ADMIN — TAMSULOSIN HYDROCHLORIDE 0.8 MILLIGRAM(S): 0.4 CAPSULE ORAL at 21:33

## 2024-03-19 RX ADMIN — Medication 3 MILLIGRAM(S): at 21:34

## 2024-03-19 RX ADMIN — SODIUM CHLORIDE 80 MILLILITER(S): 9 INJECTION, SOLUTION INTRAVENOUS at 10:48

## 2024-03-19 RX ADMIN — ATORVASTATIN CALCIUM 40 MILLIGRAM(S): 80 TABLET, FILM COATED ORAL at 21:34

## 2024-03-19 RX ADMIN — Medication 2: at 18:23

## 2024-03-19 RX ADMIN — FINASTERIDE 5 MILLIGRAM(S): 5 TABLET, FILM COATED ORAL at 12:40

## 2024-03-19 RX ADMIN — HEPARIN SODIUM 5000 UNIT(S): 5000 INJECTION INTRAVENOUS; SUBCUTANEOUS at 18:08

## 2024-03-19 RX ADMIN — SODIUM ZIRCONIUM CYCLOSILICATE 10 GRAM(S): 10 POWDER, FOR SUSPENSION ORAL at 02:27

## 2024-03-19 RX ADMIN — SODIUM ZIRCONIUM CYCLOSILICATE 10 GRAM(S): 10 POWDER, FOR SUSPENSION ORAL at 18:08

## 2024-03-19 RX ADMIN — HEPARIN SODIUM 5000 UNIT(S): 5000 INJECTION INTRAVENOUS; SUBCUTANEOUS at 06:44

## 2024-03-19 RX ADMIN — FLUCONAZOLE 100 MILLIGRAM(S): 150 TABLET ORAL at 13:30

## 2024-03-19 RX ADMIN — SODIUM ZIRCONIUM CYCLOSILICATE 10 GRAM(S): 10 POWDER, FOR SUSPENSION ORAL at 10:44

## 2024-03-19 RX ADMIN — Medication 2: at 09:56

## 2024-03-19 RX ADMIN — Medication 2: at 13:30

## 2024-03-19 NOTE — PROGRESS NOTE ADULT - SUBJECTIVE AND OBJECTIVE BOX
SUBJECTIVE/OVERNIGHT EVENTS: No acute overnight events. Pt seen in AM at bedside, resting comfortably in bed, and does not appear to be in any acute distress.    VITAL SIGNS:  Vital Signs Last 24 Hrs  T(C): 36.6 (19 Mar 2024 09:17), Max: 36.6 (18 Mar 2024 21:09)  T(F): 97.9 (19 Mar 2024 09:17), Max: 97.9 (18 Mar 2024 21:09)  HR: 59 (19 Mar 2024 09:17) (59 - 66)  BP: 124/72 (19 Mar 2024 09:17) (111/66 - 124/72)  BP(mean): --  RR: 18 (19 Mar 2024 09:17) (17 - 18)  SpO2: 93% (19 Mar 2024 09:17) (92% - 94%)    Parameters below as of 19 Mar 2024 09:17  Patient On (Oxygen Delivery Method): room air      PHYSICAL EXAM:  Constitutional: NAD, comfortable in bed.  HEENT: NC/AT, PERRLA, EOMI, no conjunctival pallor or scleral icterus, MMM  Neck: Supple, no JVD  Respiratory: CTA B/L. No w/r/r.   Cardiovascular: RRR, normal S1 and S2, no m/r/g.   Gastrointestinal: +BS, soft NTND, no guarding or rebound tenderness, no palpable masses    folwy in place minimal urine in bag no blood  Extremities: wwp; no cyanosis, clubbing or edema.   Vascular: Pulses equal and strong throughout.   Neurological: AAOx3, no CN deficits, strength and sensation intact throughout.   Skin: No gross skin abnormalities or     MEDICATIONS:  MEDICATIONS  (STANDING):  atorvastatin 40 milliGRAM(s) Oral at bedtime  dextrose 5%. 1000 milliLiter(s) (50 mL/Hr) IV Continuous <Continuous>  dextrose 5%. 1000 milliLiter(s) (100 mL/Hr) IV Continuous <Continuous>  dextrose 50% Injectable 12.5 Gram(s) IV Push once  dextrose 50% Injectable 25 Gram(s) IV Push once  dextrose 50% Injectable 25 Gram(s) IV Push once  finasteride 5 milliGRAM(s) Oral daily  fluconAZOLE IVPB 200 milliGRAM(s) IV Intermittent every 24 hours  glucagon  Injectable 1 milliGRAM(s) IntraMuscular once  heparin   Injectable 5000 Unit(s) SubCutaneous every 12 hours  insulin lispro (ADMELOG) corrective regimen sliding scale   SubCutaneous three times a day before meals  insulin lispro (ADMELOG) corrective regimen sliding scale   SubCutaneous at bedtime  lactated ringers. 1000 milliLiter(s) (80 mL/Hr) IV Continuous <Continuous>  melatonin 3 milliGRAM(s) Oral at bedtime  polyethylene glycol 3350 17 Gram(s) Oral daily  senna 2 Tablet(s) Oral at bedtime  sodium zirconium cyclosilicate 10 Gram(s) Oral every 8 hours  tamsulosin 0.8 milliGRAM(s) Oral at bedtime    MEDICATIONS  (PRN):  acetaminophen     Tablet .. 650 milliGRAM(s) Oral every 6 hours PRN Temp greater or equal to 38C (100.4F), Mild Pain (1 - 3)  dextrose Oral Gel 15 Gram(s) Oral once PRN Blood Glucose LESS THAN 70 milliGRAM(s)/deciliter      ALLERGIES:  Allergies    No Known Allergies    Intolerances        LABS:                        9.5    7.12  )-----------( 156      ( 18 Mar 2024 05:30 )             28.1     03-19    141  |  104  |  59<H>  ----------------------------<  142<H>  3.9   |  21<L>  |  5.08<H>    Ca    8.7      19 Mar 2024 05:30  Phos  4.1     03-19  Mg     1.8     03-19          RADIOLOGY & ADDITIONAL TESTS: Reviewed.

## 2024-03-19 NOTE — PROGRESS NOTE ADULT - ASSESSMENT
P89 y/o M w/ PMH of DM, BPH s/p chronic Wade, recurrent UTIs, presenting from [facility] rehab due to urinary retention x 1d, admitted for post-obstructive JULIO CESAR and UTI iso retention. At [facility] 03/14, noted to have minimal UOP, bladder scan showing 400cc, new wade placed with subsequent hematuria noted to urine bag with good output  then 03/15 noted to have minimal urine output. s/p new wade placed by urology [facility]. Draining well, uop ~33cc/hr. Admitted to [facility] iso hyperkalemia to 6.4, s/p hyperK cocktail. Patient's hyperkalemia is coming down appropriately, and he is medically stable for transfer to [facility].

## 2024-03-19 NOTE — PROGRESS NOTE ADULT - ASSESSMENT
89 yo M with DM, HLD, CKD, BPH, and recurrent UTIs. Admitted 3/15 with JULIO CESAR on CKD and urinary retention. 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 He has improved and leukocytosis resolved, likely from relief of obstruction prior to start of anti-fungal therapy. Started with Caspofungin until surveillance cultures cleared. Now that BCx (3/17) clear for 48 hours, will treat with azole for presumed susceptibility to C. albicans. Azoles also have better penetration in the urine, so it is preferred agent. TTE shows no significant valvular disease    Recommendations:  - Continue Fluconazole 200mg IV q24h for now  - Please monitor for 48-72 hrs after starting Fluconazole without formal susceptibilities   - Obtain EKG 48 hrs after starting Fluconazole  - Follow up blood cultures from 3/15 for formal susceptibility  - Follow up surveillance blood culture from 3/17 - NGTD  - Please have ophthalmology screen for intraocular candida before discharge     ID Team 1 will follow. Discussed with primary team. Final note pending attending attestation.

## 2024-03-19 NOTE — PROGRESS NOTE ADULT - SUBJECTIVE AND OBJECTIVE BOX
Infectious Disease Progress Note:    INTERVAL HPI/OVERNIGHT EVENTS:  Patient was seen and examined at bedside. No acute events overnight. Case discussed with attending physician during morning rounds. Patient resting comfortably, no acute complaints. Denies any fever, chills, dysuria, abd pain, n/v/d.       PAST MEDICAL HISTORY:  URINARY RETENTION      HLD (hyperlipidemia)    BPH (benign prostatic hyperplasia)    JUILO CESAR (acute kidney injury)    Urinary retention    UTI (urinary tract infection)    Prophylactic measure    Hyperkalemia    Sepsis    Diabetes mellitus    Metabolic encephalopathy    Candidemia        ROS:  CONSTITUTIONAL:  Negative fever or chills, feels well, good appetite  EYES:  Negative  blurry vision or double vision  CARDIOVASCULAR:  Negative for chest pain or palpitations  RESPIRATORY:  Negative for cough, wheezing, or SOB   GASTROINTESTINAL:  Negative for nausea, vomiting, diarrhea, constipation, or abdominal pain  GENITOURINARY:  Negative frequency, urgency or dysuria  NEUROLOGIC:  No headache, confusion, dizziness, lightheadedness    Allergies    No Known Allergies    Intolerances        ANTIBIOTICS/RELEVANT:  antimicrobials  fluconAZOLE IVPB 200 milliGRAM(s) IV Intermittent every 24 hours    immunologic:    OTHER:  acetaminophen     Tablet .. 650 milliGRAM(s) Oral every 6 hours PRN  atorvastatin 40 milliGRAM(s) Oral at bedtime  dextrose 5%. 1000 milliLiter(s) IV Continuous <Continuous>  dextrose 5%. 1000 milliLiter(s) IV Continuous <Continuous>  dextrose 50% Injectable 25 Gram(s) IV Push once  dextrose 50% Injectable 12.5 Gram(s) IV Push once  dextrose 50% Injectable 25 Gram(s) IV Push once  dextrose Oral Gel 15 Gram(s) Oral once PRN  finasteride 5 milliGRAM(s) Oral daily  glucagon  Injectable 1 milliGRAM(s) IntraMuscular once  heparin   Injectable 5000 Unit(s) SubCutaneous every 12 hours  insulin lispro (ADMELOG) corrective regimen sliding scale   SubCutaneous three times a day before meals  insulin lispro (ADMELOG) corrective regimen sliding scale   SubCutaneous at bedtime  lactated ringers. 1000 milliLiter(s) IV Continuous <Continuous>  melatonin 3 milliGRAM(s) Oral at bedtime  polyethylene glycol 3350 17 Gram(s) Oral daily  senna 2 Tablet(s) Oral at bedtime  sodium zirconium cyclosilicate 10 Gram(s) Oral every 8 hours  tamsulosin 0.8 milliGRAM(s) Oral at bedtime      Objective:  Vital Signs Last 24 Hrs  T(C): 36.6 (19 Mar 2024 09:17), Max: 36.6 (18 Mar 2024 21:09)  T(F): 97.9 (19 Mar 2024 09:17), Max: 97.9 (18 Mar 2024 21:09)  HR: 59 (19 Mar 2024 09:17) (59 - 66)  BP: 124/72 (19 Mar 2024 09:17) (111/66 - 124/72)  BP(mean): --  RR: 18 (19 Mar 2024 09:17) (17 - 18)  SpO2: 93% (19 Mar 2024 09:17) (92% - 94%)    Parameters below as of 19 Mar 2024 09:17  Patient On (Oxygen Delivery Method): room air        PHYSICAL EXAM:  Constitutional: elderly male, resting comfortably; NAD  HEENT: NC/AT, EOMI, anicteric sclera, MMM  Neck: supple  Respiratory: CTA b/l, no W/R/R, no retractions  Cardiovascular: +S1/S2; RRR; no M/R/G  Gastrointestinal: soft, (+) BS, NT/ND; no rebound or guarding  Genitourinary: Dan in place, draining light yellow urine, no suprapubic tenderness  Extremities: no joint swelling, edema or erythema  Dermatologic: warm, dry, intact; no rashes, wounds, or scars  Neurologic: arousable to voice, answering questions    LABS:                        9.5    7.12  )-----------( 156      ( 18 Mar 2024 05:30 )             28.1     03-19    141  |  104  |  59<H>  ----------------------------<  142<H>  3.9   |  21<L>  |  5.08<H>    Ca    8.7      19 Mar 2024 05:30  Phos  4.1     03-19  Mg     1.8     03-19        Urinalysis Basic - ( 19 Mar 2024 05:30 )    Color: x / Appearance: x / SG: x / pH: x  Gluc: 142 mg/dL / Ketone: x  / Bili: x / Urobili: x   Blood: x / Protein: x / Nitrite: x   Leuk Esterase: x / RBC: x / WBC x   Sq Epi: x / Non Sq Epi: x / Bacteria: x        MICROBIOLOGY:        RADIOLOGY & ADDITIONAL STUDIES:

## 2024-03-19 NOTE — PROGRESS NOTE ADULT - ASSESSMENT
Patient is as 89 y/o M with PMH of DM, recurrent UTIs. Chronic urinary retention with Dan, now with obstructive UTI and non oliguric JULIO CESAR.    Pte is coming from [facility] where Dan was not working properly.     3/17/24 US(s/p 24h Dan placement):   1. No hydronephrosis or nephrolithiasis.  2. Markedly thickened, trabeculated bladder wall similar to prior exam with Dan in place. Recommend clinical correlation. Small amount of fluid within the bladder.  3. Prostatomegaly.      sCr baseline 1.1 (2/2024)  sCr now 5.9->5.7->5.08  Hyperkalemia improved.   AGMA improved.   BUN improving, 59 today      Last 24h uop: 1.3L (form 1.4L the day before)  I&O inaccurate  (intake not recorded)    Imp: Non oliguric JULIO CESAR.   Pte has chronic hydroureteronephrosis, now with Dan placed (3/16).   Cystatin C 3.7, GFR 11 (3/17)    Plan:  C/w IVF with LR @ 80ml/hr. Pt with improved PO intake now. Lungs clear. BP stable. If continues to have good PO intake, can start weaning off the IVF this evening  Please ensure pte is having Strict I&O.  Follow Urology recs.   Avoid hypotension, SBP>100 as possible.   Renal diet.   Management of UTI/Candidemia as per primary team (Caspofungin not need "renal adjustment")  Obtain Cystatin C on 3/20 am  Repeat UA, U. Na on 3/20 am.   Replenish Mg as needed to keep ~2 and above   No indication for RRT at this point.

## 2024-03-19 NOTE — PROGRESS NOTE ADULT - SUBJECTIVE AND OBJECTIVE BOX
Patient is a 89y Male seen and evaluated at bedside.       Meds:    acetaminophen     Tablet .. 650 every 6 hours PRN  atorvastatin 40 at bedtime  dextrose 5%. 1000 <Continuous>  dextrose 5%. 1000 <Continuous>  dextrose 50% Injectable 12.5 once  dextrose 50% Injectable 25 once  dextrose 50% Injectable 25 once  dextrose Oral Gel 15 once PRN  finasteride 5 daily  fluconAZOLE IVPB 200 every 24 hours  glucagon  Injectable 1 once  heparin   Injectable 5000 every 12 hours  insulin lispro (ADMELOG) corrective regimen sliding scale  three times a day before meals  insulin lispro (ADMELOG) corrective regimen sliding scale  at bedtime  lactated ringers. 1000 <Continuous>  melatonin 3 at bedtime  polyethylene glycol 3350 17 daily  senna 2 at bedtime  sodium zirconium cyclosilicate 10 every 8 hours  tamsulosin 0.8 at bedtime      T(C): , Max: 36.6 (03-18-24 @ 21:09)  T(F): , Max: 97.9 (03-18-24 @ 21:09)  HR: 59 (03-19-24 @ 09:17)  BP: 124/72 (03-19-24 @ 09:17)  BP(mean): --  RR: 18 (03-19-24 @ 09:17)  SpO2: 93% (03-19-24 @ 09:17)  Wt(kg): --    03-18 @ 07:01  -  03-19 @ 07:00  --------------------------------------------------------  IN: 0 mL / OUT: 1300 mL / NET: -1300 mL          Review of Systems:  ROS negative except as per HPI      PHYSICAL EXAM:  Constitutional: NAD  Neck: No JVD  Respiratory: CTAB, no wheezes, rales or rhonchi  Cardiovascular: S1, S2, RRR  Gastrointestinal: ND/NT, +BS  Extremities:  No peripheral edema in LEs.   Neurological: A/O x 3, no focal motor deficits.   : + wade with clear colored urine      LABS:                        9.5    7.12  )-----------( 156      ( 18 Mar 2024 05:30 )             28.1     03-19    141  |  104  |  59<H>  ----------------------------<  142<H>  3.9   |  21<L>  |  5.08<H>    Ca    8.7      19 Mar 2024 05:30  Phos  4.1     03-19  Mg     1.8     03-19          Urinalysis Basic - ( 19 Mar 2024 05:30 )    Color: x / Appearance: x / SG: x / pH: x  Gluc: 142 mg/dL / Ketone: x  / Bili: x / Urobili: x   Blood: x / Protein: x / Nitrite: x   Leuk Esterase: x / RBC: x / WBC x   Sq Epi: x / Non Sq Epi: x / Bacteria: x            RADIOLOGY & ADDITIONAL STUDIES:

## 2024-03-19 NOTE — PROGRESS NOTE ADULT - PROBLEM SELECTOR PLAN 1
#Severe sepsis POA -  met 3/4 SIRS criteria with T 100.5/<96.8, , RR > 20, WBC 12.42.  Likely source UTI due to urinary tract obstruction + candidemia. ID consulted.   Cutltures: Blood Cx scandida surveillance NGTD, urine Cx candida  Blood Cx growing candida albicans Lactate 3.0 --> 2.0  - Dced Zosyn and caspofungin  - Fluconazole 200 mg IV QD  - opthalmology consult

## 2024-03-20 ENCOUNTER — TRANSCRIPTION ENCOUNTER (OUTPATIENT)
Age: 89
End: 2024-03-20

## 2024-03-20 LAB
ACANTHOCYTES BLD QL SMEAR: SIGNIFICANT CHANGE UP
ALBUMIN SERPL ELPH-MCNC: 2.5 G/DL — LOW (ref 3.3–5)
ALP SERPL-CCNC: 57 U/L — SIGNIFICANT CHANGE UP (ref 40–120)
ALT FLD-CCNC: 12 U/L — SIGNIFICANT CHANGE UP (ref 10–45)
ANION GAP SERPL CALC-SCNC: 13 MMOL/L — SIGNIFICANT CHANGE UP (ref 5–17)
ANISOCYTOSIS BLD QL: SLIGHT — SIGNIFICANT CHANGE UP
AST SERPL-CCNC: 17 U/L — SIGNIFICANT CHANGE UP (ref 10–40)
BASOPHILS # BLD AUTO: 0 K/UL — SIGNIFICANT CHANGE UP (ref 0–0.2)
BASOPHILS NFR BLD AUTO: 0 % — SIGNIFICANT CHANGE UP (ref 0–2)
BILIRUB SERPL-MCNC: 0.2 MG/DL — SIGNIFICANT CHANGE UP (ref 0.2–1.2)
BUN SERPL-MCNC: 44 MG/DL — HIGH (ref 7–23)
CALCIUM SERPL-MCNC: 8.5 MG/DL — SIGNIFICANT CHANGE UP (ref 8.4–10.5)
CHLORIDE SERPL-SCNC: 106 MMOL/L — SIGNIFICANT CHANGE UP (ref 96–108)
CO2 SERPL-SCNC: 23 MMOL/L — SIGNIFICANT CHANGE UP (ref 22–31)
CREAT SERPL-MCNC: 4.06 MG/DL — HIGH (ref 0.5–1.3)
EGFR: 12 ML/MIN/1.73M2 — LOW
EOSINOPHIL # BLD AUTO: 0.12 K/UL — SIGNIFICANT CHANGE UP (ref 0–0.5)
EOSINOPHIL NFR BLD AUTO: 1.7 % — SIGNIFICANT CHANGE UP (ref 0–6)
GLUCOSE BLDC GLUCOMTR-MCNC: 172 MG/DL — HIGH (ref 70–99)
GLUCOSE BLDC GLUCOMTR-MCNC: 183 MG/DL — HIGH (ref 70–99)
GLUCOSE BLDC GLUCOMTR-MCNC: 196 MG/DL — HIGH (ref 70–99)
GLUCOSE BLDC GLUCOMTR-MCNC: 225 MG/DL — HIGH (ref 70–99)
GLUCOSE SERPL-MCNC: 175 MG/DL — HIGH (ref 70–99)
HCT VFR BLD CALC: 28.7 % — LOW (ref 39–50)
HGB BLD-MCNC: 9.5 G/DL — LOW (ref 13–17)
LYMPHOCYTES # BLD AUTO: 1.13 K/UL — SIGNIFICANT CHANGE UP (ref 1–3.3)
LYMPHOCYTES # BLD AUTO: 16.2 % — SIGNIFICANT CHANGE UP (ref 13–44)
MACROCYTES BLD QL: SLIGHT — SIGNIFICANT CHANGE UP
MAGNESIUM SERPL-MCNC: 1.7 MG/DL — SIGNIFICANT CHANGE UP (ref 1.6–2.6)
MANUAL SMEAR VERIFICATION: SIGNIFICANT CHANGE UP
MCHC RBC-ENTMCNC: 30.8 PG — SIGNIFICANT CHANGE UP (ref 27–34)
MCHC RBC-ENTMCNC: 33.1 GM/DL — SIGNIFICANT CHANGE UP (ref 32–36)
MCV RBC AUTO: 93.2 FL — SIGNIFICANT CHANGE UP (ref 80–100)
MONOCYTES # BLD AUTO: 0.65 K/UL — SIGNIFICANT CHANGE UP (ref 0–0.9)
MONOCYTES NFR BLD AUTO: 9.4 % — SIGNIFICANT CHANGE UP (ref 2–14)
NEUTROPHILS # BLD AUTO: 4.99 K/UL — SIGNIFICANT CHANGE UP (ref 1.8–7.4)
NEUTROPHILS NFR BLD AUTO: 71.8 % — SIGNIFICANT CHANGE UP (ref 43–77)
OVALOCYTES BLD QL SMEAR: SLIGHT — SIGNIFICANT CHANGE UP
PLAT MORPH BLD: NORMAL — SIGNIFICANT CHANGE UP
PLATELET # BLD AUTO: 164 K/UL — SIGNIFICANT CHANGE UP (ref 150–400)
POIKILOCYTOSIS BLD QL AUTO: SIGNIFICANT CHANGE UP
POLYCHROMASIA BLD QL SMEAR: SLIGHT — SIGNIFICANT CHANGE UP
POTASSIUM SERPL-MCNC: 3.6 MMOL/L — SIGNIFICANT CHANGE UP (ref 3.5–5.3)
POTASSIUM SERPL-SCNC: 3.6 MMOL/L — SIGNIFICANT CHANGE UP (ref 3.5–5.3)
PROT SERPL-MCNC: 5.6 G/DL — LOW (ref 6–8.3)
RBC # BLD: 3.08 M/UL — LOW (ref 4.2–5.8)
RBC # FLD: 15.2 % — HIGH (ref 10.3–14.5)
RBC BLD AUTO: ABNORMAL
SMUDGE CELLS # BLD: PRESENT — SIGNIFICANT CHANGE UP
SODIUM SERPL-SCNC: 142 MMOL/L — SIGNIFICANT CHANGE UP (ref 135–145)
SPHEROCYTES BLD QL SMEAR: SLIGHT — SIGNIFICANT CHANGE UP
VARIANT LYMPHS # BLD: 0.9 % — SIGNIFICANT CHANGE UP (ref 0–6)
WBC # BLD: 6.95 K/UL — SIGNIFICANT CHANGE UP (ref 3.8–10.5)
WBC # FLD AUTO: 6.95 K/UL — SIGNIFICANT CHANGE UP (ref 3.8–10.5)

## 2024-03-20 PROCEDURE — 99232 SBSQ HOSP IP/OBS MODERATE 35: CPT | Mod: GC

## 2024-03-20 RX ORDER — MAGNESIUM SULFATE 500 MG/ML
2 VIAL (ML) INJECTION ONCE
Refills: 0 | Status: COMPLETED | OUTPATIENT
Start: 2024-03-20 | End: 2024-03-20

## 2024-03-20 RX ORDER — SODIUM CHLORIDE 9 MG/ML
1000 INJECTION, SOLUTION INTRAVENOUS
Refills: 0 | Status: DISCONTINUED | OUTPATIENT
Start: 2024-03-20 | End: 2024-03-21

## 2024-03-20 RX ADMIN — Medication 2: at 09:26

## 2024-03-20 RX ADMIN — Medication 25 GRAM(S): at 14:33

## 2024-03-20 RX ADMIN — ATORVASTATIN CALCIUM 40 MILLIGRAM(S): 80 TABLET, FILM COATED ORAL at 21:10

## 2024-03-20 RX ADMIN — Medication 4: at 13:44

## 2024-03-20 RX ADMIN — HEPARIN SODIUM 5000 UNIT(S): 5000 INJECTION INTRAVENOUS; SUBCUTANEOUS at 06:47

## 2024-03-20 RX ADMIN — FLUCONAZOLE 100 MILLIGRAM(S): 150 TABLET ORAL at 13:27

## 2024-03-20 RX ADMIN — HEPARIN SODIUM 5000 UNIT(S): 5000 INJECTION INTRAVENOUS; SUBCUTANEOUS at 17:48

## 2024-03-20 RX ADMIN — TAMSULOSIN HYDROCHLORIDE 0.8 MILLIGRAM(S): 0.4 CAPSULE ORAL at 21:11

## 2024-03-20 RX ADMIN — Medication 2: at 18:46

## 2024-03-20 RX ADMIN — Medication 3 MILLIGRAM(S): at 21:10

## 2024-03-20 RX ADMIN — SODIUM CHLORIDE 40 MILLILITER(S): 9 INJECTION, SOLUTION INTRAVENOUS at 16:46

## 2024-03-20 RX ADMIN — FINASTERIDE 5 MILLIGRAM(S): 5 TABLET, FILM COATED ORAL at 12:39

## 2024-03-20 NOTE — DIETITIAN INITIAL EVALUATION ADULT - OTHER CALCULATIONS
Estimated needs based on ABW; calorie/protein needs adjusted for advanced age, clinical course; fluid per team

## 2024-03-20 NOTE — PROGRESS NOTE ADULT - SUBJECTIVE AND OBJECTIVE BOX
*****INCOMPLETE NOTE*****    INTERVAL HPI/OVERNIGHT EVENTS:    SUBJECTIVE: Patient seen and examined at bedside, resting comfortably in bed, and does not appear to be in any acute distress. Patient states  Patient denies any recent fevers, chills, nausea, vomiting, headache, acute sob, chest pain, abdominal pain, genitourinary sx, extremity pain or swelling.     ANTIBIOTICS/RELEVANT:    MEDICATIONS  (STANDING):  atorvastatin 40 milliGRAM(s) Oral at bedtime  dextrose 5%. 1000 milliLiter(s) (100 mL/Hr) IV Continuous <Continuous>  dextrose 5%. 1000 milliLiter(s) (50 mL/Hr) IV Continuous <Continuous>  dextrose 50% Injectable 25 Gram(s) IV Push once  dextrose 50% Injectable 12.5 Gram(s) IV Push once  dextrose 50% Injectable 25 Gram(s) IV Push once  finasteride 5 milliGRAM(s) Oral daily  fluconAZOLE IVPB 200 milliGRAM(s) IV Intermittent every 24 hours  glucagon  Injectable 1 milliGRAM(s) IntraMuscular once  heparin   Injectable 5000 Unit(s) SubCutaneous every 12 hours  insulin lispro (ADMELOG) corrective regimen sliding scale   SubCutaneous three times a day before meals  insulin lispro (ADMELOG) corrective regimen sliding scale   SubCutaneous at bedtime  lactated ringers. 1000 milliLiter(s) (80 mL/Hr) IV Continuous <Continuous>  melatonin 3 milliGRAM(s) Oral at bedtime  polyethylene glycol 3350 17 Gram(s) Oral daily  senna 2 Tablet(s) Oral at bedtime  tamsulosin 0.8 milliGRAM(s) Oral at bedtime    MEDICATIONS  (PRN):  acetaminophen     Tablet .. 650 milliGRAM(s) Oral every 6 hours PRN Temp greater or equal to 38C (100.4F), Mild Pain (1 - 3)  dextrose Oral Gel 15 Gram(s) Oral once PRN Blood Glucose LESS THAN 70 milliGRAM(s)/deciliter      Vital Signs Last 24 Hrs  T(C): 36.5 (19 Mar 2024 21:12), Max: 36.6 (19 Mar 2024 09:17)  T(F): 97.7 (19 Mar 2024 21:12), Max: 97.9 (19 Mar 2024 09:17)  HR: 65 (19 Mar 2024 21:12) (59 - 65)  BP: 145/70 (19 Mar 2024 21:12) (120/70 - 145/70)  BP(mean): --  RR: 18 (19 Mar 2024 21:12) (17 - 18)  SpO2: 98% (19 Mar 2024 21:12) (93% - 98%)    Parameters below as of 19 Mar 2024 21:12  Patient On (Oxygen Delivery Method): room air        PHYSICAL EXAM:  General: in no acute distress  Eyes: EOMI intact bilaterally. Anicteric sclerae, moist conjunctivae  HENT: Moist mucous membranes  Neck: Trachea midline, supple  Lungs: CTA B/L. No wheezes, rales, or rhonchi  Cardiovascular: RRR. No murmurs, rubs, or gallops  Abdomen: Soft, non-tender non-distended; No rebound or guarding  Extremities: WWP, No clubbing, cyanosis or edema  Neurological: Alert and oriented  Skin: Warm and dry. No obvious rash     LABS:    03-19    141  |  104  |  59<H>  ----------------------------<  142<H>  3.9   |  21<L>  |  5.08<H>    Ca    8.7      19 Mar 2024 05:30  Phos  4.1     03-19  Mg     1.8     03-19        Urinalysis Basic - ( 19 Mar 2024 05:30 )    Color: x / Appearance: x / SG: x / pH: x  Gluc: 142 mg/dL / Ketone: x  / Bili: x / Urobili: x   Blood: x / Protein: x / Nitrite: x   Leuk Esterase: x / RBC: x / WBC x   Sq Epi: x / Non Sq Epi: x / Bacteria: x        MICROBIOLOGY:    RADIOLOGY & ADDITIONAL STUDIES: INTERVAL HPI/OVERNIGHT EVENTS: eric    SUBJECTIVE: Patient seen and examined at bedside, resting comfortably in bed, and does not appear to be in any acute distress.    MEDICATIONS  (STANDING):  atorvastatin 40 milliGRAM(s) Oral at bedtime  dextrose 5%. 1000 milliLiter(s) (100 mL/Hr) IV Continuous <Continuous>  dextrose 5%. 1000 milliLiter(s) (50 mL/Hr) IV Continuous <Continuous>  dextrose 50% Injectable 25 Gram(s) IV Push once  dextrose 50% Injectable 12.5 Gram(s) IV Push once  dextrose 50% Injectable 25 Gram(s) IV Push once  finasteride 5 milliGRAM(s) Oral daily  fluconAZOLE IVPB 200 milliGRAM(s) IV Intermittent every 24 hours  glucagon  Injectable 1 milliGRAM(s) IntraMuscular once  heparin   Injectable 5000 Unit(s) SubCutaneous every 12 hours  insulin lispro (ADMELOG) corrective regimen sliding scale   SubCutaneous three times a day before meals  insulin lispro (ADMELOG) corrective regimen sliding scale   SubCutaneous at bedtime  lactated ringers. 1000 milliLiter(s) (80 mL/Hr) IV Continuous <Continuous>  melatonin 3 milliGRAM(s) Oral at bedtime  polyethylene glycol 3350 17 Gram(s) Oral daily  senna 2 Tablet(s) Oral at bedtime  tamsulosin 0.8 milliGRAM(s) Oral at bedtime    MEDICATIONS  (PRN):  acetaminophen     Tablet .. 650 milliGRAM(s) Oral every 6 hours PRN Temp greater or equal to 38C (100.4F), Mild Pain (1 - 3)  dextrose Oral Gel 15 Gram(s) Oral once PRN Blood Glucose LESS THAN 70 milliGRAM(s)/deciliter      Vital Signs Last 24 Hrs  T(C): 36.5 (19 Mar 2024 21:12), Max: 36.6 (19 Mar 2024 09:17)  T(F): 97.7 (19 Mar 2024 21:12), Max: 97.9 (19 Mar 2024 09:17)  HR: 65 (19 Mar 2024 21:12) (59 - 65)  BP: 145/70 (19 Mar 2024 21:12) (120/70 - 145/70)  BP(mean): --  RR: 18 (19 Mar 2024 21:12) (17 - 18)  SpO2: 98% (19 Mar 2024 21:12) (93% - 98%)    Parameters below as of 19 Mar 2024 21:12  Patient On (Oxygen Delivery Method): room air        PHYSICAL EXAM:  General: in no acute distress  Eyes: EOMI intact bilaterally  HENT: Moist mucous membranes  Neck: Trachea midline  Lungs: CTA B/L  Cardiovascular: RRR  Abdomen: Soft, non-tender non-distended  : wade with clear urine  Extremities: WWP  Neurological: Alert and oriented  Skin: Warm and dry. No obvious rash     LABS:    03-19    141  |  104  |  59<H>  ----------------------------<  142<H>  3.9   |  21<L>  |  5.08<H>    Ca    8.7      19 Mar 2024 05:30  Phos  4.1     03-19  Mg     1.8     03-19        Urinalysis Basic - ( 19 Mar 2024 05:30 )    Color: x / Appearance: x / SG: x / pH: x  Gluc: 142 mg/dL / Ketone: x  / Bili: x / Urobili: x   Blood: x / Protein: x / Nitrite: x   Leuk Esterase: x / RBC: x / WBC x   Sq Epi: x / Non Sq Epi: x / Bacteria: x        MICROBIOLOGY:    RADIOLOGY & ADDITIONAL STUDIES:

## 2024-03-20 NOTE — PROGRESS NOTE ADULT - PROBLEM SELECTOR PLAN 4
P/w urinary retention and +UA. Pt has hx of recurrent UTIs. s/p ceftriazone in in ED  - c/w zosyn  - f/u urine cx  - f/u blood cx. P/w urinary retention and +UA. Pt has hx of recurrent UTIs. s/p ceftriazone in in ED  - c/w fluconazole  - f/u urine cx  - f/u blood cx.

## 2024-03-20 NOTE — PROGRESS NOTE ADULT - PROBLEM SELECTOR PLAN 2
RESOLVED  Patient with hyperkalemia to 6.4 on arrival to the ED. Likely iso post-obstructive JULIO CESAR, given hyperkalemia cocktail + lokelma 10mg q8h x 2. Repeat K downtrending, no EKG changes noted. Nephrology consulted    - f/u nephrology recs  - f/u Daily BMP  - monitor UOP;  - caution with insulin/dextrose cocktail given poor renal function RESOLVED  Patient with hyperkalemia to 6.4 on arrival to the ED. Likely iso post-obstructive JULIO CESAR, given hyperkalemia cocktail + lokelma 10mg q8h x 2. Repeat K downtrending, no EKG changes noted. Nephrology consulted    - f/u nephrology recs  - monitor UOP  - caution with insulin/dextrose cocktail given poor renal function

## 2024-03-20 NOTE — DIETITIAN INITIAL EVALUATION ADULT - OTHER INFO
P89 y/o M w/ PMH of DM, BPH s/p chronic Wade, recurrent UTIs, presenting from [facility] rehab due to urinary retention x 1d, admitted for post-obstructive JULIO CESAR and UTI iso retention. At [facility] 03/14, noted to have minimal UOP, bladder scan showing 400cc, new wade placed with subsequent hematuria noted to urine bag with good output  then 03/15 noted to have minimal urine output. s/p new wade placed by urology [facility]. Draining well, uop ~33cc/hr. Admitted to [facility] iso hyperkalemia to 6.4, s/p hyperK cocktail. Patient's hyperkalemia is coming down appropriately, and he is medically stable for transfer to [facility].    Pt assessed at bedside. Resting in bed on room air. Continues on renal diet, consistent carbohydrate. Reports fair PO intake, however likely not meeting nutrition needs based on diet recall. Tolerating diet well, no N/V, bowel regimen in place. No reports of weight or intake changes PTA.  Reviewed adequate intake parameters, current diet parameters as ordered. Diet preferences reviewed. No pain noted. Tate score 15. Stage 1 Pressure injury noted to midline back. RD to follow, nutrition recommendations below.

## 2024-03-20 NOTE — DISCHARGE NOTE PROVIDER - CARE PROVIDER_API CALL
Lai Huggins  Cardiovascular Disease  [address] [place] [address], [place] [address]  Phone: [phone]  Fax: [phone]  Follow Up Time: 1 week   Lai Huggins  Cardiovascular Disease  [address] [place] [id number]  Phone: [phone]  Fax: [phone]  Follow Up Time: 1 week    Lewis Diane  Urology  [facility] [place] [address]  Phone: ([phone]  Fax: ()-  Follow Up Time: 1 week   Lai Huggins  Cardiovascular Disease  [address] [place] [id number]  Phone: [phone]  Fax: [phone]  Established Patient  Scheduled Appointment: 03/28/2024 04:00 PM    Lewis Diane  Urology  [facility] [place] [address]  Phone: ([phone]  Fax: ()-  Follow Up Time: 1 week   Lai Huggins  Cardiovascular Disease  [address] [place] [id number]  Phone: [phone]  Fax: [phone]  Established Patient  Scheduled Appointment: 03/28/2024 04:00 PM    Lewis Diane  Urology  [address] [place] [address]  Phone: [phone]  Fax: (   )    -  Established Patient  Scheduled Appointment: 03/28/2024 01:00 PM

## 2024-03-20 NOTE — DIETITIAN INITIAL EVALUATION ADULT - PROBLEM SELECTOR PLAN 1
On admission met 3/4 SIRS criteria with T 100.5/<96.8, , RR > 20, WBC 12.42.  Likely source UTI due to urinary tract obstruction.    - Lactate 3.0  - c/w zosyn  - f/u blood cxs, UA/Ucx  - s/p 2L  IVF in ED

## 2024-03-20 NOTE — DISCHARGE NOTE PROVIDER - PROVIDER TOKENS
PROVIDER:[TOKEN:[[id number]],FOLLOWUP:[1 week]] PROVIDER:[TOKEN:[[id number]],FOLLOWUP:[1 week]],PROVIDER:[TOKEN:[[id number]],FOLLOWUP:[1 week]] PROVIDER:[TOKEN:[[id number]],SCHEDULEDAPPT:[03/28/2024],SCHEDULEDAPPTTIME:[04:00 PM],ESTABLISHEDPATIENT:[T]],PROVIDER:[TOKEN:[[id number]],FOLLOWUP:[1 week]] PROVIDER:[TOKEN:[[id number]],SCHEDULEDAPPT:[03/28/2024],SCHEDULEDAPPTTIME:[04:00 PM],ESTABLISHEDPATIENT:[T]],FREE:[LAST:[Benedicto],FIRST:[Lewis Triplett],PHONE:[phone]],FAX:[(   [phone]    -],ADDRESS:[Urology  [address] [place] [address]],SCHEDULEDAPPT:[03/28/2024],SCHEDULEDAPPTTIME:[01:00 PM],ESTABLISHEDPATIENT:[T]]

## 2024-03-20 NOTE — DIETITIAN INITIAL EVALUATION ADULT - PERTINENT LABORATORY DATA
03-20    142  |  106  |  44<H>  ----------------------------<  175<H>  3.6   |  23  |  4.06<H>    Ca    8.5      20 Mar 2024 05:30  Phos  4.1     03-19  Mg     1.7     03-20    TPro  5.6<L>  /  Alb  2.5<L>  /  TBili  0.2  /  DBili  x   /  AST  17  /  ALT  12  /  AlkPhos  57  03-20  POCT Blood Glucose.: 196 mg/dL (03-20-24 @ 18:00)  A1C with Estimated Average Glucose Result: 9.1 % (03-16-24 @ 07:37)  A1C with Estimated Average Glucose Result: 9.0 % (01-09-24 @ 05:30)

## 2024-03-20 NOTE — DIETITIAN INITIAL EVALUATION ADULT - NSFNSPHYEXAMSKINFT_GEN_A_CORE
Pressure Injury 1: midline, back, Stage I  Pressure Injury 2: none, none  Pressure Injury 3: none, none  Pressure Injury 4: none, none  Pressure Injury 5: none, none  Pressure Injury 6: none, none  Pressure Injury 7: none, none  Pressure Injury 8: none, none  Pressure Injury 9: none, none  Pressure Injury 10: none, none  Pressure Injury 11: none, none, Pressure Injury 1: midline, back, Stage I  Pressure Injury 2: none, none  Pressure Injury 3: none, none  Pressure Injury 4: none, none  Pressure Injury 5: none, none  Pressure Injury 6: none, none  Pressure Injury 7: none, none  Pressure Injury 8: none, none  Pressure Injury 9: none, none  Pressure Injury 10: none, none  Pressure Injury 11: none, none

## 2024-03-20 NOTE — PROGRESS NOTE ADULT - ASSESSMENT
P89 y/o M w/ PMH of DM, BPH s/p chronic Wade, recurrent UTIs, presenting from [facility] rehab due to urinary retention x 1d, admitted for post-obstructive JULIO CESAR and UTI iso retention. At [facility] 03/14, noted to have minimal UOP, bladder scan showing 400cc, new wade placed with subsequent hematuria noted to urine bag with good output  then 03/15 noted to have minimal urine output. s/p new wade placed by urology [facility]. Draining well, uop ~33cc/hr. Admitted to [facility] iso hyperkalemia to 6.4, s/p hyperK cocktail. Patient's hyperkalemia is coming down appropriately, and he is medically stable for transfer to [facility].

## 2024-03-20 NOTE — DIETITIAN INITIAL EVALUATION ADULT - PERTINENT MEDS FT
MEDICATIONS  (STANDING):  atorvastatin 40 milliGRAM(s) Oral at bedtime  dextrose 5%. 1000 milliLiter(s) (100 mL/Hr) IV Continuous <Continuous>  dextrose 5%. 1000 milliLiter(s) (50 mL/Hr) IV Continuous <Continuous>  dextrose 50% Injectable 25 Gram(s) IV Push once  dextrose 50% Injectable 12.5 Gram(s) IV Push once  dextrose 50% Injectable 25 Gram(s) IV Push once  finasteride 5 milliGRAM(s) Oral daily  fluconAZOLE IVPB 200 milliGRAM(s) IV Intermittent every 24 hours  glucagon  Injectable 1 milliGRAM(s) IntraMuscular once  heparin   Injectable 5000 Unit(s) SubCutaneous every 12 hours  insulin lispro (ADMELOG) corrective regimen sliding scale   SubCutaneous three times a day before meals  insulin lispro (ADMELOG) corrective regimen sliding scale   SubCutaneous at bedtime  lactated ringers. 1000 milliLiter(s) (40 mL/Hr) IV Continuous <Continuous>  melatonin 3 milliGRAM(s) Oral at bedtime  polyethylene glycol 3350 17 Gram(s) Oral daily  senna 2 Tablet(s) Oral at bedtime  tamsulosin 0.8 milliGRAM(s) Oral at bedtime    MEDICATIONS  (PRN):  acetaminophen     Tablet .. 650 milliGRAM(s) Oral every 6 hours PRN Temp greater or equal to 38C (100.4F), Mild Pain (1 - 3)  dextrose Oral Gel 15 Gram(s) Oral once PRN Blood Glucose LESS THAN 70 milliGRAM(s)/deciliter

## 2024-03-20 NOTE — PROGRESS NOTE ADULT - SUBJECTIVE AND OBJECTIVE BOX
REsolveing JULIO CESAR  Candidemia on antifungal therapy  Diabetes:  CAPILLARY BLOOD GLUCOSE  POCT Blood Glucose.: 196 mg/dL (20 Mar 2024 18:00)  POCT Blood Glucose.: 225 mg/dL (20 Mar 2024 13:25)  POCT Blood Glucose.: 172 mg/dL (20 Mar 2024 09:24)  POCT Blood Glucose.: 198 mg/dL (19 Mar 2024 22:12)  POCT Blood Glucose.: 179 mg/dL (19 Mar 2024 18:22)    MEDICATIONS  (STANDING):  atorvastatin 40 milliGRAM(s) Oral at bedtime  dextrose 5%. 1000 milliLiter(s) (100 mL/Hr) IV Continuous <Continuous>  dextrose 5%. 1000 milliLiter(s) (50 mL/Hr) IV Continuous <Continuous>  dextrose 50% Injectable 25 Gram(s) IV Push once  dextrose 50% Injectable 12.5 Gram(s) IV Push once  dextrose 50% Injectable 25 Gram(s) IV Push once  finasteride 5 milliGRAM(s) Oral daily  fluconAZOLE IVPB 200 milliGRAM(s) IV Intermittent every 24 hours  glucagon  Injectable 1 milliGRAM(s) IntraMuscular once  heparin   Injectable 5000 Unit(s) SubCutaneous every 12 hours  insulin lispro (ADMELOG) corrective regimen sliding scale   SubCutaneous three times a day before meals  insulin lispro (ADMELOG) corrective regimen sliding scale   SubCutaneous at bedtime  lactated ringers. 1000 milliLiter(s) (40 mL/Hr) IV Continuous <Continuous>  melatonin 3 milliGRAM(s) Oral at bedtime  polyethylene glycol 3350 17 Gram(s) Oral daily  senna 2 Tablet(s) Oral at bedtime  tamsulosin 0.8 milliGRAM(s) Oral at bedtime    MEDICATIONS  (PRN):  acetaminophen     Tablet .. 650 milliGRAM(s) Oral every 6 hours PRN Temp greater or equal to 38C (100.4F), Mild Pain (1 - 3)  dextrose Oral Gel 15 Gram(s) Oral once PRN Blood Glucose LESS THAN 70 milliGRAM(s)/deciliter    NAD lucid  No JVD  Chest clear  CV RRR s1s2 II/VI REGGIE LSB  Abd soft  Ext no edema                            9.5    6.95  )-----------( 164      ( 20 Mar 2024 05:30 )             28.7   03-20    142  |  106  |  44<H>  ----------------------------<  175<H>  3.6   |  23  |  4.06<H>    Ca    8.5      20 Mar 2024 05:30  Phos  4.1     03-19  Mg     1.7     03-20    TPro  5.6<L>  /  Alb  2.5<L>  /  TBili  0.2  /  DBili  x   /  AST  17  /  ALT  12  /  AlkPhos  57  03-20

## 2024-03-20 NOTE — PROGRESS NOTE ADULT - PROBLEM SELECTOR PLAN 1
#Severe sepsis POA -  met 3/4 SIRS criteria with T 100.5/<96.8, , RR > 20, WBC 12.42.  Likely source UTI due to urinary tract obstruction + candidemia. ID consulted.   Cutltures: Blood Cx scandida surveillance NGTD, urine Cx candida  Blood Cx growing candida albicans Lactate 3.0 --> 2.0  - Dced Zosyn and caspofungin  - Fluconazole 200 mg IV QD  - opthalmology consult #Severe sepsis POA -  met 3/4 SIRS criteria with T 100.5/<96.8, , RR > 20, WBC 12.42.  Likely source UTI due to urinary tract obstruction + candidemia. ID consulted.   Cutltures: Blood Cx scandida surveillance NGTD, urine Cx candida  Blood Cx growing candida albicans Lactate 3.0 --> 2.0  s/p Zosyn and caspofungin  - Fluconazole 200 mg IV QD  - ID recommending ophtho consult  - f/u ekg 1830 #Severe sepsis POA -  met 3/4 SIRS criteria with T 100.5/<96.8, , RR > 20, WBC 12.42.  Likely source UTI due to urinary tract obstruction + candidemia. ID consulted.   Cutltures: Blood Cx scandida surveillance NGTD, urine Cx candida  Blood Cx growing candida albicans Lactate 3.0 --> 2.0  s/p Zosyn and caspofungin  - Fluconazole 200 mg IV QD  - ID recommending ophtho consult  - f/u ekg 3/21

## 2024-03-20 NOTE — DIETITIAN INITIAL EVALUATION ADULT - PROBLEM SELECTOR PLAN 5
Patient noted to have JULIO CESAR with Cr 5.25 over baseline 1.05. found to have BS of 400cc  now s/p wade placement by urology in ED   - s/p 2 L IVF  - f/u urine lytes  - trend Cr  - avoid nephrotoxic drugs, renally dose meds  - will consider obtaining urine lytes if not improving

## 2024-03-20 NOTE — PROGRESS NOTE ADULT - PROBLEM SELECTOR PLAN 5
Patient noted to have JULIO CESAR with Cr 5.25 over baseline 1.05. found to have BS of 400cc  now s/p wade placement by urology in ED   - s/p 2 L IVF  - trend Cr: uptrending on 03/16 AM from 4.56 --> 5.75  - f/u urine lytes   - avoid nephrotoxic drugs, renally dose meds Patient noted to have JULIO CESAR with Cr 5.25 over baseline 1.05. found to have BS of 400cc  now s/p wade placement by urology in ED. s/p 2 L IVF. trend Cr: uptrending on 03/16 AM from 4.56 --> 5.75  - f/u urine lytes   - avoid nephrotoxic drugs, renally dose meds

## 2024-03-20 NOTE — DIETITIAN INITIAL EVALUATION ADULT - PROBLEM SELECTOR PLAN 3
Patient w/ chronic wade iso urinary retention/obstruction, presented from nursing home with obstructed wade. Wade replaced in the ED by urology with drainage of 400cc of purulent urine.   - monitor urine output, strict I/Os  - treat UTI as below

## 2024-03-20 NOTE — DISCHARGE NOTE PROVIDER - NSDCFUADDAPPT_GEN_ALL_CORE_FT
The office of Dr. Peacock will reach out to you to schedule an appointment to follow up. The office of Dr. Peacock will reach out to you to schedule an appointment to follow up.    Please bring your Insurance card, Photo ID and Discharge paperwork to the following appointment:    (1) Please follow up with your Cardiology/Primary Care Provider, Dr. Lai Huggins at [address] [place] [address] on  3/28/2024 at 4:00pm.    Appointment was scheduled by Ms. CLARITZA English, Referral Coordinator.   The office of Dr. Peacock will reach out to you to schedule an appointment to follow up.    Please bring your Insurance card, Photo ID and Discharge paperwork to the following appointments:    (1) Please follow up with your Urology Provider, Dr. Lewis Diane at [address] [place] [address] on 3/28/2024 at 1:00pm.    Appointment was scheduled by Ms. CLARITZA English, Referral Coordinator.    (2) Please follow up with your Cardiology/Primary Care Provider, Dr. Lai Huggins at [address] [place] [address] on  3/28/2024 at 4:00pm.    Appointment was scheduled by Ms. CLARITZA English, Referral Coordinator.

## 2024-03-20 NOTE — PROGRESS NOTE ADULT - PROBLEM SELECTOR PLAN 3
Patient w/ chronic wade iso urinary retention/obstruction, presented from nursing home with obstructed wade. Wade replaced in the ED by urology with drainage of 400cc of purulent urine. Urology consulted  -f/u urology recs  - UOP 25cc/hr between 6am-1pm 03/16      - s/p 250cc NS      - started maintenance fluids NS @ 60cc/hr   - treat UTI as below  - bladder scan on 3/16 1pm 32 (no evidence of retention while w/ new wade)    -outpatient urology follow up Patient w/ chronic wade iso urinary retention/obstruction, presented from nursing home with obstructed wade. Wade replaced in the ED by urology with drainage of 400cc of purulent urine. Urology consulted  -f/u urology recs  - treat UTI as below    -outpatient urology follow up

## 2024-03-20 NOTE — DISCHARGE NOTE PROVIDER - HOSPITAL COURSE
#Discharge: do not delete    89 y/o M w/ PMH of DM, BPH s/p chronic Wade, recurrent UTIs, presenting from [facility] rehab due to urinary retention x 1d, admitted for post-obstructive JULIO CESAR and UTI iso retention. At [facility] 03/14, noted to have minimal UOP, bladder scan showing 400cc, new wade placed with subsequent hematuria noted to urine bag with good output  then 03/15 noted to have minimal urine output. s/p new wade placed by urology [facility]. Draining well, uop ~33cc/hr. Admitted to tele iso hyperkalemia to 6.4, s/p hyperK cocktail. Patient's hyperkalemia is coming down appropriately, and he is medically stable for transfer to [facility].    Problem List/Main Diagnoses:   #Candidemia.   #Severe sepsis POA -  met 3/4 SIRS criteria with T 100.5/<96.8, , RR > 20, WBC 12.42.  Likely source UTI due to urinary tract obstruction + candidemia. ID consulted. Cultures: Blood Cx candida surveillance NGTD, urine Cx candida. s/p Zosyn and caspofungin. Received fluconazole 200mg IV qD and transitioned to oral regimen.  - f/u with ID outpatient    #Hyperkalemia.   RESOLVED  Patient with hyperkalemia to 6.4 on arrival to the ED. Likely iso post-obstructive JULIO CESAR, given hyperkalemia cocktail + lokelma 10mg q8h x 2. Repeat K downtrending, no EKG changes noted. Nephrology consulted.     - f/u nephrology outpatient    #Urinary retention.   Patient w/ chronic wade iso urinary retention/obstruction, presented from nursing home with obstructed wade. Wade replaced in the ED by urology with drainage of 400cc of purulent urine. Urology consulted    -f/u urology outpatient    #UTI (urinary tract infection).   ·  Plan: P/w urinary retention and +UA. Pt has hx of recurrent UTIs. s/p ceftriazone in ED. Received fluconazole during admission.    #JULIO CESAR (acute kidney injury).   Patient noted to have JULIO CESAR with Cr 5.25 over baseline 1.05. found to have BS of 400cc  now s/p wade placement by urology in ED     #BPH (benign prostatic hyperplasia).   ·  Plan: Home medications: finasteride 5mg; Tamsulosin 0.8mg Received home medications    #Diabetes mellitus.   ·  Plan: Home medications: Metformin 1000mg BID; Sitaliptin 100mg. Received mISS during admission. Will continue on home meds outpatient.    #HLD (hyperlipidemia).   ·  Plan: Home medication(s): lipitor 40mg qhs. Received home meds during admission      New medications/therapies: fluconazole  Labs to be followed outpatient:   Exam to be followed outpatient:     Discharge plan: discharge to ______    Physical Exam Upon Discharge:  General: in no acute distress  Eyes: EOMI intact bilaterally  HENT: Moist mucous membranes  Neck: Trachea midline  Lungs: CTA B/L  Cardiovascular: RRR  Abdomen: Soft, non-tender non-distended  : wade with clear urine  Extremities: WWP  Neurological: Alert and oriented  Skin: Warm and dry. No obvious rash    #Discharge: do not delete    89 y/o M w/ PMH of DM, BPH s/p chronic Wade, recurrent UTIs, presenting from [facility] rehab due to urinary retention x 1d, admitted for post-obstructive JULIO CESAR and UTI iso retention. At [facility] 03/14, noted to have minimal UOP, bladder scan showing 400cc, new wade placed with subsequent hematuria noted to urine bag with good output  then 03/15 noted to have minimal urine output. s/p new wade placed by urology [facility]. Draining well, uop ~33cc/hr. Admitted to tele iso hyperkalemia to 6.4, s/p hyperK cocktail. Patient's hyperkalemia is coming down appropriately, and he is medically stable for transfer to [facility].    Problem List/Main Diagnoses:   #Candidemia.   #Severe sepsis POA -  met 3/4 SIRS criteria with T 100.5/<96.8, , RR > 20, WBC 12.42.  Likely source UTI due to urinary tract obstruction + candidemia. ID consulted. Cultures: Blood Cx candida surveillance NGTD, urine Cx candida. s/p Zosyn and caspofungin. Received fluconazole 200mg IV qD and transitioned to oral regimen.  - f/u with ID outpatient    #Hyperkalemia.   RESOLVED  Patient with hyperkalemia to 6.4 on arrival to the ED. Likely iso post-obstructive JULIO CESAR, given hyperkalemia cocktail + lokelma 10mg q8h x 2. Repeat K downtrending, no EKG changes noted. Nephrology consulted.     - f/u nephrology outpatient    #Urinary retention.   Patient w/ chronic wade iso urinary retention/obstruction, presented from nursing home with obstructed wade. Wade replaced in the ED by urology with drainage of 400cc of purulent urine. Urology consulted    -f/u urology outpatient    #UTI (urinary tract infection).   P/w urinary retention and +UA. Pt has hx of recurrent UTIs. s/p ceftriazone in ED. Received fluconazole during admission.    - f/u with Dr. Diane outpatient    #JULIO CESAR (acute kidney injury).   Patient noted to have JULIO CESAR with Cr 5.25 over baseline 1.05. found to have BS of 400cc  now s/p wade placement by urology in ED     #BPH (benign prostatic hyperplasia).   Home medications: finasteride 5mg; Tamsulosin 0.8mg Received home medications    #Diabetes mellitus.   Home medications: Metformin 1000mg BID; Sitaliptin 100mg. Received mISS during admission. Will continue on home meds outpatient.    #HLD (hyperlipidemia).   Home medication(s): lipitor 40mg qhs. Received home meds during admission      New medications/therapies: fluconazole  Labs to be followed outpatient:   Exam to be followed outpatient:     Discharge plan: discharge to [facility]    Physical Exam Upon Discharge:  General: in no acute distress  Eyes: EOMI intact bilaterally  HENT: Moist mucous membranes  Neck: Trachea midline  Lungs: CTA B/L  Cardiovascular: RRR  Abdomen: Soft, non-tender non-distended  : wade with clear urine  Extremities: WWP  Neurological: Alert and oriented  Skin: Warm and dry. No obvious rash    #Discharge: do not delete    89 y/o M w/ PMH of DM, BPH s/p chronic Wade, recurrent UTIs, presenting from [facility] rehab due to urinary retention x 1d, admitted for post-obstructive JULIO CESAR and UTI iso retention. At [facility] 03/14, noted to have minimal UOP, bladder scan showing 400cc, new wade placed with subsequent hematuria noted to urine bag with good output  then 03/15 noted to have minimal urine output. s/p new wade placed by urology [facility]. Draining well, uop ~33cc/hr. Admitted to tele iso hyperkalemia to 6.4, s/p hyperK cocktail. Patient's hyperkalemia is coming down appropriately, and he is medically stable for transfer to [facility].    Problem List/Main Diagnoses:   #Candidemia.   #Severe sepsis POA -  met 3/4 SIRS criteria with T 100.5/<96.8, , RR > 20, WBC 12.42.  Likely source UTI due to urinary tract obstruction + candidemia. ID consulted. Cultures: Blood Cx candida surveillance NGTD, urine Cx candida. s/p Zosyn and caspofungin. Received fluconazole 200mg IV qD and transitioned to oral regimen.    - c/w fluconazole 200mg qD until 3/30/24  - f/u with ID outpatient    #Hyperkalemia.   RESOLVED  Patient with hyperkalemia to 6.4 on arrival to the ED. Likely iso post-obstructive JULIO CESAR, given hyperkalemia cocktail + lokelma 10mg q8h x 2. Repeat K downtrending, no EKG changes noted. Nephrology consulted.     - f/u nephrology outpatient    #Urinary retention.   Patient w/ chronic wade iso urinary retention/obstruction, presented from nursing home with obstructed wade. Wade replaced in the ED by urology with drainage of 400cc of purulent urine. Urology consulted    -f/u urology outpatient    #UTI (urinary tract infection).   P/w urinary retention and +UA. Pt has hx of recurrent UTIs. s/p ceftriazone in ED. Received fluconazole during admission.    - f/u with Dr. Diane outpatient    #JULIO CESAR (acute kidney injury).   Patient noted to have JULIO CESAR with Cr 5.25 over baseline 1.05. found to have BS of 400cc  now s/p wade placement by urology in ED     #BPH (benign prostatic hyperplasia).   Home medications: finasteride 5mg; Tamsulosin 0.8mg Received home medications    #Diabetes mellitus.   Home medications: Metformin 1000mg BID; Sitaliptin 100mg. Received mISS during admission. Will continue on home meds outpatient.    #HLD (hyperlipidemia).   Home medication(s): lipitor 40mg qhs. Received home meds during admission      New medications/therapies: fluconazole 200 mg qD    Discharge plan: discharge to [facility]    Physical Exam Upon Discharge:  General: in no acute distress  Eyes: EOMI intact bilaterally  HENT: Moist mucous membranes  Neck: Trachea midline  Lungs: CTA B/L  Cardiovascular: RRR  Abdomen: Soft, non-tender non-distended  : wade with clear urine  Extremities: WWP  Neurological: Alert and oriented  Skin: Warm and dry. No obvious rash

## 2024-03-20 NOTE — DISCHARGE NOTE PROVIDER - CARE PROVIDERS DIRECT ADDRESSES
[email or web address]

## 2024-03-20 NOTE — DISCHARGE NOTE PROVIDER - NSDCMRMEDTOKEN_GEN_ALL_CORE_FT
alfuzosin 10 mg oral tablet, extended release: 1 tab(s) orally once a day  atorvastatin 10 mg oral tablet: 1 tab(s) orally once a day  finasteride 5 mg oral tablet: 1 tab(s) orally once a day  insulin aspart 100 units/mL injectable solution: injectable every 6 hours 151 - 200: 1 units sq  201 - 250: 2 units sq  251 - 300: 3 units sq  301 - 350: 4 units sq  351 - 400: 1 units sq  greater than 400: 6 units sq &amp; contact MD    POCT every 6 hours, daily  melatonin 3 mg oral tablet: 1 tab(s) orally once a day (at bedtime) As needed Insomnia  MetFORMIN (Eqv-Fortamet) 1000 mg oral tablet, extended release: 1 tab(s) orally 2 times a day  polyethylene glycol 3350 oral powder for reconstitution: 17 gram(s) orally 2 times a day  senna leaf extract oral tablet: 1 tab(s) orally once a day (at bedtime)  SITagliptin (as base) 100 mg oral tablet: 1 tab(s) orally once a day  tadalafil 5 mg oral tablet: 1 tab(s) orally once a day as needed for difficulty urinating  tamsulosin 0.4 mg oral capsule: 2 cap(s) orally once a day (at bedtime)   alfuzosin 10 mg oral tablet, extended release: 1 tab(s) orally once a day  atorvastatin 10 mg oral tablet: 1 tab(s) orally once a day  finasteride 5 mg oral tablet: 1 tab(s) orally once a day  fluconazole 200 mg oral tablet: 1 tab(s) orally every 24 hours Please begin next dose 3/22/24  insulin aspart 100 units/mL injectable solution: injectable every 6 hours 151 - 200: 1 units sq  201 - 250: 2 units sq  251 - 300: 3 units sq  301 - 350: 4 units sq  351 - 400: 1 units sq  greater than 400: 6 units sq &amp; contact MD    POCT every 6 hours, daily  melatonin 3 mg oral tablet: 1 tab(s) orally once a day (at bedtime) As needed Insomnia  MetFORMIN (Eqv-Fortamet) 1000 mg oral tablet, extended release: 1 tab(s) orally 2 times a day  polyethylene glycol 3350 oral powder for reconstitution: 17 gram(s) orally 2 times a day  senna leaf extract oral tablet: 1 tab(s) orally once a day (at bedtime)  SITagliptin (as base) 100 mg oral tablet: 1 tab(s) orally once a day  tadalafil 5 mg oral tablet: 1 tab(s) orally once a day as needed for difficulty urinating  tamsulosin 0.4 mg oral capsule: 2 cap(s) orally once a day (at bedtime)   alfuzosin 10 mg oral tablet, extended release: 1 tab(s) orally once a day  atorvastatin 10 mg oral tablet: 1 tab(s) orally once a day  finasteride 5 mg oral tablet: 1 tab(s) orally once a day  fluconazole 200 mg oral tablet: 1 tab(s) orally every 24 hours Please begin next dose 3/22/24, last dose on 3/30/24  insulin aspart 100 units/mL injectable solution: injectable every 6 hours 151 - 200: 1 units sq  201 - 250: 2 units sq  251 - 300: 3 units sq  301 - 350: 4 units sq  351 - 400: 1 units sq  greater than 400: 6 units sq &amp; contact MD    POCT every 6 hours, daily  melatonin 3 mg oral tablet: 1 tab(s) orally once a day (at bedtime) As needed Insomnia  MetFORMIN (Eqv-Fortamet) 1000 mg oral tablet, extended release: 1 tab(s) orally 2 times a day  polyethylene glycol 3350 oral powder for reconstitution: 17 gram(s) orally 2 times a day  senna leaf extract oral tablet: 1 tab(s) orally once a day (at bedtime)  SITagliptin (as base) 100 mg oral tablet: 1 tab(s) orally once a day  tadalafil 5 mg oral tablet: 1 tab(s) orally once a day as needed for difficulty urinating  tamsulosin 0.4 mg oral capsule: 2 cap(s) orally once a day (at bedtime)

## 2024-03-20 NOTE — DISCHARGE NOTE PROVIDER - NSDCCPCAREPLAN_GEN_ALL_CORE_FT
PRINCIPAL DISCHARGE DIAGNOSIS  Diagnosis: Urinary retention  Assessment and Plan of Treatment: A urinary tract infection (UTI) is an infection in any part of the urinary system. The urinary system includes the kidneys, ureters, bladder and urethra. Most infections involve the lower urinary tract — the bladder and the urethra. The cultures from your blood and urine revealed a fungus called candida. You received antifungals for this during your hospital stay. You will need to continue to take antifungals when you leave the hospitals.  Please follow up with your infectious disease specialist.  Please return to the emergency department if you have any fevers or urinary sympoms ( changes in smell, frequency or color).  Medications       PRINCIPAL DISCHARGE DIAGNOSIS  Diagnosis: Urinary retention  Assessment and Plan of Treatment: A urinary tract infection (UTI) is an infection in any part of the urinary system. The urinary system includes the kidneys, ureters, bladder and urethra. Most infections involve the lower urinary tract — the bladder and the urethra. The cultures from your blood and urine revealed a fungus called candida. You received antifungals for this during your hospital stay. You will need to continue to take antifungals when you leave the hospitals.  Please follow up with your infectious disease specialist within 1 week.  Please return to the emergency department if you have any fevers or urinary sympoms ( changes in smell, frequency or color).  Medications  Fluconazole 200mg once a day until 3/30/24

## 2024-03-20 NOTE — DIETITIAN INITIAL EVALUATION ADULT - PROBLEM SELECTOR PLAN 2
Patient with hyperkalemia to 6.4 on arrival to the ED. Likely iso post-obstructive JULIO CESAR, given hyperkalemia cocktail + lokelma 10mg q8h x 2. Repeat K downtrending, no EKG changes noted.   - f/u BMP q4-6h until K normalizes  - caution with insulin/dextrose cocktail given poor renal function  - if K continues to be elevated, consider lokelma 10mg q8h  - monitor UOP

## 2024-03-20 NOTE — DIETITIAN INITIAL EVALUATION ADULT - ADD RECOMMEND
1. Continue renal diet, consistent carbohydrate diet  --Consider liberalizing diet given pts advanced age  --Recommend add Nepro once/day  2. Follow intake closely, adjust prn  3. Monitor electrolytes, adjust and replete prn  4. Pain and bowel regimen per team   5. RD diet edu prn  6. Nutrition per [facility]

## 2024-03-20 NOTE — PROGRESS NOTE ADULT - ASSESSMENT
Resolving JULIO CESAR  Antifungal therapy per ID  Dan in place for BPH - on finasterdie and flomax  DVT prophylaxis  Lipds controlled  BP Controlled  Diabetes controlled on insulin  -he will resume his metformin and tradjenta on discharge  Dispo BRIAN

## 2024-03-20 NOTE — PROGRESS NOTE ADULT - ASSESSMENT
89 yo M with DM, HLD, CKD, BPH, and recurrent UTIs. Admitted 3/15 with JULIO CESAR on CKD and urinary retention. 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 He has improved and leukocytosis resolved, likely from relief of obstruction prior to start of anti-fungal therapy. Started with Caspofungin until surveillance cultures cleared. Now that BCx (3/17) clear for 48 hours, will treat with azole for presumed susceptibility to C. albicans. Azoles also have better penetration in the urine, so it is preferred agent. TTE shows no significant valvular disease.     Recommendations:  - Continue Fluconazole 200mg IV q24h for now  - Please monitor for 48-72 hrs after starting Fluconazole without formal susceptibilities  - Obtain EKG 3/21 AM  - Follow up Candida (+) BCx from 3/15 for formal susceptibility  - Follow up surveillance blood culture from 3/17 - NGTD day 3  - Please have ophthalmology screen for intraocular candida before discharge     ID Team 1 will follow. Discussed with primary team. Final note pending attending attestation.  89 yo M with DM, HLD, CKD, BPH, and recurrent UTIs. Admitted 3/15 with JULIO CESAR on CKD and urinary retention. 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 He has improved and leukocytosis resolved, likely from relief of obstruction prior to start of anti-fungal therapy. Started with Caspofungin until surveillance cultures cleared. Now that BCx (3/17) clear for 48 hours, will treat with azole for presumed susceptibility to C. albicans. Azoles also have better penetration in the urine, so it is preferred agent. TTE shows no significant valvular disease.     Recommendations:  - Continue Fluconazole 200mg IV q24h for now  - CTM for s/s infection 48-72 hrs after starting Fluconazole without formal susceptibilities  - Obtain EKG 3/21 AM  - Follow up Candida (+) BCx from 3/15 for formal susceptibility; does not have to remain hospitalized if result pending and patient medically ready for discharge  - Follow up surveillance blood culture from 3/17 - NGTD day 3  - Please have ophthalmology screen for intraocular candida before discharge     ID Team 1 will follow. Discussed with primary team. Final note pending attending attestation.

## 2024-03-21 ENCOUNTER — TRANSCRIPTION ENCOUNTER (OUTPATIENT)
Age: 89
End: 2024-03-21

## 2024-03-21 VITALS
HEART RATE: 69 BPM | OXYGEN SATURATION: 96 % | TEMPERATURE: 97 F | RESPIRATION RATE: 18 BRPM | DIASTOLIC BLOOD PRESSURE: 75 MMHG | SYSTOLIC BLOOD PRESSURE: 162 MMHG

## 2024-03-21 LAB
ANION GAP SERPL CALC-SCNC: 9 MMOL/L — SIGNIFICANT CHANGE UP (ref 5–17)
BASOPHILS # BLD AUTO: 0.02 K/UL — SIGNIFICANT CHANGE UP (ref 0–0.2)
BASOPHILS NFR BLD AUTO: 0.3 % — SIGNIFICANT CHANGE UP (ref 0–2)
BUN SERPL-MCNC: 35 MG/DL — HIGH (ref 7–23)
CALCIUM SERPL-MCNC: 8.4 MG/DL — SIGNIFICANT CHANGE UP (ref 8.4–10.5)
CHLORIDE SERPL-SCNC: 106 MMOL/L — SIGNIFICANT CHANGE UP (ref 96–108)
CO2 SERPL-SCNC: 25 MMOL/L — SIGNIFICANT CHANGE UP (ref 22–31)
CREAT SERPL-MCNC: 3.07 MG/DL — HIGH (ref 0.5–1.3)
CULTURE RESULTS: ABNORMAL
CULTURE RESULTS: ABNORMAL
EGFR: 17 ML/MIN/1.73M2 — LOW
EOSINOPHIL # BLD AUTO: 0.27 K/UL — SIGNIFICANT CHANGE UP (ref 0–0.5)
EOSINOPHIL NFR BLD AUTO: 4.1 % — SIGNIFICANT CHANGE UP (ref 0–6)
GLUCOSE BLDC GLUCOMTR-MCNC: 181 MG/DL — HIGH (ref 70–99)
GLUCOSE BLDC GLUCOMTR-MCNC: 204 MG/DL — HIGH (ref 70–99)
GLUCOSE SERPL-MCNC: 191 MG/DL — HIGH (ref 70–99)
HCT VFR BLD CALC: 27.7 % — LOW (ref 39–50)
HGB BLD-MCNC: 9.2 G/DL — LOW (ref 13–17)
IMM GRANULOCYTES NFR BLD AUTO: 3.3 % — HIGH (ref 0–0.9)
LYMPHOCYTES # BLD AUTO: 1.42 K/UL — SIGNIFICANT CHANGE UP (ref 1–3.3)
LYMPHOCYTES # BLD AUTO: 21.5 % — SIGNIFICANT CHANGE UP (ref 13–44)
MAGNESIUM SERPL-MCNC: 1.6 MG/DL — SIGNIFICANT CHANGE UP (ref 1.6–2.6)
MCHC RBC-ENTMCNC: 30.8 PG — SIGNIFICANT CHANGE UP (ref 27–34)
MCHC RBC-ENTMCNC: 33.2 GM/DL — SIGNIFICANT CHANGE UP (ref 32–36)
MCV RBC AUTO: 92.6 FL — SIGNIFICANT CHANGE UP (ref 80–100)
MONOCYTES # BLD AUTO: 0.67 K/UL — SIGNIFICANT CHANGE UP (ref 0–0.9)
MONOCYTES NFR BLD AUTO: 10.2 % — SIGNIFICANT CHANGE UP (ref 2–14)
NEUTROPHILS # BLD AUTO: 4 K/UL — SIGNIFICANT CHANGE UP (ref 1.8–7.4)
NEUTROPHILS NFR BLD AUTO: 60.6 % — SIGNIFICANT CHANGE UP (ref 43–77)
NRBC # BLD: 0 /100 WBCS — SIGNIFICANT CHANGE UP (ref 0–0)
ORGANISM # SPEC MICROSCOPIC CNT: ABNORMAL
ORGANISM # SPEC MICROSCOPIC CNT: SIGNIFICANT CHANGE UP
PHOSPHATE SERPL-MCNC: 3.5 MG/DL — SIGNIFICANT CHANGE UP (ref 2.5–4.5)
PLATELET # BLD AUTO: 178 K/UL — SIGNIFICANT CHANGE UP (ref 150–400)
POTASSIUM SERPL-MCNC: 3.5 MMOL/L — SIGNIFICANT CHANGE UP (ref 3.5–5.3)
POTASSIUM SERPL-SCNC: 3.5 MMOL/L — SIGNIFICANT CHANGE UP (ref 3.5–5.3)
RBC # BLD: 2.99 M/UL — LOW (ref 4.2–5.8)
RBC # FLD: 15 % — HIGH (ref 10.3–14.5)
SODIUM SERPL-SCNC: 140 MMOL/L — SIGNIFICANT CHANGE UP (ref 135–145)
SPECIMEN SOURCE: SIGNIFICANT CHANGE UP
SPECIMEN SOURCE: SIGNIFICANT CHANGE UP
WBC # BLD: 6.6 K/UL — SIGNIFICANT CHANGE UP (ref 3.8–10.5)
WBC # FLD AUTO: 6.6 K/UL — SIGNIFICANT CHANGE UP (ref 3.8–10.5)

## 2024-03-21 PROCEDURE — 82570 ASSAY OF URINE CREATININE: CPT

## 2024-03-21 PROCEDURE — 81001 URINALYSIS AUTO W/SCOPE: CPT

## 2024-03-21 PROCEDURE — 85025 COMPLETE CBC W/AUTO DIFF WBC: CPT

## 2024-03-21 PROCEDURE — 87150 DNA/RNA AMPLIFIED PROBE: CPT

## 2024-03-21 PROCEDURE — C8929: CPT

## 2024-03-21 PROCEDURE — 76770 US EXAM ABDO BACK WALL COMP: CPT

## 2024-03-21 PROCEDURE — 93005 ELECTROCARDIOGRAM TRACING: CPT

## 2024-03-21 PROCEDURE — 36415 COLL VENOUS BLD VENIPUNCTURE: CPT

## 2024-03-21 PROCEDURE — 87186 SC STD MICRODIL/AGAR DIL: CPT

## 2024-03-21 PROCEDURE — 82610 CYSTATIN C: CPT

## 2024-03-21 PROCEDURE — 87040 BLOOD CULTURE FOR BACTERIA: CPT

## 2024-03-21 PROCEDURE — 83735 ASSAY OF MAGNESIUM: CPT

## 2024-03-21 PROCEDURE — 87086 URINE CULTURE/COLONY COUNT: CPT

## 2024-03-21 PROCEDURE — 83605 ASSAY OF LACTIC ACID: CPT

## 2024-03-21 PROCEDURE — 86901 BLOOD TYPING SEROLOGIC RH(D): CPT

## 2024-03-21 PROCEDURE — 80053 COMPREHEN METABOLIC PANEL: CPT

## 2024-03-21 PROCEDURE — 84133 ASSAY OF URINE POTASSIUM: CPT

## 2024-03-21 PROCEDURE — 82962 GLUCOSE BLOOD TEST: CPT

## 2024-03-21 PROCEDURE — 84100 ASSAY OF PHOSPHORUS: CPT

## 2024-03-21 PROCEDURE — 86900 BLOOD TYPING SEROLOGIC ABO: CPT

## 2024-03-21 PROCEDURE — 96374 THER/PROPH/DIAG INJ IV PUSH: CPT

## 2024-03-21 PROCEDURE — 99232 SBSQ HOSP IP/OBS MODERATE 35: CPT | Mod: GC

## 2024-03-21 PROCEDURE — 84540 ASSAY OF URINE/UREA-N: CPT

## 2024-03-21 PROCEDURE — 96375 TX/PRO/DX INJ NEW DRUG ADDON: CPT

## 2024-03-21 PROCEDURE — 86850 RBC ANTIBODY SCREEN: CPT

## 2024-03-21 PROCEDURE — 83935 ASSAY OF URINE OSMOLALITY: CPT

## 2024-03-21 PROCEDURE — 93010 ELECTROCARDIOGRAM REPORT: CPT

## 2024-03-21 PROCEDURE — 83036 HEMOGLOBIN GLYCOSYLATED A1C: CPT

## 2024-03-21 PROCEDURE — 99285 EMERGENCY DEPT VISIT HI MDM: CPT | Mod: 25

## 2024-03-21 PROCEDURE — 84300 ASSAY OF URINE SODIUM: CPT

## 2024-03-21 PROCEDURE — 80048 BASIC METABOLIC PNL TOTAL CA: CPT

## 2024-03-21 RX ORDER — FLUCONAZOLE 150 MG/1
200 TABLET ORAL EVERY 24 HOURS
Refills: 0 | Status: DISCONTINUED | OUTPATIENT
Start: 2024-03-21 | End: 2024-03-21

## 2024-03-21 RX ORDER — FLUCONAZOLE 150 MG/1
1 TABLET ORAL
Qty: 0 | Refills: 0 | DISCHARGE
Start: 2024-03-21

## 2024-03-21 RX ORDER — MAGNESIUM SULFATE 500 MG/ML
2 VIAL (ML) INJECTION ONCE
Refills: 0 | Status: COMPLETED | OUTPATIENT
Start: 2024-03-21 | End: 2024-03-21

## 2024-03-21 RX ADMIN — Medication 4: at 13:12

## 2024-03-21 RX ADMIN — HEPARIN SODIUM 5000 UNIT(S): 5000 INJECTION INTRAVENOUS; SUBCUTANEOUS at 06:37

## 2024-03-21 RX ADMIN — FLUCONAZOLE 200 MILLIGRAM(S): 150 TABLET ORAL at 13:02

## 2024-03-21 RX ADMIN — Medication 2: at 09:40

## 2024-03-21 RX ADMIN — FINASTERIDE 5 MILLIGRAM(S): 5 TABLET, FILM COATED ORAL at 13:02

## 2024-03-21 RX ADMIN — Medication 25 GRAM(S): at 08:06

## 2024-03-21 NOTE — PROGRESS NOTE ADULT - PROVIDER SPECIALTY LIST ADULT
Cardiology
Infectious Disease
Internal Medicine
Nephrology
Cardiology
Cardiology
Nephrology
Nephrology
Cardiology
Cardiology
Internal Medicine

## 2024-03-21 NOTE — PROGRESS NOTE ADULT - SUBJECTIVE AND OBJECTIVE BOX
*****INCOMPLETE NOTE*****    INTERVAL HPI/OVERNIGHT EVENTS:    SUBJECTIVE: Patient seen and examined at bedside, resting comfortably in bed, and does not appear to be in any acute distress. Patient states  Patient denies any recent fevers, chills, nausea, vomiting, headache, acute sob, chest pain, abdominal pain, genitourinary sx, extremity pain or swelling.     ANTIBIOTICS/RELEVANT:    MEDICATIONS  (STANDING):  atorvastatin 40 milliGRAM(s) Oral at bedtime  dextrose 5%. 1000 milliLiter(s) (100 mL/Hr) IV Continuous <Continuous>  dextrose 5%. 1000 milliLiter(s) (50 mL/Hr) IV Continuous <Continuous>  dextrose 50% Injectable 25 Gram(s) IV Push once  dextrose 50% Injectable 12.5 Gram(s) IV Push once  dextrose 50% Injectable 25 Gram(s) IV Push once  finasteride 5 milliGRAM(s) Oral daily  fluconAZOLE IVPB 200 milliGRAM(s) IV Intermittent every 24 hours  glucagon  Injectable 1 milliGRAM(s) IntraMuscular once  heparin   Injectable 5000 Unit(s) SubCutaneous every 12 hours  insulin lispro (ADMELOG) corrective regimen sliding scale   SubCutaneous three times a day before meals  insulin lispro (ADMELOG) corrective regimen sliding scale   SubCutaneous at bedtime  lactated ringers. 1000 milliLiter(s) (40 mL/Hr) IV Continuous <Continuous>  melatonin 3 milliGRAM(s) Oral at bedtime  polyethylene glycol 3350 17 Gram(s) Oral daily  senna 2 Tablet(s) Oral at bedtime  tamsulosin 0.8 milliGRAM(s) Oral at bedtime    MEDICATIONS  (PRN):  acetaminophen     Tablet .. 650 milliGRAM(s) Oral every 6 hours PRN Temp greater or equal to 38C (100.4F), Mild Pain (1 - 3)  dextrose Oral Gel 15 Gram(s) Oral once PRN Blood Glucose LESS THAN 70 milliGRAM(s)/deciliter      Vital Signs Last 24 Hrs  T(C): 36.3 (21 Mar 2024 05:20), Max: 36.4 (20 Mar 2024 21:15)  T(F): 97.4 (21 Mar 2024 05:20), Max: 97.6 (20 Mar 2024 21:15)  HR: 56 (21 Mar 2024 05:20) (56 - 65)  BP: 147/82 (21 Mar 2024 05:20) (133/67 - 147/82)  BP(mean): --  RR: 17 (21 Mar 2024 05:20) (17 - 18)  SpO2: 95% (21 Mar 2024 05:20) (94% - 95%)    Parameters below as of 21 Mar 2024 05:20  Patient On (Oxygen Delivery Method): room air        PHYSICAL EXAM:  General: in no acute distress  Eyes: EOMI intact bilaterally. Anicteric sclerae, moist conjunctivae  HENT: Moist mucous membranes  Neck: Trachea midline, supple  Lungs: CTA B/L. No wheezes, rales, or rhonchi  Cardiovascular: RRR. No murmurs, rubs, or gallops  Abdomen: Soft, non-tender non-distended; No rebound or guarding  Extremities: WWP, No clubbing, cyanosis or edema  Neurological: Alert and oriented  Skin: Warm and dry. No obvious rash     LABS:                        9.5    6.95  )-----------( 164      ( 20 Mar 2024 05:30 )             28.7     03-20    142  |  106  |  44<H>  ----------------------------<  175<H>  3.6   |  23  |  4.06<H>    Ca    8.5      20 Mar 2024 05:30  Mg     1.7     03-20    TPro  5.6<L>  /  Alb  2.5<L>  /  TBili  0.2  /  DBili  x   /  AST  17  /  ALT  12  /  AlkPhos  57  03-20      Urinalysis Basic - ( 20 Mar 2024 05:30 )    Color: x / Appearance: x / SG: x / pH: x  Gluc: 175 mg/dL / Ketone: x  / Bili: x / Urobili: x   Blood: x / Protein: x / Nitrite: x   Leuk Esterase: x / RBC: x / WBC x   Sq Epi: x / Non Sq Epi: x / Bacteria: x        MICROBIOLOGY:    RADIOLOGY & ADDITIONAL STUDIES: INTERVAL HPI/OVERNIGHT EVENTS: jasmina    SUBJECTIVE: Patient seen and examined at bedside, resting comfortably in bed, and does not appear to be in any acute distress. Patient states T    ANTIBIOTICS/RELEVANT:    MEDICATIONS  (STANDING):  atorvastatin 40 milliGRAM(s) Oral at bedtime  dextrose 5%. 1000 milliLiter(s) (100 mL/Hr) IV Continuous <Continuous>  dextrose 5%. 1000 milliLiter(s) (50 mL/Hr) IV Continuous <Continuous>  dextrose 50% Injectable 25 Gram(s) IV Push once  dextrose 50% Injectable 12.5 Gram(s) IV Push once  dextrose 50% Injectable 25 Gram(s) IV Push once  finasteride 5 milliGRAM(s) Oral daily  fluconAZOLE IVPB 200 milliGRAM(s) IV Intermittent every 24 hours  glucagon  Injectable 1 milliGRAM(s) IntraMuscular once  heparin   Injectable 5000 Unit(s) SubCutaneous every 12 hours  insulin lispro (ADMELOG) corrective regimen sliding scale   SubCutaneous three times a day before meals  insulin lispro (ADMELOG) corrective regimen sliding scale   SubCutaneous at bedtime  lactated ringers. 1000 milliLiter(s) (40 mL/Hr) IV Continuous <Continuous>  melatonin 3 milliGRAM(s) Oral at bedtime  polyethylene glycol 3350 17 Gram(s) Oral daily  senna 2 Tablet(s) Oral at bedtime  tamsulosin 0.8 milliGRAM(s) Oral at bedtime    MEDICATIONS  (PRN):  acetaminophen     Tablet .. 650 milliGRAM(s) Oral every 6 hours PRN Temp greater or equal to 38C (100.4F), Mild Pain (1 - 3)  dextrose Oral Gel 15 Gram(s) Oral once PRN Blood Glucose LESS THAN 70 milliGRAM(s)/deciliter      Vital Signs Last 24 Hrs  T(C): 36.3 (21 Mar 2024 05:20), Max: 36.4 (20 Mar 2024 21:15)  T(F): 97.4 (21 Mar 2024 05:20), Max: 97.6 (20 Mar 2024 21:15)  HR: 56 (21 Mar 2024 05:20) (56 - 65)  BP: 147/82 (21 Mar 2024 05:20) (133/67 - 147/82)  BP(mean): --  RR: 17 (21 Mar 2024 05:20) (17 - 18)  SpO2: 95% (21 Mar 2024 05:20) (94% - 95%)    Parameters below as of 21 Mar 2024 05:20  Patient On (Oxygen Delivery Method): room air        PHYSICAL EXAM:  General: in no acute distress  Eyes: EOMI intact bilaterally. Anicteric sclerae, moist conjunctivae  HENT: Moist mucous membranes  Neck: Trachea midline, supple  Lungs: CTA B/L. No wheezes, rales, or rhonchi  Cardiovascular: RRR. No murmurs, rubs, or gallops  Abdomen: Soft, non-tender non-distended; No rebound or guarding  Extremities: WWP, No clubbing, cyanosis or edema  Neurological: Alert and oriented  Skin: Warm and dry. No obvious rash     LABS:                        9.5    6.95  )-----------( 164      ( 20 Mar 2024 05:30 )             28.7     03-20    142  |  106  |  44<H>  ----------------------------<  175<H>  3.6   |  23  |  4.06<H>    Ca    8.5      20 Mar 2024 05:30  Mg     1.7     03-20    TPro  5.6<L>  /  Alb  2.5<L>  /  TBili  0.2  /  DBili  x   /  AST  17  /  ALT  12  /  AlkPhos  57  03-20      Urinalysis Basic - ( 20 Mar 2024 05:30 )    Color: x / Appearance: x / SG: x / pH: x  Gluc: 175 mg/dL / Ketone: x  / Bili: x / Urobili: x   Blood: x / Protein: x / Nitrite: x   Leuk Esterase: x / RBC: x / WBC x   Sq Epi: x / Non Sq Epi: x / Bacteria: x        MICROBIOLOGY:    RADIOLOGY & ADDITIONAL STUDIES: INTERVAL HPI/OVERNIGHT EVENTS: jasmina    SUBJECTIVE: Patient seen and examined at bedside, resting comfortably in bed, and does not appear to be in any acute distress.    MEDICATIONS  (STANDING):  atorvastatin 40 milliGRAM(s) Oral at bedtime  dextrose 5%. 1000 milliLiter(s) (100 mL/Hr) IV Continuous <Continuous>  dextrose 5%. 1000 milliLiter(s) (50 mL/Hr) IV Continuous <Continuous>  dextrose 50% Injectable 25 Gram(s) IV Push once  dextrose 50% Injectable 12.5 Gram(s) IV Push once  dextrose 50% Injectable 25 Gram(s) IV Push once  finasteride 5 milliGRAM(s) Oral daily  fluconAZOLE IVPB 200 milliGRAM(s) IV Intermittent every 24 hours  glucagon  Injectable 1 milliGRAM(s) IntraMuscular once  heparin   Injectable 5000 Unit(s) SubCutaneous every 12 hours  insulin lispro (ADMELOG) corrective regimen sliding scale   SubCutaneous three times a day before meals  insulin lispro (ADMELOG) corrective regimen sliding scale   SubCutaneous at bedtime  lactated ringers. 1000 milliLiter(s) (40 mL/Hr) IV Continuous <Continuous>  melatonin 3 milliGRAM(s) Oral at bedtime  polyethylene glycol 3350 17 Gram(s) Oral daily  senna 2 Tablet(s) Oral at bedtime  tamsulosin 0.8 milliGRAM(s) Oral at bedtime    MEDICATIONS  (PRN):  acetaminophen     Tablet .. 650 milliGRAM(s) Oral every 6 hours PRN Temp greater or equal to 38C (100.4F), Mild Pain (1 - 3)  dextrose Oral Gel 15 Gram(s) Oral once PRN Blood Glucose LESS THAN 70 milliGRAM(s)/deciliter    Vital Signs Last 24 Hrs  T(C): 36.3 (21 Mar 2024 05:20), Max: 36.4 (20 Mar 2024 21:15)  T(F): 97.4 (21 Mar 2024 05:20), Max: 97.6 (20 Mar 2024 21:15)  HR: 56 (21 Mar 2024 05:20) (56 - 65)  BP: 147/82 (21 Mar 2024 05:20) (133/67 - 147/82)  BP(mean): --  RR: 17 (21 Mar 2024 05:20) (17 - 18)  SpO2: 95% (21 Mar 2024 05:20) (94% - 95%)    Parameters below as of 21 Mar 2024 05:20  Patient On (Oxygen Delivery Method): room air    PHYSICAL EXAM:  General: in no acute distress  Eyes: EOMI intact bilaterally  HENT: Moist mucous membranes  Neck: Trachea midline, supple  Lungs: CTA B/L  Cardiovascular: RRR  Abdomen: Soft, non-tender non-distended  : wade with red tinged urine  Extremities: WWP, No clubbing, cyanosis or edema  Neurological: Alert and oriented  Skin: Warm and dry    LABS:                        9.5    6.95  )-----------( 164      ( 20 Mar 2024 05:30 )             28.7     03-20    142  |  106  |  44<H>  ----------------------------<  175<H>  3.6   |  23  |  4.06<H>    Ca    8.5      20 Mar 2024 05:30  Mg     1.7     03-20    TPro  5.6<L>  /  Alb  2.5<L>  /  TBili  0.2  /  DBili  x   /  AST  17  /  ALT  12  /  AlkPhos  57  03-20      Urinalysis Basic - ( 20 Mar 2024 05:30 )    Color: x / Appearance: x / SG: x / pH: x  Gluc: 175 mg/dL / Ketone: x  / Bili: x / Urobili: x   Blood: x / Protein: x / Nitrite: x   Leuk Esterase: x / RBC: x / WBC x   Sq Epi: x / Non Sq Epi: x / Bacteria: x

## 2024-03-21 NOTE — PROGRESS NOTE ADULT - PROBLEM SELECTOR PLAN 2
RESOLVED  Patient with hyperkalemia to 6.4 on arrival to the ED. Likely iso post-obstructive JULIO CESAR, given hyperkalemia cocktail + lokelma 10mg q8h x 2. Repeat K downtrending, no EKG changes noted. Nephrology consulted    - f/u nephrology recs  - monitor UOP  - caution with insulin/dextrose cocktail given poor renal function

## 2024-03-21 NOTE — PROGRESS NOTE ADULT - PROBLEM SELECTOR PLAN 1
#Severe sepsis POA -  met 3/4 SIRS criteria with T 100.5/<96.8, , RR > 20, WBC 12.42.  Likely source UTI due to urinary tract obstruction + candidemia. ID consulted.   Cutltures: Blood Cx scandida surveillance NGTD, urine Cx candida  Blood Cx growing candida albicans Lactate 3.0 --> 2.0  s/p Zosyn and caspofungin  - Fluconazole 200 mg IV QD  - ID recommending ophtho consult  - f/u ekg 3/21 #Severe sepsis POA -  met 3/4 SIRS criteria with T 100.5/<96.8, , RR > 20, WBC 12.42.  Likely source UTI due to urinary tract obstruction + candidemia. ID consulted.   Cutltures: Blood Cx scandida surveillance NGTD, urine Cx candida  Blood Cx growing candida albicans Lactate 3.0 --> 2.0  s/p Zosyn and caspofungin  3/22: ekg qtc 403  - ID consulted, f/u recs  - Fluconazole 200 mg IV QD d/yvonne, transitioned to oral fluconazole

## 2024-03-21 NOTE — PROGRESS NOTE ADULT - PROBLEM SELECTOR PLAN 5
Patient noted to have JULIO CESAR with Cr 5.25 over baseline 1.05. found to have BS of 400cc  now s/p wade placement by urology in ED. s/p 2 L IVF. trend Cr: uptrending on 03/16 AM from 4.56 --> 5.75  - f/u urine lytes   - avoid nephrotoxic drugs, renally dose meds

## 2024-03-21 NOTE — DISCHARGE NOTE NURSING/CASE MANAGEMENT/SOCIAL WORK - PATIENT PORTAL LINK FT
You can access the FollowMyHealth Patient Portal offered by [facility] by registering at the following website: [email or web address]. By joining [facility]’s FollowMyHealth portal, you will also be able to view your health information using other applications (apps) compatible with our system.

## 2024-03-21 NOTE — PROGRESS NOTE ADULT - ASSESSMENT
89 yo M with DM, HLD, CKD, BPH, and recurrent UTIs. Admitted 3/15 with JULIO CESAR on CKD and urinary retention. 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 He has improved and leukocytosis resolved, likely from relief of obstruction prior to start of anti-fungal therapy. Started with Caspofungin until surveillance cultures cleared. Now that BCx (3/17) clear for 48 hours, will treat with azole for presumed susceptibility to C. albicans. Azoles also have better penetration in the urine, so it is preferred agent. TTE shows no significant valvular disease.     Recommendations:  - Okay switch to oral Fluconazole 200mg q24h; total azole course 3/17-3/30 for 14 days  - EKG 3/21 AM shows QTc 403  - Follow up Candida (+) BCx from 3/15 for formal susceptibility; does not have to remain hospitalized if result pending and patient medically ready for discharge  - Please patient seen ophthalmology if any vision changes for intraocular candid      ID Team 1 will follow. Discussed with primary team. Final note pending attending attestation.  89 yo M with DM, HLD, CKD, BPH, and recurrent UTIs. Admitted 3/15 with JULIO CESAR on CKD and urinary retention. 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 He has improved and leukocytosis resolved, likely from relief of obstruction prior to start of anti-fungal therapy. Started with Caspofungin until surveillance cultures cleared. Now that BCx (3/17) clear for 48 hours, will treat with azole for presumed susceptibility to C. albicans. Azoles also have better penetration in the urine, so it is preferred agent. TTE shows no significant valvular disease.     Hematuria this AM appears transient and self limited; urine already looks clear, yellow again. No changes to antibiotics.     Recommendations:  - Okay switch to oral Fluconazole 200mg q24h; total azole course 3/17-3/30 for 14 days  - EKG 3/21 AM shows QTc 403  - Follow up Candida (+) BCx from 3/15 for formal susceptibility; does not have to remain hospitalized if result pending and patient medically ready for discharge  - Please have patient see ophthalmology as outpatient if any vision changes for intraocular candida      ID Team 1 will follow. Discussed with primary team. Final note pending attending attestation.  89 yo M with DM, HLD, CKD, BPH, and recurrent UTIs. Admitted 3/15 with JULIO CESAR on CKD and urinary retention. 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 He has improved and leukocytosis resolved, likely from relief of obstruction prior to start of anti-fungal therapy. Started with Caspofungin until surveillance cultures cleared. Now that BCx (3/17) clear for 48 hours, will treat with azole for presumed susceptibility to C. albicans. Azoles also have better penetration in the urine, so it is preferred agent. TTE shows no significant valvular disease.     Hematuria this AM appears transient and self limited; new urine already looks clear, yellow again. No changes to antibiotics.    Recommendations:  - Okay switch to oral Fluconazole 200mg q24h; total azole course 3/17-3/30 for 14 days  - EKG 3/21 AM shows QTc 403  - Follow up Candida (+) BCx from 3/15 for formal susceptibility; does not have to remain hospitalized if result pending and patient medically ready for discharge  - Please have patient see ophthalmology as outpatient if any vision changes for intraocular candida      ID Team 1 will sign off. Final recommendations pending attending attestation.

## 2024-03-21 NOTE — PROGRESS NOTE ADULT - ASSESSMENT
P89 y/o M w/ PMH of DM, BPH s/p chronic Wade, recurrent UTIs, presenting from [facility] rehab due to urinary retention x 1d, admitted for post-obstructive JULIO CESAR and UTI iso retention. At [facility] 03/14, noted to have minimal UOP, bladder scan showing 400cc, new wade placed with subsequent hematuria noted to urine bag with good output  then 03/15 noted to have minimal urine output. s/p new wade placed by urology [facility]. Draining well, uop ~33cc/hr. Admitted to [facility] iso hyperkalemia to 6.4, s/p hyperK cocktail. Patient's hyperkalemia is coming down appropriately, and he is medically stable for transfer to [facility].

## 2024-03-21 NOTE — PROGRESS NOTE ADULT - PROBLEM SELECTOR PLAN 3
Patient w/ chronic wade iso urinary retention/obstruction, presented from nursing home with obstructed wade. Wade replaced in the ED by urology with drainage of 400cc of purulent urine. Urology consulted  -f/u urology recs  - treat UTI as below    -outpatient urology follow up Patient w/ chronic wade iso urinary retention/obstruction, presented from nursing home with obstructed wade. Wade replaced in the ED by urology with drainage of 400cc of purulent urine. Urology consulted  - f/u urology recs  - treat UTI as below    -outpatient urology follow up

## 2024-03-21 NOTE — PROGRESS NOTE ADULT - REASON FOR ADMISSION
JULIO CESAR
UTI/JULIO CESAR
JULIO CESAR
BPH with obstruction and JULIO CESAR with candidemia
UTI JULIO CESAR

## 2024-03-21 NOTE — PROGRESS NOTE ADULT - PROBLEM SELECTOR PLAN 4
P/w urinary retention and +UA. Pt has hx of recurrent UTIs. s/p ceftriazone in in ED  - c/w fluconazole  - f/u urine cx  - f/u blood cx.

## 2024-03-21 NOTE — PROGRESS NOTE ADULT - SUBJECTIVE AND OBJECTIVE BOX
Hematuria overnight  Conitnued improvement in cre  More lucid - essentially now at baseline    MEDICATIONS  (STANDING):  atorvastatin 40 milliGRAM(s) Oral at bedtime  dextrose 5%. 1000 milliLiter(s) (100 mL/Hr) IV Continuous <Continuous>  dextrose 5%. 1000 milliLiter(s) (50 mL/Hr) IV Continuous <Continuous>  dextrose 50% Injectable 25 Gram(s) IV Push once  dextrose 50% Injectable 12.5 Gram(s) IV Push once  dextrose 50% Injectable 25 Gram(s) IV Push once  finasteride 5 milliGRAM(s) Oral daily  fluconAZOLE IVPB 200 milliGRAM(s) IV Intermittent every 24 hours  glucagon  Injectable 1 milliGRAM(s) IntraMuscular once  heparin   Injectable 5000 Unit(s) SubCutaneous every 12 hours  insulin lispro (ADMELOG) corrective regimen sliding scale   SubCutaneous three times a day before meals  insulin lispro (ADMELOG) corrective regimen sliding scale   SubCutaneous at bedtime  lactated ringers. 1000 milliLiter(s) (40 mL/Hr) IV Continuous <Continuous>  magnesium sulfate  IVPB 2 Gram(s) IV Intermittent once  melatonin 3 milliGRAM(s) Oral at bedtime  polyethylene glycol 3350 17 Gram(s) Oral daily  senna 2 Tablet(s) Oral at bedtime  tamsulosin 0.8 milliGRAM(s) Oral at bedtime    MEDICATIONS  (PRN):  acetaminophen     Tablet .. 650 milliGRAM(s) Oral every 6 hours PRN Temp greater or equal to 38C (100.4F), Mild Pain (1 - 3)  dextrose Oral Gel 15 Gram(s) Oral once PRN Blood Glucose LESS THAN 70 milliGRAM(s)/deciliter    ICU Vital Signs Last 24 Hrs  T(C): 36.3 (21 Mar 2024 05:20), Max: 36.4 (20 Mar 2024 21:15)  T(F): 97.4 (21 Mar 2024 05:20), Max: 97.6 (20 Mar 2024 21:15)  HR: 56 (21 Mar 2024 05:20) (56 - 65)  BP: 147/82 (21 Mar 2024 05:20) (138/70 - 147/82)  BP(mean): --  ABP: --  ABP(mean): --  RR: 17 (21 Mar 2024 05:20) (17 - 18)  SpO2: 95% (21 Mar 2024 05:20) (94% - 95%)    O2 Parameters below as of 21 Mar 2024 05:20  Patient On (Oxygen Delivery Method): room air    NAD seated in bed  No JVD  Chest clear  CV RRR s1s2 no murmur  Abd soft  Ext no edema                          9.2    6.60  )-----------( 178      ( 21 Mar 2024 05:30 )             27.7   03-21    140  |  106  |  35<H>  ----------------------------<  191<H>  3.5   |  25  |  3.07<H>    Ca    8.4      21 Mar 2024 05:30  Phos  3.5     03-21  Mg     1.6     03-21    TPro  5.6<L>  /  Alb  2.5<L>  /  TBili  0.2  /  DBili  x   /  AST  17  /  ALT  12  /  AlkPhos  57  03-20

## 2024-03-21 NOTE — PROGRESS NOTE ADULT - ASSESSMENT
89 yo man admitted from [facility] with JULIO CESAR and diagnosed with candidemia:  Hematuria overnight - will flush wade and monitor hgb stable  Anemia - multifactorial (iron def and nutritional)  Conitnued improvement in cre  More lucid - essentially now at baseline  On antifungal tx per ID  DVT prophylaxis  Diabetes:  POCT Blood Glucose.: 183 mg/dL (20 Mar 2024 21:28)  POCT Blood Glucose.: 196 mg/dL (20 Mar 2024 18:00)  POCT Blood Glucose.: 225 mg/dL (20 Mar 2024 13:25)  POCT Blood Glucose.: 172 mg/dL (20 Mar 2024 09:24)    Well controlled on currently regiemtn - will resume metformin and trdjenta on dc to [facility]

## 2024-03-21 NOTE — PROGRESS NOTE ADULT - SUBJECTIVE AND OBJECTIVE BOX
Infectious Disease Progress Note:    INTERVAL HPI/OVERNIGHT EVENTS:  Patient was seen and examined at bedside. Case discussed with attending physician during morning rounds.     As per nurse and patient, no o/n events, patient resting comfortably.    PAST MEDICAL HISTORY:  URINARY RETENTION      HLD (hyperlipidemia)    BPH (benign prostatic hyperplasia)    JULIO CESAR (acute kidney injury)    Urinary retention    UTI (urinary tract infection)    Prophylactic measure    Hyperkalemia    Sepsis    Diabetes mellitus    Metabolic encephalopathy    Candidemia        ROS:  CONSTITUTIONAL:  Negative fever or chills, feels well, good appetite  EYES:  Negative  blurry vision or double vision  CARDIOVASCULAR:  Negative for chest pain or palpitations  RESPIRATORY:  Negative for cough, wheezing, or SOB   GASTROINTESTINAL:  Negative for nausea, vomiting, diarrhea, constipation, or abdominal pain  GENITOURINARY:  Negative frequency, urgency or dysuria  NEUROLOGIC:  No headache, confusion, dizziness, lightheadedness    Allergies    No Known Allergies    Intolerances        ANTIBIOTICS/RELEVANT:  antimicrobials  fluconAZOLE   Tablet 200 milliGRAM(s) Oral every 24 hours    immunologic:    OTHER:  acetaminophen     Tablet .. 650 milliGRAM(s) Oral every 6 hours PRN  atorvastatin 40 milliGRAM(s) Oral at bedtime  dextrose 5%. 1000 milliLiter(s) IV Continuous <Continuous>  dextrose 5%. 1000 milliLiter(s) IV Continuous <Continuous>  dextrose 50% Injectable 25 Gram(s) IV Push once  dextrose 50% Injectable 12.5 Gram(s) IV Push once  dextrose 50% Injectable 25 Gram(s) IV Push once  dextrose Oral Gel 15 Gram(s) Oral once PRN  finasteride 5 milliGRAM(s) Oral daily  glucagon  Injectable 1 milliGRAM(s) IntraMuscular once  heparin   Injectable 5000 Unit(s) SubCutaneous every 12 hours  insulin lispro (ADMELOG) corrective regimen sliding scale   SubCutaneous three times a day before meals  insulin lispro (ADMELOG) corrective regimen sliding scale   SubCutaneous at bedtime  melatonin 3 milliGRAM(s) Oral at bedtime  polyethylene glycol 3350 17 Gram(s) Oral daily  senna 2 Tablet(s) Oral at bedtime  tamsulosin 0.8 milliGRAM(s) Oral at bedtime      Objective:  Vital Signs Last 24 Hrs  T(C): 36.3 (21 Mar 2024 08:56), Max: 36.4 (20 Mar 2024 21:15)  T(F): 97.3 (21 Mar 2024 08:56), Max: 97.6 (20 Mar 2024 21:15)  HR: 69 (21 Mar 2024 08:56) (56 - 69)  BP: 162/75 (21 Mar 2024 08:56) (138/70 - 162/75)  BP(mean): --  RR: 18 (21 Mar 2024 08:56) (17 - 18)  SpO2: 96% (21 Mar 2024 08:56) (94% - 96%)    Parameters below as of 21 Mar 2024 08:56  Patient On (Oxygen Delivery Method): room air        PHYSICAL EXAM:  Constitutional: resting comfortably; NAD  Eyes: NC/AT, EOMI, anicteric sclera, MMM  Ear/Nose/Throat: no oral lesions or erythema, no sinus tenderness on percussion   Neck: no JVD, no lymphadenopathy, supple  Respiratory: CTA b/l, no W/R/R, no retractions  Cardiovascular: +S1/S2; RRR; no M/R/G  Gastrointestinal: soft, (+) BS, NT/ND; no rebound or guarding  Extremities: no joint swelling, edema or erythema  Dermatologic: warm, dry, intact; no raches, wounds, or scars  Neurologic: AAOx3, no focal deficits  Psychiatric: calm, cooperative    LABS:                        9.2    6.60  )-----------( 178      ( 21 Mar 2024 05:30 )             27.7     03-21    140  |  106  |  35<H>  ----------------------------<  191<H>  3.5   |  25  |  3.07<H>    Ca    8.4      21 Mar 2024 05:30  Phos  3.5     03-21  Mg     1.6     03-21    TPro  5.6<L>  /  Alb  2.5<L>  /  TBili  0.2  /  DBili  x   /  AST  17  /  ALT  12  /  AlkPhos  57  03-20      Urinalysis Basic - ( 21 Mar 2024 05:30 )    Color: x / Appearance: x / SG: x / pH: x  Gluc: 191 mg/dL / Ketone: x  / Bili: x / Urobili: x   Blood: x / Protein: x / Nitrite: x   Leuk Esterase: x / RBC: x / WBC x   Sq Epi: x / Non Sq Epi: x / Bacteria: x        MICROBIOLOGY:        RADIOLOGY & ADDITIONAL STUDIES: Infectious Disease Progress Note:    INTERVAL HPI/OVERNIGHT EVENTS:  Patient was seen and examined at bedside. Case discussed with attending physician during morning rounds.     Patient resting comfortably, sleeping. Afebrile overnight. Son at bedside, points out that urine appear red punch colored this AM when he arrived. Patient denied any fever, chills, wade related trauma, suprapubic/pelvic pain, abd pain, n/v. Son did not notice any change in mental status, lethargy, loss of appetite. Urine appears lighter/more yellow in wade tube.     PAST MEDICAL HISTORY:  URINARY RETENTION      HLD (hyperlipidemia)    BPH (benign prostatic hyperplasia)    JULIO CESAR (acute kidney injury)    Urinary retention    UTI (urinary tract infection)    Prophylactic measure    Hyperkalemia    Sepsis    Diabetes mellitus    Metabolic encephalopathy    Candidemia        ROS:  CONSTITUTIONAL:  Negative fever or chills, feels well, good appetite  EYES:  Negative  blurry vision or double vision  CARDIOVASCULAR:  Negative for chest pain or palpitations  RESPIRATORY:  Negative for cough, wheezing, or SOB   GASTROINTESTINAL:  Negative for nausea, vomiting, diarrhea, constipation, or abdominal pain  GENITOURINARY:  +Hematuria. Negative frequency, urgency or dysuria  NEUROLOGIC:  No headache, confusion, dizziness, lightheadedness    Allergies    No Known Allergies    Intolerances        ANTIBIOTICS/RELEVANT:  antimicrobials  fluconAZOLE   Tablet 200 milliGRAM(s) Oral every 24 hours    immunologic:    OTHER:  acetaminophen     Tablet .. 650 milliGRAM(s) Oral every 6 hours PRN  atorvastatin 40 milliGRAM(s) Oral at bedtime  dextrose 5%. 1000 milliLiter(s) IV Continuous <Continuous>  dextrose 5%. 1000 milliLiter(s) IV Continuous <Continuous>  dextrose 50% Injectable 25 Gram(s) IV Push once  dextrose 50% Injectable 12.5 Gram(s) IV Push once  dextrose 50% Injectable 25 Gram(s) IV Push once  dextrose Oral Gel 15 Gram(s) Oral once PRN  finasteride 5 milliGRAM(s) Oral daily  glucagon  Injectable 1 milliGRAM(s) IntraMuscular once  heparin   Injectable 5000 Unit(s) SubCutaneous every 12 hours  insulin lispro (ADMELOG) corrective regimen sliding scale   SubCutaneous three times a day before meals  insulin lispro (ADMELOG) corrective regimen sliding scale   SubCutaneous at bedtime  melatonin 3 milliGRAM(s) Oral at bedtime  polyethylene glycol 3350 17 Gram(s) Oral daily  senna 2 Tablet(s) Oral at bedtime  tamsulosin 0.8 milliGRAM(s) Oral at bedtime      Objective:  Vital Signs Last 24 Hrs  T(C): 36.3 (21 Mar 2024 08:56), Max: 36.4 (20 Mar 2024 21:15)  T(F): 97.3 (21 Mar 2024 08:56), Max: 97.6 (20 Mar 2024 21:15)  HR: 69 (21 Mar 2024 08:56) (56 - 69)  BP: 162/75 (21 Mar 2024 08:56) (138/70 - 162/75)  BP(mean): --  RR: 18 (21 Mar 2024 08:56) (17 - 18)  SpO2: 96% (21 Mar 2024 08:56) (94% - 96%)    Parameters below as of 21 Mar 2024 08:56  Patient On (Oxygen Delivery Method): room air        PHYSICAL EXAM:  Constitutional: resting comfortably; NAD  Eyes: NC/AT, MMM  Neck: supple  Respiratory: CTA b/l, no W/R/R  Cardiovascular: +S1/S2; RRR  Gastrointestinal: NT/ND  Genitourinary: wade in place, old dried blood near meatus, no purulent discharge. Punch colored urine in collection bag, but yellow in tubing  Extremities: no joint swelling, edema or erythema  Dermatologic: warm, dry, intact  Neurologic: no focal deficits  Psychiatric: calm, cooperative    LABS:                        9.2    6.60  )-----------( 178      ( 21 Mar 2024 05:30 )             27.7     03-21    140  |  106  |  35<H>  ----------------------------<  191<H>  3.5   |  25  |  3.07<H>    Ca    8.4      21 Mar 2024 05:30  Phos  3.5     03-21  Mg     1.6     03-21    TPro  5.6<L>  /  Alb  2.5<L>  /  TBili  0.2  /  DBili  x   /  AST  17  /  ALT  12  /  AlkPhos  57  03-20      Urinalysis Basic - ( 21 Mar 2024 05:30 )    Color: x / Appearance: x / SG: x / pH: x  Gluc: 191 mg/dL / Ketone: x  / Bili: x / Urobili: x   Blood: x / Protein: x / Nitrite: x   Leuk Esterase: x / RBC: x / WBC x   Sq Epi: x / Non Sq Epi: x / Bacteria: x        MICROBIOLOGY:    3/15: BCx - Candida albicans confirmed 2/2; susceptibilities pending   3/15: UCx - growing >100k Candida albicans   3/17: BCx - NGTD       RADIOLOGY & ADDITIONAL STUDIES: Infectious Disease Progress Note:    INTERVAL HPI/OVERNIGHT EVENTS:  Patient was seen and examined at bedside. Case discussed with attending physician during morning rounds.     Patient resting comfortably, sleeping. Afebrile overnight. Son at bedside, points out that urine appear red punch colored this AM when he arrived. Patient denied any fever, chills, wade related trauma, suprapubic/pelvic pain, abd pain, n/v. Son did not notice any change in mental status, lethargy, loss of appetite. Urine appears lighter/more yellow in wade tube.     PAST MEDICAL HISTORY:  URINARY RETENTION      HLD (hyperlipidemia)    BPH (benign prostatic hyperplasia)    JULIO CESAR (acute kidney injury)    Urinary retention    UTI (urinary tract infection)    Prophylactic measure    Hyperkalemia    Sepsis    Diabetes mellitus    Metabolic encephalopathy    Candidemia        ROS:  CONSTITUTIONAL:  Negative fever or chills, feels well, good appetite  EYES:  Negative  blurry vision or double vision  CARDIOVASCULAR:  Negative for chest pain or palpitations  RESPIRATORY:  Negative for cough, wheezing, or SOB   GASTROINTESTINAL:  Negative for nausea, vomiting, diarrhea, constipation, or abdominal pain  GENITOURINARY:  + Hematuria. Negative frequency, urgency or dysuria  NEUROLOGIC:  No headache, confusion, dizziness, lightheadedness    Allergies    No Known Allergies    Intolerances        ANTIBIOTICS/RELEVANT:  antimicrobials  fluconAZOLE   Tablet 200 milliGRAM(s) Oral every 24 hours    immunologic:    OTHER:  acetaminophen     Tablet .. 650 milliGRAM(s) Oral every 6 hours PRN  atorvastatin 40 milliGRAM(s) Oral at bedtime  dextrose 5%. 1000 milliLiter(s) IV Continuous <Continuous>  dextrose 5%. 1000 milliLiter(s) IV Continuous <Continuous>  dextrose 50% Injectable 25 Gram(s) IV Push once  dextrose 50% Injectable 12.5 Gram(s) IV Push once  dextrose 50% Injectable 25 Gram(s) IV Push once  dextrose Oral Gel 15 Gram(s) Oral once PRN  finasteride 5 milliGRAM(s) Oral daily  glucagon  Injectable 1 milliGRAM(s) IntraMuscular once  heparin   Injectable 5000 Unit(s) SubCutaneous every 12 hours  insulin lispro (ADMELOG) corrective regimen sliding scale   SubCutaneous three times a day before meals  insulin lispro (ADMELOG) corrective regimen sliding scale   SubCutaneous at bedtime  melatonin 3 milliGRAM(s) Oral at bedtime  polyethylene glycol 3350 17 Gram(s) Oral daily  senna 2 Tablet(s) Oral at bedtime  tamsulosin 0.8 milliGRAM(s) Oral at bedtime      Objective:  Vital Signs Last 24 Hrs  T(C): 36.3 (21 Mar 2024 08:56), Max: 36.4 (20 Mar 2024 21:15)  T(F): 97.3 (21 Mar 2024 08:56), Max: 97.6 (20 Mar 2024 21:15)  HR: 69 (21 Mar 2024 08:56) (56 - 69)  BP: 162/75 (21 Mar 2024 08:56) (138/70 - 162/75)  BP(mean): --  RR: 18 (21 Mar 2024 08:56) (17 - 18)  SpO2: 96% (21 Mar 2024 08:56) (94% - 96%)    Parameters below as of 21 Mar 2024 08:56  Patient On (Oxygen Delivery Method): room air        PHYSICAL EXAM:  Constitutional: resting comfortably; NAD  Eyes: NC/AT, MMM  Neck: supple  Respiratory: CTA b/l, no W/R/R  Cardiovascular: +S1/S2; RRR  Gastrointestinal: NT/ND  Genitourinary: wade in place, old dried blood near meatus, no purulent discharge. Punch colored urine in collection bag, but yellow in tubing  Extremities: no joint swelling, edema or erythema  Dermatologic: warm, dry, intact  Neurologic: no focal deficits  Psychiatric: calm, cooperative    LABS:                        9.2    6.60  )-----------( 178      ( 21 Mar 2024 05:30 )             27.7     03-21    140  |  106  |  35<H>  ----------------------------<  191<H>  3.5   |  25  |  3.07<H>    Ca    8.4      21 Mar 2024 05:30  Phos  3.5     03-21  Mg     1.6     03-21    TPro  5.6<L>  /  Alb  2.5<L>  /  TBili  0.2  /  DBili  x   /  AST  17  /  ALT  12  /  AlkPhos  57  03-20      Urinalysis Basic - ( 21 Mar 2024 05:30 )    Color: x / Appearance: x / SG: x / pH: x  Gluc: 191 mg/dL / Ketone: x  / Bili: x / Urobili: x   Blood: x / Protein: x / Nitrite: x   Leuk Esterase: x / RBC: x / WBC x   Sq Epi: x / Non Sq Epi: x / Bacteria: x        MICROBIOLOGY:    3/15: BCx - Candida albicans confirmed 2/2; susceptibilities pending   3/15: UCx - growing >100k Candida albicans   3/17: BCx - NGTD       RADIOLOGY & ADDITIONAL STUDIES:

## 2024-03-21 NOTE — PROGRESS NOTE ADULT - ATTENDING COMMENTS
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 Ongoing overall clinical improvement, Cr slowly decreasing, blood culture 3/17 NGTD.  Would f/u Candida susceptibility, repeat ECG 48-72 h after initiation of fluconazole, have Optho eval, continue fluconazole IV for today, anticipate change to po to complete 14 d course.  Will follow with you, team 1.
events noted,  chart reviewed  case reviewed with patient's son at bedside    89 yo man with JULIO CESAR, Uretention creat 5.9- wade in place but without significant drop in creat level- down to 5.7 today  urine output improving  would add IVFs as outlined above, at this time- will trend    No indication for RRT at this time
89 yo man with JULIO CESAR, Uretention creat slow down trend- 5.09 today  non oliguric  c/w IVFs LR  seems more alert and should be able to drink sufficiently over next 24H  No indication for RRT at this time
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  He is markedly improved clinically, more awake, suprapubic tenderness has resolved.  Would change to fluconazole renally dosed as above in view of better penetration into the urine with this agent.  QTc 400 on 3/15.  Will be able to change to po if he continues to improve.  TTE shows no significant valvular disease.  Would f/u blood cultures from 3/15 for susceptibility, surveillance blood culture from 3/17, obtain Optho eval for ?endophthalmitis.  Will follow with you, team 1.
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 Ongoing overall clinical improvement, Cr slowly decreasing, blood culture 3/17 NGTD.  Would f/u Candida susceptibility, repeat ECG 48-72 h after initiation of fluconazole (morning 3/21), continue fluconazole IV for today, anticipate change to po 3/21 to complete 14 d course. Per primary team, Optho will not consult in absence of changes in vision - is a debate between the Ophtho and ID communities.  He has not had any visual changes in a/c this infection - explained to his son the importance of him being seen immediately with visual change.  Will follow with you, team 1.
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 Ongoing overall clinical improvement, Cr slowly decreasing, blood culture 3/17 NGTD, QTc 403 today.  Would f/u Candida susceptibility, treat with fluconazole to complete 14 d course.  Has hematuria - per son, was evaluated by Urology, told likely due to minor trauma, no reason for concern.  Please recall if further ID input is desired - team 1.

## 2024-03-22 LAB
CULTURE RESULTS: SIGNIFICANT CHANGE UP
SPECIMEN SOURCE: SIGNIFICANT CHANGE UP

## 2024-03-25 LAB
-  FLUCONAZOLE: SIGNIFICANT CHANGE UP
CULTURE RESULTS: ABNORMAL
METHOD TYPE: SIGNIFICANT CHANGE UP
ORGANISM # SPEC MICROSCOPIC CNT: ABNORMAL
ORGANISM # SPEC MICROSCOPIC CNT: SIGNIFICANT CHANGE UP
SPECIMEN SOURCE: SIGNIFICANT CHANGE UP

## 2024-03-26 DIAGNOSIS — Z79.899 OTHER LONG TERM (CURRENT) DRUG THERAPY: ICD-10-CM

## 2024-03-26 DIAGNOSIS — T83.511A INFECTION AND INFLAMMATORY REACTION DUE TO INDWELLING URETHRAL CATHETER, INITIAL ENCOUNTER: ICD-10-CM

## 2024-03-26 DIAGNOSIS — E11.22 TYPE 2 DIABETES MELLITUS WITH DIABETIC CHRONIC KIDNEY DISEASE: ICD-10-CM

## 2024-03-26 DIAGNOSIS — Z79.4 LONG TERM (CURRENT) USE OF INSULIN: ICD-10-CM

## 2024-03-26 DIAGNOSIS — E87.5 HYPERKALEMIA: ICD-10-CM

## 2024-03-26 DIAGNOSIS — G47.00 INSOMNIA, UNSPECIFIED: ICD-10-CM

## 2024-03-26 DIAGNOSIS — T83.091A OTHER MECHANICAL COMPLICATION OF INDWELLING URETHRAL CATHETER, INITIAL ENCOUNTER: ICD-10-CM

## 2024-03-26 DIAGNOSIS — Z79.818 LONG TERM (CURRENT) USE OF OTHER AGENTS AFFECTING ESTROGEN RECEPTORS AND ESTROGEN LEVELS: ICD-10-CM

## 2024-03-26 DIAGNOSIS — Z66 DO NOT RESUSCITATE: ICD-10-CM

## 2024-03-26 DIAGNOSIS — N13.8 OTHER OBSTRUCTIVE AND REFLUX UROPATHY: ICD-10-CM

## 2024-03-26 DIAGNOSIS — N40.1 BENIGN PROSTATIC HYPERPLASIA WITH LOWER URINARY TRACT SYMPTOMS: ICD-10-CM

## 2024-03-26 DIAGNOSIS — B37.7 CANDIDAL SEPSIS: ICD-10-CM

## 2024-03-26 DIAGNOSIS — N18.9 CHRONIC KIDNEY DISEASE, UNSPECIFIED: ICD-10-CM

## 2024-03-26 DIAGNOSIS — G93.41 METABOLIC ENCEPHALOPATHY: ICD-10-CM

## 2024-03-26 DIAGNOSIS — L89.101 PRESSURE ULCER OF UNSPECIFIED PART OF BACK, STAGE 1: ICD-10-CM

## 2024-03-26 DIAGNOSIS — N17.9 ACUTE KIDNEY FAILURE, UNSPECIFIED: ICD-10-CM

## 2024-03-26 DIAGNOSIS — D50.9 IRON DEFICIENCY ANEMIA, UNSPECIFIED: ICD-10-CM

## 2024-03-26 DIAGNOSIS — N39.0 URINARY TRACT INFECTION, SITE NOT SPECIFIED: ICD-10-CM

## 2024-03-26 DIAGNOSIS — R65.20 SEVERE SEPSIS WITHOUT SEPTIC SHOCK: ICD-10-CM

## 2024-03-26 DIAGNOSIS — E87.20 ACIDOSIS, UNSPECIFIED: ICD-10-CM

## 2024-03-29 ENCOUNTER — NON-APPOINTMENT (OUTPATIENT)
Age: 89
End: 2024-03-29

## 2024-05-17 NOTE — PROGRESS NOTE ADULT - SUBJECTIVE AND OBJECTIVE BOX
[facility] - Med Surg  Discharge Final Note    Primary Care Provider: Phil Andrade MD    Expected Discharge Date: 5/16/2024      Patient discharge to home with [facility] home health. SOC on Wednesday 5.22.2024. Discharge Plan A and Plan B have been determined by review of patient's clinical status, future medical and therapeutic needs, and coverage/benefits for post-acute care in coordination with multidisciplinary team members.  Final Discharge Note (most recent)       Final Note - 05/17/24 1626          Final Note    Assessment Type Final Discharge Note (P)      Anticipated Discharge Disposition Home-Health Care Svc (P)      Hospital Resources/Appts/Education Provided Appointments scheduled and added to AVS (P)         Post-Acute Status    Post-Acute Authorization Home Health (P)      Home Health Status Set-up Complete/Auth obtained (P)      Coverage [employer] (P)      Discharge Delays None known at this time (P)                      Important Message from Medicare             Contact Info       Phil Andrade MD   Specialty: Family Medicine, Sports Medicine   Relationship: PCP - General  Hypertension Digital Medicine Responsible Provider    [address]  [facility] [address]   Phone: [phone]       Next Steps: Follow up            JAYLON Cueto  Case Management  [phone]     Infectious Disease Progress Note:    INTERVAL HPI/OVERNIGHT EVENTS:  Patient was seen and examined at bedside. Case discussed with attending physician during morning rounds.     As per nurse and patient, no o/n events, patient resting comfortably. Eating breakfast. Denies any f/c, CP, SOB, abd pain, dysuria.     PAST MEDICAL HISTORY:  URINARY RETENTION      HLD (hyperlipidemia)    BPH (benign prostatic hyperplasia)    JULIO CESAR (acute kidney injury)    Urinary retention    UTI (urinary tract infection)    Prophylactic measure    Hyperkalemia    Sepsis    Diabetes mellitus    Metabolic encephalopathy    Candidemia        ROS:  CONSTITUTIONAL:  Negative fever or chills, feels well, good appetite  EYES:  Negative  blurry vision or double vision  CARDIOVASCULAR:  Negative for chest pain or palpitations  RESPIRATORY:  Negative for cough, wheezing, or SOB   GASTROINTESTINAL:  Negative for nausea, vomiting, diarrhea, constipation, or abdominal pain  GENITOURINARY:  Negative frequency, urgency or dysuria  NEUROLOGIC:  No headache, confusion, dizziness, lightheadedness    Allergies    No Known Allergies    Intolerances        ANTIBIOTICS/RELEVANT:  antimicrobials  fluconAZOLE IVPB 200 milliGRAM(s) IV Intermittent every 24 hours    immunologic:    OTHER:  acetaminophen     Tablet .. 650 milliGRAM(s) Oral every 6 hours PRN  atorvastatin 40 milliGRAM(s) Oral at bedtime  dextrose 5%. 1000 milliLiter(s) IV Continuous <Continuous>  dextrose 5%. 1000 milliLiter(s) IV Continuous <Continuous>  dextrose 50% Injectable 25 Gram(s) IV Push once  dextrose 50% Injectable 12.5 Gram(s) IV Push once  dextrose 50% Injectable 25 Gram(s) IV Push once  dextrose Oral Gel 15 Gram(s) Oral once PRN  finasteride 5 milliGRAM(s) Oral daily  glucagon  Injectable 1 milliGRAM(s) IntraMuscular once  heparin   Injectable 5000 Unit(s) SubCutaneous every 12 hours  insulin lispro (ADMELOG) corrective regimen sliding scale   SubCutaneous three times a day before meals  insulin lispro (ADMELOG) corrective regimen sliding scale   SubCutaneous at bedtime  lactated ringers. 1000 milliLiter(s) IV Continuous <Continuous>  melatonin 3 milliGRAM(s) Oral at bedtime  polyethylene glycol 3350 17 Gram(s) Oral daily  senna 2 Tablet(s) Oral at bedtime  tamsulosin 0.8 milliGRAM(s) Oral at bedtime      Objective:  Vital Signs Last 24 Hrs  T(C): 36.3 (20 Mar 2024 09:01), Max: 36.5 (19 Mar 2024 21:12)  T(F): 97.4 (20 Mar 2024 09:01), Max: 97.7 (19 Mar 2024 21:12)  HR: 58 (20 Mar 2024 09:01) (58 - 65)  BP: 141/73 (20 Mar 2024 09:01) (120/70 - 145/70)  BP(mean): --  RR: 18 (20 Mar 2024 09:01) (17 - 18)  SpO2: 94% (20 Mar 2024 09:01) (93% - 98%)    Parameters below as of 20 Mar 2024 09:01  Patient On (Oxygen Delivery Method): room air        PHYSICAL EXAM:  Constitutional: resting comfortably; NAD  Eyes: NC/AT, EOMI, anicteric sclera, MMM  Ear/Nose/Throat: no oral lesions or erythema, no sinus tenderness on percussion   Neck: no JVD, no lymphadenopathy, supple  Respiratory: CTA b/l, no W/R/R, no retractions  Cardiovascular: +S1/S2; RRR; no M/R/G  Gastrointestinal: soft, (+) BS, NT/ND; no rebound or guarding  Extremities: no joint swelling, edema or erythema  Dermatologic: warm, dry, intact; no raches, wounds, or scars  Neurologic: AAOx3, no focal deficits  Psychiatric: calm, cooperative    LABS:                        9.5    6.95  )-----------( 164      ( 20 Mar 2024 05:30 )             28.7     03-20    142  |  106  |  44<H>  ----------------------------<  175<H>  3.6   |  23  |  4.06<H>    Ca    8.5      20 Mar 2024 05:30  Phos  4.1     03-19  Mg     1.7     03-20    TPro  5.6<L>  /  Alb  2.5<L>  /  TBili  0.2  /  DBili  x   /  AST  17  /  ALT  12  /  AlkPhos  57  03-20      Urinalysis Basic - ( 20 Mar 2024 05:30 )    Color: x / Appearance: x / SG: x / pH: x  Gluc: 175 mg/dL / Ketone: x  / Bili: x / Urobili: x   Blood: x / Protein: x / Nitrite: x   Leuk Esterase: x / RBC: x / WBC x   Sq Epi: x / Non Sq Epi: x / Bacteria: x        MICROBIOLOGY:        RADIOLOGY & ADDITIONAL STUDIES: Infectious Disease Progress Note:    INTERVAL HPI/OVERNIGHT EVENTS:  Patient was seen and examined at bedside. Case discussed with attending physician during morning rounds.     As per nurse and patient, no o/n events, patient resting comfortably. Eating breakfast. Denies any f/c, CP, SOB, abd pain, dysuria.     PAST MEDICAL HISTORY:  URINARY RETENTION      HLD (hyperlipidemia)    BPH (benign prostatic hyperplasia)    JULIO CESAR (acute kidney injury)    Urinary retention    UTI (urinary tract infection)    Prophylactic measure    Hyperkalemia    Sepsis    Diabetes mellitus    Metabolic encephalopathy    Candidemia        ROS:  CONSTITUTIONAL:  Negative fever or chills, feels well, good appetite  EYES:  Negative  blurry vision or double vision  CARDIOVASCULAR:  Negative for chest pain or palpitations  RESPIRATORY:  Negative for cough, wheezing, or SOB   GASTROINTESTINAL:  Negative for nausea, vomiting, diarrhea, constipation, or abdominal pain  GENITOURINARY:  Negative frequency, urgency or dysuria  NEUROLOGIC:  No headache, confusion, dizziness, lightheadedness    Allergies    No Known Allergies    Intolerances        ANTIBIOTICS/RELEVANT:  antimicrobials  fluconAZOLE IVPB 200 milliGRAM(s) IV Intermittent every 24 hours    immunologic:    OTHER:  acetaminophen     Tablet .. 650 milliGRAM(s) Oral every 6 hours PRN  atorvastatin 40 milliGRAM(s) Oral at bedtime  dextrose 5%. 1000 milliLiter(s) IV Continuous <Continuous>  dextrose 5%. 1000 milliLiter(s) IV Continuous <Continuous>  dextrose 50% Injectable 25 Gram(s) IV Push once  dextrose 50% Injectable 12.5 Gram(s) IV Push once  dextrose 50% Injectable 25 Gram(s) IV Push once  dextrose Oral Gel 15 Gram(s) Oral once PRN  finasteride 5 milliGRAM(s) Oral daily  glucagon  Injectable 1 milliGRAM(s) IntraMuscular once  heparin   Injectable 5000 Unit(s) SubCutaneous every 12 hours  insulin lispro (ADMELOG) corrective regimen sliding scale   SubCutaneous three times a day before meals  insulin lispro (ADMELOG) corrective regimen sliding scale   SubCutaneous at bedtime  lactated ringers. 1000 milliLiter(s) IV Continuous <Continuous>  melatonin 3 milliGRAM(s) Oral at bedtime  polyethylene glycol 3350 17 Gram(s) Oral daily  senna 2 Tablet(s) Oral at bedtime  tamsulosin 0.8 milliGRAM(s) Oral at bedtime      Objective:  Vital Signs Last 24 Hrs  T(C): 36.3 (20 Mar 2024 09:01), Max: 36.5 (19 Mar 2024 21:12)  T(F): 97.4 (20 Mar 2024 09:01), Max: 97.7 (19 Mar 2024 21:12)  HR: 58 (20 Mar 2024 09:01) (58 - 65)  BP: 141/73 (20 Mar 2024 09:01) (120/70 - 145/70)  BP(mean): --  RR: 18 (20 Mar 2024 09:01) (17 - 18)  SpO2: 94% (20 Mar 2024 09:01) (93% - 98%)    Parameters below as of 20 Mar 2024 09:01  Patient On (Oxygen Delivery Method): room air        PHYSICAL EXAM:  Constitutional: elderly male, resting comfortably; NAD  HEENT: NC/AT, EOMI, anicteric sclera, MMM  Neck: supple  Respiratory: CTA b/l, no W/R/R, no retractions  Cardiovascular: +S1/S2; RRR; no M/R/G  Gastrointestinal: soft, (+) BS, NT/ND; no rebound or guarding  Genitourinary: Dan in place, draining light yellow urine, no suprapubic tenderness  Extremities: no joint swelling, edema or erythema  Dermatologic: warm, dry, intact; no rashes, wounds, or scars  Neurologic: arousable to voice, answering questions    LABS:                        9.5    6.95  )-----------( 164      ( 20 Mar 2024 05:30 )             28.7     03-20    142  |  106  |  44<H>  ----------------------------<  175<H>  3.6   |  23  |  4.06<H>    Ca    8.5      20 Mar 2024 05:30  Phos  4.1     03-19  Mg     1.7     03-20    TPro  5.6<L>  /  Alb  2.5<L>  /  TBili  0.2  /  DBili  x   /  AST  17  /  ALT  12  /  AlkPhos  57  03-20      Urinalysis Basic - ( 20 Mar 2024 05:30 )    Color: x / Appearance: x / SG: x / pH: x  Gluc: 175 mg/dL / Ketone: x  / Bili: x / Urobili: x   Blood: x / Protein: x / Nitrite: x   Leuk Esterase: x / RBC: x / WBC x   Sq Epi: x / Non Sq Epi: x / Bacteria: x        MICROBIOLOGY:  3/15: BCx - Candida albicans confirmed 2/2; susceptibilities pending   3/15: UCx - growing >100k Candida albicans   3/17: BCx - NGTD       RADIOLOGY & ADDITIONAL STUDIES:

## 2024-12-26 NOTE — PROGRESS NOTE ADULT - ATTENDING COMMENTS
Agree with above. 89M with PMH of DM2, HLD, BPH presented for metabolic encephalopathy 2/2 new insulin use, previously in retention requiring indwelling catheter, now s/p failed TOV that required catheter replacement.       Recommendations:   - cont wade for minimum 3d  - cont ambulation and bowel regimen  - if pt fails TOV again prior to d/c, ok to d/c with wade with f/u at [address] [place] [address] office for outpt void trial Agree with above. 89M with PMH of DM2, HLD, BPH presented for metabolic encephalopathy 2/2 new insulin use, previously in retention requiring indwelling catheter, now s/p failed TOV that required catheter replacement.       Recommendations:   - cont wade for minimum 3d  - cont ambulation and bowel regimen  - if pt fails TOV again prior to d/c, ok to d/c with wade with f/u at [address] [place] [address] office for outpt void trial Agree with above. 89M with PMH of DM2, HLD, BPH presented for metabolic encephalopathy 2/2 new insulin use, previously in retention requiring indwelling catheter, now s/p failed TOV that required catheter replacement.       Recommendations:   - cont wade for minimum 3d  - cont ambulation and bowel regimen  - if pt fails TOV again prior to d/c, ok to d/c with wade with f/u at [address] [place] [address] office for outpt void trial Agree with above. 89M with PMH of DM2, HLD, BPH presented for metabolic encephalopathy 2/2 new insulin use, previously in retention requiring indwelling catheter, now s/p failed TOV that required catheter replacement.       Recommendations:   - cont wade for minimum 3d  - cont ambulation and bowel regimen  - if pt fails TOV again prior to d/c, ok to d/c with wade with f/u at [address] [place] [address] office for outpt void trial Resulted